# Patient Record
Sex: MALE | Race: BLACK OR AFRICAN AMERICAN | Employment: OTHER | URBAN - METROPOLITAN AREA
[De-identification: names, ages, dates, MRNs, and addresses within clinical notes are randomized per-mention and may not be internally consistent; named-entity substitution may affect disease eponyms.]

---

## 2017-08-14 ENCOUNTER — APPOINTMENT (OUTPATIENT)
Dept: GENERAL RADIOLOGY | Age: 72
DRG: 064 | End: 2017-08-14
Attending: EMERGENCY MEDICINE
Payer: OTHER GOVERNMENT

## 2017-08-14 ENCOUNTER — APPOINTMENT (OUTPATIENT)
Dept: CT IMAGING | Age: 72
DRG: 064 | End: 2017-08-14
Attending: EMERGENCY MEDICINE
Payer: OTHER GOVERNMENT

## 2017-08-14 ENCOUNTER — HOSPITAL ENCOUNTER (INPATIENT)
Age: 72
LOS: 7 days | Discharge: NURSING, REHAB OR LONG-TERM CARE | DRG: 064 | End: 2017-08-21
Attending: EMERGENCY MEDICINE | Admitting: FAMILY MEDICINE
Payer: OTHER GOVERNMENT

## 2017-08-14 DIAGNOSIS — I63.9 ACUTE CVA (CEREBROVASCULAR ACCIDENT) (HCC): Primary | ICD-10-CM

## 2017-08-14 DIAGNOSIS — R53.1 WEAKNESS: ICD-10-CM

## 2017-08-14 PROBLEM — Z95.0 PACEMAKER: Status: ACTIVE | Noted: 2017-08-14

## 2017-08-14 LAB
ALBUMIN SERPL BCP-MCNC: 3.2 G/DL (ref 3.4–5)
ALBUMIN/GLOB SERPL: 0.8 {RATIO} (ref 0.8–1.7)
ALP SERPL-CCNC: 67 U/L (ref 45–117)
ALT SERPL-CCNC: 32 U/L (ref 16–61)
ANION GAP BLD CALC-SCNC: 12 MMOL/L (ref 3–18)
APPEARANCE UR: CLEAR
AST SERPL W P-5'-P-CCNC: 33 U/L (ref 15–37)
BACTERIA URNS QL MICRO: ABNORMAL /HPF
BASOPHILS # BLD AUTO: 0 K/UL (ref 0–0.06)
BASOPHILS # BLD: 0 % (ref 0–2)
BILIRUB SERPL-MCNC: 0.7 MG/DL (ref 0.2–1)
BILIRUB UR QL: ABNORMAL
BUN SERPL-MCNC: 15 MG/DL (ref 7–18)
BUN/CREAT SERPL: 17 (ref 12–20)
CALCIUM SERPL-MCNC: 9.3 MG/DL (ref 8.5–10.1)
CHLORIDE SERPL-SCNC: 97 MMOL/L (ref 100–108)
CK MB CFR SERPL CALC: 0.7 % (ref 0–4)
CK MB SERPL-MCNC: 2.4 NG/ML (ref 5–25)
CK SERPL-CCNC: 352 U/L (ref 39–308)
CO2 SERPL-SCNC: 29 MMOL/L (ref 21–32)
COLOR UR: ABNORMAL
CREAT SERPL-MCNC: 0.86 MG/DL (ref 0.6–1.3)
DIFFERENTIAL METHOD BLD: ABNORMAL
EOSINOPHIL # BLD: 0 K/UL (ref 0–0.4)
EOSINOPHIL NFR BLD: 0 % (ref 0–5)
EPITH CASTS URNS QL MICRO: ABNORMAL /LPF (ref 0–5)
ERYTHROCYTE [DISTWIDTH] IN BLOOD BY AUTOMATED COUNT: 17.5 % (ref 11.6–14.5)
GLOBULIN SER CALC-MCNC: 4 G/DL (ref 2–4)
GLUCOSE BLD STRIP.AUTO-MCNC: 148 MG/DL (ref 70–110)
GLUCOSE SERPL-MCNC: 103 MG/DL (ref 74–99)
GLUCOSE UR STRIP.AUTO-MCNC: NEGATIVE MG/DL
HCT VFR BLD AUTO: 39.8 % (ref 36–48)
HGB BLD-MCNC: 13.2 G/DL (ref 13–16)
HGB UR QL STRIP: ABNORMAL
KETONES UR QL STRIP.AUTO: 80 MG/DL
LEUKOCYTE ESTERASE UR QL STRIP.AUTO: NEGATIVE
LYMPHOCYTES # BLD AUTO: 13 % (ref 21–52)
LYMPHOCYTES # BLD: 0.8 K/UL (ref 0.9–3.6)
MAGNESIUM SERPL-MCNC: 1.8 MG/DL (ref 1.6–2.6)
MCH RBC QN AUTO: 27.3 PG (ref 24–34)
MCHC RBC AUTO-ENTMCNC: 33.2 G/DL (ref 31–37)
MCV RBC AUTO: 82.4 FL (ref 74–97)
MONOCYTES # BLD: 0.8 K/UL (ref 0.05–1.2)
MONOCYTES NFR BLD AUTO: 13 % (ref 3–10)
MUCOUS THREADS URNS QL MICRO: ABNORMAL /LPF
NEUTS SEG # BLD: 4.7 K/UL (ref 1.8–8)
NEUTS SEG NFR BLD AUTO: 74 % (ref 40–73)
NITRITE UR QL STRIP.AUTO: NEGATIVE
PH UR STRIP: 5.5 [PH] (ref 5–8)
PLATELET # BLD AUTO: 175 K/UL (ref 135–420)
PMV BLD AUTO: 9.9 FL (ref 9.2–11.8)
POTASSIUM SERPL-SCNC: 2.8 MMOL/L (ref 3.5–5.5)
PROT SERPL-MCNC: 7.2 G/DL (ref 6.4–8.2)
PROT UR STRIP-MCNC: 300 MG/DL
RBC # BLD AUTO: 4.83 M/UL (ref 4.7–5.5)
SODIUM SERPL-SCNC: 138 MMOL/L (ref 136–145)
SP GR UR REFRACTOMETRY: 1.03 (ref 1–1.03)
TROPONIN I SERPL-MCNC: 0.07 NG/ML (ref 0–0.04)
TSH SERPL DL<=0.05 MIU/L-ACNC: 1.62 UIU/ML (ref 0.36–3.74)
UROBILINOGEN UR QL STRIP.AUTO: 1 EU/DL (ref 0.2–1)
WBC # BLD AUTO: 6.3 K/UL (ref 4.6–13.2)
WBC URNS QL MICRO: ABNORMAL /HPF (ref 0–4)

## 2017-08-14 PROCEDURE — 82550 ASSAY OF CK (CPK): CPT | Performed by: EMERGENCY MEDICINE

## 2017-08-14 PROCEDURE — 71010 XR CHEST PORT: CPT

## 2017-08-14 PROCEDURE — 83735 ASSAY OF MAGNESIUM: CPT | Performed by: EMERGENCY MEDICINE

## 2017-08-14 PROCEDURE — 81001 URINALYSIS AUTO W/SCOPE: CPT | Performed by: EMERGENCY MEDICINE

## 2017-08-14 PROCEDURE — 82962 GLUCOSE BLOOD TEST: CPT

## 2017-08-14 PROCEDURE — 74011000250 HC RX REV CODE- 250: Performed by: FAMILY MEDICINE

## 2017-08-14 PROCEDURE — 85025 COMPLETE CBC W/AUTO DIFF WBC: CPT | Performed by: EMERGENCY MEDICINE

## 2017-08-14 PROCEDURE — 93005 ELECTROCARDIOGRAM TRACING: CPT

## 2017-08-14 PROCEDURE — 74011000258 HC RX REV CODE- 258: Performed by: FAMILY MEDICINE

## 2017-08-14 PROCEDURE — 84443 ASSAY THYROID STIM HORMONE: CPT | Performed by: EMERGENCY MEDICINE

## 2017-08-14 PROCEDURE — 74011250637 HC RX REV CODE- 250/637: Performed by: EMERGENCY MEDICINE

## 2017-08-14 PROCEDURE — 74011250637 HC RX REV CODE- 250/637: Performed by: FAMILY MEDICINE

## 2017-08-14 PROCEDURE — 80053 COMPREHEN METABOLIC PANEL: CPT | Performed by: EMERGENCY MEDICINE

## 2017-08-14 PROCEDURE — 65660000000 HC RM CCU STEPDOWN

## 2017-08-14 PROCEDURE — 74011250637 HC RX REV CODE- 250/637: Performed by: PSYCHIATRY & NEUROLOGY

## 2017-08-14 PROCEDURE — 99285 EMERGENCY DEPT VISIT HI MDM: CPT

## 2017-08-14 PROCEDURE — 74011250636 HC RX REV CODE- 250/636: Performed by: FAMILY MEDICINE

## 2017-08-14 PROCEDURE — 70450 CT HEAD/BRAIN W/O DYE: CPT

## 2017-08-14 RX ORDER — SODIUM CHLORIDE 0.9 % (FLUSH) 0.9 %
5-10 SYRINGE (ML) INJECTION EVERY 8 HOURS
Status: DISCONTINUED | OUTPATIENT
Start: 2017-08-14 | End: 2017-08-21 | Stop reason: HOSPADM

## 2017-08-14 RX ORDER — CLOPIDOGREL BISULFATE 75 MG/1
75 TABLET ORAL DAILY
Status: DISCONTINUED | OUTPATIENT
Start: 2017-08-15 | End: 2017-08-21 | Stop reason: HOSPADM

## 2017-08-14 RX ORDER — ASPIRIN 325 MG
325 TABLET ORAL
Status: COMPLETED | OUTPATIENT
Start: 2017-08-14 | End: 2017-08-14

## 2017-08-14 RX ORDER — METOPROLOL TARTRATE 50 MG/1
TABLET ORAL DAILY
COMMUNITY
End: 2021-01-01

## 2017-08-14 RX ORDER — LANOLIN ALCOHOL/MO/W.PET/CERES
100 CREAM (GRAM) TOPICAL DAILY
Status: DISCONTINUED | OUTPATIENT
Start: 2017-08-14 | End: 2017-08-21 | Stop reason: HOSPADM

## 2017-08-14 RX ORDER — POTASSIUM CHLORIDE 20 MEQ/1
40 TABLET, EXTENDED RELEASE ORAL
Status: COMPLETED | OUTPATIENT
Start: 2017-08-14 | End: 2017-08-14

## 2017-08-14 RX ORDER — HYDROCODONE BITARTRATE AND ACETAMINOPHEN 5; 325 MG/1; MG/1
1 TABLET ORAL
Qty: 20 TAB | Refills: 0 | Status: SHIPPED | OUTPATIENT
Start: 2017-08-14 | End: 2017-08-14

## 2017-08-14 RX ORDER — SODIUM CHLORIDE 0.9 % (FLUSH) 0.9 %
5-10 SYRINGE (ML) INJECTION AS NEEDED
Status: DISCONTINUED | OUTPATIENT
Start: 2017-08-14 | End: 2017-08-21 | Stop reason: HOSPADM

## 2017-08-14 RX ORDER — NIFEDIPINE 60 MG/1
60 TABLET, EXTENDED RELEASE ORAL DAILY
Status: DISCONTINUED | OUTPATIENT
Start: 2017-08-15 | End: 2017-08-16

## 2017-08-14 RX ORDER — TRAZODONE HYDROCHLORIDE 50 MG/1
100 TABLET ORAL
Status: DISCONTINUED | OUTPATIENT
Start: 2017-08-14 | End: 2017-08-17

## 2017-08-14 RX ORDER — IBUPROFEN 200 MG
1 TABLET ORAL DAILY
Status: DISCONTINUED | OUTPATIENT
Start: 2017-08-15 | End: 2017-08-21 | Stop reason: HOSPADM

## 2017-08-14 RX ORDER — OXYCODONE AND ACETAMINOPHEN 5; 325 MG/1; MG/1
0.5 TABLET ORAL
Qty: 6 TAB | Refills: 0 | Status: SHIPPED | OUTPATIENT
Start: 2017-08-14 | End: 2017-08-21

## 2017-08-14 RX ORDER — CLOPIDOGREL BISULFATE 75 MG/1
75 TABLET ORAL DAILY
Status: DISCONTINUED | OUTPATIENT
Start: 2017-08-15 | End: 2017-08-14 | Stop reason: SDUPTHER

## 2017-08-14 RX ADMIN — Medication 100 MG: at 22:19

## 2017-08-14 RX ADMIN — POTASSIUM CHLORIDE: 2 INJECTION, SOLUTION, CONCENTRATE INTRAVENOUS at 21:00

## 2017-08-14 RX ADMIN — DEXTROSE MONOHYDRATE, SODIUM CHLORIDE, AND POTASSIUM CHLORIDE 75 ML/HR: 50; 4.5; .745 INJECTION, SOLUTION INTRAVENOUS at 22:19

## 2017-08-14 RX ADMIN — TRAZODONE HYDROCHLORIDE 100 MG: 50 TABLET ORAL at 22:19

## 2017-08-14 RX ADMIN — POTASSIUM CHLORIDE 40 MEQ: 20 TABLET, EXTENDED RELEASE ORAL at 10:49

## 2017-08-14 RX ADMIN — Medication 10 ML: at 22:19

## 2017-08-14 RX ADMIN — ASPIRIN 325 MG ORAL TABLET 325 MG: 325 PILL ORAL at 18:36

## 2017-08-14 RX ADMIN — POTASSIUM CHLORIDE: 2 INJECTION, SOLUTION, CONCENTRATE INTRAVENOUS at 23:30

## 2017-08-14 RX ADMIN — POTASSIUM CHLORIDE: 2 INJECTION, SOLUTION, CONCENTRATE INTRAVENOUS at 22:19

## 2017-08-14 NOTE — ED PROVIDER NOTES
HPI Comments: 8:33 AM Kartik Kohli is a 71 y.o. male w/ hx of HTN who presents to the ED, via EMS, c/o fatigue. Pt states that the sx have been present for 1-2 weeks. Pt also c/o diarrhea, and weakness. Pt states that the diarrhea started 1 dy ago. Pt is followed by the Thuan Hoffman. [facility] 1 day ago and discharged home. Pt denies fever, chills, cough, rash, abd pain, and voice changes. Pt has no other sx or complaints. Past Medical History:   Diagnosis Date    Hypertension     Other ill-defined conditions     PVD       No past surgical history on file. No family history on file. Social History     Social History    Marital status: SINGLE     Spouse name: N/A    Number of children: N/A    Years of education: N/A     Occupational History    Not on file. Social History Main Topics    Smoking status: Not on file    Smokeless tobacco: Not on file    Alcohol use Not on file    Drug use: Not on file    Sexual activity: Not on file     Other Topics Concern    Not on file     Social History Narrative    No narrative on file         ALLERGIES: Review of patient's allergies indicates no known allergies. Review of Systems   Constitutional: Positive for fatigue. Negative for chills and fever. Respiratory: Negative for cough. Gastrointestinal: Positive for diarrhea. Negative for abdominal pain, nausea and vomiting. Skin: Negative for rash. Neurological: Positive for weakness. All other systems reviewed and are negative. Vitals:    08/14/17 1045 08/14/17 1100 08/14/17 1115 08/14/17 1130   BP: (!) 156/91 147/71 145/80 163/77   Pulse: 88 88 79 91   Resp: 26 20 19 23   Temp:       SpO2: 100% 99% 98% 97%            Physical Exam   Constitutional: He is oriented to person, place, and time. He appears well-developed. HENT:   Head: Normocephalic and atraumatic. Eyes: EOM are normal. Pupils are equal, round, and reactive to light.    Neck: Normal range of motion. Neck supple. Cardiovascular: Normal rate, regular rhythm and normal heart sounds. Exam reveals no friction rub. No murmur heard. Pacemaker left anterior chest wall. Pulmonary/Chest: Effort normal and breath sounds normal. No respiratory distress. He has no wheezes. Abdominal: Soft. He exhibits no distension. There is no tenderness. There is no rebound and no guarding. Musculoskeletal: Normal range of motion. Neurological: He is alert and oriented to person, place, and time. strenght 4/5 right upper and lower   5/5 left   Skin: Skin is warm and dry. Psychiatric: He has a normal mood and affect. His behavior is normal. Thought content normal.        MDM  Number of Diagnoses or Management Options  Acute CVA (cerebrovascular accident) [facility]):   Weakness:   Diagnosis management comments:  EKG: Ventricular paced at 99 with a QRS of 198 and QTC of 436 negative for [place]. 1.  Weakness: 71year-old gentleman presents for about 1-2 weeks of weakness. Denies fever chills cough or infectious symptoms. No cardiac symptoms. Overall general weakness of unclear etiology, will send basic labs and thyroid. cxr napd   TSH wnl    CT noted. Discussed with family status as evidenced by lying down for about the past 2 weeks. He was seen over at the [address] however it was not an ER visit; complaint is geneeral weakness, cva noted on exma. Pt weak, covered in stool, [facility]; [facility]. 2:17 PM still penidng discussion    2:42 PM [facility] states [facility] not [facility]y    3:56 PM d/w Dr Sherlyn Grimm; requests admission to dr Vale Brand. 4:12 PM d/w dr Nico Benavidez; will admit ; Patient's covered in his own feces at home. Unable to walk well. Strength is subacute but he cannot get the outpatient workup [facility]    D/w Neuro.   Dr Zaheer Preciado - will consult     ED Course       Procedures                       Scribe Attestation:   Angela Chavez am scribing for and in the presence of Muna Barnhart MD on this day 08/14/17 at 8:32 AM   aaron Ordoñez    Provider Attestation:  I personally performed the services described in the documentation, reviewed the documentation, as recorded by the scribe in my presence, and it accurately and completely records my words and actions.   Muna Barnhart MD. 8:32 AM      Signed by: Aaron Ordoñez, 8:32 AM

## 2017-08-14 NOTE — H&P
History and Physical    Patient: Dorian Carmichael MRN: [id number]  SSN: [id number]    YOB: 1945  Age: 71 y.o. Sex: male      Subjective:      Dorian Carmichael is a 71 y.o. male who was brought to the ER because noted to have acute left arm and leg weakness. Patient mainly complaining of being hungry. Patient denies history of CVA. Will admit for acute left arm and leg weakness c/w CVA. Past Medical History:   Diagnosis Date    Hypertension     Other ill-defined conditions     PVD     No past surgical history on file. No family history on file. Social History   Substance Use Topics    Smoking status: Not on file    Smokeless tobacco: Not on file    Alcohol use Not on file      Prior to Admission medications    Medication Sig Start Date End Date Taking? Authorizing Provider   oxyCODONE-acetaminophen (PERCOCET) 5-325 mg per tablet Take 0.5 Tabs by mouth every four (4) hours as needed for Pain. Max Daily Amount: 3 Tabs. 8/14/17  Yes Lilliana Guzman MD   NIFEdipine CR (ADALAT CC) 60 mg CR tablet Take 60 mg by mouth daily. Historical Provider   cholecalciferol, vitamin D3, (VITAMIN D3) 2,000 unit Tab Take  by mouth. Historical Provider   ascorbic acid (VITAMIN C) 250 mg tablet Take  by mouth. Historical Provider   etodolac (LODINE) 400 mg tablet Take 400 mg by mouth two (2) times daily as needed. Historical Provider   sildenafil citrate (VIAGRA) 100 mg tablet Take 100 mg by mouth as needed. Historical Provider   traZODone (DESYREL) 100 mg tablet Take 100 mg by mouth nightly. Historical Provider   clopidogrel (PLAVIX) 75 mg tablet Take 1 Tab by mouth daily. 12/5/12   Rena Muniz MD        No Known Allergies    Review of Systems:  Review of systems not obtained due to patient factors.     Objective:     Vitals:    08/14/17 1115 08/14/17 1130 08/14/17 1600 08/14/17 1815   BP: 145/80 163/77 135/77 130/77   Pulse: 79 91 70 71   Resp: 19 23 11    Temp:   98.7 °F (37.1 °C) SpO2: 98% 97% 99% 98%        Physical Exam:  GENERAL: alert, no distress, slowed mentation, appears older than stated age  LUNG: clear to auscultation bilaterally  HEART: regular rate and rhythm  paced  ABDOMEN: soft, non-tender. Bowel sounds normal. No masses,  no organomegaly  EXTREMITIES:  extremities normal, atraumatic, no cyanosis or edema  NEUROLOGIC: positive findings: muscular weakness left arm and leg    Assessment:     Hospital Problems  Date Reviewed: 8/14/2017          Codes Class Noted POA    CVA (cerebral vascular accident) [facility]) ICD-10-CM: I63.9  ICD-9-CM: 434.91  8/14/2017 Unknown        Pacemaker ICD-10-CM: Z95.0  ICD-9-CM: V45.01  8/14/2017 Unknown              Plan:     Admit  CVA protocol. Cannot do MRI due to pacemaker. Potassium replacement.     Signed By: Pee Duenas MD     August 14, 2017

## 2017-08-14 NOTE — Clinical Note
Status[de-identified] Inpatient [101] Type of Bed: Neuro/Stroke [9] Inpatient Hospitalization Certified Necessary for the Following Reasons: 3. Patient receiving treatment that can only be provided in an inpatient setting (further clarification in H&P documentation) Admitting Diagnosis: CVA (cerebral vascular accident) [facility]) [[id number]] Admitting Physician: Khanh Johnson Attending Physician: Khanh Johnson Estimated Length of Stay: 2 Midnights Discharge Plan[de-identified] Home with Office Follow-up

## 2017-08-14 NOTE — ED NOTES
Pt received from [facility]. Pt confused with daughter at side. Pt waiting for disposition. Pt with left sided weakness since yesterday per Dr. Von Yu & family. Pt follows commands.

## 2017-08-14 NOTE — ED NOTES
Daughter at bedside, Pt provided with urinal to void, updated on plan of care. Pt stable at this time, provided with warm blankets.

## 2017-08-14 NOTE — ED TRIAGE NOTES
PT arrived via medic, c/o not feeling well for a few weeks, was taken to [facility] yesterday per medics patient's daughter stated [facility] made him leave because he doesn't have the right benefits\", patient covered in stool, medics report patient's home is covered in stool

## 2017-08-15 ENCOUNTER — APPOINTMENT (OUTPATIENT)
Dept: CT IMAGING | Age: 72
DRG: 064 | End: 2017-08-15
Attending: PSYCHIATRY & NEUROLOGY
Payer: OTHER GOVERNMENT

## 2017-08-15 LAB
ANION GAP BLD CALC-SCNC: 10 MMOL/L (ref 3–18)
ATRIAL RATE: 107 BPM
ATRIAL RATE: 115 BPM
BUN SERPL-MCNC: 11 MG/DL (ref 7–18)
BUN/CREAT SERPL: 17 (ref 12–20)
CALCIUM SERPL-MCNC: 8.5 MG/DL (ref 8.5–10.1)
CALCULATED R AXIS, ECG10: -69 DEGREES
CALCULATED R AXIS, ECG10: -77 DEGREES
CALCULATED T AXIS, ECG11: 100 DEGREES
CALCULATED T AXIS, ECG11: 99 DEGREES
CHLORIDE SERPL-SCNC: 102 MMOL/L (ref 100–108)
CHOLEST SERPL-MCNC: 185 MG/DL
CO2 SERPL-SCNC: 28 MMOL/L (ref 21–32)
CREAT SERPL-MCNC: 0.66 MG/DL (ref 0.6–1.3)
DIAGNOSIS, 93000: NORMAL
DIAGNOSIS, 93000: NORMAL
ERYTHROCYTE [SEDIMENTATION RATE] IN BLOOD: 23 MM/HR (ref 0–20)
EST. AVERAGE GLUCOSE BLD GHB EST-MCNC: 97 MG/DL
GLUCOSE BLD STRIP.AUTO-MCNC: 114 MG/DL (ref 70–110)
GLUCOSE BLD STRIP.AUTO-MCNC: 129 MG/DL (ref 70–110)
GLUCOSE BLD STRIP.AUTO-MCNC: 135 MG/DL (ref 70–110)
GLUCOSE BLD STRIP.AUTO-MCNC: 164 MG/DL (ref 70–110)
GLUCOSE SERPL-MCNC: 84 MG/DL (ref 74–99)
HBA1C MFR BLD: 5 % (ref 4.2–5.6)
HDLC SERPL-MCNC: 81 MG/DL (ref 40–60)
HDLC SERPL: 2.3 {RATIO} (ref 0–5)
LDLC SERPL CALC-MCNC: 84.2 MG/DL (ref 0–100)
LIPID PROFILE,FLP: ABNORMAL
POTASSIUM SERPL-SCNC: 3 MMOL/L (ref 3.5–5.5)
Q-T INTERVAL, ECG07: 418 MS
Q-T INTERVAL, ECG07: 476 MS
QRS DURATION, ECG06: 190 MS
QRS DURATION, ECG06: 198 MS
QTC CALCULATION (BEZET), ECG08: 531 MS
QTC CALCULATION (BEZET), ECG08: 536 MS
SODIUM SERPL-SCNC: 140 MMOL/L (ref 136–145)
TRIGL SERPL-MCNC: 99 MG/DL (ref ?–150)
VENTRICULAR RATE, ECG03: 75 BPM
VENTRICULAR RATE, ECG03: 99 BPM
VLDLC SERPL CALC-MCNC: 19.8 MG/DL

## 2017-08-15 PROCEDURE — 65660000000 HC RM CCU STEPDOWN

## 2017-08-15 PROCEDURE — 83036 HEMOGLOBIN GLYCOSYLATED A1C: CPT | Performed by: FAMILY MEDICINE

## 2017-08-15 PROCEDURE — 74011250636 HC RX REV CODE- 250/636: Performed by: PSYCHIATRY & NEUROLOGY

## 2017-08-15 PROCEDURE — 97161 PT EVAL LOW COMPLEX 20 MIN: CPT

## 2017-08-15 PROCEDURE — 74011250636 HC RX REV CODE- 250/636: Performed by: FAMILY MEDICINE

## 2017-08-15 PROCEDURE — 93306 TTE W/DOPPLER COMPLETE: CPT

## 2017-08-15 PROCEDURE — 74011250637 HC RX REV CODE- 250/637: Performed by: PSYCHIATRY & NEUROLOGY

## 2017-08-15 PROCEDURE — 92526 ORAL FUNCTION THERAPY: CPT

## 2017-08-15 PROCEDURE — 85652 RBC SED RATE AUTOMATED: CPT | Performed by: FAMILY MEDICINE

## 2017-08-15 PROCEDURE — 74011000258 HC RX REV CODE- 258: Performed by: FAMILY MEDICINE

## 2017-08-15 PROCEDURE — 80048 BASIC METABOLIC PNL TOTAL CA: CPT | Performed by: FAMILY MEDICINE

## 2017-08-15 PROCEDURE — 74011636320 HC RX REV CODE- 636/320: Performed by: FAMILY MEDICINE

## 2017-08-15 PROCEDURE — 74011250637 HC RX REV CODE- 250/637: Performed by: FAMILY MEDICINE

## 2017-08-15 PROCEDURE — 92610 EVALUATE SWALLOWING FUNCTION: CPT

## 2017-08-15 PROCEDURE — 82962 GLUCOSE BLOOD TEST: CPT

## 2017-08-15 PROCEDURE — 70496 CT ANGIOGRAPHY HEAD: CPT

## 2017-08-15 PROCEDURE — 74011000250 HC RX REV CODE- 250: Performed by: FAMILY MEDICINE

## 2017-08-15 PROCEDURE — 36415 COLL VENOUS BLD VENIPUNCTURE: CPT | Performed by: FAMILY MEDICINE

## 2017-08-15 PROCEDURE — 93005 ELECTROCARDIOGRAM TRACING: CPT

## 2017-08-15 PROCEDURE — 80061 LIPID PANEL: CPT | Performed by: FAMILY MEDICINE

## 2017-08-15 RX ORDER — DEXTROSE MONOHYDRATE AND SODIUM CHLORIDE 5; .45 G/100ML; G/100ML
50 INJECTION, SOLUTION INTRAVENOUS CONTINUOUS
Status: DISCONTINUED | OUTPATIENT
Start: 2017-08-15 | End: 2017-08-16

## 2017-08-15 RX ORDER — SODIUM CHLORIDE 9 MG/ML
10 INJECTION INTRAMUSCULAR; INTRAVENOUS; SUBCUTANEOUS
Status: COMPLETED | OUTPATIENT
Start: 2017-08-15 | End: 2017-08-15

## 2017-08-15 RX ORDER — LEVETIRACETAM 10 MG/ML
1000 INJECTION INTRAVASCULAR EVERY 12 HOURS
Status: DISCONTINUED | OUTPATIENT
Start: 2017-08-15 | End: 2017-08-21

## 2017-08-15 RX ADMIN — Medication 10 ML: at 13:00

## 2017-08-15 RX ADMIN — LEVETIRACETAM 1000 MG: 10 INJECTION INTRAVENOUS at 22:23

## 2017-08-15 RX ADMIN — CLOPIDOGREL BISULFATE 75 MG: 75 TABLET ORAL at 09:19

## 2017-08-15 RX ADMIN — LIDOCAINE HYDROCHLORIDE: 10 INJECTION, SOLUTION EPIDURAL; INFILTRATION; INTRACAUDAL; PERINEURAL at 17:24

## 2017-08-15 RX ADMIN — Medication 10 ML: at 23:10

## 2017-08-15 RX ADMIN — LEVETIRACETAM 1000 MG: 10 INJECTION INTRAVENOUS at 10:42

## 2017-08-15 RX ADMIN — NIFEDIPINE 60 MG: 60 TABLET, FILM COATED, EXTENDED RELEASE ORAL at 09:19

## 2017-08-15 RX ADMIN — IOPAMIDOL 100 ML: 755 INJECTION, SOLUTION INTRAVENOUS at 03:50

## 2017-08-15 RX ADMIN — Medication 10 ML: at 06:35

## 2017-08-15 RX ADMIN — SODIUM CHLORIDE 10 ML: 9 INJECTION INTRAMUSCULAR; INTRAVENOUS; SUBCUTANEOUS at 08:59

## 2017-08-15 RX ADMIN — DEXTROSE MONOHYDRATE AND SODIUM CHLORIDE 50 ML/HR: 5; .45 INJECTION, SOLUTION INTRAVENOUS at 15:28

## 2017-08-15 RX ADMIN — TRAZODONE HYDROCHLORIDE 100 MG: 50 TABLET ORAL at 22:23

## 2017-08-15 NOTE — PROGRESS NOTES
Patient was seen last night in emergency room. My phone dictated note is not available for review at this time. Patient has had a stroke. Reviewed patient's progress today. At 9:30 this morning the patient had a 15 second brief transient episode of focal seizure activity to the left. Blood glucose was 185. Oxygen saturation is adequate. Spontaneously resolved. Patient reports a little insight into the event. Temp: 97.7 °F (36.5 °C) (08/15/17 0940) Pulse (Heart Rate): 82 (08/15/17 0940) Resp Rate: 18 (08/15/17 0940) BP: 125/60 (08/15/17 0940) O2 Sat (%): 98 % (08/15/17 0940) Weight: 65 kg (143 lb 4.8 oz) (08/15/17 3334)     Exam shows a patient who's actually a little more awake and alert than he was yesterday. Dense recess with second to the left. He is not having seizure activity at this time. He is able to recognize who I am, remembered my name, and is able to register and recalled to some degree. No evidence of alcohol withdrawal.    Assessment: patient was stroke. Question if the patient has carotid disease. CT angiogram pending. Just focal seizure is most assuredly second to his stroke. Will initiate keppra  No need for EEG.     Monitor for alcohol withdrawal.

## 2017-08-15 NOTE — PROGRESS NOTES
Occupational Therapy Note:  Orders received, chart reviewed and this patient had a seizure this morning. OT evaluation attempted and this patient is unable to attend to tasks or conversation. Will f/u as appropriate for this patient. His daughter is present and is in agreement with this plan. She was instructed on role of OT.  Ching Abbott, SUSHMAR/L

## 2017-08-15 NOTE — PROGRESS NOTES
[facility] Stroke Education Booklet and Stroke Education provided to patient and relative(s) and the following topics were discussed    1. Patients personal risk factors for stroke are hypertension, smoking, family history and hyperlipidemia    2. Warning signs of Stroke:        * Sudden numbness or weakness of the face, arm or leg, especially on one side of          The body            * Sudden confusion, trouble speaking or understanding        * Sudden trouble seeing in one or both eyes        * Sudden trouble walking, dizziness, loss of balance or coordination        * Sudden severe headache with no known cause      3. Importance of activation Emergency Medical Services ( 9-1-1 ) immediately if  you experience any warning signs of stroke. 4. Be sure and schedule a follow-up appointment with your primary care doctor or any specialists as instructed. 5. You must take medicine every day to treat your risk factors for stroke. Be sure to take your medicines exactly as your doctor tells you: no more, no less. Know what your medicines are for , what they do. Anti-thrombotics /anticoagulants can help prevent strokes. You are taking the following medicine(s)  See MAR     6. Smoking and second-hand smoke greatly increase your risk of stroke, cardiovascular disease and death.  Smoking history cigarettes, 5-6 per day

## 2017-08-15 NOTE — PROGRESS NOTES
08/15/17 0940   Vitals   Temp 97.7 °F (36.5 °C)   Temp Source Axillary   Pulse (Heart Rate) 82   Heart Rate Source Monitor   Resp Rate 18   O2 Sat (%) 98 %   Level of Consciousness Alert   /60   MAP (Calculated) 82   BP 1 Location Left arm   BP 1 Method Automatic   BP Patient Position At rest   Per physical therapy, at 0930 patient was sitting on side of bed, left side began twitching and legs became rigid. Patient states left arm was tingling. Physical therapy placed patient on left side and states seizure activity lasted 30-45 seconds. Vital signs obtained, Dr. Leeanne Goldberg paged. Neurology Dr. Alejo Spencer at bedside and evaluated patient. New orders to follow. Patient resting comfortably in bed with call bell in reach, bed in low position, and bed alarm on. Daughter at bedside.

## 2017-08-15 NOTE — PROGRESS NOTES
NUTRITION    Nursing Referral: [facility]      RECOMMENDATIONS / PLAN:     - Add supplement: Ensure Enlive BID  - Continue RD inpatient monitoring and evaluation. NUTRITION INTERVENTIONS & DIAGNOSIS:     [x] Meals/Snacks: modified diet  [x] Medical food supplementation: add     Nutrition Diagnosis:  Unintentional weight loss related to inadequate energy intake as evidenced by wt loss 22 lb (13.3%) x 2 year PTA    ASSESSMENT:     Pt had good appetite and meal intake PTA per daughter report. Fair meal intake today. Had unplanned wt loss PTA. Discussed adding nutrition supplement; daughter agreed with plan    Average po intake adequate to meet patients estimated nutritional needs:   [] Yes     [] No   [x] Unable to determine at this time    Diet: DIET DYSPHAGIA MECH ALTERED (NDD2)      Food Allergies:  None known   Current Appetite:   [] Good     [x] Fair     [] Poor     [] Other:  Appetite/meal intake prior to admission:   [x] Good (per daughter)     [] Fair     [] Poor     [] Other:  Feeding Limitations:  [x] Swallowing difficulty: SLP following    [] Chewing difficulty    [] Other:  Current Meal Intake: No data found. BM:  8/14  Skin Integrity:  Previous wound lower back  Edema:  None   Pertinent Medications: Reviewed    Recent Labs      08/15/17   0308  08/14/17   0845   NA  140  138   K  3.0*  2.8*   CL  102  97*   CO2  28  29   GLU  84  103*   BUN  11  15   CREA  0.66  0.86   CA  8.5  9.3   MG   --   1.8   ALB   --   3.2*   SGOT   --   33   ALT   --   32       Intake/Output Summary (Last 24 hours) at 08/15/17 1447  Last data filed at 08/15/17 1042   Gross per 24 hour   Intake             1110 ml   Output                0 ml   Net             1110 ml       Anthropometrics:  Ht Readings from Last 1 Encounters:   08/15/17 6' (1.829 m)     Last 3 Recorded Weights in this Encounter    08/15/17 0625   Weight: 65 kg (143 lb 4.8 oz)     Body mass index is 19.43 kg/(m^2).           Weight History:  Pt had unplanned wt loss PTA per daughter report; 22 lb (13.3%) wt loss x 2 years PTA per chart    Weight Metrics 8/15/2017 9/11/2015 12/5/2012   Weight 143 lb 4.8 oz 165 lb 6.4 oz 173 lb   BMI 19.43 kg/m2 22.43 kg/m2 23.46 kg/m2        Admitting Diagnosis: CVA (cerebral vascular accident) ([facility] [address]  CVA (cerebral vascular accident) ([facility] [address]  Pertinent PMHx: HTN    Education Needs:        [x] None identified  [] Identified - Not appropriate at this time  []  Identified and addressed - refer to education log  Learning Limitations:   [] None identified  [x] Identified: appears confused at times during visit    Cultural, Islam & ethnic food preferences:  [x] None identified    [] Identified and addressed     ESTIMATED NUTRITION NEEDS:     Calories: 3019-6596 kcal (MSJx1.2-1.3) based on  [x] Actual BW: 81 kg      [] IBW   Protein: 65-81 gm (0.8-1 gm/kg) based on  [x] Actual BW      [] IBW   Fluid: 1 mL/kcal     MONITORING & EVALUATION:     Nutrition Goal(s):   1. Po intake of meals will meet >75% of patient estimated nutritional needs within the next 7 days.   Outcome:  [] Met/Ongoing    []  Not Met    [x] New/Initial Goal     Monitoring:   [x] Diet tolerance   [x] Meal intake   [x] Supplement intake   [] GI symptoms/ability to tolerate po diet   [] Respiratory status   [] Plan of care      Previous Recommendations (for follow-up assessments only):     []   Implemented       []   Not Implemented (RD to address)     [] No Recommendation Made     Discharge Planning: cardiac diet; consistency per SLP   [x] Participated in care planning, discharge planning, & interdisciplinary rounds as appropriate      Nahomy Young, [address] [place] [address]   Pager: [phone]

## 2017-08-15 NOTE — CDMP QUERY
Please clarify if this patient is being treated/managed for:    =>  left sided   hemiplegia  in setting of new CVA   with PT, OT ordered    =>Other Explanation of clinical findings  =>Unable to Determine (no explanation of clinical findings)    The medical record reflects the following:    Risk:  new stroke ; elderly     Clinical Indicators:  left sided  weakness noted on admit  to LUE  and LLE    Treatment:   PT. OT  ordered.   neuro consult     Thank you,    Buster Morrison RN   CCDS

## 2017-08-15 NOTE — PROGRESS NOTES
SLP evaluation completed; full report to follow. Recommend change diet to [facility] soft, thin liquids with meds as tolerated. Only feed when alert. D/w pt, dtr and RN.       Thank you for this referral,   Elizabeth Palmer M.S., [id number] [facility]  Speech-Language Pathologist

## 2017-08-15 NOTE — ROUTINE PROCESS
TRANSFER - OUT REPORT:    Verbal report given to Leigh NEGRO(name) on Cherelle Peres  being transferred to [facility](unit) for routine progression of care       Report consisted of patients Situation, Background, Assessment and   Recommendations(SBAR). Information from the following report(s) SBAR, ED Summary and MAR was reviewed with the receiving nurse. Lines:   Peripheral IV 08/14/17 Left Forearm (Active)   Site Assessment Clean, dry, & intact 8/14/2017  8:53 AM   Phlebitis Assessment 0 8/14/2017  8:53 AM   Infiltration Assessment 0 8/14/2017  8:53 AM   Dressing Status Clean, dry, & intact 8/14/2017  8:53 AM   Dressing Type Transparent 8/14/2017  8:53 AM        Opportunity for questions and clarification was provided.       Patient transported with:   Robert

## 2017-08-15 NOTE — PROGRESS NOTES
Problem: Dysphagia (Adult)  Goal: *Acute Goals and Plan of Care (Insert Text)  Patient will:  1. Tolerate PO trials with 0 s/s overt distress in 4/5 trials  2. Utilize compensatory swallow strategies/maneuvers (decrease bite/sip, size/rate, alt. liq/sol) with min cues in 4/5 trials  3. Perform oral-motor/laryngeal exercises to increase oropharyngeal swallow function with min cues  4. Complete an objective swallow study (i.e., MBSS) to assess swallow integrity, r/o aspiration, and determine of safest LRD, min A as indicated/ordered by MD     Recommend:   Mech soft, thin liquids  Meds as tolerated  ONLY FEED WHEN ALERT  Assist with all intake   Aspiration precautions  HOB >45 degrees during all intake and for at least 30 min after po   Small bites/sips, slow rate of intake, alternating bites/sips  Oral care post meals   SPEECH LANGUAGE PATHOLOGY BEDSIDE SWALLOW EVALUATION/TREATMENT     Patient: Becky Willis (38 y.o. male)  Date: 8/15/2017  Primary Diagnosis: CVA (cerebral vascular accident) [facility])  CVA (cerebral vascular accident) ([facility] [address]  Precautions: Aspiration         ASSESSMENT :  Based on the objective data described below, the patient presents with mild-mod oral dysphagia complicated by lethargy/post-ictal state. Pt drowsy, oriented to person only with decreased attention/concentration and poor safety awareness. Oral mech examination revealed R lingual droop with reduced labial seal resulting in anterior loss of saliva on R side. Voice breathy with low vocal intensity noted. Speech moderately dysarthric. Pt tolerating thin liquids + straw via single sips and successive swallows with timely swallow initiation and adequate laryngeal elevation to palpation. Pt demo incomplete bolus manipulation with regular solids with >50% of bolus remaining on anterior portion of tongue, not cleared with liquid wash and max cues. Pt demo improved tolerance of mech soft consistency trials.   No overt s/sx aspiration noted across po intake. Recommend mech soft, thin liquids with use of the above mentioned compensatory strategies listed above. Dtr present, educated following evaluation (see below). D/w RN. ST will continue to follow for further dysphagia management. Patient will benefit from skilled intervention to address the above impairments. Patients rehabilitation potential is considered to be Fair  Factors which may influence rehabilitation potential include:   [ ]            None noted  [X]            Mental ability/status  [X]            Medical condition  [ ]            Home/family situation and support systems  [X]            Safety awareness  [ ]            Pain tolerance/management  [ ]            Other:        PLAN :  Recommendations and Planned Interventions:  As above   May benefit from speech-lang evaluation as appropriate   Frequency/Duration: Patient will be followed by speech-language pathology 1-2 times per day/4-7 days per week to address goals. Discharge Recommendations: To Be Determined       SUBJECTIVE:   Patient stated I want my phone. OBJECTIVE:       Past Medical History:   Diagnosis Date    Hypertension      Other ill-defined conditions       PVD   History reviewed. No pertinent surgical history.   Prior Level of Function/Home Situation:  Home Situation  Home Environment: Apartment  # Steps to Enter: 0  One/Two Story Residence: One story  Living Alone: Yes  Support Systems: Child(keegan), /, Family member(s), Friends \ neighbors  Patient Expects to be Discharged to[de-identified] Apartment  Current DME Used/Available at Home: Shelley Chavez Wheelchair  Diet prior to admission: Family reports regular diet   Current Diet:  Soft solids, recommend mech soft, thin liquids    Cognitive and Communication Status:  Neurologic State: Drowsy, Confused  Orientation Level: Oriented to person  Cognition: Decreased command following, Decreased attention/concentration  Oral Assessment:  Oral Assessment  Labial: Right droop  Dentition: Natural  Oral Hygiene: Fair  Lingual: Decreased strength;Decreased rate; Incoordinated  Velum: Unable to visualize  Mandible: Restricted  P.O. Trials:  Patient Position: [facility] at [facility]  Vocal quality prior to P.O.: Breathy;Low volume  Consistency Presented: Thin liquid; Solid;Mechanical soft;Puree  How Presented: SLP-fed/presented;Spoon;Cup/sip;Straw;Successive swallows  Bolus Acceptance: No impairment  Bolus Formation/Control: Impaired  Type of Impairment: Mastication; Incomplete (With regular solids)  Propulsion: Discoordination;Delayed (# of seconds)  Oral Residue: Greater than 50% of bolus (With regular solids)  Initiation of Swallow: No impairment  Laryngeal Elevation: Functional  Aspiration Signs/Symptoms: None  Pharyngeal Phase Characteristics: No impairment, issues, or problems   Effective Modifications: Small sips and bites; Alternate liquids/solids  Cues for Modifications: Maximal  Oral Phase Severity: Mild-moderate  Pharyngeal Phase Severity : No impairment     GCODESwallowing:  Swallow Current Status CJ= 20-39%   Swallow Goal Status CI= 1-19%     The severity rating is based on the following outcomes:             Clinical Judgment     Pain:  No pain reported prior to or following evaluation     Treatment Performed Following evaluation:  Training and education completed following evaluation with dtr including aspiration s/sx, aspiration risk post CVA, diet recs, comp strategies, oral care and positioning. Pt's dtr able to verbalize understanding. Will continue to follow.        After treatment:   [ ]            Patient left in no apparent distress sitting up in chair  [X]            Patient left in no apparent distress in bed  [X]            Call bell left within reach  [X]            Nursing notified  [X]            Caregiver present  [ ]            Bed alarm activated      COMMUNICATION/EDUCATION:   [X]            SLP educated pt with regard to compensatory swallow strategies and                  aspiration/reflux precautions including: small bites/sips,                  alternate liquids/solids, decrease feeding rate, HOB > 45 with all po, and upright in bed at 30 degrees after po for at least 45 minutes. [X]            Patient/family have participated as able in goal setting and plan of care. [ ]            Patient/family agree to work toward stated goals and plan of care. [ ]            Patient understands intent and goals of therapy; neutral about participation. [ ]            Patient is unable to participate in goal setting and plan of care.      Thank you for this referral.  Umberto Brunner, M.S., [id number] [facility]  Speech-Language Pathologist

## 2017-08-15 NOTE — PROGRESS NOTES
RN called to to patients room by daughter. Daughter states patient having another seizure. Left facial and arm twitching, lasting 30 seconds. Dr. Alton Trejo at bedside. Patient started on IV keppra. Will continue to monitor.

## 2017-08-15 NOTE — CONSULTS
[address]    Name:  Mkie Lorenz  MR#:  [id number]  :  1945  Account #:  [de-identified]  Date of Adm:  2017  Date of Consultation:  2017      REASON FOR CONSULTATION: Evaluate for possible stroke. HISTORY OF PRESENT ILLNESS: The patient arrives for \"not feeling  well\". Patient found covered in stool. He has been  doing poorly for at least 1 week. Also having diarrhea for 1 day. The  patient was found to have paresis. This is on the left. A CT scan was  performed and I have received this film, which shows evidence of  multiple areas in the right parieto-occipital area, occipital temporal  area, and the basal ganglia, stroke. Has the appearance of a subacute  stroke, no hemorrhage. CURRENT MEDICATIONS:  1. Plavix. 2. Aspirin. 3. Nifedipine. 4. Trazodone. 5. I have initiated the patient on thiamine as he does have a history of  heavy alcohol use and he has gone in withdrawals in the past. Please  be aware of this. He does smoke tobacco. He requests a nicotine patch. He has a pacemaker. No contributing family history. Has a history of hypertension. The patient reports that he is dehydrated and hungry. Otherwise, a 10-  system review of systems is normal.    PHYSICAL EXAMINATION  VITAL SIGNS: Blood pressure currently 150/60. Saturating well. Pulse  is 82. It is irregular. Patient without fever. GENERAL: The patient is  awake and alert. NEUROLOGIC: Neglect to the left both visually as well as with cortical  stimulation loss. Arm greater than leg. Paresis on the left clear. Can lift  against gravity. Does not give good resistance in the arm; he has at  least 3, perhaps 4 power in the leg. He is hyperreflexive to the left with  upward going toe. Clear visual field loss to the left. A homonymous  hemianopsia is seen. HEART: Irregular. ASSESSMENT:  1. Patient with stroke. We need to evaluate vasculature.  Very well  could be artery to artery embolization stroke, given the appearance of  the CT. Cannot have MRI as has a pacemaker. A CT angiogram is  ordered. 2. Nicotine patch. 3. ETOH dependence. Initiate thiamine. Be aware that the patient  has gone  into withdrawal in the past by his report. 4. PT, OT, and speech would be warranted. 5. Noted that the patient is on aspirin and Plavix. I do not believe  the CHANCE trial is applicable here as the patient's NIH stroke scale is  clearly above 4. I will pick one or the other. I will not use dual  antiplatelet therapy in this the patient. Stroke coordinator consulted. We will follow. WILLIAM L. Lamonte Bumpers, MD Melburn Pica / Ricco Apodaca  D:  08/14/2017   20:24  T:  08/14/2017   21:39  Job #:  [id number]

## 2017-08-15 NOTE — PROGRESS NOTES
Problem: Mobility Impaired (Adult and Pediatric)  Goal: *Acute Goals and Plan of Care (Insert Text)  Physical Therapy Goals  Initiated 8/15/2017 and to be accomplished within 7 day(s)  1. Patient will move from supine to sit and sit to supine , scoot up and down and roll side to side in bed with minimal assistance. 2. Patient will transfer from bed to chair and chair to bed with contact guard assist using the least restrictive device. 3. Patient will perform sit to stand with minimal assistance. 4. Patient will ambulate with minimal assistance for >25 feet with the least restrictive device. 5. Patient will perform BLE HEP in order to improve strength for carryover into functional tasks. PHYSICAL THERAPY EVALUATION     Patient: Susan Sarabia (89 y.o. male)  Date: 8/15/2017  Primary Diagnosis: CVA (cerebral vascular accident) [facility])  CVA (cerebral vascular accident) [facility])        Precautions: Fall, Seizure       ASSESSMENT :  Pt presents today alert and agreeable to therapy and was supine in bed. Pt oriented to self, time, and place with Min VC's. Pt transferred sup to sit with ModA and additional time. Pt sat EOB with fair to poor balance, and demonstrated lean towards left; pt given VC's to self correct balance and required tactile cues at R shoulder to improve posture. Pt reports he can tell he is off balance. Pt also experienced 2 LOB posteriorly requiring righting by PT. Pt then performed objective assessment sitting up in bed. Pt then reported his L arm felt like it was tingling and apprx 3 seconds later, pt LUE began twitching, then progressed to full body with BLE rigidity; at this point pt actively seizing and was assited with Total Assist onto his side. RN notified and episode lasted apprx 30-45sec. Pt then stopped seizing and was able state his name and date of birth. Session terminated at this time and RN left in room with patient to continue appropriate care.  Pt demonstrates decreased strength, mobility, and endurance and will benefit from skilled intervention to address the above impairments. Patients rehabilitation potential is considered to be Fair  Factors which may influence rehabilitation potential include:   [ ]         None noted  [X]         Mental ability/status  [X]         Medical condition  [X]         Home/family situation and support systems  [X]         Safety awareness  [X]         Pain tolerance/management  [ ]         Other:        PLAN :  Recommendations and Planned Interventions:  [X]           Bed Mobility Training             [X]    Neuromuscular Re-Education  [X]           Transfer Training                   [ ]    Orthotic/Prosthetic Training  [X]           Gait Training                          [ ]    Modalities  [X]           Therapeutic Exercises          [ ]    Edema Management/Control  [X]           Therapeutic Activities            [X]    Patient and Family Training/Education  [ ]           Other (comment):     Frequency/Duration: Patient will be followed by physical therapy 1-2 times per day/4-7 days per week to address goals. Discharge Recommendations: TBD  Further Equipment Recommendations for Discharge: N/A       G-CODES      Mobility  Current  CJ= 20-39%   Goal  CJ= 20-39%. The severity rating is based on the Level of Assistance required for Functional Mobility and ADLs.            G-CODES      Eval Complexity: History: MEDIUM  Complexity : 1-2 comorbidities / personal factors will impact the outcome/ POC Exam:MEDIUM Complexity : 3 Standardized tests and measures addressing body structure, function, activity limitation and / or participation in recreation  Presentation: LOW Complexity : Stable, uncomplicated  Clinical Decision Making:Low Complexity   Overall Complexity:LOW       SUBJECTIVE:   Patient stated I'm tired.       OBJECTIVE DATA SUMMARY:       Past Medical History:   Diagnosis Date    Hypertension      Other ill-defined conditions       PVD History reviewed. No pertinent surgical history. Prior Level of Function/Home Situation: Pt poor historian and unable to report who he lives with. Pt reports he was able to walk with a walker and that he has a WC at home that his family/friends assist in pushing his WC. Home Situation  Home Environment: Apartment  # Steps to Enter: 0  One/Two Story Residence: One story  Living Alone: Yes  Support Systems: Child(keegan), /, Family member(s), Friends \ neighbors  Patient Expects to be Discharged to[de-identified] Apartment  Current DME Used/Available at Home: Ariella Handrajni, Wheelchair  Critical Behavior:  Neurologic State: Alert;Eyes open spontaneously  Orientation Level: Oriented to person;Oriented to place;Oriented to time;Disoriented to situation  Cognition: Follows commands;Decreased attention/concentration      Strength:    Strength: Generally decreased, functional (LLE diminished > RLE)   Tone & Sensation:    Sensation: Intact (BLE to LT)   Range Of Motion:  AROM: Generally decreased, functional (BLE)   Functional Mobility:  Bed Mobility:   Supine to Sit: Moderate assistance  Sit to Supine: Total assistance (as pt actively seizing); pt assisted to sidelying and not positioned supine due to seizure  Scooting: Minimum assistance  Balance:   Sitting: Impaired; With support  Sitting - Static: Fair (occasional); Poor (constant support)  Sitting - Dynamic: Poor (constant support)    Pain:  Pt reports 0/10 pain or discomfort prior to treatment. Pt reports 0/10 pain or discomfort post treatment. Activity Tolerance:   Pt demonstrated decreased tolerance to activity due to fatigue and poor balance. Session terminated due to pt seizing during assessment. Please refer to the flowsheet for vital signs taken during this treatment.   After treatment:   [ ]         Patient left in no apparent distress sitting up in chair  [X]         Patient left in no apparent distress in bed  [X]         Call bell left within reach  [X]         Nursing notified  [ ]         Caregiver present  [ ]         Bed alarm activated      COMMUNICATION/EDUCATION:   [X]         Fall prevention education was provided and the patient/caregiver indicated understanding. [X]         Patient/family have participated as able in goal setting and plan of care. [X]         Patient/family agree to work toward stated goals and plan of care. [ ]         Patient understands intent and goals of therapy, but is neutral about his/her participation. [ ]         Patient is unable to participate in goal setting and plan of care.      Thank you for this referral.  Verlan Rinne, PT   Time Calculation: 14 mins

## 2017-08-15 NOTE — PROGRESS NOTES
Bedside and Verbal shift change report given to [address] (oncoming nurse) by Travis Marks (offgoing nurse). Report included the following information SBAR, Kardex, Procedure Summary, Intake/Output, MAR, Recent Results and Cardiac Rhythm pacemaker with underlying afib. Manuel Diallo

## 2017-08-15 NOTE — PROGRESS NOTES
Problem: Falls - Risk of  Goal: *Absence of Falls  Document Karly Fall Risk and appropriate interventions in the flowsheet. Outcome: Progressing Towards Goal  Patient remained safe from harm and falls during this shift. Non skid socks in place, bed in low position, and bed alarm on. PT/OT following patient, daughter at bedside. Call bell and belongings within reach. Will continue to monitor.      Fall Risk Interventions:  Mobility Interventions: Assess mobility with egress test, Bed/chair exit alarm, OT consult for ADLs, Patient to call before getting OOB, PT Consult for mobility concerns, Strengthening exercises (ROM-active/passive)     Mentation Interventions: Adequate sleep, hydration, pain control, Bed/chair exit alarm, Door open when patient unattended, Family/sitter at bedside     Medication Interventions: Bed/chair exit alarm, Evaluate medications/consider consulting pharmacy, Patient to call before getting OOB     Elimination Interventions: Bed/chair exit alarm, Call light in reach, Patient to call for help with toileting needs     History of Falls Interventions: Bed/chair exit alarm, Consult care management for discharge planning, Door open when patient unattended

## 2017-08-15 NOTE — ROUTINE PROCESS
Primary Nurse Michele Miller RN and Abran Denney RN performed a dual skin assessment on this patient No impairment noted  Alton score is 16    Healing laceration to center of forehead from fall at home.

## 2017-08-15 NOTE — PROGRESS NOTES
ARU/IPR REFERRAL CONTACT NOTE  [address] [place] [address] for Physical Rehabilitation    RE: [facility]    Referral received to review this patient's case for admission to [address] [place] [address] for Physical Rehabilitation. Current status reviewed.  When appropriate, will need PT/OT evaluation/treatment on this patient to complete the pre-admission evaluation.  Will continue to follow. Thank you for this referral.  Should you have any questions please do not hesitate to call. Sincerely,  Milan Valdes.  [address] [place] [address] Physical Rehabilitation  [phone]

## 2017-08-15 NOTE — ROUTINE PROCESS
Bedside and Verbal shift change report given to [address] (oncoming nurse) by SAVANNAH Villar RN (offgoing nurse). Report given with SBAR, Kardex, MAR and Recent Results.

## 2017-08-15 NOTE — PROGRESS NOTES
Progress Note    Patient: Andrew Polo MRN: [id number]  SSN: [id number]    YOB: 1945  Age: 71 y.o. Sex: male      Admit Date: 8/14/2017    LOS: 1 day     Subjective:     Patient admitted with acute left hemiparesis with probable new CVA. Patient has hypokalemia unclear etiology. Does have ETOH history. Had brief seizure this am.    Objective:     Vitals:    08/15/17 0625 08/15/17 0800 08/15/17 0940 08/15/17 1158   BP:  153/75 125/60 134/78   Pulse:  68 82 63   Resp:  16 18 18   Temp:  98.2 °F (36.8 °C) 97.7 °F (36.5 °C) 98.2 °F (36.8 °C)   SpO2:  94% 98% 96%   Weight: 65 kg (143 lb 4.8 oz)      Height: 6' (1.829 m)           Intake and Output:  Current Shift: 08/15 0701 - 08/15 1900  In: 100 [I.V.:100]  Out: -   Last three shifts: 08/13 1901 - 08/15 0700  In: 1010 [P.O.:360; I.V.:650]  Out: 0     Physical Exam:   GENERAL: distracted, mild distress, slowed mentation, appears older than stated age  LUNG: clear to auscultation bilaterally  HEART: regular rate and rhythm  ABDOMEN: soft, non-tender. Bowel sounds normal. No masses,  no organomegaly    Lab/Data Review: All lab results for the last 24 hours reviewed. CTA occlusion right ICA. Potassium 3.0    Assessment:     Active Problems:    CVA (cerebral vascular accident) ([address] (8/14/2017)      Pacemaker (8/14/2017)        Plan:     Continue to replACE potassium  Doubt candidate for any revascularization.     Signed By: Abrahan Cavazos MD     August 15, 2017

## 2017-08-15 NOTE — CDMP QUERY
Please clarify if this patient is being treated/managed for:    =>  hypokalemia    =>Other Explanation of clinical findings  =>Unable to Determine (no explanation of clinical findings)    The medical record reflects the following:    Risk:  new stroke  , pt having weakness, diarrhea    Clinical Indicators:  K+  2.8 on admit  ;  next day   K+ up to 3.0; Treatment:    Doyal Passe   given in ER    If you DECLINE this query or would like to communicate with [employer], please utilize the \"[employer] message box\" at the TOP of the Progress Note on the right.       Thank you,   Veronika Liao   RN   [facility]

## 2017-08-15 NOTE — ROUTINE PROCESS
TRANSFER - IN REPORT:    Verbal report received from Negrito RN(name) on Junbrittani Mohair  being received from ED(unit) for routine progression of care      Report consisted of patients Situation, Background, Assessment and   Recommendations(SBAR). Information from the following report(s) SBAR, Kardex, ED Summary, Intake/Output, MAR and Recent Results was reviewed with the receiving nurse. Opportunity for questions and clarification was provided. Assessment completed upon patients arrival to unit and care assumed.

## 2017-08-16 LAB
ANION GAP BLD CALC-SCNC: 7 MMOL/L (ref 3–18)
BUN SERPL-MCNC: 8 MG/DL (ref 7–18)
BUN/CREAT SERPL: 12 (ref 12–20)
CALCIUM SERPL-MCNC: 8.9 MG/DL (ref 8.5–10.1)
CHLORIDE SERPL-SCNC: 103 MMOL/L (ref 100–108)
CO2 SERPL-SCNC: 29 MMOL/L (ref 21–32)
CREAT SERPL-MCNC: 0.65 MG/DL (ref 0.6–1.3)
GLUCOSE BLD STRIP.AUTO-MCNC: 107 MG/DL (ref 70–110)
GLUCOSE BLD STRIP.AUTO-MCNC: 123 MG/DL (ref 70–110)
GLUCOSE SERPL-MCNC: 107 MG/DL (ref 74–99)
POTASSIUM SERPL-SCNC: 3.2 MMOL/L (ref 3.5–5.5)
SODIUM SERPL-SCNC: 139 MMOL/L (ref 136–145)

## 2017-08-16 PROCEDURE — 74011000250 HC RX REV CODE- 250: Performed by: FAMILY MEDICINE

## 2017-08-16 PROCEDURE — 65660000000 HC RM CCU STEPDOWN

## 2017-08-16 PROCEDURE — 36415 COLL VENOUS BLD VENIPUNCTURE: CPT | Performed by: FAMILY MEDICINE

## 2017-08-16 PROCEDURE — 82962 GLUCOSE BLOOD TEST: CPT

## 2017-08-16 PROCEDURE — 74011250637 HC RX REV CODE- 250/637: Performed by: FAMILY MEDICINE

## 2017-08-16 PROCEDURE — 97530 THERAPEUTIC ACTIVITIES: CPT

## 2017-08-16 PROCEDURE — 74011250637 HC RX REV CODE- 250/637: Performed by: PSYCHIATRY & NEUROLOGY

## 2017-08-16 PROCEDURE — 74011000258 HC RX REV CODE- 258: Performed by: FAMILY MEDICINE

## 2017-08-16 PROCEDURE — 74011250636 HC RX REV CODE- 250/636: Performed by: FAMILY MEDICINE

## 2017-08-16 PROCEDURE — 80048 BASIC METABOLIC PNL TOTAL CA: CPT | Performed by: FAMILY MEDICINE

## 2017-08-16 PROCEDURE — 97165 OT EVAL LOW COMPLEX 30 MIN: CPT

## 2017-08-16 PROCEDURE — 74011250636 HC RX REV CODE- 250/636: Performed by: PSYCHIATRY & NEUROLOGY

## 2017-08-16 RX ORDER — GUAIFENESIN 100 MG/5ML
81 LIQUID (ML) ORAL DAILY
Status: DISCONTINUED | OUTPATIENT
Start: 2017-08-16 | End: 2017-08-16

## 2017-08-16 RX ORDER — NIFEDIPINE 30 MG/1
30 TABLET, EXTENDED RELEASE ORAL DAILY
Status: DISCONTINUED | OUTPATIENT
Start: 2017-08-17 | End: 2017-08-16

## 2017-08-16 RX ORDER — NIFEDIPINE 30 MG/1
30 TABLET, EXTENDED RELEASE ORAL DAILY
Status: DISCONTINUED | OUTPATIENT
Start: 2017-08-16 | End: 2017-08-21 | Stop reason: HOSPADM

## 2017-08-16 RX ADMIN — NIFEDIPINE 30 MG: 30 TABLET, FILM COATED, EXTENDED RELEASE ORAL at 10:28

## 2017-08-16 RX ADMIN — Medication 10 ML: at 06:26

## 2017-08-16 RX ADMIN — ASPIRIN 81 MG 81 MG: 81 TABLET ORAL at 13:47

## 2017-08-16 RX ADMIN — POTASSIUM CHLORIDE: 2 INJECTION, SOLUTION, CONCENTRATE INTRAVENOUS at 17:00

## 2017-08-16 RX ADMIN — CLOPIDOGREL BISULFATE 75 MG: 75 TABLET ORAL at 09:56

## 2017-08-16 RX ADMIN — Medication 100 MG: at 09:55

## 2017-08-16 RX ADMIN — TRAZODONE HYDROCHLORIDE 100 MG: 50 TABLET ORAL at 22:04

## 2017-08-16 RX ADMIN — LEVETIRACETAM 1000 MG: 10 INJECTION INTRAVENOUS at 22:04

## 2017-08-16 RX ADMIN — POTASSIUM CHLORIDE: 2 INJECTION, SOLUTION, CONCENTRATE INTRAVENOUS at 14:54

## 2017-08-16 RX ADMIN — POTASSIUM CHLORIDE: 2 INJECTION, SOLUTION, CONCENTRATE INTRAVENOUS at 16:04

## 2017-08-16 RX ADMIN — LEVETIRACETAM 1000 MG: 10 INJECTION INTRAVENOUS at 10:28

## 2017-08-16 RX ADMIN — Medication 10 ML: at 14:59

## 2017-08-16 RX ADMIN — Medication 10 ML: at 22:05

## 2017-08-16 NOTE — PROGRESS NOTES
Bedside and Verbal shift change report given to [address] (oncoming nurse) by Nain Casillas (offgoing nurse). Report included the following information SBAR, Procedure Summary, Intake/Output, MAR, Recent Results and Cardiac Rhythm afib and pacemaker. Annamary Ripa

## 2017-08-16 NOTE — PROGRESS NOTES
08/16/17 0755   Vitals   /82   MAP (Calculated) 99   BP 1 Location Left arm   BP 1 Method Automatic   BP Patient Position At rest   Dr. Jorje Beverly made aware of blood pressure and admission for stroke. Verbal order with read back to modify nifedepine to 30mg daily. Will continue to monitor.

## 2017-08-16 NOTE — PROGRESS NOTES
Complete occlusion carotid  No procedure available to open available  No interval seizures  Pt reports history stent in L leg. .. Given this Plavix better choice  Will switch to Plavix. .D/C ASA

## 2017-08-16 NOTE — PROGRESS NOTES
Problem: Mobility Impaired (Adult and Pediatric)  Goal: *Acute Goals and Plan of Care (Insert Text)  Physical Therapy Goals  Initiated 8/15/2017 and to be accomplished within 7 day(s)  1. Patient will move from supine to sit and sit to supine , scoot up and down and roll side to side in bed with minimal assistance. 2. Patient will transfer from bed to chair and chair to bed with contact guard assist using the least restrictive device. 3. Patient will perform sit to stand with minimal assistance. 4. Patient will ambulate with minimal assistance for >25 feet with the least restrictive device. 5. Patient will perform BLE HEP in order to improve strength for carryover into functional tasks. PHYSICAL THERAPY TREATMENT     Patient: Logan Proctor (80 y.o. male)  Date: 8/16/2017  Diagnosis: CVA (cerebral vascular accident) [facility])  [facility] <principal problem not specified>       Precautions: fall,impulsive  Chart, physical therapy assessment, plan of care and goals were reviewed. ASSESSMENT:  Fair progress noted with increased ability to progress to standing functional transfers and increased time tolerated in sitting. Pt req's slightly decreased assist to maintain sitting for increased time and tolerates intermittent periods of unsupported sitting. Pt cont's to require substantial neuromuscular re-ed/postural re-ed and midline orientation training as indicated by posterolateral L shift in sitting and inability to reach across midline to facilitate functional movements in the bed. Pt will benefit from an intensive and multidisciplinary approach,however, it is unclear at this point if this pt is currently able to tolerate 3 hrs daily regimen found in ARU. This being said the pt is progressing in terms of activity tolerance as indicated in this encounter and would significantly benefit.   Progression toward goals:  [X]      Improving appropriately and progressing toward goals  [X] Improving slowly and progressing toward goals  [ ]      Not making progress toward goals and plan of care will be adjusted       PLAN:  Patient continues to benefit from skilled intervention to address the above impairments. Continue treatment per established plan of care. Discharge Recommendations:  Rehab  Further Equipment Recommendations for Discharge:  N/A       SUBJECTIVE:   Patient stated I need that bottle. Can you get me that bottle?   Pt asks for the urinal several times throughout session despite several times the urinal having already been positioned for use. OBJECTIVE DATA SUMMARY:   Critical Behavior:  Neurologic State: Drowsy, Eyes open spontaneously  Orientation Level: Oriented to person, Oriented to time, Oriented to situation, Disoriented to place  Cognition: Follows commands, Decreased attention/concentration     Functional Mobility Training:  Bed Mobility:  Rolling: Moderate assistance  Supine to Sit: Maximum assistance  Sit to Supine: Moderate assistance  Scooting: Moderate assistance (mod lateral and max A of 2 to scoot to [facility])   Difficulty following commands for cross midline reaching with roll to L secondary to noted L neglect/inattention. Transfers:  Sit to Stand: Moderate assistance  Stand to Sit: Maximum assistance  Bed to Chair: Maximum assistance  Other: stand step with L LE management swing/stance   Stand step utilized to L and to R for transfer to bedside commode. To R pt demo's increased R LE impulsive stepping w/o coordination of movement. To the L (second trial) pt demo's decreased impulsivity with cont'd need for full L LE management throughout trial.    Balance:  Sitting: Impaired; With support  Sitting - Static: Fair (occasional) (L posterolateral lean with ability to correct/verbal cues)  Sitting - Dynamic: Poor (constant support)  Standing: Impaired; With support  Standing - Static: Poor  Standing - Dynamic : Poor              Seated static/dynamic trials: x's 15 min's EOB with intermittent moderate assist for balance. Maximal tactile/manual cues for erect posture (thoracic extension,scapular adduction). Pt is able to right balance to within a few degrees from midline with verbal cues,however, cannot maintain position for >10 seconds at a time. Pt presents with some mild R to L pushing/posting,however, is receptive to restrictive training. Standing: pt demo's 0 L LE quad engagement to maintain static stand,however, does demo some quad engagement with lift off. L knee support is req'd at all times in standing and mod/max A for balance. Pain:  Pt reports 0/10 pain or discomfort prior to treatment. Pt reports 0/10 pain or discomfort post treatment. Activity Tolerance:   Fair-/poor+  Please refer to the flowsheet for vital signs taken during this treatment.   After treatment:   [ ] Patient left in no apparent distress sitting up in chair  [X] Patient left in no apparent distress in bed  [X] Call bell left within reach  [ ] Nursing notified  [ ] Caregiver present  [ ] Bed alarm activated      Saskia Villareal PTA   Time Calculation: 32 mins

## 2017-08-16 NOTE — PROGRESS NOTES
ARU/IPR REFERRAL CONTACT NOTE  [address] [place] [address] Physical Rehabilitation    RE: Eneida Holt    Referral received to review this patient's case for admission to [address] [place] [address] Physical [facility]. Current status reviewed.  When appropriate, will need OT evaluation/treatment on this patient to complete the pre-admission evaluation.  Will continue to follow. Thank you for this referral.  Should you have any questions please do not hesitate to call. Sincerely,  Malcom Hoyt.  [address] [place] [address] Physical Rehabilitation  [phone]

## 2017-08-16 NOTE — PROGRESS NOTES
Patient resting in bed with daughter at bedside. NIH assessment completed. NIH score have changed from 8 to 16 and patient is neglecting the left side more. When I approached the patient from the left side he could not see me until I got in front of his face or approached him from the right side. Patient also noted to have left facial droop and to be drooling on the left side of his mouth. His speech is also noted to be a little slurred with some words incomprehensible. He's also unable to lift his left arm and left leg off the bed. When this writer raised the patients left arm, it immediately fell to the bed as did his left leg when lifted off the bed. Patient deny any complaints of pain and no distress noted. Daughter at bedside stated that she attempted to help patient to the bathroom earlier today and he couldn't take any steps. Daughter stated patient started to lean to the left side so she put him back in bed. During my shift last night, 08/14/17, patient was able to ambulate to the bathroom with assistance. Will page neurologist on call regarding changes. [id number] No return call from neurology. Spoke with Dr. Jenni Stacy on phone and made of patients changes in NIH score.

## 2017-08-16 NOTE — CDMP QUERY
Please clarify if this patient is being treated/managed for:    =>  mild protein calorie malnutrition    =>Other Explanation of clinical findings  =>Unable to Determine (no explanation of clinical findings)    The medical record reflects the following:    Risk:   elderly; new stroke ; swallowing problems. diarrhea, weakness    Clinical Indicators:   Nutrition Diagnosis:  Unintentional weight loss related to inadequate energy intake as evidenced by wt loss 22 lb (13.3%) x 2 year PTA    Treatment:    Ensure Enlive BID  - Continue RD inpatient monitoring and evaluation      If you DECLINE this query or would like to communicate with [facility], please utilize the \"Snapsheet message box\" at the TOP of the Progress Note on the right.       Thank you,    Dane Dick RN   CCDS

## 2017-08-16 NOTE — PROGRESS NOTES
Problem: Falls - Risk of  Goal: *Absence of Falls  Document Karly Fall Risk and appropriate interventions in the flowsheet.    Outcome: Progressing Towards Goal  Fall Risk Interventions:  Mobility Interventions: Assess mobility with egress test, Bed/chair exit alarm, Communicate number of staff needed for ambulation/transfer, OT consult for ADLs, Patient to call before getting OOB, PT Consult for mobility concerns, PT Consult for assist device competence, Strengthening exercises (ROM-active/passive), Utilize walker, cane, or other assitive device, Utilize gait belt for transfers/ambulation     Mentation Interventions: Adequate sleep, hydration, pain control, Bed/chair exit alarm, Door open when patient unattended, Evaluate medications/consider consulting pharmacy, Eyeglasses and hearing aids, Familiar objects from home, Family/sitter at bedside, Increase mobility, More frequent rounding, Reorient patient, Toileting rounds, Update white board     Medication Interventions: Bed/chair exit alarm, Patient to call before getting OOB, Teach patient to arise slowly     Elimination Interventions: Bed/chair exit alarm, Call light in reach, Patient to call for help with toileting needs, Toilet paper/wipes in reach, Toileting schedule/hourly rounds, Urinal in reach     History of Falls Interventions: Bed/chair exit alarm, Consult care management for discharge planning, Door open when patient unattended, Investigate reason for fall, Room close to nurse's station, Utilize gait belt for transfer/ambulation

## 2017-08-16 NOTE — PROGRESS NOTES
Problem: Self Care Deficits Care Plan (Adult)  Goal: *Acute Goals and Plan of Care (Insert Text)  Occupational Therapy Goals  Initiated 8/16/2017 within 7 day(s). 1. Patient will perform self-feeding with supervision/set-up   2. Patient will perform upper body dressing with supervision/set-up. 3. Patient will perform grooming with modified independence while sitting on the edge of the bed  4. Patient and his family will demonstrate independence with LUE sensory stim/functional activity cycle in preparation for efficient functional use of LUE during his self care routine  5. Patient and his family will demonstrate independence with his occulo motor program in preparation for his efficient gaze shifting during his self care routine  OCCUPATIONAL THERAPY EVALUATION     Patient: Kartik Kohli (13 y.o. male)  Date: 8/16/2017  Primary Diagnosis: CVA (cerebral vascular accident) [facility])  [facility]   Precautions: fall         ASSESSMENT :  Based on the objective data described below, the patient presents with decreased sensation/strength and functional use of his LUE, decreased balance (sense of middle), decreased visual attention to the left and decreased functional mobility which limits his independence. Patient will benefit from skilled intervention to address the above impairments.   Patients rehabilitation potential is considered to be Good  Factors which may influence rehabilitation potential include:   [X]             None noted  [ ]             Mental ability/status  [ ]             Medical condition  [ ]             Home/family situation and support systems  [ ]             Safety awareness  [ ]             Pain tolerance/management  [ ]             Other:        PLAN :  Recommendations and Planned Interventions:  [X]               Self Care Training                  [X]        Therapeutic Activities  [ ]               Functional Mobility Training    [ ]        Cognitive Retraining  [X]               Therapeutic Exercises           [X]        Endurance Activities  [X]               Balance Training                   [X]        Neuromuscular Re-Education  [X]               Visual/Perceptual Training     [ ]   Home Safety Training  [X]               Patient Education                 [ ]        Family Training/Education  [ ]               Other (comment):     Frequency/Duration: Patient will be followed by occupational therapy 1-2 times per day/4-7 days per week to address goals. Discharge Recommendations: Rehab  Further Equipment Recommendations for Discharge: N/A       PATIENT COMPLEXITY      Eval Complexity: History: LOW Complexity : Brief history review ; Examination: LOW Complexity : 1-3 performance deficits relating to physical, cognitive , or psychosocial skils that result in activity limitations and / or participation restrictions ; Decision Making:LOW Complexity : No comorbidities that affect functional and no verbal or physical assistance needed to complete eval tasks  Assessment: LOW Complexity       G-CODES:      Self Care  Current  CN= 100%   Goal  CK= 40-59%. The severity rating is based on the Level of Assistance required for Functional Mobility and ADLs. SUBJECTIVE:   Patient's daughter stated I fed him because I wanted him to eat and he was having difficulty finding his mouth.  she reports an understanding of instructions regarding his sensory loss resulting in decreased concept of his middle and is independent with training in backward chaining and sensory stimulation to facilitate his participation in his self feeding program.      OBJECTIVE DATA SUMMARY:       Past Medical History:   Diagnosis Date    Hypertension      Other ill-defined conditions       PVD   History reviewed. No pertinent surgical history.   Prior Level of Function/Home Situation:   Home Situation  Home Environment: Apartment  # Steps to Enter: 0  One/Two Story Residence: [facility] story  Living Alone: Yes  Support Systems: Child(keegan), /, Family member(s), Friends \ neighbors  Patient Expects to be Discharged to[de-identified] Apartment  Current DME Used/Available at Home: Aleksandra Neff, Wheelchair  [X]  Right hand dominant          [ ]  Left hand dominant  Cognitive/Behavioral Status:   he is being alert with frequent sensory stim (auditory and tactile) on his right side   Skin: no skin integrity issues noted during OT evaluation  Edema: no extremity edema noted  Vision/Perceptual:      tracking from the left to middle only; no response to visual threat from middle of left eye laterally; he will attend to first 20% of left of anterior body with tactile stim.     Coordination:   RUE: WFL  LUE: non functional   Balance:   min assist to mod assist unsupported sitting on the edge of the bed  Strength:  RUE: 4-/5  LUE: shoulder and elbow 1/5; hand 2/5 digit flexion and extension   Tone & Sensation:  RUE: WFL  LUE: mod impaired sensation and hypotonic   Range of Motion:  RUE: AROM is WFL   LUE: PROM is WFL; trace AROM in shoulder and elbow; 25% digit flexion and extension   Functional Mobility and Transfers for ADLs:  Bed Mobility:   supine to sit and return to supine requires mod assist   ADL Assessment:   self feeding: total assist  Grooming: total assist  UB bathing/dressing: max assist  LB bathing/dressing: total assist   ADL Intervention:   backward chaining training done for self feeding and he was facilitated to drink from a cup following set up; he daughter reports an understanding of backward chaining program and that she will follow through  Therapeutic Exercise:  Sensory stim/functional activity cycle completed (modified to account for his current level of functioning) and trained to this patient and his daughter for follow through; they presented with independent return demonstration and report they will follow through  Pain:  0/10 pain prior to OT session  0/10 pain following OT session  Activity Tolerance:   No SOB noted and no c/o fatigue noted  Please refer to the flowsheet for vital signs taken during this treatment. After treatment:   [ ] Patient left in no apparent distress sitting up in chair  [X] Patient left in no apparent distress in bed  [ ] Call bell left within reach  [ ] Nursing notified  [ ] Caregiver present  [ ] Bed alarm activated      COMMUNICATION/EDUCATION:   [ ] Home safety education was provided and the patient/caregiver indicated understanding. [X] Patient/family have participated as able in goal setting and plan of care. This patient is highly motivated  [ ] Patient/family agree to work toward stated goals and plan of care. [ ] Patient understands intent and goals of therapy, but is neutral about his/her participation. [ ] Patient is unable to participate in goal setting and plan of care.      Thank you for this referral.  Samira Duncan, OTR/L  Time Calculation: 27 mins

## 2017-08-16 NOTE — PROGRESS NOTES
Progress Note    Patient: Burton Elizabeth MRN: [id number]  SSN: [id number]    YOB: 1945  Age: 71 y.o. Sex: male      Admit Date: 8/14/2017    LOS: 2 days     Subjective:     Patient admitted with acute CVA and hypokalemia. Patient with continued slurred speech but more alert. No further seizures. Objective:     Vitals:    08/16/17 0009 08/16/17 0415 08/16/17 0755 08/16/17 1202   BP: 114/73 120/82 132/82 136/75   Pulse: 74 80  75   Resp: 16 16 16 16   Temp: 98.4 °F (36.9 °C) 97.9 °F (36.6 °C) 97.9 °F (36.6 °C) 97.6 °F (36.4 °C)   SpO2: 98% 97% 94% 98%   Weight:       Height:            Intake and Output:  Current Shift: 08/16 0701 - 08/16 1900  In: 100 [I.V.:100]  Out: -   Last three shifts: 08/14 1901 - 08/16 0700  In: 2982.1 [P.O.:720; I.V.:2262.1]  Out: 0     Physical Exam:   GENERAL: alert, cooperative, no distress, mild distress, appears stated age  LUNG: clear to auscultation bilaterally  HEART: regular rate and rhythm  paced  NEUROLOGIC: positive findings: muscular weakness left arm and leg    Lab/Data Review: All lab results for the last 24 hours reviewed. Potassium 3.2 ECHO pending    Assessment:     Active Problems:    CVA (cerebral vascular accident) ([address] (8/14/2017)      Pacemaker (8/14/2017)        Plan:     Nifedipine dose decreased. Replace potassium. Hopefully can be transferred to ARU.     Signed By: Bobby García MD     August 16, 2017

## 2017-08-17 ENCOUNTER — APPOINTMENT (OUTPATIENT)
Dept: CT IMAGING | Age: 72
DRG: 064 | End: 2017-08-17
Attending: PSYCHIATRY & NEUROLOGY
Payer: OTHER GOVERNMENT

## 2017-08-17 LAB — GLUCOSE BLD STRIP.AUTO-MCNC: 117 MG/DL (ref 70–110)

## 2017-08-17 PROCEDURE — 74011250637 HC RX REV CODE- 250/637: Performed by: PSYCHIATRY & NEUROLOGY

## 2017-08-17 PROCEDURE — 97112 NEUROMUSCULAR REEDUCATION: CPT

## 2017-08-17 PROCEDURE — 97530 THERAPEUTIC ACTIVITIES: CPT

## 2017-08-17 PROCEDURE — 74011250637 HC RX REV CODE- 250/637: Performed by: FAMILY MEDICINE

## 2017-08-17 PROCEDURE — 70450 CT HEAD/BRAIN W/O DYE: CPT

## 2017-08-17 PROCEDURE — 65660000000 HC RM CCU STEPDOWN

## 2017-08-17 PROCEDURE — 74011000258 HC RX REV CODE- 258: Performed by: FAMILY MEDICINE

## 2017-08-17 PROCEDURE — 92526 ORAL FUNCTION THERAPY: CPT

## 2017-08-17 PROCEDURE — 74011250636 HC RX REV CODE- 250/636: Performed by: PSYCHIATRY & NEUROLOGY

## 2017-08-17 PROCEDURE — 82962 GLUCOSE BLOOD TEST: CPT

## 2017-08-17 RX ORDER — DEXTROSE MONOHYDRATE AND SODIUM CHLORIDE 5; .45 G/100ML; G/100ML
100 INJECTION, SOLUTION INTRAVENOUS CONTINUOUS
Status: DISCONTINUED | OUTPATIENT
Start: 2017-08-17 | End: 2017-08-21 | Stop reason: HOSPADM

## 2017-08-17 RX ADMIN — Medication 10 ML: at 17:12

## 2017-08-17 RX ADMIN — Medication 100 MG: at 10:13

## 2017-08-17 RX ADMIN — CLOPIDOGREL BISULFATE 75 MG: 75 TABLET ORAL at 10:14

## 2017-08-17 RX ADMIN — Medication 10 ML: at 05:54

## 2017-08-17 RX ADMIN — DEXTROSE MONOHYDRATE AND SODIUM CHLORIDE 100 ML/HR: 5; .45 INJECTION, SOLUTION INTRAVENOUS at 17:12

## 2017-08-17 RX ADMIN — LEVETIRACETAM 1000 MG: 10 INJECTION INTRAVENOUS at 22:35

## 2017-08-17 RX ADMIN — NIFEDIPINE 30 MG: 30 TABLET, FILM COATED, EXTENDED RELEASE ORAL at 10:14

## 2017-08-17 RX ADMIN — LEVETIRACETAM 1000 MG: 10 INJECTION INTRAVENOUS at 12:13

## 2017-08-17 RX ADMIN — Medication 10 ML: at 22:36

## 2017-08-17 NOTE — ROUTINE PROCESS
Patient: Esa Casey Age: 71 y.o. Sex: male     Bedside and Verbal shift change report given to [place], RN (oncoming nurse) by Cassandra Fotre RN (offgoing nurse). Report included the following information SBAR, Kardex, MAR and Recent Results. PATIENT GOALS FOR TODAY PATIENT PRIORITIES FOR TODAY   Goals are: cva protocoo, pt, ot, speech, and safety, monitor labs  Updated on Whiteboard in Patients Room: yes   Patient states his/her priorities are: get better  Updated on Whiteboard in Patients Room: yes    SITUATION:   Code Status: Full Code Reason for Admission: CVA (cerebral vascular accident) ([facility]  CVA (cerebral vascular accident) [facility])   Hospital day: 3 Problem List: Active Problems:    CVA (cerebral vascular accident) ([facility] (8/14/2017)      Pacemaker (8/14/2017)       Attending Provider:   Liliya Kaufman MD Allergies: No Known Allergies   BACKGROUND:   Past Medical History:   Past Medical History:   Diagnosis Date    Hypertension     Other ill-defined conditions     PVD     ASSESSMENT:   Neuro:  Neurologic State: Alert, Orientation Level: Oriented to place, Oriented to person, Disoriented to time, Disoriented to situation CV:  Patient on Telemetry: Cardiac/Telemetry Monitor On: Yes    Box Number: 70   Cardiac Rhythm: Normal sinus rhythm  Patient has a pace maker:   Endocrine   Recent Glucose Results:   Lab Results   Component Value Date/Time    GLUCPOC 117 (H) 08/17/2017 06:26 AM    GLUCPOC 107 08/16/2017 10:54 PM        Respiratory:  O2 Device: Room air       Supplemental O2         Incentive Spirometery          GI  Current diet: DIET NUTRITIONAL SUPPLEMENTS Breakfast, Dinner; ENSURE ENLIVE  DIET DYSPHAGIA PUREED (NDD1)                                        Reasons if patient has a eugene: no   Patient Safety  Restraints:    Family notified:    Other Alternatives:     Fall Risk   Total Score: 3   Safety Measures: Bed/Chair alarm on, Bed/Chair-Wheels locked, Bed in low position, Call light within reach, Fall prevention (comment), Family at bedside, Seizure precaution, Side rails X 3 VTE Prophylaxis     Sequential Compression Device: Bilateral     Patient Refused VTE Prophylaxis: Yes    WOUND (if present)  Wound Type:  no  Dressing present Dressing Present : No  Wound Concerns/Notes: no IV ACCESS     Reasons if patient has a central line: no     PAIN  Pain Assessment  Pain Intensity 1: 0 (08/17/17 1532)        Patient Stated Pain Goal: 0  Time of last intervention: 1700   Reassessment Completed: no Last Vitals:  Vitals:    08/17/17 0343 08/17/17 0800 08/17/17 1200 08/17/17 1600   BP: 145/79 146/88 136/76 149/78   Pulse: 70 70 63 83   Resp: 18 17 18 18   Temp: 98.1 °F (36.7 °C) 98.2 °F (36.8 °C) 98 °F (36.7 °C) 98.1 °F (36.7 °C)   SpO2: 95% 98% 100% 95%   Weight: 70.4 kg (155 lb 4.8 oz)      Height:          Last 3 Weights:  Last 3 Recorded Weights in this Encounter    08/15/17 0625 08/17/17 0343   Weight: 65 kg (143 lb 4.8 oz) 70.4 kg (155 lb 4.8 oz)     Weight change:   LAB RESULTS  No results for input(s): WBC, HGB, HCT, PLT, HGBEXT, HCTEXT, PLTEXT in the last 72 hours. Recent Labs      08/16/17   0318  08/15/17   0308   NA  139  140   K  3.2*  3.0*   GLU  107*  84   BUN  8  11   CREA  0.65  0.66   CA  8.9  8.5      RECOMMENDATIONS AND DISCHARGE PLANNING   1. Discharge plan for patient // Needs or barriers to disharge: [possible rehab/snf . 2. Estimated Discharge Date: tbd Posted on Whiteboard in Patients Room: yes   \"HEALS\" SAFETY CHECK   A safety check occurred in the patient's room between off going nurse and oncoming nurse listed above. The safety check included the below items  Area Items   H  High Alert Meds  - Verify all high alert medication drips (heparin, PCA, etc.)   E  Equipment - Suction is set up for ALL patients (with yanker)  - Red plugs utilized for all equipment (IV pumps, etc.)  - WOWs wiped down at end of shift.  - Room stocked with oxygen, suction, and other unit-specific supplies   A  Alarms - Bed alarm is set for fall risk patients  - Ensure chair alarm is in place and activated if patient is up in a chair   L  Lines - Check IV for any infiltration  - Zhang bag is empty if patient has a Zhang   - Tubing and IV bags are labeled   S  Safety   - Room is clean, patient is clean, and equipment is clean. - Ensure room is clear and free of clutter  - Hallways are clear from equipment besides carts.    - Fall bracelet on for fall risk patients  - Suction is set up for ALL patients (with jose francisco)  - Isolation precautions followed, supplies available outside room, sign posted   Jay Chaudhry RN

## 2017-08-17 NOTE — PROGRESS NOTES
Progress Note    Patient: Josephine Joya MRN: [id number]  SSN: [id number]    YOB: 1945  Age: 71 y.o. Sex: male      Admit Date: 8/14/2017    LOS: 3 days     Subjective:     Patient admitted with new CVA. Patient is more lethargic today. Objective:     Vitals:    08/16/17 2000 08/17/17 0000 08/17/17 0343 08/17/17 0800   BP: 135/86 152/81 145/79 146/88   Pulse: 73 84 70 70   Resp: 16 18 18 17   Temp: 97.6 °F (36.4 °C) 98.1 °F (36.7 °C) 98.1 °F (36.7 °C) 98.2 °F (36.8 °C)   SpO2: 100% 96% 95% 98%   Weight:   70.4 kg (155 lb 4.8 oz)    Height:            Intake and Output:  Current Shift: 08/17 0701 - 08/17 1900  In: 100 [I.V.:100]  Out: -   Last three shifts: 08/15 1901 - 08/17 0700  In: 1436.7 [P.O.:120; I.V.:1316.7]  Out: -     Physical Exam:   GENERAL: alert, cooperative, no distress, mild distress, slowed mentation, appears older than stated age  LUNG: clear to auscultation bilaterally  HEART: regular rate and rhythm  paced    Lab/Data Review: All lab results for the last 24 hours reviewed. Urinalysis and repeat head CT pending    Assessment:     Active Problems:    CVA (cerebral vascular accident) ([address] (8/14/2017)      Pacemaker (8/14/2017)        Plan:     Agree with holding trazodone. Will start maintenance IV fluid as patient may not be alert to eat.     Signed By: Rodrigo Quispe MD     August 17, 2017

## 2017-08-17 NOTE — PROGRESS NOTES
Progress Note    Patient: Kartik Kohli MRN: [id number]  SSN: [id number]    YOB: 1945  Age: 71 y.o. Sex: male      Admit Date: 8/14/2017    LOS: 3 days     Subjective:     Patient admitted with new CVA with left hemiparesis. More lethargic today. Patient has mild protein calorie malnutrition. Objective:     Vitals:    08/16/17 2000 08/17/17 0000 08/17/17 0343 08/17/17 0800   BP: 135/86 152/81 145/79 146/88   Pulse: 73 84 70 70   Resp: 16 18 18 17   Temp: 97.6 °F (36.4 °C) 98.1 °F (36.7 °C) 98.1 °F (36.7 °C) 98.2 °F (36.8 °C)   SpO2: 100% 96% 95% 98%   Weight:   70.4 kg (155 lb 4.8 oz)    Height:            Intake and Output:  Current Shift: 08/17 0701 - 08/17 1900  In: 100 [I.V.:100]  Out: -   Last three shifts: 08/15 1901 - 08/17 0700  In: 1436.7 [P.O.:120; I.V.:1316.7]  Out: -     Physical Exam:   GENERAL: mild distress, slowed mentation, appears older than stated age  LUNG: clear to auscultation bilaterally  HEART: regular rate and rhythm  paced    Lab/Data Review: All lab results for the last 24 hours reviewed. Urinalysis and head CT pending    Assessment:     Active Problems:    CVA (cerebral vascular accident) ([facility] [address] (8/14/2017)      Pacemaker (8/14/2017)        Plan:      Will give IV fluid for hydration    Signed By: Kyleigh Brand MD     August 17, 2017

## 2017-08-17 NOTE — PROGRESS NOTES
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 If stroke has extended  ? UTI  ?  Side effect trazodone    Repeat CT non contrast.  Cannot have mri due to pacemaker  UA  D/C trazodone

## 2017-08-17 NOTE — PROGRESS NOTES
Care Management Interventions  PCP Verified by CM: Yes (Seen at [place].)  Mode of Transport at Discharge: BLS  Transition of Care Consult (CM Consult): SNF, Discharge Planning (Patient will be transfering to SNF for rehab. Possible LTC.)  Physical Therapy Consult: Yes  Occupational Therapy Consult: Yes  Speech Therapy Consult: Yes  Current Support Network: Lives Alone, Family Lives Nearby (Pt was living alone prior to discharge. His sister and brother in law are local.  Daughter lives in [place], at bedside.)  Plan discussed with Pt/Family/Caregiver: Yes  Freedom of Choice Offered: Yes  Discharge Location  Discharge Placement: Skilled nursing facility    Patient is a 72 yo male admitted for CVA. Daughter, Vineet Quiñones [phone], at bedside. She lives in [place], [place]. She stated she was able to speak with her dad briefly and he is in agreement with rehab and possible transfer to LTC. She confirmed he has assets, therefore will not qualify for Medicaid. The patient is unable to participate in the interview. Patient's daughter would like for him to transfer to SNF for rehab. PT/OT recommends rehab, however with so many questions regarding the patient's rehab potential, she is unable to determine a solid d/c plan. VML with Amy Alberto to determine if the patient can attend ARU with transfer to [facility]. If not, patient will most likely go directly to [facility] for rehab. Daughter will be researching half-way and NF near her home in [place]. Patient would be private pay. Also, unclear if the patient has any VA benefit for LTC. Will call the patient's sister and brother in law, Piyush Cartagena [phone], to determine patient's PCM and associated SW.  Per Alfredo Leroy, she stated the patient mentioned he was 30% service connected. VML.

## 2017-08-17 NOTE — PROGRESS NOTES
SLP Note:     Attempted dysphagia tx this am; however, pt asleep and not aroused with verbal/tactile stim. Pt's dtr at b/s reporting pt was unable to clear green beans from lunch tray previous date. Recommend diet downgrade to puree with SLP follow up later this date to continue further dysphagia management. D/w pt's dtr who verbalized understanding/agreement.       Sandra Adan, M.S., [address] [facility]  Speech-Language Pathologist

## 2017-08-17 NOTE — PROGRESS NOTES
Patient has been accepted to [address] [place] [address] Will transfer when medically appropriate.

## 2017-08-17 NOTE — PROGRESS NOTES
Problem: Mobility Impaired (Adult and Pediatric)  Goal: *Acute Goals and Plan of Care (Insert Text)  Physical Therapy Goals  Initiated 8/15/2017 and to be accomplished within 7 day(s)  1. Patient will move from supine to sit and sit to supine , scoot up and down and roll side to side in bed with minimal assistance. 2. Patient will transfer from bed to chair and chair to bed with contact guard assist using the least restrictive device. 3. Patient will perform sit to stand with minimal assistance. 4. Patient will ambulate with minimal assistance for >25 feet with the least restrictive device. 5. Patient will perform BLE HEP in order to improve strength for carryover into functional tasks. PHYSICAL THERAPY TREATMENT     Patient: Tiffany Crawley (89 y.o. male)  Date: 8/17/2017  Diagnosis: CVA (cerebral vascular accident) [facility])  CVA (cerebral vascular accident) ([facility] [address] <principal problem not specified>       Precautions: Fall  Chart, physical therapy assessment, plan of care and goals were reviewed. ASSESSMENT:  Pt demo's increased alertness and sitting balance this afternoon with the ability to maintain sitting at EOB x's 15 min's and with decreased assist for a portion post neuro re-ed activities. Prior to thalamic stimulation activity described in Neuro Re-ed pt req's intermittent Mod A to achieve and maintain midline. Post activity pt maintains midline x's ~2 min's with SBA and slight verbal cues for self correction of L drift. Pt is fatigued significantly by this session and req's return to supine upon conclusion. Progression toward goals:  [ ]      Improving appropriately and progressing toward goals  [X]      Improving slowly and progressing toward goals  [ ]      Not making progress toward goals and plan of care will be adjusted       PLAN:  Patient continues to benefit from skilled intervention to address the above impairments. Continue treatment per established plan of care.   Discharge Recommendations:  Rehab  Further Equipment Recommendations for Discharge:  N/A       SUBJECTIVE:   Patient stated i'm tired.       OBJECTIVE DATA SUMMARY:   Critical Behavior:  Neurologic State: Alert  Orientation Level: Oriented to place, Oriented to person, Disoriented to time, Disoriented to situation  Cognition: Impaired decision making  Functional Mobility Training:  Bed Mobility:  Rolling: Minimum assistance; Moderate assistance (to L)  Supine to Sit: Maximum assistance (from L side 2/2 R sided weakness)  Sit to Supine: Moderate assistance;Maximum assistance  Transfers:  Sit to Stand: Moderate assistance  Stand to Sit: Moderate assistance;Maximum assistance (increased control with descent noted)  Bed to Chair:  (NT 2/2 decreased tolerance and impaired sitting balance.)  Balance:  Sitting: Impaired; With support  Sitting - Static: Fair (occasional) (x's 15 min's EOB postural re-ed/visual targeting for midline)  Sitting - Dynamic: Poor (constant support)  Standing: Impaired; With support  Standing - Static: Poor  Standing - Dynamic : Poor   Standing static: x's 2'30\" for hygiene post urinary incontinence. Pt req's L knee management to prevent buckling,however, with verbal cues pt is able to intermittently engage L quads for some [address] Pt is unable to control in final degrees of extension and experiences recurvatum as a result. Neuro Re-Education:  Postural re-ed with manual cues for increased scapular adduction and thoracic extension. Visual targeting/cues utilized for increased midline awareness post use of midline reintegration activity. Pt fully shifted to WB on R elbow and cued for 10 anterior weight shifts consecutively. Pt returned passively to midline and is able to maintain with decreased assist with minimal drift. Pain:  Pt reports 0/10 pain or discomfort prior to treatment. Pt reports 0/10 pain or discomfort post treatment.    Activity Tolerance:   fair  Please refer to the flowsheet for vital signs taken during this treatment.   After treatment:   [ ] Patient left in no apparent distress sitting up in chair  [X] Patient left in no apparent distress in bed  [X] Call bell left within reach  [ ] Nursing notified  [ ] Caregiver present  [ ] Bed alarm activated      Aiyana Baumann PTA   Time Calculation: 25 mins

## 2017-08-17 NOTE — PROGRESS NOTES
Occupational Therapy Note:  OT session attempted. This patient is unable to be aroused with verbal or tactile stim. Will f/u as appropriate for this patient. His daughter is present and is in agreement with this plan.  Evan Abbott, OTR/L

## 2017-08-17 NOTE — ROUTINE PROCESS
Bedside shift change report given to Antonette Sethi RN (oncoming nurse) by MARKY Jarquin (offgoing nurse). Report included the following information SBAR, ED Summary, MAR and Recent Results.

## 2017-08-17 NOTE — PROGRESS NOTES
Problem: Falls - Risk of  Goal: *Absence of Falls  Document Karly Fall Risk and appropriate interventions in the flowsheet.    Fall Risk Interventions:  Mobility Interventions: Assess mobility with egress test, Bed/chair exit alarm, Communicate number of staff needed for ambulation/transfer, Patient to call before getting OOB, PT Consult for mobility concerns, Strengthening exercises (ROM-active/passive)     Mentation Interventions: Bed/chair exit alarm, Adequate sleep, hydration, pain control, Door open when patient unattended, Family/sitter at bedside, Reorient patient, Toileting rounds     Medication Interventions: Bed/chair exit alarm, Patient to call before getting OOB     Elimination Interventions: Bed/chair exit alarm, Call light in reach, Patient to call for help with toileting needs, Toileting schedule/hourly rounds     History of Falls Interventions: Bed/chair exit alarm, Consult care management for discharge planning, Door open when patient unattended

## 2017-08-17 NOTE — PROGRESS NOTES
conducted an initial consultation and Spiritual Assessment for Lorraine King, who is a 71 y.o.,male. Patients Primary Language is: [place]. According to the patients EMR Sabianism Affiliation is: No Religious. The reason the Patient came to the hospital is:   Patient Active Problem List    Diagnosis Date Noted    [facility] 08/14/2017    Pacemaker 08/14/2017        The  provided the following Interventions:  Initiated a relationship of care and support. Listened empathically. Provided chaplaincy education. Provided information about Spiritual Care Services. Offered prayer and assurance of continued prayers on patient's behalf. Chart reviewed. Plan:  Chaplains will continue to follow and will provide pastoral care on an as needed/requested basis.  recommends bedside caregivers page  on duty if patient shows signs of acute spiritual or emotional distress.       [address] [facility]   [phone]

## 2017-08-17 NOTE — PROGRESS NOTES
Problem: Dysphagia (Adult)  Goal: *Acute Goals and Plan of Care (Insert Text)  Patient will:  1. Tolerate PO trials with 0 s/s overt distress in 4/5 trials  2. Utilize compensatory swallow strategies/maneuvers (decrease bite/sip, size/rate, alt. liq/sol) with min cues in 4/5 trials  3. Perform oral-motor/laryngeal exercises to increase oropharyngeal swallow function with min cues  4. Complete an objective swallow study (i.e., MBSS) to assess swallow integrity, r/o aspiration, and determine of safest LRD, min A as indicated/ordered by MD     Recommend:   Puree, thin liquids  Meds as tolerated  ONLY FEED WHEN ALERT  Assist with all intake   Aspiration precautions  HOB >45 degrees during all intake and for at least 30 min after po   Small bites/sips, slow rate of intake, alternating bites/sips  Oral care post meals    Outcome: Not Progressing Towards Goal  SPEECH LANGUAGE PATHOLOGY DYSPHAGIA TREATMENT     Patient: Tiffany Older (85 y.o. male)  Date: 8/17/2017  Diagnosis: CVA (cerebral vascular accident) [facility]  [facility] <principal problem not specified>  Precautions: Aspiration        ASSESSMENT:  Pt seen multiple times today with dtr present at b/s. Attempted to arouse pt several times throughout the day; however, pt answering briefly and going back to sleep x4 attempts. Training and education completed with pt's dtr with pt present at b/s. Dtr informed SLP in am that pt was unable to chew green beans from lunch tray previous date; therefore, diet was changed to puree. Pt's dtr present during puree lunch meal reporting pt alerting enough to consume \"some\" of puree diet with no difficulties. Pt's dtr educated with regard to diet recs, s/sx aspiration, diet recs, compensatory strategies, oral care and positioning. Further educated to alert MD/RN if aspiration s/sx occur. Pt's dtr able to verbalize understanding. Will continue to follow for further dysphagia management.     Progression toward goals:  [ ]         Improving appropriately and progressing toward goals  [ ]         Improving slowly and progressing toward goals  [X]         Not making progress toward goals and plan of care will be adjusted       PLAN:  Recommendations and Planned Interventions:  Patient continues to benefit from skilled intervention to address the above impairments. Continue treatment per established plan of care. Discharge Recommendations: To Be Determined       SUBJECTIVE:   Patient stated \"No. OBJECTIVE:   Cognitive and Communication Status:  Neurologic State: Alert  Orientation Level: Oriented to place, Oriented to person, Disoriented to time, Disoriented to situation  Cognition: Impaired decision making  Dysphagia Treatment:  See above      PAIN:  Pt unable to report pain level     After treatment:   [ ]              Patient left in no apparent distress sitting up in chair  [X]              Patient left in no apparent distress in bed  [X]              Call bell left within reach  [X]              Nursing notified  [X]              Caregiver present  [ ]              Bed alarm activated         COMMUNICATION/EDUCATION:   [X]              SLP educated pt's dtr with regard to compensatory swallow strategies and                   aspiration/reflux precautions including: small bites/sips,                   alternate liquids/solids, decrease feeding rate, HOB > 45 with all po, and                   upright in bed at 30 degrees after po for at least 45                   Minutes. Further educated with regard to s/sx aspiration and to alert MD/RN if symptoms arise. Pt's dtr able to verbalize                   understanding.        Akhil Aparicio M.S., [address] [facility]  Speech-Language Pathologist

## 2017-08-18 PROBLEM — I25.5 ISCHEMIC CARDIOMYOPATHY: Chronic | Status: ACTIVE | Noted: 2017-08-15

## 2017-08-18 PROBLEM — Z78.9 IMPAIRED MOBILITY AND ADLS: Status: ACTIVE | Noted: 2017-08-14

## 2017-08-18 PROBLEM — E87.6 HYPOKALEMIA: Status: ACTIVE | Noted: 2017-08-14

## 2017-08-18 PROBLEM — E78.5 HYPERLIPIDEMIA WITH TARGET LDL LESS THAN 70: Chronic | Status: ACTIVE | Noted: 2017-08-15

## 2017-08-18 PROBLEM — I65.21 OCCLUSION OF RIGHT INTERNAL CAROTID ARTERY: Status: ACTIVE | Noted: 2017-08-15

## 2017-08-18 PROBLEM — I69.391 DYSPHAGIA AS LATE EFFECT OF CEREBROVASCULAR ACCIDENT (CVA): Status: ACTIVE | Noted: 2017-08-14

## 2017-08-18 PROBLEM — I69.398 SEIZURE, LATE EFFECT OF STROKE (HCC): Status: ACTIVE | Noted: 2017-08-15

## 2017-08-18 PROBLEM — Z74.09 IMPAIRED MOBILITY AND ADLS: Status: ACTIVE | Noted: 2017-08-14

## 2017-08-18 PROBLEM — R41.841 COGNITIVE COMMUNICATION DEFICIT: Status: ACTIVE | Noted: 2017-08-14

## 2017-08-18 PROBLEM — I69.354 HEMIPARESIS AFFECTING LEFT SIDE AS LATE EFFECT OF CEREBROVASCULAR ACCIDENT (CVA) (HCC): Status: ACTIVE | Noted: 2017-08-14

## 2017-08-18 PROBLEM — R56.9 SEIZURE, LATE EFFECT OF STROKE (HCC): Status: ACTIVE | Noted: 2017-08-15

## 2017-08-18 PROBLEM — I69.322 DYSARTHRIA AS LATE EFFECT OF CEREBROVASCULAR ACCIDENT (CVA): Status: ACTIVE | Noted: 2017-08-14

## 2017-08-18 PROBLEM — I63.231 CEREBROVASCULAR ACCIDENT (CVA) DUE TO OCCLUSION OF RIGHT CAROTID ARTERY (HCC): Status: ACTIVE | Noted: 2017-08-14

## 2017-08-18 PROCEDURE — 74011000258 HC RX REV CODE- 258: Performed by: FAMILY MEDICINE

## 2017-08-18 PROCEDURE — 97112 NEUROMUSCULAR REEDUCATION: CPT

## 2017-08-18 PROCEDURE — 74011250636 HC RX REV CODE- 250/636: Performed by: PSYCHIATRY & NEUROLOGY

## 2017-08-18 PROCEDURE — 74011250637 HC RX REV CODE- 250/637: Performed by: PSYCHIATRY & NEUROLOGY

## 2017-08-18 PROCEDURE — 74011250637 HC RX REV CODE- 250/637: Performed by: FAMILY MEDICINE

## 2017-08-18 PROCEDURE — 65660000000 HC RM CCU STEPDOWN

## 2017-08-18 PROCEDURE — 97530 THERAPEUTIC ACTIVITIES: CPT

## 2017-08-18 PROCEDURE — 97535 SELF CARE MNGMENT TRAINING: CPT

## 2017-08-18 RX ADMIN — Medication 10 ML: at 22:53

## 2017-08-18 RX ADMIN — Medication 100 MG: at 08:44

## 2017-08-18 RX ADMIN — DEXTROSE MONOHYDRATE AND SODIUM CHLORIDE 100 ML/HR: 5; .45 INJECTION, SOLUTION INTRAVENOUS at 03:18

## 2017-08-18 RX ADMIN — Medication 10 ML: at 05:41

## 2017-08-18 RX ADMIN — CLOPIDOGREL BISULFATE 75 MG: 75 TABLET ORAL at 08:43

## 2017-08-18 RX ADMIN — LEVETIRACETAM 1000 MG: 10 INJECTION INTRAVENOUS at 22:49

## 2017-08-18 RX ADMIN — LEVETIRACETAM 1000 MG: 10 INJECTION INTRAVENOUS at 12:09

## 2017-08-18 RX ADMIN — NIFEDIPINE 30 MG: 30 TABLET, FILM COATED, EXTENDED RELEASE ORAL at 08:43

## 2017-08-18 RX ADMIN — DEXTROSE MONOHYDRATE AND SODIUM CHLORIDE 100 ML/HR: 5; .45 INJECTION, SOLUTION INTRAVENOUS at 13:27

## 2017-08-18 NOTE — ROUTINE PROCESS
Bedside shift change report given to Samantha Feldman RN (oncoming nurse) by Elisa Coello RN (offgoing nurse). Report included the following information SBAR, ED Summary, MAR and Recent Results.

## 2017-08-18 NOTE — PALLIATIVE CARE
ARU/IPR REFERRAL CONTACT NOTE  [address] [place] [address] for Physical Rehabilitation    RE: Dorian Carmichael   Thank you for the opportunity to review this patient's case for admission to [address] [place] [address] for Physical Rehabilitation. Based on our pre-admission screening:     [x ] Our Team/Medical Director is following this case. Comments: Spoke with the nurse Dulce Lee. She stated that the patient becomes extremily drowsy after his administration of IV Keppra q12 hours. He would need to be alert enough to be able to participate in 3 hours of therapy a day. We will continue to monitor patients medical status,PT/OT/ST progress and advice following review with the team.    Again, Thank you for this referral. Should you have any questions please do not hesitate to call. Sincerely,  Marizol Quigley. Gisell Hope, [address]  Gisell Hope, RN  Admissions [facility] for Physical Rehabilitation  [phone]

## 2017-08-18 NOTE — CDMP QUERY
Please clarify if this patient is being treated/managed for:    =>Acute Toxic/Metabolic Encephalopathy s/p CVA with noted increasing size, more letharigc in a patient recieving Trazodone at bedtime  =>Other Explanation of clinical findings  =>Unable to Determine (no explanation of clinical findings)    The medical record reflects the following:    Risk:s/p Evolving right parieto-occipital and occipitotemporal infarction. Increasing size of the now large evolving infarction of the right basal ganglia    Clinical Indicators: more lethargic and hard to arouse, speech more garbled with drooling. At one point aroused to agressive stimulation. Receiving Trazodone at bedtime    Treatment: CT, UA, started IV fluids, discontinued Trazodone    Please clarify and document your clinical opinion in the progress notes and discharge summary including the definitive and/or presumptive diagnosis, (suspected or probable), related to the above clinical findings. Please include clinical findings supporting your diagnosis. If you DECLINE this query or would like to communicate with [facility], please utilize the \"[facility] message box\" at the TOP of the Progress Note on the right.       Thank you,    Haylie Hernandez RN, CCDS

## 2017-08-18 NOTE — PROGRESS NOTES
ALMA received a voicemail from the patient's daughter, Cody Redman [phone], who stated she would like him to transfer to SNF with [facility]. ALMA contacted Anatoly Bernal with [facility] with new plan. ALMA placed him to [facility] via CC link. ALMA spoke with the patient's daughter. She is very overwhelmed at this time. She is in agreement with patient transferring to rehab. She is open to both ARU or SNF. Ideally ARU to SNF. Patient potentially needs LTC. Patient decided he wants to stay in [place].   ALMA contacted [facility] with referral.

## 2017-08-18 NOTE — PROGRESS NOTES
Problem: Self Care Deficits Care Plan (Adult)  Goal: *Acute Goals and Plan of Care (Insert Text)  Occupational Therapy Goals  Initiated 8/16/2017 within 7 day(s). 1. Patient will perform self-feeding with supervision/set-up   2. Patient will perform upper body dressing with supervision/set-up. 3. Patient will perform grooming with modified independence while sitting on the edge of the bed  4. Patient and his family will demonstrate independence with LUE sensory stim/functional activity cycle in preparation for efficient functional use of LUE during his self care routine  5. Patient and his family will demonstrate independence with his occulo motor program in preparation for his efficient gaze shifting during his self care routine   Outcome: Progressing Towards Goal  OCCUPATIONAL THERAPY TREATMENT     Patient: Jens Herrmnan (89 y.o. male)  Date: 8/18/2017  Diagnosis: CVA (cerebral vascular accident) [facility])  CVA (cerebral vascular accident) [facility]) Cerebrovascular accident (CVA) due to occlusion of right carotid artery ([address]       Precautions:    Chart, occupational therapy assessment, plan of care, and goals were reviewed. ASSESSMENT:  Pt Is asleep upon entry, however easily awakens. Pt c/o neck pain, however noted to be positioned w/lateral flexion of the neck to R. Position was adjusted w/pt's agreement, pt reports feeling more comfortable w/adjusting head slightly more towards midline w/o c/o pain. Pt follows commands well this session, supportive family is present at bedside. Pt and pt's family educated on and  participated in sensory stim/functional activity cycle for pt's LUE x3, following which pt noted to have ~25% of supination in L forearm, and ~25% of L digit flexion. Pt's lunch present in room.  Pt initially required Mod A for self-feeding, however w/backwards chaining and min/mod tactile cues for midline pt was able to self-feed w/spoon (holding utensil independently) w/Min A. Pt's family was educated on tracking techniques to midline/to right to facilitate gaze shift and improve pt's ability to efficiently participate in ADLs at midline. Pt is very motivated to participate in OT and improve independence w/ADLs. Progression toward goals:  [ ]          Improving appropriately and progressing toward goals  [X]          Improving slowly and progressing toward goals  [ ]          Not making progress toward goals and plan of care will be adjusted       PLAN:  Patient continues to benefit from skilled intervention to address the above impairments. Continue treatment per established plan of care. Discharge Recommendations:  Rehab  Further Equipment Recommendations for Discharge:  N/A      G-CODES:      Self Care  Current  CL= 60-79%. The severity rating is based on the Level of Assistance required for Functional Mobility and ADLs. SUBJECTIVE:   Patient stated I am not very hungry, but I'll try to feed myself.       OBJECTIVE DATA SUMMARY:   Cognitive/Behavioral Status:  Neurologic State: Alert  Orientation Level: Oriented to person  Cognition: Decreased attention/concentration, Follows commands   ADL Intervention:   Feeding  Utensil Management: Contact guard assistance  Food to Mouth: Minimum assistance; Moderate assistance  Drink to Mouth: Minimum assistance; Moderate assistance     Grooming  Grooming Assistance: Minimum assistance  Washing Face: Contact guard assistance  Neuro Re-Education:   Sensory stim/functional activity cycle x3 as noted above     Therapeutic Exercises:   Functional ROM in preparation for ADLs  Pain:  Pt reports 5/10 pain or discomfort prior to treatment. Neck  Pt reports 5/10 pain or discomfort post treatment. Activity Tolerance:    Fair     Please refer to the flowsheet for vital signs taken during this treatment.   After treatment:   [ ]  Patient left in no apparent distress sitting up in chair  [X]  Patient left in no apparent distress in bed  [X]  Call bell left within reach  [X]  Nursing notified  [X]  Caregiver present  [X]  Bed alarm activated     CHIOMA Palomo  Time Calculation: 39 mins

## 2017-08-18 NOTE — ROUTINE PROCESS
NURSING Routine Process Date of Service: 08/11817 [id number]         []Hide copied text  []Hover for attribution information      Patient: Tahira Hudson Age: 71 y.o. Sex: male      Bedside and Verbal shift change report given to [employer] (oncoming nurse) by Malena Corrales RN (offgoing nurse). Report included the following information SBAR, Kardex, MAR and Recent Results.   PATIENT GOALS FOR TODAY PATIENT PRIORITIES FOR TODAY   Goals are: cva protocoo, pt, ot, speech, and safety, monitor labs  Updated on Whiteboard in Patients Room: yes   Patient states his/her priorities are: get better  Updated on Whiteboard in Patients Room: yes    SITUATION:   Code Status: Full Code Reason for Admission: CVA (cerebral vascular accident) ([facility] [address]  CVA (cerebral vascular accident) [facility])   Hospital day: 3 Problem List: Active Problems:    CVA (cerebral vascular accident) ([facility] [address] (8/14/2017)       Pacemaker (8/14/2017)         Attending Provider:   Oseas Junior MD Allergies: No Known Allergies   BACKGROUND:   Past Medical History:        Past Medical History:   Diagnosis Date    Hypertension      Other ill-defined conditions       PVD           ASSESSMENT:   Neuro:  Neurologic State: Alert, Orientation Level: Oriented to place, Oriented to person, Disoriented to time, Disoriented to situation CV:  Patient on Telemetry: Cardiac/Telemetry Monitor On: Yes    Box Number: 70   Cardiac Rhythm: Normal sinus rhythm  Patient has a pace maker:   Endocrine   Recent Glucose Results:           Lab Results   Component Value Date/Time     GLUCPOC 117 (H) 08/17/2017 06:26 AM     GLUCPOC 107 08/16/2017 10:54 PM       Respiratory:  O2 Device: Room air       Supplemental O2         Incentive Spirometery          GI  Current diet: DIET NUTRITIONAL SUPPLEMENTS Breakfast, Dinner; ENSURE ENLIVE  DIET DYSPHAGIA PUREED (NDD1)                                                                                          Reasons if patient has a eugene: no Patient Safety  Restraints:    Family notified:    Other Alternatives: Fall Risk   Total Score: 3   Safety Measures: Bed/Chair alarm on, Bed/Chair-Wheels locked, Bed in low position, Call light within reach, Fall prevention (comment), Family at bedside, Seizure precaution, Side rails X 3 VTE Prophylaxis     Sequential Compression Device: Bilateral     Patient Refused VTE Prophylaxis: Yes     WOUND (if present)  Wound Type:  no  Dressing present Dressing Present : No  Wound Concerns/Notes: no IV ACCESS     Reasons if patient has a central line: no   PAIN  Pain Assessment  Pain Intensity 1: 0 (08/17/17 1532)        Patient Stated Pain Goal: 0  Time of last intervention: 1700   Reassessment Completed: no Last Vitals:         Vitals:     08/17/17 0343 08/17/17 0800 08/17/17 1200 08/17/17 1600   BP: 145/79 146/88 136/76 149/78   Pulse: 70 70 63 83   Resp: 18 17 18 18   Temp: 98.1 °F (36.7 °C) 98.2 °F (36.8 °C) 98 °F (36.7 °C) 98.1 °F (36.7 °C)   SpO2: 95% 98% 100% 95%   Weight: 70.4 kg (155 lb 4.8 oz)         Height:               Last 3 Weights:       Last 3 Recorded Weights in this Encounter     08/15/17 0625 08/17/17 0343   Weight: 65 kg (143 lb 4.8 oz) 70.4 kg (155 lb 4.8 oz)      Weight change:   LAB RESULTS  No results for input(s): WBC, HGB, HCT, PLT, HGBEXT, HCTEXT, PLTEXT in the last 72 hours. Recent Labs       08/16/17   0318  08/15/17   0308   NA  139  140   K  3.2*  3.0*   GLU  107*  84   BUN  8  11   CREA  0.65  0.66   CA  8.9  8.5       RECOMMENDATIONS AND DISCHARGE PLANNING   1. Discharge plan for patient // Needs or barriers to disharge: [possible rehab/snf . 2. Estimated Discharge Date: tbd Posted on Whiteboard in Patients Room: yes   \"HEALS\" SAFETY CHECK   A safety check occurred in the patient's room between off going nurse and oncoming nurse listed above. The safety check included the below items  Area Items   H  High Alert Meds  § Verify all high alert medication drips (heparin, PCA, etc.)   E  Equipment § Suction is set up for ALL patients (with jose francisco)  § Red plugs utilized for all equipment (IV pumps, etc.)  § WOWs wiped down at end of shift. § Room stocked with oxygen, suction, and other unit-specific supplies   A  Alarms § Bed alarm is set for fall risk patients  § Ensure chair alarm is in place and activated if patient is up in a chair   L  Lines § Check IV for any infiltration  § Zhang bag is empty if patient has a Zhang   § Tubing and IV bags are labeled   S  Safety § Room is clean, patient is clean, and equipment is clean. § Ensure room is clear and free of clutter  § Hallways are clear from equipment besides carts.    § Fall bracelet on for fall risk patients  § Suction is set up for ALL patients (with jose francisco)  § Isolation precautions followed, supplies available outside room

## 2017-08-18 NOTE — PROGRESS NOTES
CM called, spoke to Shin Hercules about this patient for possible post acute care inj ARU. She said, they will discuss this pt's case on their huddle, however, there are too many steps to be addressed in order for them to consider this patient for admission.

## 2017-08-18 NOTE — PROGRESS NOTES
Progress Note    Patient: Robin Winn MRN: [id number]  SSN: [id number]    YOB: 1945  Age: 71 y.o. Sex: male      Admit Date: 8/14/2017    LOS: 4 days     Subjective:     Patient aqdmitted with new CVA with left hemiparesis. Patient does have encephalopathy secondary to CVA. Not very alert. Objective:     Vitals:    08/18/17 0000 08/18/17 0400 08/18/17 0800 08/18/17 1200   BP: 125/77 141/83 153/87 128/78   Pulse: 70 81 70 70   Resp: 16 16 17 18   Temp: 99.5 °F (37.5 °C) 99.8 °F (37.7 °C) 98.4 °F (36.9 °C) 98 °F (36.7 °C)   SpO2: 97% 98% 100% 97%   Weight:  64.7 kg (142 lb 9.6 oz)     Height:            Intake and Output:  Current Shift:    Last three shifts: 08/16 1901 - 08/18 0700  In: 536.7 [I.V.:536.7]  Out: -     Physical Exam:   GENERAL: alert, cooperative, no distress, slowed mentation, appears older than stated age  LUNG: clear to auscultation bilaterally  HEART: regular rate and  Rhythm  paced  ABDOMEN: soft, non-tender. Bowel sounds normal. No masses,  no organomegaly    Lab/Data Review: All lab results for the last 24 hours reviewed.      Glucose 117    Assessment:     Principal Problem:    Cerebrovascular accident (CVA) due to occlusion of right carotid artery ([facility] [address] (8/14/2017)      Overview: Acute Ischemic Stroke (acute infarctions involving right parieto-occipital       area and occipitotemporal area and anterior and midportion of right corona       radiata) with residual left hemiparesis, dysphagia, dysarthria and       cognitive-communication deficits    Active Problems:    Cardiac pacemaker in situ ()      Impaired mobility and ADLs (8/14/2017)      Hemiparesis affecting left side as late effect of cerebrovascular accident (CVA) ([address] (8/14/2017)      Dysphagia as late effect of cerebrovascular accident (CVA) (8/14/2017)      Cognitive communication deficit (8/14/2017)      Ischemic cardiomyopathy (8/15/2017)      Overview: EF 40%      Hyperlipidemia with target LDL less than 70 (8/15/2017)      Overview: Lipid profile (8/15/2017) showed TG 99, , HDL 81, LDL 84      Hypokalemia (8/14/2017)      Overview: K (8/14/2017, on admission) = 2.8      On chronic clopidogrel therapy ()      Alcohol use disorder (HCC) ()      Tobacco use disorder ()      Benign hypertensive heart disease with systolic congestive heart failure, NYHA class 2 (HCC) ()      Seizure, late effect of stroke ([address] (8/15/2017)      Overview: On Levetiracetam      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) (8/14/2017)      Occlusion of right internal carotid artery (8/15/2017)        Plan:     Calorie count        Signed By: Angela Couch MD     August 18, 2017

## 2017-08-18 NOTE — PROGRESS NOTES
More awake and alert  CT head shows an evolution of stroke. No new stroke or hemorrhage. UA nitrate/LE (-)  Minimal WBC  Pt reports neck discomfort    Temp: 98.4 °F (36.9 °C) (08/18/17 0800) Pulse (Heart Rate): 70 (08/18/17 0800) Resp Rate: 17 (08/18/17 0800) BP: 153/87 (08/18/17 0800) O2 Sat (%): 100 % (08/18/17 0800) Weight: 64.7 kg (142 lb 9.6 oz) (08/18/17 0400)     Awake alert dysarthric  Lying in bed once again now with eugene  Dense weakness with neglect and visual field loss as before  Assessment:  Stroke. Artery to artery  Non dominant hemisphere  Complete occlusion carotid No procedure available to open available  No interval seizures Pt reports history stent in L leg. .. Given this Plavix better choice than asa  Improvement in lethargy  Will need NH placement  Need to advance patient.   Needs to get out of bed  D/C eugene if possible  Will follow

## 2017-08-18 NOTE — PROGRESS NOTES
NUTRITION    Nursing Referral: [facility]   Nutrition Consult: Calorie Count     RECOMMENDATIONS / PLAN:     - Start calorie count on 8/19 for next 3 days; RD to collect and calculate when completed  - Continue RD inpatient monitoring and evaluation. NUTRITION INTERVENTIONS & DIAGNOSIS:     [x] Meals/Snacks: modified diet  [x] Medical food supplementation: Ensure Enlive BID  [x] Coordination of care: calorie count discussed with nursing    Nutrition Diagnosis:  Unintentional weight loss related to inadequate energy intake as evidenced by wt loss 22 lb (13.3%) x 2 year PTA    ASSESSMENT:     8/18: Pt with fair meal intake per nursing. Eats slow. Unsure if consuming supplements; had one unopened Ensure in room. Pt lethargic at time of visit. Plan to start calorie count; discussed with nursing; form posted in pt's room    8/15: Pt had good appetite and meal intake PTA per daughter report. Fair meal intake today. Had unplanned wt loss PTA.  Discussed adding nutrition supplement; daughter agreed with plan    Average po intake adequate to meet patients estimated nutritional needs:   [] Yes     [x] No   [] Unable to determine at this time    Diet: DIET NUTRITIONAL SUPPLEMENTS Breakfast, Dinner; ENSURE ENLIVE  DIET DYSPHAGIA PUREED (NDD1)      Food Allergies:  None known   Current Appetite:   [] Good     [x] Fair     [] Poor     [] Other:  Appetite/meal intake prior to admission:   [x] Good (per daughter)     [] Payam Kaplan     [] Poor     [] Other:  Feeding Limitations:  [x] Swallowing difficulty: SLP following    [] Chewing difficulty    [] Other:  Current Meal Intake:   Patient Vitals for the past 100 hrs:   % Diet Eaten   08/15/17 1857 50 %       BM:  8/14  Skin Integrity:  Previous wound lower back  Edema:  None   Pertinent Medications: Reviewed    Recent Labs      08/16/17   0318   NA  139   K  3.2*   CL  103   CO2  29   GLU  107*   BUN  8   CREA  0.65   CA  8.9       Intake/Output Summary (Last 24 hours) at 08/18/17 1825  Last data filed at 08/17/17 2034   Gross per 24 hour   Intake           436.67 ml   Output                0 ml   Net           436.67 ml       Anthropometrics:  Ht Readings from Last 1 Encounters:   08/15/17 6' (1.829 m)     Last 3 Recorded Weights in this Encounter    08/15/17 0625 08/17/17 0343 08/18/17 0400   Weight: 65 kg (143 lb 4.8 oz) 70.4 kg (155 lb 4.8 oz) 64.7 kg (142 lb 9.6 oz)     Body mass index is 19.34 kg/(m^2). Weight History:  Pt had unplanned wt loss PTA per daughter report; 22 lb (13.3%) wt loss x 2 years PTA per chart    Weight Metrics 8/18/2017 9/11/2015 12/5/2012   Weight 142 lb 9.6 oz 165 lb 6.4 oz 173 lb   BMI 19.34 kg/m2 22.43 kg/m2 23.46 kg/m2        Admitting Diagnosis: CVA (cerebral vascular accident) ([facility] [address]  CVA (cerebral vascular accident) ([facility] [address]  Pertinent PMHx: HTN    Education Needs:        [x] None identified  [] Identified - Not appropriate at this time  []  Identified and addressed - refer to education log  Learning Limitations:   [] None identified  [x] Identified: appears confused at times during visit    Cultural, Hoahaoism & ethnic food preferences:  [x] None identified    [] Identified and addressed     ESTIMATED NUTRITION NEEDS:     Calories: 0023-0349 kcal (MSJx1.2-1.3) based on  [x] Actual BW: 81 kg      [] IBW   Protein: 65-81 gm (0.8-1 gm/kg) based on  [x] Actual BW      [] IBW   Fluid: 1 mL/kcal     MONITORING & EVALUATION:     Nutrition Goal(s):   1. Po intake of meals will meet >75% of patient estimated nutritional needs within the next 7 days.   Outcome:  [] Met/Ongoing    [x]  Not Met/Progressing    [] New/Initial Goal     Monitoring:   [x] Diet tolerance   [x] Meal intake   [x] Supplement intake   [] GI symptoms/ability to tolerate po diet   [] Respiratory status   [] Plan of care      Previous Recommendations (for follow-up assessments only):     [x]   Implemented       []   Not Implemented (RD to address)     [] No Recommendation Made     Discharge Planning: cardiac diet; consistency per SLP   [x] Participated in care planning, discharge planning, & interdisciplinary rounds as appropriate      Leonid Goins, [address] [place] [address]   Pager: [phone]

## 2017-08-19 LAB
ANION GAP SERPL CALC-SCNC: 9 MMOL/L (ref 3–18)
BUN SERPL-MCNC: 8 MG/DL (ref 7–18)
BUN/CREAT SERPL: 13 (ref 12–20)
CALCIUM SERPL-MCNC: 8.3 MG/DL (ref 8.5–10.1)
CHLORIDE SERPL-SCNC: 97 MMOL/L (ref 100–108)
CO2 SERPL-SCNC: 27 MMOL/L (ref 21–32)
CREAT SERPL-MCNC: 0.63 MG/DL (ref 0.6–1.3)
GLUCOSE BLD STRIP.AUTO-MCNC: 107 MG/DL (ref 70–110)
GLUCOSE SERPL-MCNC: 125 MG/DL (ref 74–99)
POTASSIUM SERPL-SCNC: 2.8 MMOL/L (ref 3.5–5.5)
SODIUM SERPL-SCNC: 133 MMOL/L (ref 136–145)

## 2017-08-19 PROCEDURE — 80048 BASIC METABOLIC PNL TOTAL CA: CPT | Performed by: FAMILY MEDICINE

## 2017-08-19 PROCEDURE — 74011250637 HC RX REV CODE- 250/637: Performed by: FAMILY MEDICINE

## 2017-08-19 PROCEDURE — 82962 GLUCOSE BLOOD TEST: CPT

## 2017-08-19 PROCEDURE — 74011000258 HC RX REV CODE- 258: Performed by: FAMILY MEDICINE

## 2017-08-19 PROCEDURE — 74011250637 HC RX REV CODE- 250/637: Performed by: PSYCHIATRY & NEUROLOGY

## 2017-08-19 PROCEDURE — 36415 COLL VENOUS BLD VENIPUNCTURE: CPT | Performed by: FAMILY MEDICINE

## 2017-08-19 PROCEDURE — 65660000000 HC RM CCU STEPDOWN

## 2017-08-19 PROCEDURE — 97535 SELF CARE MNGMENT TRAINING: CPT

## 2017-08-19 PROCEDURE — 74011250636 HC RX REV CODE- 250/636: Performed by: PSYCHIATRY & NEUROLOGY

## 2017-08-19 PROCEDURE — 74011250636 HC RX REV CODE- 250/636: Performed by: FAMILY MEDICINE

## 2017-08-19 RX ORDER — POTASSIUM CHLORIDE 7.45 MG/ML
10 INJECTION INTRAVENOUS
Status: COMPLETED | OUTPATIENT
Start: 2017-08-19 | End: 2017-08-19

## 2017-08-19 RX ORDER — POTASSIUM CHLORIDE 1.5 G/1.77G
20 POWDER, FOR SOLUTION ORAL 2 TIMES DAILY WITH MEALS
Status: DISCONTINUED | OUTPATIENT
Start: 2017-08-19 | End: 2017-08-21 | Stop reason: HOSPADM

## 2017-08-19 RX ADMIN — POTASSIUM CHLORIDE 20 MEQ: 1.5 POWDER, FOR SOLUTION ORAL at 16:41

## 2017-08-19 RX ADMIN — LEVETIRACETAM 1000 MG: 10 INJECTION INTRAVENOUS at 12:59

## 2017-08-19 RX ADMIN — CLOPIDOGREL BISULFATE 75 MG: 75 TABLET ORAL at 09:23

## 2017-08-19 RX ADMIN — DEXTROSE MONOHYDRATE AND SODIUM CHLORIDE 100 ML/HR: 5; .45 INJECTION, SOLUTION INTRAVENOUS at 00:32

## 2017-08-19 RX ADMIN — DEXTROSE MONOHYDRATE AND SODIUM CHLORIDE 100 ML/HR: 5; .45 INJECTION, SOLUTION INTRAVENOUS at 14:00

## 2017-08-19 RX ADMIN — NIFEDIPINE 30 MG: 30 TABLET, FILM COATED, EXTENDED RELEASE ORAL at 09:23

## 2017-08-19 RX ADMIN — POTASSIUM CHLORIDE 10 MEQ: 7.46 INJECTION, SOLUTION INTRAVENOUS at 10:26

## 2017-08-19 RX ADMIN — Medication 10 ML: at 06:01

## 2017-08-19 RX ADMIN — Medication 100 MG: at 09:23

## 2017-08-19 RX ADMIN — POTASSIUM CHLORIDE 10 MEQ: 7.46 INJECTION, SOLUTION INTRAVENOUS at 11:52

## 2017-08-19 RX ADMIN — POTASSIUM CHLORIDE 10 MEQ: 7.46 INJECTION, SOLUTION INTRAVENOUS at 09:21

## 2017-08-19 NOTE — ROUTINE PROCESS
Bedside, Verbal and Written shift change report given to ZEYNEP Baez RN (oncoming nurse) by [employer]). Report included the following information SBAR, Kardex, and MAR. Hourly rounding and  chart checks completed.

## 2017-08-19 NOTE — PROGRESS NOTES
Problem: Self Care Deficits Care Plan (Adult)  Goal: *Acute Goals and Plan of Care (Insert Text)  Occupational Therapy Goals  Initiated 8/16/2017 within 7 day(s). 1. Patient will perform self-feeding with supervision/set-up   2. Patient will perform upper body dressing with supervision/set-up. 3. Patient will perform grooming with modified independence while sitting on the edge of the bed  4. Patient and his family will demonstrate independence with LUE sensory stim/functional activity cycle in preparation for efficient functional use of LUE during his self care routine  5. Patient and his family will demonstrate independence with his occulo motor program in preparation for his efficient gaze shifting during his self care routine   Outcome: Progressing Towards Goal  OCCUPATIONAL THERAPY TREATMENT     Patient: Alethea Mitchell (9 y.o. male)  Date: 8/19/2017  Diagnosis: CVA (cerebral vascular accident) [facility])  CVA (cerebral vascular accident) [facility]) Cerebrovascular accident (CVA) due to occlusion of right carotid artery [facility])       Precautions:  Fall  Chart, occupational therapy assessment, plan of care, and goals were reviewed. ASSESSMENT:  At start of session, patient lethargic and with right facing gaze. Patient able to self feed with minimal assistance after backward chaining and hand over hand assist provided. Constant verbal and tactile cues given to bring head to midline and visual cues used to look at lunch tray when attempting to eat. He was able to reach his drink on the tray table to bring it to his mouth but assist given to position straw for drinking. Extra time required for self feeding but increased performance noted over time. At end of session, patient with midline gaze and more alert state.    Progression toward goals:  [ ]          Improving appropriately and progressing toward goals  [X]          Improving slowly and progressing toward goals  [ ]          Not making progress toward goals and plan of care will be adjusted       PLAN:  Patient continues to benefit from skilled intervention to address the above impairments. Continue treatment per established plan of care. Discharge Recommendations:  Rehab  Further Equipment Recommendations for Discharge:  TBD at next level of care      G-CODES:      NA      SUBJECTIVE:   Patient stated I need transportation.       OBJECTIVE DATA SUMMARY:   Cognitive/Behavioral Status:  Neurologic State: Alert  Orientation Level: Oriented to person  Cognition: Decreased attention/concentration, Decreased command following  Safety/Judgement: Fall prevention     ADL Intervention:  Feeding  Feeding Assistance: Minimum assistance  Food to Mouth: Minimum assistance  Drink to Mouth: Supervision/set-up     Cognitive Retraining  Safety/Judgement: Fall prevention     Pain:  Pt reports 0/10 pain or discomfort prior to treatment. Pt reports 0/10 pain or discomfort post treatment. Activity Tolerance:    Good     Please refer to the flowsheet for vital signs taken during this treatment. After treatment:   [ ]  Patient left in no apparent distress sitting up in chair  [X]  Patient left in no apparent distress in bed  [X]  Call bell left within reach  [X]  Nursing notified  [ ]  Caregiver present  [ ]  Bed alarm activated      COMMUNICATION/EDUCATION:   [X] Home safety education was provided and the patient/caregiver indicated understanding. [ ] Patient/family have participated as able in goal setting and plan of care. [ ] Patient/family agree to work toward stated goals and plan of care. [ ] Patient understands intent and goals of therapy, but is neutral about his/her participation. [ ] Patient is unable to participate in goal setting and plan of care.         Everlena Klinefelter, MS OTR/L  Time Calculation: 25 mins

## 2017-08-19 NOTE — PROGRESS NOTES
Progress Note    Patient: Elizabeth Cutler MRN: [id number]  SSN: [id number]    YOB: 1945  Age: 71 y.o. Sex: male      Admit Date: 8/14/2017    LOS: 5 days     Subjective:     Patient with moderate protein calorie malnutrition with new CVA and hypokalemia. Slightly more awake today. Objective:     Vitals:    08/18/17 2349 08/19/17 0406 08/19/17 0636 08/19/17 0750   BP: 150/80 155/81  155/89   Pulse: 73 66  68   Resp: 20 18 18   Temp: 98.1 °F (36.7 °C) 98.1 °F (36.7 °C)  98.5 °F (36.9 °C)   SpO2: 99% 99%  98%   Weight:   64.7 kg (142 lb 10.2 oz)    Height:            Intake and Output:  Current Shift:    Last three shifts: 08/17 1901 - 08/19 0700  In: 1736.7 [I.V.:1736.7]  Out: 500 [Urine:500]    Physical Exam:   GENERAL: alert, slowed mentation, appears older than stated age  LUNG: clear to auscultation bilaterally  paced  HEART: regular rate and rhythm    Lab/Data Review: All lab results for the last 24 hours reviewed.      Potassium 2.8    Assessment:     Principal Problem:    Cerebrovascular accident (CVA) due to occlusion of right carotid artery ([facility] [address] (8/14/2017)      Overview: Acute Ischemic Stroke (acute infarctions involving right parieto-occipital       area and occipitotemporal area and anterior and midportion of right corona       radiata) with residual left hemiparesis, dysphagia, dysarthria and       cognitive-communication deficits    Active Problems:    Cardiac pacemaker in situ ()      Impaired mobility and ADLs (8/14/2017)      Hemiparesis affecting left side as late effect of cerebrovascular accident (CVA) ([address] (8/14/2017)      Dysphagia as late effect of cerebrovascular accident (CVA) (8/14/2017)      Cognitive communication deficit (8/14/2017)      Ischemic cardiomyopathy (8/15/2017)      Overview: EF 40%      Hyperlipidemia with target LDL less than 70 (8/15/2017)      Overview: Lipid profile (8/15/2017) showed TG 99, , HDL 81, LDL 84      Hypokalemia (8/14/2017) Overview: K (8/14/2017, on admission) = 2.8      On chronic clopidogrel therapy ()      Alcohol use disorder (HCC) ()      Tobacco use disorder ()      Benign hypertensive heart disease with systolic congestive heart failure, NYHA class 2 (HCC) ()      Seizure, late effect of stroke ([facility] [address] (8/15/2017)      Overview: On Levetiracetam      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) (8/14/2017)      Occlusion of right internal carotid artery (8/15/2017)        Plan:     potassium replacement.     Signed By: Abrahan Cavazos MD     August 19, 2017

## 2017-08-19 NOTE — PROGRESS NOTES
Pt not seen for skilled OT due to:  []  Nausea/vomiting  []  Eating  []  Pain  [x]  Pt sleeping and requesting therapy to come back later. []  Off Unit  Will f/u later as schedule allows. Thank you.   David Barnett MS OTR/L

## 2017-08-19 NOTE — PROGRESS NOTES
14:25 PT treatment attempt X 1: Patient occupied with CNA, asked to return later. 14:50 PT treatment attempt X 2: Patient sleeping soundly, unable to be roused with verbal or tactile stimuli. 15:30 PT treatment attempt X 3: Patient continues sleeping soundly, unable to be roused with verbal or tactile stimuli. Will f/u as patient's schedule permits. Thank you, Bessie Godfrey DPT.

## 2017-08-20 LAB
ANION GAP SERPL CALC-SCNC: 6 MMOL/L (ref 3–18)
BUN SERPL-MCNC: 8 MG/DL (ref 7–18)
BUN/CREAT SERPL: 14 (ref 12–20)
CALCIUM SERPL-MCNC: 8.6 MG/DL (ref 8.5–10.1)
CHLORIDE SERPL-SCNC: 102 MMOL/L (ref 100–108)
CO2 SERPL-SCNC: 29 MMOL/L (ref 21–32)
CREAT SERPL-MCNC: 0.58 MG/DL (ref 0.6–1.3)
GLUCOSE SERPL-MCNC: 93 MG/DL (ref 74–99)
POTASSIUM SERPL-SCNC: 3.5 MMOL/L (ref 3.5–5.5)
SODIUM SERPL-SCNC: 137 MMOL/L (ref 136–145)

## 2017-08-20 PROCEDURE — 97112 NEUROMUSCULAR REEDUCATION: CPT

## 2017-08-20 PROCEDURE — 80048 BASIC METABOLIC PNL TOTAL CA: CPT | Performed by: FAMILY MEDICINE

## 2017-08-20 PROCEDURE — 65660000000 HC RM CCU STEPDOWN

## 2017-08-20 PROCEDURE — 74011250637 HC RX REV CODE- 250/637: Performed by: PSYCHIATRY & NEUROLOGY

## 2017-08-20 PROCEDURE — 36415 COLL VENOUS BLD VENIPUNCTURE: CPT | Performed by: FAMILY MEDICINE

## 2017-08-20 PROCEDURE — 74011250636 HC RX REV CODE- 250/636: Performed by: PSYCHIATRY & NEUROLOGY

## 2017-08-20 PROCEDURE — 97530 THERAPEUTIC ACTIVITIES: CPT

## 2017-08-20 PROCEDURE — 74011250637 HC RX REV CODE- 250/637: Performed by: FAMILY MEDICINE

## 2017-08-20 PROCEDURE — 74011000258 HC RX REV CODE- 258: Performed by: FAMILY MEDICINE

## 2017-08-20 RX ADMIN — LEVETIRACETAM 1000 MG: 10 INJECTION INTRAVENOUS at 01:06

## 2017-08-20 RX ADMIN — LEVETIRACETAM 1000 MG: 10 INJECTION INTRAVENOUS at 12:19

## 2017-08-20 RX ADMIN — Medication 10 ML: at 01:52

## 2017-08-20 RX ADMIN — POTASSIUM CHLORIDE 20 MEQ: 1.5 POWDER, FOR SOLUTION ORAL at 16:51

## 2017-08-20 RX ADMIN — LEVETIRACETAM 1000 MG: 10 INJECTION INTRAVENOUS at 22:45

## 2017-08-20 RX ADMIN — Medication 100 MG: at 10:07

## 2017-08-20 RX ADMIN — CLOPIDOGREL BISULFATE 75 MG: 75 TABLET ORAL at 10:07

## 2017-08-20 RX ADMIN — DEXTROSE MONOHYDRATE AND SODIUM CHLORIDE 100 ML/HR: 5; .45 INJECTION, SOLUTION INTRAVENOUS at 12:17

## 2017-08-20 RX ADMIN — DEXTROSE MONOHYDRATE AND SODIUM CHLORIDE 100 ML/HR: 5; .45 INJECTION, SOLUTION INTRAVENOUS at 22:43

## 2017-08-20 RX ADMIN — NIFEDIPINE 30 MG: 30 TABLET, FILM COATED, EXTENDED RELEASE ORAL at 10:07

## 2017-08-20 RX ADMIN — POTASSIUM CHLORIDE 20 MEQ: 1.5 POWDER, FOR SOLUTION ORAL at 10:07

## 2017-08-20 RX ADMIN — DEXTROSE MONOHYDRATE AND SODIUM CHLORIDE 100 ML/HR: 5; .45 INJECTION, SOLUTION INTRAVENOUS at 01:48

## 2017-08-20 NOTE — ROUTINE PROCESS
Bedside and Verbal shift change report given to CONNOR Samuel RNby ZYENEP Baez RN. Report included the following information SBAR, Kardex, MAR and Recent Results.

## 2017-08-20 NOTE — PROGRESS NOTES
Problem: Mobility Impaired (Adult and Pediatric)  Goal: *Acute Goals and Plan of Care (Insert Text)  Physical Therapy Goals  Initiated 8/15/2017 and to be accomplished within 7 day(s)  1. Patient will move from supine to sit and sit to supine , scoot up and down and roll side to side in bed with minimal assistance. 2. Patient will transfer from bed to chair and chair to bed with contact guard assist using the least restrictive device. 3. Patient will perform sit to stand with minimal assistance. 4. Patient will ambulate with minimal assistance for >25 feet with the least restrictive device. 5. Patient will perform BLE HEP in order to improve strength for carryover into functional tasks. PHYSICAL THERAPY TREATMENT     Patient: Leda Rosales (1 y.o. male)  Date: 8/20/2017  Diagnosis: CVA (cerebral vascular accident) [facility])  CVA (cerebral vascular accident) [facility]) Cerebrovascular accident (CVA) due to occlusion of right carotid artery ([facility] [address]       Precautions:    Chart, physical therapy assessment, plan of care and goals were reviewed. ASSESSMENT:  Pt agreeable to treatment session upon entering room. Pt has difficulty verbalizing responses/questions. Mod to max (A) for bed mobility and transfers. Max (A) for sit<>stand, pt able to maintain standing for ~30 seconds. Neuro re-ed per objective below. Pt requests to use bathroom toward end of session, and shortly after has BM @ EOB. Assisted CNA w/ clean-up. Pt returned to supine, caregiver present, nursing notified, call bell within reach. Progression toward goals:  [ ]      Improving appropriately and progressing toward goals  [X]      Improving slowly and progressing toward goals  [ ]      Not making progress toward goals and plan of care will be adjusted       PLAN:  Patient continues to benefit from skilled intervention to address the above impairments. Continue treatment per established plan of care.   Discharge Recommendations:  Skilled Nursing Facility  Further Equipment Recommendations for Discharge:  rolling walker and N/A       SUBJECTIVE:   Patient stated I need to go to the bathroom.   Mobility [id number] Current  CL= 60-79%   Goal  CJ= 20-39%. The severity rating is based on the Other level of assistance required for functional mobility and ADLs. OBJECTIVE DATA SUMMARY:   Critical Behavior:  Neurologic State: Drowsy, Lethargic  Orientation Level: Oriented X4  Cognition: Decreased command following  Safety/Judgement: Fall prevention  Functional Mobility Training:  Bed Mobility:  Rolling: Moderate assistance  Supine to Sit: Maximum assistance  Sit to Supine: Maximum assistance                       Transfers:  Sit to Stand: Maximum assistance  Stand to Sit: Maximum assistance           Balance:  Sitting: Impaired; With support  Sitting - Static: Poor (constant support)  Sitting - Dynamic: Poor (constant support)  Standing: Impaired; With support  Standing - Static: Constant support  Neuro Re-Education:  Static sitting to improve posture, decrease drift to (L). Pt positioned on (R) elbow w/ WS to (R) followed by sitting EOB @ midline. Pt unable to maintain midline for > 2 min. Pain:  Pt pain was reported as  Na  pre-treatment. Pt pain was reported as na  post-treatment     Activity Tolerance:   Fair  Please refer to the flowsheet for vital signs taken during this treatment.   After treatment:   [ ] Patient left in no apparent distress sitting up in chair  [X] Patient left in no apparent distress in bed  [X] Call bell left within reach  [X] Nursing notified  [X] Caregiver present  [ ] Bed alarm activated      Tanya Villagran, PTA   Time Calculation: 34 mins

## 2017-08-20 NOTE — PROGRESS NOTES
Progress Note    Patient: Robin Winn MRN: [id number]  SSN: [id number]    YOB: 1945  Age: 71 y.o. Sex: male      Admit Date: 8/14/2017    LOS: 6 days     Subjective:     Patient admitted with new CVA with hypokalemia. Taking some po. Objective:     Vitals:    08/20/17 0000 08/20/17 0400 08/20/17 0618 08/20/17 0800   BP: (!) 165/95 (!) 164/95  (!) 148/92   Pulse: 69 70  70   Resp: 19 19 18   Temp: 97.7 °F (36.5 °C) 97.9 °F (36.6 °C)  97.5 °F (36.4 °C)   SpO2: 99% 99%  98%   Weight:   66.5 kg (146 lb 9.6 oz)    Height:   6' (1.829 m)         Intake and Output:  Current Shift: 08/20 0701 - 08/20 1900  In: 60 [P.O.:60]  Out: -   Last three shifts: 08/18 1901 - 08/20 0700  In: 1400 [P.O.:100; I.V.:1300]  Out: 1650 [Urine:1650]    Physical Exam:   GENERAL: alert, cooperative, no distress, appears older than stated age  LUNG: clear to auscultation bilaterally  HEART: regular rate and rhythm, S1, S2 normal, no murmur, click, rub or gallop    Lab/Data Review: All lab results for the last 24 hours reviewed.      Potassium 3.5    Assessment:     Principal Problem:    Cerebrovascular accident (CVA) due to occlusion of right carotid artery ([address] (8/14/2017)      Overview: Acute Ischemic Stroke (acute infarctions involving right parieto-occipital       area and occipitotemporal area and anterior and midportion of right corona       radiata) with residual left hemiparesis, dysphagia, dysarthria and       cognitive-communication deficits    Active Problems:    Cardiac pacemaker in situ ()      Impaired mobility and ADLs (8/14/2017)      Hemiparesis affecting left side as late effect of cerebrovascular accident (CVA) ([address] (8/14/2017)      Dysphagia as late effect of cerebrovascular accident (CVA) (8/14/2017)      Cognitive communication deficit (8/14/2017)      Ischemic cardiomyopathy (8/15/2017)      Overview: EF 40%      Hyperlipidemia with target LDL less than 70 (8/15/2017)      Overview: Lipid profile (8/15/2017) showed TG 99, , HDL 81, LDL 84      Hypokalemia (8/14/2017)      Overview: K (8/14/2017, on admission) = 2.8      On chronic clopidogrel therapy ()      Alcohol use disorder (HCC) ()      Tobacco use disorder ()      Benign hypertensive heart disease with systolic congestive heart failure, NYHA class 2 (HCC) ()      Seizure, late effect of stroke ([facility] [address] (8/15/2017)      Overview: On Levetiracetam      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) (8/14/2017)      Occlusion of right internal carotid artery (8/15/2017)        Plan:     Awaiting SNF placement.     Signed By: Beto Nunez MD     August 20, 2017

## 2017-08-21 VITALS
BODY MASS INDEX: 20.05 KG/M2 | HEIGHT: 72 IN | RESPIRATION RATE: 19 BRPM | HEART RATE: 70 BPM | TEMPERATURE: 97.2 F | SYSTOLIC BLOOD PRESSURE: 166 MMHG | DIASTOLIC BLOOD PRESSURE: 91 MMHG | WEIGHT: 148 LBS | OXYGEN SATURATION: 99 %

## 2017-08-21 PROCEDURE — 97112 NEUROMUSCULAR REEDUCATION: CPT

## 2017-08-21 PROCEDURE — 74011250637 HC RX REV CODE- 250/637: Performed by: FAMILY MEDICINE

## 2017-08-21 PROCEDURE — 74011250637 HC RX REV CODE- 250/637: Performed by: PSYCHIATRY & NEUROLOGY

## 2017-08-21 PROCEDURE — 97164 PT RE-EVAL EST PLAN CARE: CPT

## 2017-08-21 PROCEDURE — 97535 SELF CARE MNGMENT TRAINING: CPT

## 2017-08-21 PROCEDURE — 74011000258 HC RX REV CODE- 258: Performed by: FAMILY MEDICINE

## 2017-08-21 PROCEDURE — 92526 ORAL FUNCTION THERAPY: CPT

## 2017-08-21 PROCEDURE — 92522 EVALUATE SPEECH PRODUCTION: CPT

## 2017-08-21 RX ORDER — ACETAMINOPHEN 325 MG/1
650 TABLET ORAL
Status: DISCONTINUED | OUTPATIENT
Start: 2017-08-21 | End: 2017-08-21 | Stop reason: HOSPADM

## 2017-08-21 RX ORDER — POTASSIUM CHLORIDE 1.5 G/1.77G
20 POWDER, FOR SOLUTION ORAL DAILY
Qty: 30 PACKET | Refills: 0 | Status: SHIPPED
Start: 2017-08-21 | End: 2017-09-23

## 2017-08-21 RX ORDER — LEVETIRACETAM 500 MG/1
1000 TABLET ORAL EVERY 12 HOURS
Status: DISCONTINUED | OUTPATIENT
Start: 2017-08-21 | End: 2017-08-21 | Stop reason: HOSPADM

## 2017-08-21 RX ORDER — LANOLIN ALCOHOL/MO/W.PET/CERES
100 CREAM (GRAM) TOPICAL DAILY
Qty: 30 TAB | Refills: 0 | Status: SHIPPED
Start: 2017-08-21 | End: 2021-01-01

## 2017-08-21 RX ORDER — LEVETIRACETAM 500 MG/1
1000 TABLET ORAL 2 TIMES DAILY
Status: DISCONTINUED | OUTPATIENT
Start: 2017-08-21 | End: 2017-08-21

## 2017-08-21 RX ORDER — IBUPROFEN 200 MG
1 TABLET ORAL DAILY
Qty: 7 PATCH | Refills: 0 | Status: SHIPPED
Start: 2017-08-21 | End: 2017-09-23

## 2017-08-21 RX ORDER — LEVETIRACETAM 1000 MG/1
1000 TABLET ORAL EVERY 12 HOURS
Qty: 60 TAB | Refills: 0 | Status: SHIPPED
Start: 2017-08-21 | End: 2021-01-01

## 2017-08-21 RX ADMIN — CLOPIDOGREL BISULFATE 75 MG: 75 TABLET ORAL at 09:36

## 2017-08-21 RX ADMIN — Medication 10 ML: at 01:27

## 2017-08-21 RX ADMIN — ACETAMINOPHEN 650 MG: 325 TABLET ORAL at 03:01

## 2017-08-21 RX ADMIN — POTASSIUM CHLORIDE 20 MEQ: 1.5 POWDER, FOR SOLUTION ORAL at 17:56

## 2017-08-21 RX ADMIN — NIFEDIPINE 30 MG: 30 TABLET, FILM COATED, EXTENDED RELEASE ORAL at 09:35

## 2017-08-21 RX ADMIN — LEVETIRACETAM 1000 MG: 500 TABLET ORAL at 09:36

## 2017-08-21 RX ADMIN — DEXTROSE MONOHYDRATE AND SODIUM CHLORIDE 100 ML/HR: 5; .45 INJECTION, SOLUTION INTRAVENOUS at 09:33

## 2017-08-21 RX ADMIN — POTASSIUM CHLORIDE 20 MEQ: 1.5 POWDER, FOR SOLUTION ORAL at 09:35

## 2017-08-21 RX ADMIN — Medication 100 MG: at 09:35

## 2017-08-21 NOTE — PROGRESS NOTES
ARU/IPR REFERRAL CONTACT NOTE  [address] [place] [address] for Physical Rehabilitation    RE: Tay Mccartney     Thank you for the opportunity to review this patient's case for admission to [address] [place] [address] for Physical Rehabilitation. Based on our pre-admission screening:     [x ] This patient does not meet criteria for admission to [facility] for Physical  Rehabilitation due to:    [x ] Too low level, per documentation, patient has not demonstrated tolerance for acute rehabilitation level of intensity. [x ] We recommend the following:  [ ] Re-evaluation of this patient's status when appropriate  [ ] Home with Home Care Services  [ ] Outpatient Therapy Services  [x ] Skilled Nursing Facility/Sub Acute Services with extended stay option  [ ] Assisted Living/ Adult Home    Again, Thank you for this referral. Should you have any questions please do not hesitate to call. Sincerely,  Minor Hunger. Tawny Coker, [address]   Tawny Coker, RN  Admissions [facility] for Physical Rehabilitation  [phone]

## 2017-08-21 NOTE — DISCHARGE SUMMARY
Discharge Summary     Patient: Ben Leventhal MRN: [id number]  SSN: [id number]    YOB: 1945  Age: 71 y.o.   Sex: male       Admit Date: 8/14/2017    Discharge Date: 8/21/2017      Admission Diagnoses: CVA (cerebral vascular accident) [facility])  CVA (cerebral vascular accident) [facility])    Discharge Diagnoses:   Problem List as of 8/21/2017  Date Reviewed: 8/14/2017          Codes Class Noted - Resolved    Peripheral artery disease ([facility] (Chronic) ICD-10-CM: I73.9  ICD-9-CM: 443.9  Unknown - Present        On chronic clopidogrel therapy (Chronic) ICD-10-CM: Z79.01  ICD-9-CM: V58.61  Unknown - Present        Alcohol use disorder ([facility] (Chronic) ICD-10-CM: F10.99  ICD-9-CM: 305.00  Unknown - Present        Tobacco use disorder (Chronic) ICD-10-CM: F17.200  ICD-9-CM: 305.1  Unknown - Present        Benign hypertensive heart disease with systolic congestive heart failure, NYHA class 2 (HCC) (Chronic) ICD-10-CM: I11.0, I50.20  ICD-9-CM: 402.11, 428.20, 428.0  Unknown - Present        Erectile dysfunction (Chronic) ICD-10-CM: N52.9  ICD-9-CM: 607.84  Unknown - Present    Overview Signed 8/18/2017  1:38 PM by Lázaro Lebron MD     On Sildenafil citrate             Depression (Chronic) ICD-10-CM: F32.9  ICD-9-CM: 311  Unknown - Present    Overview Signed 8/18/2017  1:37 PM by Lázaro Lebron MD     On Trazodone             Osteoarthritis (Chronic) ICD-10-CM: M19.90  ICD-9-CM: 715.90  Unknown - Present    Overview Signed 8/18/2017  1:37 PM by Lázaro Lebron MD     On Etodolac and Oxycodone-Acetaminophen             Ischemic cardiomyopathy (Chronic) ICD-10-CM: I25.5  ICD-9-CM: 414.8  8/15/2017 - Present    Overview Signed 8/18/2017  1:31 PM by Lázaro Lebron MD     EF 40%             Hyperlipidemia with target LDL less than 70 (Chronic) ICD-10-CM: E78.5  ICD-9-CM: 272.4  8/15/2017 - Present    Overview Signed 8/18/2017  1:32 PM by Lázaro Lebron MD     Lipid profile (8/15/2017) showed TG 99, , HDL 81, LDL 84 Seizure, late effect of stroke [facility]) ICD-10-CM: E28.780, R56.9  ICD-9-CM: 438.89, 780.39  8/15/2017 - Present    Overview Signed 8/18/2017  1:39 PM by Masood Leavitt MD     On Levetiracetam             Occlusion of right internal carotid artery ICD-10-CM: I65.21  ICD-9-CM: 433.10  8/15/2017 - Present        * (Principal)Cerebrovascular accident (CVA) due to occlusion of right carotid artery ([facility] ICD-10-CM: A92.535  ICD-9-CM: 433.11  8/14/2017 - Present    Overview Addendum 8/18/2017  1:40 PM by Masood Leavitt MD     Acute Ischemic Stroke (acute infarctions involving right parieto-occipital area and occipitotemporal area and anterior and midportion of right corona radiata) with residual left hemiparesis, dysphagia, dysarthria and cognitive-communication deficits             Cardiac pacemaker in situ (Chronic) ICD-10-CM: Z95.0  ICD-9-CM: V45.01  Unknown - Present        Impaired mobility and ADLs ICD-10-CM: Z74.09  ICD-9-CM: 799.89  8/14/2017 - Present        Hemiparesis affecting left side as late effect of cerebrovascular accident (CVA) ([facility] ICD-10-CM: G06.023  ICD-9-CM: 438.20  8/14/2017 - Present        Dysphagia as late effect of cerebrovascular accident (CVA) ICD-10-CM: I69.391  ICD-9-CM: 438.82  8/14/2017 - Present        Cognitive communication deficit ICD-10-CM: R41.841  ICD-9-CM: 799.52  8/14/2017 - Present        Hypokalemia ICD-10-CM: E87.6  ICD-9-CM: 276.8  8/14/2017 - Present    Overview Signed 8/18/2017  1:33 PM by Masood Leavitt MD     K (8/14/2017, on admission) = 2.8             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) ICD-10-CM: Y52.921  ICD-9-CM: 438.13  8/14/2017 - Present               Discharge Condition: Stable    Hospital Course: Patient admitted with new onset CVA with initial slurred speech and left hemiparesis. Patient initially with hypokalemia but this has been corrected. Patient found to have occluded right ICA.  PWill transfer to SNF for therapy. Consults: Neurology    Significant Diagnostic Studies: radiology: MRI: right sided CVA and CT scan: CTA  Occluded right ICA    Disposition: SNF    Discharge Medications:   Current Discharge Medication List      START taking these medications    Details   levETIRAcetam 1,000 mg tablet Take 1 Tab by mouth every twelve (12) hours. Qty: 60 Tab, Refills: 0      nicotine (NICODERM CQ) 21 mg/24 hr 1 Patch by TransDERmal route daily for 30 days. Qty: 7 Patch, Refills: 0      potassium chloride (KLOR-CON) 20 mEq packet Take 1 Packet by mouth daily. Qty: 30 Packet, Refills: 0      thiamine (B-1) 100 mg tablet Take 1 Tab by mouth daily. Qty: 30 Tab, Refills: 0         CONTINUE these medications which have NOT CHANGED    Details   metoprolol tartrate (LOPRESSOR) 50 mg tablet Take  by mouth daily. cholecalciferol, vitamin D3, (VITAMIN D3) 2,000 unit Tab Take  by mouth. ascorbic acid (VITAMIN C) 250 mg tablet Take  by mouth.        etodolac (LODINE) 400 mg tablet Take 400 mg by mouth two (2) times daily as needed. clopidogrel (PLAVIX) 75 mg tablet Take 1 Tab by mouth daily. Qty: 30 Tab, Refills: 3         STOP taking these medications       NIFEdipine CR (ADALAT CC) 60 mg CR tablet Comments:   Reason for Stopping:         sildenafil citrate (VIAGRA) 100 mg tablet Comments:   Reason for Stopping:         traZODone (DESYREL) 100 mg tablet Comments:   Reason for Stopping:               Activity: Activity as tolerated  Diet: Cardiac Diet and Dysphagia diet-pureed  Wound Care: None needed    Follow-up Appointments   Procedures    FOLLOW UP VISIT Appointment in: Other ([address]) I will follow as attending     I will follow as attending     Standing Status:   Standing     Number of Occurrences:   1     Order Specific Question:   Appointment in     Answer:    Other (Specify)       Signed By: Juan Manuel Muro MD     August 21, 2017

## 2017-08-21 NOTE — PROGRESS NOTES
Problem: Self Care Deficits Care Plan (Adult)  Goal: *Acute Goals and Plan of Care (Insert Text)  Occupational Therapy Goals  Initiated 8/16/2017 within 7 day(s). 1. Patient will perform self-feeding with supervision/set-up   2. Patient will perform upper body dressing with supervision/set-up. 3. Patient will perform grooming with modified independence while sitting on the edge of the bed  4. Patient and his family will demonstrate independence with LUE sensory stim/functional activity cycle in preparation for efficient functional use of LUE during his self care routine  5. Patient and his family will demonstrate independence with his occulo motor program in preparation for his efficient gaze shifting during his self care routine   Outcome: Progressing Towards Goal  OCCUPATIONAL THERAPY TREATMENT     Patient: Leigh Ann Chatman (65 y.o. male)  Date: 8/21/2017  Diagnosis: CVA (cerebral vascular accident) [facility])  CVA (cerebral vascular accident) [facility]) Cerebrovascular accident (CVA) due to occlusion of right carotid artery ([address]       Precautions:    Chart, occupational therapy assessment, plan of care, and goals were reviewed. ASSESSMENT: Pt presented sitting up supported in bed with daughter, Fermin Trejo, at bedside. Provided Pt and daughter education and demonstration on the benefits of sensory stimulation techniques and neuromuscular rehabilitation to maximize independence and safety during ADL tasks and transfers. Fermin Trejo demonstrated understanding through return verbalization, and Pt through return participation. Following sensory stimulation Pt exhibiting slow improving progress with supine to EOB transitions with Moderate Verbal/Visual cues. Upon sitting EOB, Pt required Max A to maintain Fair- Midline balance  but improved to Min A  following orientation to midline and close supervision during UB dressing and grooming tasks.   Pt and daughter participated in brief standing tasks in preparation for ADL transfer training. Pt required Mod A x 2 for sit<>stand xfr, and Max Ax 2 to maintain F standing balance for 10 seconds with Poor static standing balance. Please see below for levels of assistance/independence. Progression toward goals:  [ ]          Improving appropriately and progressing toward goals  [X]          Improving slowly and progressing toward goals  [ ]          Not making progress toward goals and plan of care will be adjusted       PLAN:  Patient continues to benefit from skilled intervention to address the above impairments. Continue treatment per established plan of care. Discharge Recommendations:  Pt will benefit from intensive skilled therapy to maximize activity tolerance for optimal safety and independence at d/c location (home). Further Equipment Recommendations for Discharge:  tbd       SUBJECTIVE:   Patient stated I would love a western omelet with toast.      G-CODES:      Self Care  Current  CL= 60-79%. The severity rating is based on the Level of Assistance required for Functional Mobility and ADLs. OBJECTIVE DATA SUMMARY:   Cognitive/Behavioral Status:  Neurologic State: Alert, Drowsy, Confused  Orientation Level: Oriented to person  Cognition: Follows commands  Safety/Judgement: Fall prevention  Functional Mobility and Transfers for ADLs:              Bed Mobility:  Rolling: Moderate assistance  Supine to Sit: Maximum assistance  Sit to Supine: Maximum assistance                 Transfers:  Sit to Stand: Maximum assistance;Assist x2                                                                                                     Balance:  Sitting: Impaired; With support  Sitting - Static: Poor (constant support)  Sitting - Dynamic: Poor (constant support)  Standing: With support; Impaired  Standing - Static: Constant support;Poor  ADL Intervention:  Grooming  Washing Face: Minimum assistance  Washing Hands: Maximum assistance  Kishan Whyte [address]: Maximum assistance      Neuromuscular Rehabilitation  Pt participated in neuromuscular reeducation of movement, balance, coordination,  posture, and/or proprioception for sitting and/or standing activities for improved functional performance during ADLs and ADL transfers. Pain:  Pt reports 0/10 pain or discomfort prior to treatment. Pt reports 0/10 pain or discomfort post treatment. Activity Tolerance:    Fair  Please refer to the flowsheet for vital signs taken during this treatment.   After treatment:   [ ]  Patient left in no apparent distress sitting up in chair  [X]  Patient left in no apparent distress in bed  [X]  Call bell left within reach  [ ]  Nursing notified  [X]  Caregiver present  [ ]  Bed alarm activated     Wilfrid Lombard, COTA  Time Calculation: 45 mins

## 2017-08-21 NOTE — PROGRESS NOTES
CM called [facility], L for Ana Drain, informed about possible dc today, asking assistance with placement. DRAGAN also spoke to [facility] admissions coordinator, she stated patient is accepted to their facility and can come in when ready. CM received call from Ana Pichardo stating she can accept the patient, but ONLY for short term rehab.   3:50 PM [facility] at [facility], or 6 PM.    DRAGAN called pt's Jenna Mosley [phone] to inform about pt's discharge, [facility].     5:09 PM Called, spoke to pt's Jenna Mosley [phone], informed about pt's dc.

## 2017-08-21 NOTE — PROGRESS NOTES
Problem: Motor Speech Impaired (Adult)  Goal: Interventions  1. Utilize compensatory strategies (decrease rate, overarticulate, increase intensity) to increase intelligibility to >90% at level with conversational A visual/verbal cues in 4/5 trials. 2. Perform oral motor exercises (with and without resistance) in therapy and at home to increase oral motor strength/range-of-motion for articulation tasks with min A with visual/verbal cues in 4/5 trials. 3. Complete articulatory agility tasks (reading-conversation) with min A with visual/verbal cues in 4/5 trials. 4. Demonstrate functional increase in articulation skills for effective participation in communication ADLs with min A.   SPEECH LANGUAGE PATHOLOGY EVALUATION     Patient: Khadijah Cardenas (89 y.o. male)  Date: 8/21/2017  Primary Diagnosis: CVA (cerebral vascular accident) [facility])  CVA [facility]cere[facility] vascular accident) ([facility]  Precautions: Aspiration, Falls          ASSESSMENT :  Based on the objective data described below, the patient presents with mod dysarthria impacting functional communication and functional independence. Pt with decreased lingual/labial strength with L facial droop with decreased labial seal on R side resulting in anterior loss of saliva. Motor speech c/b blended word boundaries, imprecise articulation, decreased intensity and poor respiratory coordination impacting intelligibility. Intelligibility ~80% in conversation, improved to ~95% at the word level. Pt able to improve speech with mod cues for use of comp strategies (decreased rate, exaggerated articulation, increased volume). Recommend SLP services to address above mentioned deficits. D/w pt who verbalized understanding. Patient will benefit from skilled intervention to address the above impairments.   Patients rehabilitation potential is considered to be Good  Factors which may influence rehabilitation potential include:   [ ]              None noted  [ ]              Mental ability/status  [X]              Medical condition  [ ]              Home/family situation and support systems  [ ]              Safety awareness  [ ]              Pain tolerance/management  [ ]              Other:        PLAN :  Recommendations and Planned Interventions:  As above   Frequency/Duration: Patient will be followed by speech-language pathology 1-2 times per day/4-7 days per week to address goals. Discharge Recommendations: Inpatient Rehab or Skilled Nursing Facility       SUBJECTIVE:   Patient stated I was in the [employer].       OBJECTIVE:       Past Medical History:   Diagnosis Date    Alcohol use disorder ([address]      Benign hypertensive heart disease with systolic congestive heart failure, NYHA class 2 ([address]      Cardiac pacemaker in situ      Cerebrovascular accident (CVA) due to occlusion of right carotid artery ([address] 8/14/2017     Acute Ischemic Stroke (acute infarctions involving right parieto-occipital area and occipitotemporal area and anterior and midportion of right corona radiata) with residual left hemiparesis, dysphagia and cognitive-communication deficits    Cognitive communication deficit 8/14/2017    Depression       On Trazodone    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) 8/14/2017    Dysphagia as late effect of cerebrovascular accident (CVA) 8/14/2017    Erectile dysfunction       On Sildenafil citrate    Hemiparesis affecting left side as late effect of cerebrovascular accident (CVA) ([address] 8/14/2017    Hyperlipidemia with target LDL less than 70 8/15/2017     Lipid profile (8/15/2017) showed TG 99, , HDL 81, LDL 80    Hypertension      Ischemic cardiomyopathy 8/15/2017     EF 40%    Occlusion of right internal carotid artery 8/15/2017    On chronic clopidogrel therapy      Osteoarthritis       On Etodolac and Oxycodone-Acetaminophen    Other ill-defined conditions       PVD    Peripheral artery disease ([address]      Seizure, late effect of stroke ([facility] 8/15/2017     On Levetiracetam    Tobacco use disorder       Past Surgical History:   Procedure Laterality Date    HX ANGIOPLASTY   12/05/2012     S/P Balloon angioplasty and stent of left common iliac artery (12/5/2012 - Dr. Michele Hobbs)     Prior Level of Function/Home Situation:  Home Situation  Home Environment: Apartment  # Steps to Enter: 0  One/Two Story Residence: One story  Living Alone: Yes  Support Systems: Child(keegan), /, Family member(s), Friends \ neighbors  Patient Expects to be Discharged to[de-identified] Apartment  Current DME Used/Available at Home: [employer], Wheelchair  Mental Status:  Neurologic State: Alert, Drowsy, Confused  Orientation Level: Oriented to person  Cognition: Follows commands  Perception: Cues to attend left visual field, Cues to attend to left side of body, Tactile, Verbal, Visual  Perseveration: Perseverates during conversation  Safety/Judgement: Fall prevention  Motor Speech:  Oral-Motor Structure/Motor Speech  Dysarthric Characteristics: Blended word boundaries; Decreased breath support; Imprecise; Increased rate  Intelligibility: Impaired  Word Intelligibility (%): 95 %  Phrase Intelligibility (%): 90 %  Sentence Intelligibility (%): 85 %  Conversation Intelligibility (%): 80 %  Overall Impairment Severity: Moderate     SPEECH GCODE  Motor:  Current  CK= 40-59%   Goal  CJ= 20-39%  The severity rating is based on the following outcomes     PAIN:  Pt reports 0/10 pain or discomfort prior to evaluation. Pt reports 0/10 pain or discomfort post evaluation.          After treatment:   [ ]              Patient left in no apparent distress sitting up in chair  [X]              Patient left in no apparent distress in bed  [X]              Call bell left within reach  [X]              Nursing notified  [ ]              Caregiver present  [ ]              Bed alarm activated      COMMUNICATION/EDUCATION:   [X] Patient educated regarding role of SLP, comp strategies   [X] Patient/family have participated as able in goal setting and plan of care. [ ]  Patient/family agree to work toward stated goals and plan of care. [ ]  Patient understands intent and goals of therapy, but is neutral about his/her participation. [ ]  Patient is unable to participate in goal setting and plan of care.      Thank you for this referral.  Ariadna Bah M.S., [id number] [facility]  Speech-Language Pathologist

## 2017-08-21 NOTE — PROGRESS NOTES
Problem: Mobility Impaired (Adult and Pediatric)  Goal: *Acute Goals and Plan of Care (Insert Text)  Physical Therapy Goals  Initiated 8/21/2017 and to be accomplished within 7 day(s)  1. Patient will move from supine to sit and sit to supine, scoot up and down and roll side to side in bed with minimal assistance. 2. Patient will transfer from bed to chair and chair to bed with minimum assistance using the least restrictive device. 3. Patient will perform sit to stand with minimal assistance. 4. Patient will perform BLE HEP in order to improve strength for carryover into functional tasks. PHYSICAL THERAPY RE-EVALUATION AND TREATMENT     Patient: Aliyah Funes (48 y.o. male)  Date: 8/21/2017  Diagnosis: CVA (cerebral vascular accident) [facility])  CVA (cerebral vascular accident) [facility]) Cerebrovascular accident (CVA) due to occlusion of right carotid artery ([address]       Precautions:        ASSESSMENT:  Pt presents today alert and agreeable to therapy and was supine in bed upon arrival. Pt transferred sup to sit with MaxA and VC's for sequencing. Pt demonstrated poor sitting balance requiring constant support form therapist. Pt sat EOB several minutes and reports he can tell when he is not centered and when he is listing towards L or posteriorly. Pt was assisted back to supine with MaxA and left resting with call bell by his side. HOB elevated beyond 30 degrees as pt demonstrates difficulty with managing secretions. Patient's progression toward goals since last assessment: Progressing appropriately, goals adjusted at this time       PLAN:  Goals have been updated based on progression since last assessment. Patient continues to benefit from skilled intervention to address the above impairments. Continue to follow the patient 1-2 times per day/4-7 days per week to address goals.   Planned Interventions:  [X]     Bed Mobility Training          [X]     Neuromuscular Re-Education  [X]     Transfer Training                [ ]    Orthotic/Prosthetic Training  [X]     Gait Training                       [ ]     Modalities  [X]     Therapeutic Exercises       [ ]     Edema Management/Control  [X]     Therapeutic Activities         [X]     Patient and Family Training/Education  [ ]     Other (comment):  Discharge Recommendations: Rehab  Further Equipment Recommendations for Discharge: N/A       G-CODES:      Mobility [id number] Current  CL= 60-79%   Goal  CJ= 20-39%. The severity rating is based on the Level of Assistance required for Functional Mobility and ADLs. SUBJECTIVE:   Patient stated I'm used to working. I retired twice in my life.       OBJECTIVE DATA SUMMARY:   Critical Behavior:  Neurologic State: Drowsy  Orientation Level: Disoriented to place, Disoriented to situation, Disoriented to time, Oriented to person  Cognition: Decreased command following  Safety/Judgement: Fall prevention  Functional Mobility Training:  Bed Mobility:  Rolling: Moderate assistance  Supine to Sit: Maximum assistance  Sit to Supine: Maximum assistance   Balance:  Sitting: Impaired; With support  Sitting - Static: Poor (constant support)  Sitting - Dynamic: Poor (constant support)   Performed seated weight shifting x5 to both L/R and Ant/Post  Pain:    Pt did not rate  Activity Tolerance:   Pt tolerated activity without complaints of chest pain, dizziness, or SOB. Please refer to the flowsheet for vital signs taken during this treatment.   After treatment:   [ ]  Patient left in no apparent distress sitting up in chair  [X]  Patient left in no apparent distress in bed  [X]  Call bell left within reach  [X]  Nursing notified  [ ]  Caregiver present  [ ]  Bed alarm activated     Alvin Roldan PT   Time Calculation: 17 mins

## 2017-08-21 NOTE — PROGRESS NOTES
Problem: Dysphagia (Adult)  Goal: *Acute Goals and Plan of Care (Insert Text)  Patient will:  1. Tolerate PO trials with 0 s/s overt distress in 4/5 trials  2. Utilize compensatory swallow strategies/maneuvers (decrease bite/sip, size/rate, alt. liq/sol) with min cues in 4/5 trials  3. Perform oral-motor/laryngeal exercises to increase oropharyngeal swallow function with min cues  4. Complete an objective swallow study (i.e., MBSS) to assess swallow integrity, r/o aspiration, and determine of safest LRD, min A as indicated/ordered by MD     Recommend:   Mech soft, thin liquids  Meds in puree   No mixed consistencies   Assist with all intake   Aspiration precautions  HOB >45 degrees during all intake and for at least 30 min after po   Small bites/sips, slow rate of intake, alternating bites/sips  Oral care post meals    Outcome: Progressing Towards Goal    SPEECH LANGUAGE PATHOLOGY DYSPHAGIA TREATMENT     Patient: Cleo Wilson (80 y.o. male)  Date: 8/21/2017  Diagnosis: CVA (cerebral vascular accident) [facility])  CVA (cerebral vascular accident) ([facility] Cerebrovascular accident (CVA) due to occlusion of right carotid artery ([facility]  Precautions: Aspiration        ASSESSMENT:  Pt seen for follow up dysphagia tx, demo improved alertness. Pt able to feed self with set up. Pt with anterior loss of saliva on R side with L sided lingual/labial weakness. Pt observed with cereal (mixed consistency), demo x1 sneeze and runny nose. Pt able to tolerate thin liquids +/- straw with timely swallow initiation and no overt s/sx aspiration across multiple trials. Pt tolerating puree and mech soft with positive mastication/clearance and no overt s/sx aspiration. Recommend continue mech soft, thin liquid diet with NO MIXED CONSISTENCIES (including pills; recommend pills in puree). Further recommend motor speech evaluation as pt with increased alertness this date. D/w pt who verbalized understanding. Will continue to follow. Progression toward goals:  [ ]         Improving appropriately and progressing toward goals  [X]         Improving slowly and progressing toward goals  [ ]         Not making progress toward goals and plan of care will be adjusted       PLAN:  Recommendations and Planned Interventions:  Patient continues to benefit from skilled intervention to address the above impairments. Continue treatment per established plan of care. Discharge Recommendations:  Inpatient Rehab or Skilled Nursing Facility       SUBJECTIVE:   Patient stated I like to eat sausage, eggs and rice for breakfast. OBJECTIVE:   Cognitive and Communication Status:  Neurologic State: Alert, Drowsy, Confused  Orientation Level: Oriented to person  Cognition: Follows commands  Perception: Cues to attend left visual field, Cues to attend to left side of body, Tactile, Verbal, Visual  Perseveration: Perseverates during conversation  Safety/Judgement: Fall prevention  Dysphagia Treatment:  Oral Assessment:  Oral Assessment  Labial: Right droop  Dentition: Natural  Oral Hygiene: Fair  Lingual: Decreased strength, Decreased rate, Incoordinated  Velum: Unable to visualize  Mandible: Restricted  P.O. Trials:              Patient Position: [facility] at [facility]              Vocal quality prior to P.O.: Breathy, Low volume              Consistency Presented:  Thin liquid, Solid, Mechanical soft, Puree              How Presented: SLP-fed/presented, Spoon, Cup/sip, Straw, Successive swallows              Bolus Acceptance: No impairment              Bolus Formation/Control: Impaired              Type of Impairment: Mastication, Incomplete (With regular solids)              Propulsion: Discoordination, Delayed (# of seconds)              Oral Residue: Greater than 50% of bolus (With regular solids)              Initiation of Swallow: No impairment              Laryngeal Elevation: Functional              Aspiration S/sx:  Sneeze, runny nose with mixed consistency Pharyngeal Phase Characteristics: No impairment, issues, or problems               Effective Modifications: Small sips and bites, Alternate liquids/solids              Cues for Modifications: Maximal              Oral Phase Severity: Mild-moderate              Pharyngeal Phase Severity : No impairment                PAIN:  Pt reports 0/10 pain or discomfort prior to tx. Pt reports 0/10 pain or discomfort post tx. After treatment:   [ ]              Patient left in no apparent distress sitting up in chair  [X]              Patient left in no apparent distress in bed  [X]              Call bell left within reach  [X]              Nursing notified  [ ]              Caregiver present  [ ]              Bed alarm activated         COMMUNICATION/EDUCATION:   [X]              SLP educated pt with regard to compensatory swallow strategies and                   aspiration/reflux precautions including: small bites/sips,                   alternate liquids/solids, decrease feeding rate, HOB > 45 with all po, and                   upright in bed at 30 degrees after po for at least 45                   minutes.       Maggie Salgado M.S., [address] [facility]  Speech-Language Pathologist

## 2017-08-24 ENCOUNTER — HOSPITAL ENCOUNTER (OUTPATIENT)
Dept: LAB | Age: 72
Discharge: HOME | End: 2017-08-24

## 2017-08-24 LAB
APPEARANCE UR: CLEAR
BACTERIA URNS QL MICRO: ABNORMAL /HPF
BILIRUB UR QL: NEGATIVE
COLOR UR: ABNORMAL
EPITH CASTS URNS QL MICRO: ABNORMAL /LPF (ref 0–5)
GLUCOSE UR STRIP.AUTO-MCNC: NEGATIVE MG/DL
HGB UR QL STRIP: NEGATIVE
KETONES UR QL STRIP.AUTO: 15 MG/DL
LEUKOCYTE ESTERASE UR QL STRIP.AUTO: NEGATIVE
MUCOUS THREADS URNS QL MICRO: ABNORMAL /LPF
NITRITE UR QL STRIP.AUTO: NEGATIVE
PH UR STRIP: 6.5 [PH] (ref 5–8)
PROT UR STRIP-MCNC: 300 MG/DL
RBC #/AREA URNS HPF: ABNORMAL /HPF (ref 0–5)
SP GR UR REFRACTOMETRY: 1.02 (ref 1–1.03)
UROBILINOGEN UR QL STRIP.AUTO: 1 EU/DL (ref 0.2–1)
WBC URNS QL MICRO: ABNORMAL /HPF (ref 0–4)

## 2017-08-24 PROCEDURE — 87086 URINE CULTURE/COLONY COUNT: CPT | Performed by: FAMILY MEDICINE

## 2017-08-24 PROCEDURE — 81001 URINALYSIS AUTO W/SCOPE: CPT | Performed by: FAMILY MEDICINE

## 2017-08-25 LAB
BACTERIA SPEC CULT: NORMAL
SERVICE CMNT-IMP: NORMAL

## 2017-08-29 ENCOUNTER — HOSPITAL ENCOUNTER (OUTPATIENT)
Dept: LAB | Age: 72
Discharge: HOME | End: 2017-08-29

## 2017-08-29 LAB
ALBUMIN SERPL-MCNC: 3.2 G/DL (ref 3.4–5)
ALBUMIN/GLOB SERPL: 0.8 {RATIO} (ref 0.8–1.7)
ALP SERPL-CCNC: 94 U/L (ref 45–117)
ALT SERPL-CCNC: 34 U/L (ref 16–61)
ANION GAP SERPL CALC-SCNC: 11 MMOL/L (ref 3–18)
AST SERPL-CCNC: 41 U/L (ref 15–37)
BILIRUB SERPL-MCNC: 0.5 MG/DL (ref 0.2–1)
BUN SERPL-MCNC: 25 MG/DL (ref 7–18)
BUN/CREAT SERPL: 30 (ref 12–20)
CALCIUM SERPL-MCNC: 9.8 MG/DL (ref 8.5–10.1)
CHLORIDE SERPL-SCNC: 97 MMOL/L (ref 100–108)
CO2 SERPL-SCNC: 25 MMOL/L (ref 21–32)
CREAT SERPL-MCNC: 0.83 MG/DL (ref 0.6–1.3)
GLOBULIN SER CALC-MCNC: 4.2 G/DL (ref 2–4)
GLUCOSE SERPL-MCNC: 84 MG/DL (ref 74–99)
POTASSIUM SERPL-SCNC: 5.2 MMOL/L (ref 3.5–5.5)
PROT SERPL-MCNC: 7.4 G/DL (ref 6.4–8.2)
SODIUM SERPL-SCNC: 133 MMOL/L (ref 136–145)

## 2017-08-29 PROCEDURE — 80053 COMPREHEN METABOLIC PANEL: CPT | Performed by: FAMILY MEDICINE

## 2017-08-31 ENCOUNTER — HOME HEALTH ADMISSION (OUTPATIENT)
Dept: HOME HEALTH SERVICES | Facility: HOME HEALTH | Age: 72
End: 2017-08-31
Payer: MEDICARE

## 2017-09-05 ENCOUNTER — HOSPITAL ENCOUNTER (OUTPATIENT)
Dept: LAB | Age: 72
Discharge: HOME | End: 2017-09-05

## 2017-09-05 LAB
ALBUMIN SERPL-MCNC: 3.6 G/DL (ref 3.4–5)
ALBUMIN/GLOB SERPL: 0.9 {RATIO} (ref 0.8–1.7)
ALP SERPL-CCNC: 96 U/L (ref 45–117)
ALT SERPL-CCNC: 38 U/L (ref 16–61)
ANION GAP SERPL CALC-SCNC: 11 MMOL/L (ref 3–18)
AST SERPL-CCNC: 28 U/L (ref 15–37)
BILIRUB SERPL-MCNC: 0.5 MG/DL (ref 0.2–1)
BUN SERPL-MCNC: 40 MG/DL (ref 7–18)
BUN/CREAT SERPL: 36 (ref 12–20)
CALCIUM SERPL-MCNC: 10 MG/DL (ref 8.5–10.1)
CHLORIDE SERPL-SCNC: 98 MMOL/L (ref 100–108)
CO2 SERPL-SCNC: 28 MMOL/L (ref 21–32)
CREAT SERPL-MCNC: 1.11 MG/DL (ref 0.6–1.3)
GLOBULIN SER CALC-MCNC: 4.2 G/DL (ref 2–4)
GLUCOSE SERPL-MCNC: 106 MG/DL (ref 74–99)
POTASSIUM SERPL-SCNC: 4.8 MMOL/L (ref 3.5–5.5)
PROT SERPL-MCNC: 7.8 G/DL (ref 6.4–8.2)
SODIUM SERPL-SCNC: 137 MMOL/L (ref 136–145)

## 2017-09-05 PROCEDURE — 80053 COMPREHEN METABOLIC PANEL: CPT | Performed by: FAMILY MEDICINE

## 2017-09-10 ENCOUNTER — HOME CARE VISIT (OUTPATIENT)
Dept: SCHEDULING | Facility: HOME HEALTH | Age: 72
End: 2017-09-10
Payer: MEDICARE

## 2017-09-10 PROCEDURE — 400013 HH SOC

## 2017-09-10 PROCEDURE — 3331090001 HH PPS REVENUE CREDIT

## 2017-09-10 PROCEDURE — 3331090002 HH PPS REVENUE DEBIT

## 2017-09-10 PROCEDURE — G0299 HHS/HOSPICE OF RN EA 15 MIN: HCPCS

## 2017-09-11 ENCOUNTER — HOME CARE VISIT (OUTPATIENT)
Dept: HOME HEALTH SERVICES | Facility: HOME HEALTH | Age: 72
End: 2017-09-11
Payer: MEDICARE

## 2017-09-11 PROCEDURE — 3331090002 HH PPS REVENUE DEBIT

## 2017-09-11 PROCEDURE — 3331090001 HH PPS REVENUE CREDIT

## 2017-09-12 ENCOUNTER — HOME CARE VISIT (OUTPATIENT)
Dept: SCHEDULING | Facility: HOME HEALTH | Age: 72
End: 2017-09-12
Payer: MEDICARE

## 2017-09-12 VITALS
HEART RATE: 69 BPM | OXYGEN SATURATION: 97 % | SYSTOLIC BLOOD PRESSURE: 120 MMHG | RESPIRATION RATE: 16 BRPM | DIASTOLIC BLOOD PRESSURE: 70 MMHG | TEMPERATURE: 97 F

## 2017-09-12 PROCEDURE — G0151 HHCP-SERV OF PT,EA 15 MIN: HCPCS

## 2017-09-12 PROCEDURE — 3331090001 HH PPS REVENUE CREDIT

## 2017-09-12 PROCEDURE — 3331090002 HH PPS REVENUE DEBIT

## 2017-09-12 PROCEDURE — G0153 HHCP-SVS OF S/L PATH,EA 15MN: HCPCS

## 2017-09-13 PROCEDURE — 3331090001 HH PPS REVENUE CREDIT

## 2017-09-13 PROCEDURE — 3331090002 HH PPS REVENUE DEBIT

## 2017-09-14 ENCOUNTER — HOME CARE VISIT (OUTPATIENT)
Dept: SCHEDULING | Facility: HOME HEALTH | Age: 72
End: 2017-09-14
Payer: MEDICARE

## 2017-09-14 VITALS — SYSTOLIC BLOOD PRESSURE: 120 MMHG | TEMPERATURE: 99.2 F | HEART RATE: 69 BPM | DIASTOLIC BLOOD PRESSURE: 70 MMHG

## 2017-09-14 PROCEDURE — 3331090002 HH PPS REVENUE DEBIT

## 2017-09-14 PROCEDURE — 3331090001 HH PPS REVENUE CREDIT

## 2017-09-14 PROCEDURE — G0157 HHC PT ASSISTANT EA 15: HCPCS

## 2017-09-15 ENCOUNTER — HOME CARE VISIT (OUTPATIENT)
Dept: SCHEDULING | Facility: HOME HEALTH | Age: 72
End: 2017-09-15
Payer: MEDICARE

## 2017-09-15 PROCEDURE — 3331090001 HH PPS REVENUE CREDIT

## 2017-09-15 PROCEDURE — G0153 HHCP-SVS OF S/L PATH,EA 15MN: HCPCS

## 2017-09-15 PROCEDURE — 3331090002 HH PPS REVENUE DEBIT

## 2017-09-15 PROCEDURE — G0299 HHS/HOSPICE OF RN EA 15 MIN: HCPCS

## 2017-09-16 PROCEDURE — 3331090002 HH PPS REVENUE DEBIT

## 2017-09-16 PROCEDURE — 3331090001 HH PPS REVENUE CREDIT

## 2017-09-17 PROCEDURE — 3331090001 HH PPS REVENUE CREDIT

## 2017-09-17 PROCEDURE — 3331090002 HH PPS REVENUE DEBIT

## 2017-09-18 ENCOUNTER — HOME CARE VISIT (OUTPATIENT)
Dept: SCHEDULING | Facility: HOME HEALTH | Age: 72
End: 2017-09-18
Payer: MEDICARE

## 2017-09-18 VITALS — HEART RATE: 68 BPM | SYSTOLIC BLOOD PRESSURE: 110 MMHG | DIASTOLIC BLOOD PRESSURE: 62 MMHG | OXYGEN SATURATION: 97 %

## 2017-09-18 PROCEDURE — 3331090001 HH PPS REVENUE CREDIT

## 2017-09-18 PROCEDURE — G0157 HHC PT ASSISTANT EA 15: HCPCS

## 2017-09-18 PROCEDURE — G0152 HHCP-SERV OF OT,EA 15 MIN: HCPCS

## 2017-09-18 PROCEDURE — G0155 HHCP-SVS OF CSW,EA 15 MIN: HCPCS

## 2017-09-18 PROCEDURE — 3331090002 HH PPS REVENUE DEBIT

## 2017-09-19 ENCOUNTER — HOME CARE VISIT (OUTPATIENT)
Dept: SCHEDULING | Facility: HOME HEALTH | Age: 72
End: 2017-09-19
Payer: MEDICARE

## 2017-09-19 ENCOUNTER — HOME CARE VISIT (OUTPATIENT)
Dept: HOME HEALTH SERVICES | Facility: HOME HEALTH | Age: 72
End: 2017-09-19
Payer: MEDICARE

## 2017-09-19 VITALS — SYSTOLIC BLOOD PRESSURE: 120 MMHG | OXYGEN SATURATION: 98 % | DIASTOLIC BLOOD PRESSURE: 70 MMHG | HEART RATE: 70 BPM

## 2017-09-19 PROCEDURE — G0299 HHS/HOSPICE OF RN EA 15 MIN: HCPCS

## 2017-09-19 PROCEDURE — 3331090002 HH PPS REVENUE DEBIT

## 2017-09-19 PROCEDURE — G0156 HHCP-SVS OF AIDE,EA 15 MIN: HCPCS

## 2017-09-19 PROCEDURE — 3331090001 HH PPS REVENUE CREDIT

## 2017-09-19 PROCEDURE — G0153 HHCP-SVS OF S/L PATH,EA 15MN: HCPCS

## 2017-09-20 ENCOUNTER — HOME CARE VISIT (OUTPATIENT)
Dept: SCHEDULING | Facility: HOME HEALTH | Age: 72
End: 2017-09-20
Payer: MEDICARE

## 2017-09-20 VITALS
TEMPERATURE: 97.6 F | SYSTOLIC BLOOD PRESSURE: 130 MMHG | HEART RATE: 70 BPM | OXYGEN SATURATION: 97 % | RESPIRATION RATE: 20 BRPM | DIASTOLIC BLOOD PRESSURE: 90 MMHG

## 2017-09-20 PROCEDURE — G0157 HHC PT ASSISTANT EA 15: HCPCS

## 2017-09-20 PROCEDURE — 3331090001 HH PPS REVENUE CREDIT

## 2017-09-20 PROCEDURE — 3331090002 HH PPS REVENUE DEBIT

## 2017-09-21 ENCOUNTER — HOME CARE VISIT (OUTPATIENT)
Dept: SCHEDULING | Facility: HOME HEALTH | Age: 72
End: 2017-09-21
Payer: MEDICARE

## 2017-09-21 ENCOUNTER — HOSPITAL ENCOUNTER (OUTPATIENT)
Age: 72
Setting detail: OBSERVATION
Discharge: HOME HEALTH | End: 2017-09-23
Attending: EMERGENCY MEDICINE | Admitting: FAMILY MEDICINE
Payer: SELF-PAY

## 2017-09-21 ENCOUNTER — HOME CARE VISIT (OUTPATIENT)
Dept: HOME HEALTH SERVICES | Facility: HOME HEALTH | Age: 72
End: 2017-09-21
Payer: MEDICARE

## 2017-09-21 DIAGNOSIS — K92.2 ACUTE LOWER GI BLEEDING: Primary | ICD-10-CM

## 2017-09-21 PROBLEM — Z86.73 HISTORY OF CVA (CEREBROVASCULAR ACCIDENT): Status: ACTIVE | Noted: 2017-09-21

## 2017-09-21 LAB
ALBUMIN SERPL-MCNC: 3 G/DL (ref 3.4–5)
ALBUMIN/GLOB SERPL: 0.6 {RATIO} (ref 0.8–1.7)
ALP SERPL-CCNC: 94 U/L (ref 45–117)
ALT SERPL-CCNC: 23 U/L (ref 16–61)
ANION GAP SERPL CALC-SCNC: 5 MMOL/L (ref 3–18)
AST SERPL-CCNC: 28 U/L (ref 15–37)
BASOPHILS # BLD: 0 K/UL (ref 0–0.06)
BASOPHILS NFR BLD: 0 % (ref 0–2)
BILIRUB SERPL-MCNC: 0.6 MG/DL (ref 0.2–1)
BUN SERPL-MCNC: 26 MG/DL (ref 7–18)
BUN/CREAT SERPL: 24 (ref 12–20)
CALCIUM SERPL-MCNC: 10.2 MG/DL (ref 8.5–10.1)
CHLORIDE SERPL-SCNC: 99 MMOL/L (ref 100–108)
CO2 SERPL-SCNC: 33 MMOL/L (ref 21–32)
CREAT SERPL-MCNC: 1.07 MG/DL (ref 0.6–1.3)
DIFFERENTIAL METHOD BLD: ABNORMAL
EOSINOPHIL # BLD: 0.1 K/UL (ref 0–0.4)
EOSINOPHIL NFR BLD: 2 % (ref 0–5)
ERYTHROCYTE [DISTWIDTH] IN BLOOD BY AUTOMATED COUNT: 14.6 % (ref 11.6–14.5)
GLOBULIN SER CALC-MCNC: 5 G/DL (ref 2–4)
GLUCOSE SERPL-MCNC: 102 MG/DL (ref 74–99)
HCT VFR BLD AUTO: 40.6 % (ref 36–48)
HEMOCCULT STL QL: POSITIVE
HGB BLD-MCNC: 13.3 G/DL (ref 13–16)
INR PPP: 1.1 (ref 0.8–1.2)
LYMPHOCYTES # BLD: 1.6 K/UL (ref 0.9–3.6)
LYMPHOCYTES NFR BLD: 28 % (ref 21–52)
MCH RBC QN AUTO: 26 PG (ref 24–34)
MCHC RBC AUTO-ENTMCNC: 32.8 G/DL (ref 31–37)
MCV RBC AUTO: 79.3 FL (ref 74–97)
MONOCYTES # BLD: 0.5 K/UL (ref 0.05–1.2)
MONOCYTES NFR BLD: 9 % (ref 3–10)
NEUTS SEG # BLD: 3.5 K/UL (ref 1.8–8)
NEUTS SEG NFR BLD: 61 % (ref 40–73)
PLATELET # BLD AUTO: 202 K/UL (ref 135–420)
PMV BLD AUTO: 10.4 FL (ref 9.2–11.8)
POTASSIUM SERPL-SCNC: 4.2 MMOL/L (ref 3.5–5.5)
PROT SERPL-MCNC: 8 G/DL (ref 6.4–8.2)
PROTHROMBIN TIME: 13.2 SEC (ref 11.5–15.2)
RBC # BLD AUTO: 5.12 M/UL (ref 4.7–5.5)
SODIUM SERPL-SCNC: 137 MMOL/L (ref 136–145)
WBC # BLD AUTO: 5.7 K/UL (ref 4.6–13.2)

## 2017-09-21 PROCEDURE — 65390000012 HC CONDITION CODE 44 OBSERVATION

## 2017-09-21 PROCEDURE — 74011250637 HC RX REV CODE- 250/637: Performed by: FAMILY MEDICINE

## 2017-09-21 PROCEDURE — 80053 COMPREHEN METABOLIC PANEL: CPT | Performed by: EMERGENCY MEDICINE

## 2017-09-21 PROCEDURE — 77030011256 HC DRSG MEPILEX <16IN NO BORD MOLN -A

## 2017-09-21 PROCEDURE — 82270 OCCULT BLOOD FECES: CPT

## 2017-09-21 PROCEDURE — 65270000029 HC RM PRIVATE

## 2017-09-21 PROCEDURE — 3331090002 HH PPS REVENUE DEBIT

## 2017-09-21 PROCEDURE — 85025 COMPLETE CBC W/AUTO DIFF WBC: CPT | Performed by: EMERGENCY MEDICINE

## 2017-09-21 PROCEDURE — 85610 PROTHROMBIN TIME: CPT | Performed by: EMERGENCY MEDICINE

## 2017-09-21 PROCEDURE — 3331090001 HH PPS REVENUE CREDIT

## 2017-09-21 PROCEDURE — 99285 EMERGENCY DEPT VISIT HI MDM: CPT

## 2017-09-21 RX ORDER — METOPROLOL TARTRATE 50 MG/1
50 TABLET ORAL DAILY
Status: DISCONTINUED | OUTPATIENT
Start: 2017-09-22 | End: 2017-09-23 | Stop reason: HOSPADM

## 2017-09-21 RX ORDER — IBUPROFEN 200 MG
1 TABLET ORAL DAILY
Status: DISCONTINUED | OUTPATIENT
Start: 2017-09-22 | End: 2017-09-23 | Stop reason: HOSPADM

## 2017-09-21 RX ORDER — LEVETIRACETAM 500 MG/1
1000 TABLET ORAL EVERY 12 HOURS
Status: DISCONTINUED | OUTPATIENT
Start: 2017-09-21 | End: 2017-09-23 | Stop reason: HOSPADM

## 2017-09-21 RX ORDER — LANOLIN ALCOHOL/MO/W.PET/CERES
100 CREAM (GRAM) TOPICAL DAILY
Status: DISCONTINUED | OUTPATIENT
Start: 2017-09-22 | End: 2017-09-23 | Stop reason: HOSPADM

## 2017-09-21 RX ADMIN — LEVETIRACETAM 1000 MG: 500 TABLET ORAL at 20:59

## 2017-09-21 NOTE — H&P
History and Physical    Patient: Prasanna Joseph MRN: [id number]  SSN: [id number]    YOB: 1945  Age: 71 y.o. Sex: male      Subjective:      Prasanna Joseph is a 71 y.o. male who has had recent CVA affecting left side and has been placed on plavix. Patient had been in SNF for therapy but now with home health. Patient reported by family to have bleeding per rectum starting after midnight with at least 3 bloody stools. Will admit for lower GI bleeding in patient on plavix.     Past Medical History:   Diagnosis Date    Alcohol use disorder ([address]     Benign hypertensive heart disease with systolic congestive heart failure, NYHA class 2 ([address]     Cardiac pacemaker in situ     Cerebrovascular accident (CVA) due to occlusion of right carotid artery ([address] 8/14/2017    Acute Ischemic Stroke (acute infarctions involving right parieto-occipital area and occipitotemporal area and anterior and midportion of right corona radiata) with residual left hemiparesis, dysphagia and cognitive-communication deficits    Cognitive communication deficit 8/14/2017    Depression     On Trazodone    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) 8/14/2017    Dysphagia as late effect of cerebrovascular accident (CVA) 8/14/2017    Erectile dysfunction     On Sildenafil citrate    Hemiparesis affecting left side as late effect of cerebrovascular accident (CVA) ([address] 8/14/2017    Hyperlipidemia with target LDL less than 70 8/15/2017    Lipid profile (8/15/2017) showed TG 99, , HDL 81, LDL 80    Hypertension     Ischemic cardiomyopathy 8/15/2017    EF 40%    Occlusion of right internal carotid artery 8/15/2017    On chronic clopidogrel therapy     Osteoarthritis     On Etodolac and Oxycodone-Acetaminophen    Other ill-defined conditions     PVD    Peripheral artery disease ([address]     Seizure, late effect of stroke ([address] 8/15/2017    On Levetiracetam    Tobacco use disorder      Past Surgical History: Procedure Laterality Date    HX ANGIOPLASTY  12/05/2012    S/P Balloon angioplasty and stent of left common iliac artery (12/5/2012 - Dr. Eron Parekh)      History reviewed. No pertinent family history. Social History   Substance Use Topics    Smoking status: Not on file    Smokeless tobacco: Not on file    Alcohol use Not on file      Prior to Admission medications    Medication Sig Start Date End Date Taking? Authorizing Provider   NIFEDIPINE PO Take  by mouth. Yes Chasidy Kern MD   potassium chloride (KLOR-CON) 20 mEq packet Take 1 Packet by mouth daily. 8/21/17  Yes Dennie Kluver, MD   thiamine (B-1) 100 mg tablet Take 1 Tab by mouth daily. 8/21/17  Yes Dennie Kluver, MD   metoprolol tartrate (LOPRESSOR) 50 mg tablet Take  by mouth daily. Yes Historical Provider   cholecalciferol, vitamin D3, (VITAMIN D3) 2,000 unit Tab Take  by mouth. Yes Historical Provider   etodolac (LODINE) 400 mg tablet Take 400 mg by mouth two (2) times daily as needed. Yes Historical Provider   clopidogrel (PLAVIX) 75 mg tablet Take 1 Tab by mouth daily. 12/5/12  Yes Eron Parekh MD   levETIRAcetam 1,000 mg tablet Take 1 Tab by mouth every twelve (12) hours. 8/21/17   Dennie Kluver, MD   nicotine (NICODERM CQ) 21 mg/24 hr 1 Patch by TransDERmal route daily for 30 days. 8/21/17 9/20/17  Dennie Kluver, MD   ascorbic acid (VITAMIN C) 250 mg tablet Take  by mouth. Historical Provider        No Known Allergies    Review of Systems:  Review of systems not obtained due to patient factors. Objective:     Vitals:    09/21/17 1230 09/21/17 1300 09/21/17 1330 09/21/17 1400   BP: 159/79 161/75 151/90 (!) 158/95   Pulse: 89 86 87 81   Resp: 20 24 19 24   Temp:       SpO2: 98% 99% 99% 98%   Weight:       Height:            Physical Exam:  GENERAL: alert, mild distress, appears older than stated age  LUNG: clear to auscultation bilaterally  HEART: regular rate and rhythm  ABDOMEN: soft, non-tender.  Bowel sounds normal. No masses,  no organomegaly    Assessment:     Hospital Problems  Date Reviewed: 9/21/2017          Codes Class Noted POA    Lower GI bleed ICD-10-CM: K92.2  ICD-9-CM: 578.9  9/21/2017 Unknown        History of CVA (cerebrovascular accident) ICD-10-CM: Z86.73  ICD-9-CM: V12.54  9/21/2017 Unknown              Plan:     Admit  Hold plavix GI consulted serial hct.     Signed By: Melissa Graves MD     September 21, 2017

## 2017-09-21 NOTE — IP AVS SNAPSHOT
Seamus Bang 
 
 
 [address] [facility] [address] [place] [address] Patient: Leda Rosales MRN: [id number] KXN:[id number] You are allergic to the following No active allergies Recent Documentation Height Weight BMI Smoking Status 1.829 m 59.9 kg 17.9 kg/m2 Never Assessed Emergency Contacts Name Discharge Info Relation Home Work Mobile Carlene Baez DISCHARGE CAREGIVER [3] Other Relative [6] [phone] [facility] DISCHARGE CAREGIVER [3] Daughter [21]   [phone] About your hospitalization You were admitted on:  September 21, 2017 You last received care in the:  [facility] [address] [facility] You were discharged on:  September 23, 2017 Unit phone number:  [phone] Why you were hospitalized Your primary diagnosis was:  Not on File Your diagnoses also included:  Lower Gi Bleed, History Of Cva (Cerebrovascular Accident), Acute Lower Gi Bleeding Providers Seen During Your Hospitalizations Provider Role Specialty Primary office phone Malcolm Burkett MD Attending Provider Emergency Medicine [phone] Vishnu Neumann MD Attending Provider [facility] [phone] Your Primary Care Physician (PCP) Primary Care Physician Office Phone Office Fax Andres Dennison [phone] Follow-up Information Follow up With Details Comments Contact Info Vishnu Neumann MD On 9/25/2017   [address] [address] 
[phone] Current Discharge Medication List  
  
START taking these medications Dose & Instructions Dispensing Information Comments Morning Noon Evening Bedtime  
 aspirin delayed-release 81 mg tablet Your next dose is:  9/23/2017 Dose:  81 mg Take 1 Tab by mouth daily. Quantity:  30 Tab Refills:  0 CONTINUE these medications which have NOT CHANGED Dose & Instructions Dispensing Information Comments Morning Noon Evening Bedtime  
 levETIRAcetam 1,000 mg tablet Your next dose is:  9/23/2017 Dose:  1000 mg Take 1 Tab by mouth every twelve (12) hours. Quantity:  60 Tab Refills:  0  
     
   
   
   
  
  
 metoprolol tartrate 50 mg tablet Commonly known as:  LOPRESSOR Your next dose is:  9/24/2017 Take  by mouth daily. Refills:  0  
     
  
   
   
   
  
 thiamine 100 mg tablet Commonly known as:  B-1 Your next dose is:  9/23/2017 Dose:  100 mg Take 1 Tab by mouth daily. Quantity:  30 Tab Refills:  0  
     
  
   
   
   
  
 VITAMIN C 250 mg tablet Generic drug:  ascorbic acid (vitamin C) Take  by mouth. Refills:  0  
     
   
   
   
  
 VITAMIN D3 2,000 unit Tab Generic drug:  cholecalciferol (vitamin D3) Take  by mouth. Refills:  0 STOP taking these medications   
 clopidogrel 75 mg Tab Commonly known as:  PLAVIX  
   
  
 etodolac 400 mg tablet Commonly known as:  LODINE  
   
  
 nicotine 21 mg/24 hr  
Commonly known as:  NICODERM CQ  
   
  
 NIFEDIPINE PO  
   
  
 potassium chloride 20 mEq packet Commonly known as:  KLOR-CON Where to Get Your Medications Information on where to get these meds will be given to you by the nurse or doctor. ! Ask your nurse or doctor about these medications  
  aspirin delayed-release 81 mg tablet Discharge Instructions Follow Up instructions by Physician: F/u cbc, cmp, mag on with Dr César Stephens Monday. Stop Plavix, and use asa 81 mg daily. Set up colonoscopy as outpatient. Dr Jay Florez office will arrange APPT. If pt has new sx recurrent bleeding to return to ER.  to resume home health with [facility]. DISCHARGE SUMMARY from Nurse The following personal items are in your possession at time of discharge: 
 
  
Visual Aid: None Clothing: None PATIENT INSTRUCTIONS: 
 
After general anesthesia or intravenous sedation, for 24 hours or while taking prescription Narcotics: · Limit your activities · Do not drive and operate hazardous machinery · Do not make important personal or business decisions · Do  not drink alcoholic beverages · If you have not urinated within 8 hours after discharge, please contact your surgeon on call. What to do at Home: 
Recommended activity: Activity as tolerated, or as recommended by your PCP If you experience any of the following symptoms listed under GI Bleed, please follow up with your PCP. *  Please give a list of your current medications to your Primary Care Provider. *  Please update this list whenever your medications are discontinued, doses are 
    changed, or new medications (including over-the-counter products) are added. *  Please carry medication information at all times in case of emergency situations. These are general instructions for a healthy lifestyle: No smoking/ No tobacco products/ Avoid exposure to second hand smoke Surgeon General's Warning:  Quitting smoking now greatly reduces serious risk to your health. Obesity, smoking, and sedentary lifestyle greatly increases your risk for illness A healthy diet, regular physical exercise & weight monitoring are important for maintaining a healthy lifestyle You may be retaining fluid if you have a history of heart failure or if you experience any of the following symptoms:  Weight gain of 3 pounds or more overnight or 5 pounds in a week, increased swelling in our hands or feet or shortness of breath while lying flat in bed. Please call your doctor as soon as you notice any of these symptoms; do not wait until your next office visit.  
 
Recognize signs and symptoms of STROKE: 
 
 F-face looks uneven A-arms unable to move or move unevenly S-speech slurred or non-existent T-time-call 911 as soon as signs and symptoms begin-DO NOT go Back to bed or wait to see if you get better-TIME IS BRAIN. Warning Signs of HEART ATTACK Call 911 if you have these symptoms: 
? Chest discomfort. Most heart attacks involve discomfort in the center of the chest that lasts more than a few minutes, or that goes away and comes back. It can feel like uncomfortable pressure, squeezing, fullness, or pain. ? Discomfort in other areas of the upper body. Symptoms can include pain or discomfort in one or both arms, the back, neck, jaw, or stomach. ? Shortness of breath with or without chest discomfort. ? Other signs may include breaking out in a cold sweat, nausea, or lightheadedness. Don't wait more than five minutes to call [address]! Fast action can save your life. Calling 911 is almost always the fastest way to get lifesaving treatment. Emergency Medical Services staff can begin treatment when they arrive  up to an hour sooner than if someone gets to the hospital by car. The discharge information has been reviewed with the patient. The patient verbalized understanding. Discharge medications reviewed with the patient and appropriate educational materials and side effects teaching were provided. Gastrointestinal Bleeding: Care Instructions Your Care Instructions The digestive or gastrointestinal tract goes from the mouth to the anus. It is often called the GI tract. Bleeding can happen anywhere in the GI tract. It may be caused by an ulcer, an infection, or cancer. It may also be caused by medicines such as aspirin or ibuprofen. Light bleeding may not cause any symptoms at first. But if you continue to bleed for a while, you may feel very weak or tired. Sudden, heavy bleeding means you need to see a doctor right away.  This kind of bleeding can be very dangerous. But it can usually be cured or controlled. The doctor may do some tests to find the cause of your bleeding. Follow-up care is a key part of your treatment and safety. Be sure to make and go to all appointments, and call your doctor if you are having problems. It's also a good idea to know your test results and keep a list of the medicines you take. How can you care for yourself at home? · Be safe with medicines. Take your medicines exactly as prescribed. Call your doctor if you think you are having a problem with your medicine. You will get more details on the specific medicines your doctor prescribes. · Do not take aspirin or other anti-inflammatory medicines, such as naproxen (Aleve) or ibuprofen (Advil, Motrin), without talking to your doctor first. Ask your doctor if it is okay to use acetaminophen (Tylenol). · Do not drink alcohol. · The bleeding may make you lose iron. So it's important to eat foods that have a lot of iron. These include red meat, shellfish, poultry, and eggs. They also include beans, raisins, whole-grain breads, and leafy green vegetables. If you want help planning meals, you can make an appointment with a dietitian. When should you call for help? Call 911 anytime you think you may need emergency care. For example, call if: 
· You have sudden, severe belly pain. · You vomit blood or what looks like coffee grounds. · You passed out (lost consciousness). · Your stools are maroon or very bloody. Call your doctor now or seek immediate medical care if: 
· You are dizzy or lightheaded, or you feel like you may faint. · Your stools are black and look like tar, or they have streaks of blood. · You have belly pain. · You vomit or have nausea. · You have trouble swallowing, or it hurts when you swallow. Watch closely for changes in your health, and be sure to contact your doctor if: 
· You do not get better as expected. Where can you learn more? Go to http://gordo-ghassan.info/. Enter Z695 in the search box to learn more about \"Gastrointestinal Bleeding: Care Instructions. \" Current as of: March 20, 2017 Content Version: 11.3 © 3626-8502 [employer]. Care instructions adapted under license by [employer]EVIZZ (which disclaims liability or warranty for this information). If you have questions about a medical condition or this instruction, always ask your healthcare professional. [place] [address] any warranty or liability for your use of this information. Colonoscopy: Before Your Procedure What is a colonoscopy? A colonoscopy is a test that lets a doctor look inside your colon. The doctor uses a thin, lighted tube called a colonoscope to look for problems. These include small growths called polyps, cancer, or bleeding. During the test, the doctor can take samples of tissue that can be checked for cancer or other problems. This is called a biopsy. The doctor can also take out polyps. Before the test, you will need to stop eating solid foods. You also will drink a liquid or take a tablet that cleans out your colon. This helps your doctor be able to see inside your colon during the test. 
Follow-up care is a key part of your treatment and safety. Be sure to make and go to all appointments, and call your doctor if you are having problems. It's also a good idea to know your test results and keep a list of the medicines you take. What happens before the procedure? Procedures can be stressful. This information will help you understand what you can expect. And it will help you safely prepare for your procedure. Preparing for the procedure · Understand exactly what procedure is planned, along with the risks, benefits, and other options.  
· Tell your doctors ALL the medicines, vitamins, supplements, and herbal remedies you take. Some of these can increase the risk of bleeding or interact with anesthesia. · If you take blood thinners, such as warfarin (Coumadin), clopidogrel (Plavix), or aspirin, be sure to talk to your doctor. He or she will tell you if you should stop taking these medicines before your procedure. Make sure that you understand exactly what your doctor wants you to do. · Your doctor will tell you which medicines to take or stop before your procedure. You may need to stop taking certain medicines a week or more before the procedure. So talk to your doctor as soon as you can. · If you have an advance directive, let your doctor know. It may include a living will and a durable power of  for health care. Bring a copy to the hospital. If you don't have one, you may want to prepare one. It lets your doctor and loved ones know your health care wishes. Doctors advise that everyone prepare these papers before any type of surgery or procedure. Before the procedure · Follow your doctor's directions about when to stop eating solid foods and drink only clear liquids. You can drink water, clear juices, clear broths, flavored ice pops, and gelatin (such as Jell-O). Do not eat or drink anything red or purple. This includes grape juice and grape-flavored ice pops. It also includes fruit punch and cherry gelatin. · Drink the \"colon prep\" liquid as your doctor tells you. You will want to stay home, because the liquid will make you go to the bathroom a lot. Your stools will be loose and watery. It is very important to drink all of the liquid. If you have problems drinking it, call your doctor. Some doctors may have you take a tablet rather than drink a liquid. · Do not eat any solid foods after you drink the colon prep. · Stop drinking clear liquids 6 to 8 hours before the test. 
What happens on the day of the procedure? · Follow the instructions exactly about when to stop eating and drinking. If you don't, your procedure may be canceled. If your doctor told you to take your medicines on the day of the procedure, take them with only a sip of water. · Take a bath or shower before you come in for your procedure. Do not apply lotions, perfumes, deodorants, or nail polish. · Take off all jewelry and piercings. And take out contact lenses, if you wear them. At the [address] [place] [address] or hospital 
· Bring a picture ID. · You will be kept comfortable and safe by your anesthesia provider. The anesthesia may make you sleep. · You will lie on your back or your side with your knees drawn up toward your belly. The doctor will gently put a gloved finger into your anus. Then the doctor puts the scope in and moves it into your colon. The scope goes in easily because it is lubricated. · The doctor may also use small tools to take tissue samples for a biopsy or to remove polyps. This does not hurt. · The test usually takes 30 to 45 minutes. But it may take longer. It depends on what is found and what is done. Going home · Be sure you have someone to drive you home. Anesthesia and pain medicine make it unsafe for you to drive. · You will be given more specific instructions about recovering from your procedure. When should you call your doctor? · You have questions or concerns. · You don't understand how to prepare for your procedure. · You are having trouble with the bowel prep. · You become ill before the procedure (such as fever, flu, or a cold). · You need to reschedule or have changed your mind about having the procedure. Where can you learn more? Go to http://gordo-ghassan.info/. Enter C315 in the search box to learn more about \"Colonoscopy: Before Your Procedure. \" Current as of: August 9, 2016 Content Version: 11.3 © 8663-4258 [employer]U, [employer].  Care instructions adapted under license by BI[employer] (which disclaims liability or warranty for this information). If you have questions about a medical condition or this instruction, always ask your healthcare professional. [address] any warranty or liability for your use of this information. Patient armband removed and shredded Discharge Orders None WhatsOpen Announcement We are excited to announce that we are making your provider's discharge notes available to you in WhatsOpen. You will see these notes when they are completed and signed by the physician that discharged you from your recent hospital stay. If you have any questions or concerns about any information you see in WhatsOpen, please call the Health Information Department where you were seen or reach out to your Primary Care Provider for more information about your plan of care. Introducing [facility]! [facility] introduces WhatsOpen patient portal. Now you can access parts of your medical record, email your doctor's office, and request medication refills online. 1. In your internet browser, go to [email or web address] 2. Click on the First Time User? Click Here link in the Sign In box. You will see the New Member Sign Up page. 3. Enter your WhatsOpen Access Code exactly as it appears below. You will not need to use this code after youve completed the sign-up process. If you do not sign up before the expiration date, you must request a new code. · WhatsOpen Access Code: [email or web address] Expires: 11/13/2017  9:55 AM 
 
4. Enter the last four digits of your Social Security Number (xxxx) and Date of Birth (mm/dd/yyyy) as indicated and click Submit. You will be taken to the next sign-up page. 5. Create a WhatsOpen ID. This will be your WhatsOpen login ID and cannot be changed, so think of one that is secure and easy to remember. 6. Create a WhatsOpen password. You can change your password at any time. 7. Enter your Password Reset Question and Answer. This can be used at a later time if you forget your password. 8. Enter your e-mail address. You will receive e-mail notification when new information is available in [address] 9. Click Sign Up. You can now view and download portions of your medical record. 10. Click the Download Summary menu link to download a portable copy of your medical information. If you have questions, please visit the Frequently Asked Questions section of the Dizkon website. Remember, Dizkon is NOT to be used for urgent needs. For medical emergencies, dial 911. Now available from your iPhone and Android! General Information Please provide this summary of care documentation to your next provider. Patient Signature:  ____________________________________________________________ Date:  ____________________________________________________________  
  
[facility] Provider Signature:  ____________________________________________________________ Date:  ____________________________________________________________

## 2017-09-21 NOTE — ED NOTES
Pt laying with eyes closed and appears to be sleeping. Easily rouses to verbal/tactile stimulus. NAD noted. Will continue to monitor.

## 2017-09-21 NOTE — ED TRIAGE NOTES
Pt daughter states she noted blood in patient stool on 3 occasions through the night. Pt is on Plavix and is s/p cva x 1 month.

## 2017-09-21 NOTE — ED NOTES
Incontinence check. Pt cleaned and changed of grossly bloody stool. Sacral wound stage 2 noted. New brief applied and pt repositioned for comfort. Denies any needs at this time. Daughter remains at bedside.

## 2017-09-21 NOTE — ED PROVIDER NOTES
HPI Comments: 10:24 AM Susan Sarabia is a 71 y.o. male with h/o HTN, CVA, PAD, and Seizures who presents to ED complaining of rectal bleeding onset today after 12AM. Patients daughter states that he hasn't had this before and that it was a lot of blood. Patient has left sided neglect due to hx of his stroke. Patient takes Plavix. Denies any pain. No other concerns or symptoms at this time. PCP: Sue Meredith MD      The history is provided by the patient.         Past Medical History:   Diagnosis Date    Alcohol use disorder ([address]     Benign hypertensive heart disease with systolic congestive heart failure, NYHA class 2 ([address]     Cardiac pacemaker in situ     Cerebrovascular accident (CVA) due to occlusion of right carotid artery ([address] 8/14/2017    Acute Ischemic Stroke (acute infarctions involving right parieto-occipital area and occipitotemporal area and anterior and midportion of right corona radiata) with residual left hemiparesis, dysphagia and cognitive-communication deficits    Cognitive communication deficit 8/14/2017    Depression     On Trazodone    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) 8/14/2017    Dysphagia as late effect of cerebrovascular accident (CVA) 8/14/2017    Erectile dysfunction     On Sildenafil citrate    Hemiparesis affecting left side as late effect of cerebrovascular accident (CVA) ([address] 8/14/2017    Hyperlipidemia with target LDL less than 70 8/15/2017    Lipid profile (8/15/2017) showed TG 99, , HDL 81, LDL 80    Hypertension     Ischemic cardiomyopathy 8/15/2017    EF 40%    Occlusion of right internal carotid artery 8/15/2017    On chronic clopidogrel therapy     Osteoarthritis     On Etodolac and Oxycodone-Acetaminophen    Other ill-defined conditions     PVD    Peripheral artery disease ([address]     Seizure, late effect of stroke ([address] 8/15/2017    On Levetiracetam    Tobacco use disorder        Past Surgical History:   Procedure Laterality Date    HX ANGIOPLASTY  12/05/2012    S/P Balloon angioplasty and stent of left common iliac artery (12/5/2012 - Dr. Lindy Carmona)         No family history on file. Social History     Social History    Marital status: SINGLE     Spouse name: N/A    Number of children: N/A    Years of education: N/A     Occupational History    Not on file. Social History Main Topics    Smoking status: Not on file    Smokeless tobacco: Not on file    Alcohol use Not on file    Drug use: Not on file    Sexual activity: Not on file     Other Topics Concern    Not on file     Social History Narrative         ALLERGIES: Review of patient's allergies indicates no known allergies. Review of Systems   Gastrointestinal: Positive for anal bleeding. Negative for abdominal pain. All other systems reviewed and are negative. Vitals:    09/21/17 1021   BP: (!) 184/96   Pulse: 94   Resp: 16   Temp: 97.8 °F (36.6 °C)   SpO2: 99%   Weight: 64.4 kg (142 lb)   Height: 6' (1.829 m)            Physical Exam   Constitutional: He is oriented to person, place, and time. He appears well-developed. HENT:   Head: Normocephalic and atraumatic. Eyes: EOM are normal. Pupils are equal, round, and reactive to light. Neck: Normal range of motion. Neck supple. Cardiovascular: Normal rate, regular rhythm and normal heart sounds. Exam reveals no friction rub. No murmur heard. Pulmonary/Chest: Effort normal and breath sounds normal. No respiratory distress. He has no wheezes. Abdominal: Soft. He exhibits no distension. There is no tenderness. There is no rebound and no guarding. Musculoskeletal: Normal range of motion. Neurological: He is alert and oriented to person, place, and time. resdiual left upper and lower weakness     Skin: Skin is warm and dry. Psychiatric: He has a normal mood and affect.  His behavior is normal. Thought content normal.        MDM  Number of Diagnoses or Management Options  Diagnosis management comments: : Lower GI bleed-  Patient with lower GI bleeding since a.m. Hemoglobin 13. Given on plavix will likely admit to obs. 12:25 PM pending discussion with dr Tigre Santos.     5:21 PM pt does not see dr Vale Brand; is [facility] pt. Would like to go to [place], d/w Dr Giles [place]: will check to see if GI is available. D[facility]w [facility] Dr Terri Da Silva does not have capability to handle this and we should admit here. D/w Dr Tigre Santos; will admit. ED Course       Procedures                       Scribe Attestation      Donita Matthews acting as a scribe for and in the presence of Tabitha Carrington MD      September 21, 2017 at 10:23 AM       Provider Attestation:      I personally performed the services described in the documentation, reviewed the documentation, as recorded by the scribe in my presence, and it accurately and completely records my words and actions.  September 21, 2017 at 10:23 AM - Tabitha Carrington MD

## 2017-09-21 NOTE — IP AVS SNAPSHOT
[address] Patient: Moon Smith MRN: [id number]:[id number] Current Discharge Medication List  
  
START taking these medications Dose & Instructions Dispensing Information Comments Morning Noon Evening Bedtime  
 aspirin delayed-release 81 mg tablet Your next dose is:  9/23/2017 Dose:  81 mg Take 1 Tab by mouth daily. Quantity:  30 Tab Refills:  0 CONTINUE these medications which have NOT CHANGED Dose & Instructions Dispensing Information Comments Morning Noon Evening Bedtime  
 levETIRAcetam 1,000 mg tablet Your next dose is:  9/23/2017 Dose:  1000 mg Take 1 Tab by mouth every twelve (12) hours. Quantity:  60 Tab Refills:  0  
     
   
   
   
  
  
 metoprolol tartrate 50 mg tablet Commonly known as:  LOPRESSOR Your next dose is:  9/24/2017 Take  by mouth daily. Refills:  0  
     
  
   
   
   
  
 thiamine 100 mg tablet Commonly known as:  B-1 Your next dose is:  9/23/2017 Dose:  100 mg Take 1 Tab by mouth daily. Quantity:  30 Tab Refills:  0  
     
  
   
   
   
  
 VITAMIN C 250 mg tablet Generic drug:  ascorbic acid (vitamin C) Take  by mouth. Refills:  0  
     
   
   
   
  
 VITAMIN D3 2,000 unit Tab Generic drug:  cholecalciferol (vitamin D3) Take  by mouth. Refills:  0 STOP taking these medications   
 clopidogrel 75 mg Tab Commonly known as:  PLAVIX  
   
  
 etodolac 400 mg tablet Commonly known as:  LODINE  
   
  
 nicotine 21 mg/24 hr  
Commonly known as:  NICODERM CQ  
   
  
 NIFEDIPINE PO  
   
  
 potassium chloride 20 mEq packet Commonly known as:  KLOR-CON Where to Get Your Medications Information on where to get these meds will be given to you by the nurse or doctor. ! Ask your nurse or doctor about these medications  
  aspirin delayed-release 81 mg tablet

## 2017-09-21 NOTE — ED NOTES
TRANSFER - OUT REPORT:    Verbal report given to 4N nurse(name) on Moon Smith  being transferred to 4N(unit) for routine progression of care       Report consisted of patients Situation, Background, Assessment and   Recommendations(SBAR). Information from the following report(s) SBAR, ED Summary, [facility] and Recent Results was reviewed with the receiving nurse. Lines:   Peripheral IV 09/21/17 Right Forearm (Active)   Site Assessment Clean, dry, & intact 9/21/2017 10:20 AM   Phlebitis Assessment 0 9/21/2017 10:20 AM   Infiltration Assessment 0 9/21/2017 10:20 AM   Dressing Status Clean, dry, & intact; Occlusive 9/21/2017 10:20 AM   Dressing Type Transparent 9/21/2017 10:20 AM   Hub Color/Line Status Pink;Flushed;Patent 9/21/2017 10:20 AM   Action Taken Blood drawn 9/21/2017 10:20 AM        Opportunity for questions and clarification was provided.       Patient transported with:   [employer]

## 2017-09-21 NOTE — ED NOTES
Rounds made. Pt repositioned for comfort. NAD noted. Denies any needs at this time. Pt and family informed of status.

## 2017-09-22 ENCOUNTER — HOME CARE VISIT (OUTPATIENT)
Dept: HOME HEALTH SERVICES | Facility: HOME HEALTH | Age: 72
End: 2017-09-22
Payer: MEDICARE

## 2017-09-22 VITALS — SYSTOLIC BLOOD PRESSURE: 122 MMHG | OXYGEN SATURATION: 97 % | HEART RATE: 70 BPM | DIASTOLIC BLOOD PRESSURE: 68 MMHG

## 2017-09-22 PROBLEM — K92.2 ACUTE LOWER GI BLEEDING: Status: ACTIVE | Noted: 2017-09-22

## 2017-09-22 LAB
FERRITIN SERPL-MCNC: 945 NG/ML (ref 8–388)
FOLATE SERPL-MCNC: 7.4 NG/ML (ref 3.1–17.5)
HCT VFR BLD AUTO: 38.6 % (ref 36–48)
HGB BLD-MCNC: 12.2 G/DL (ref 13–16)
IRON SATN MFR SERPL: 30 %
IRON SERPL-MCNC: 54 UG/DL (ref 50–175)
TIBC SERPL-MCNC: 181 UG/DL (ref 250–450)
VIT B12 SERPL-MCNC: 988 PG/ML (ref 211–911)

## 2017-09-22 PROCEDURE — 36415 COLL VENOUS BLD VENIPUNCTURE: CPT | Performed by: FAMILY MEDICINE

## 2017-09-22 PROCEDURE — 82607 VITAMIN B-12: CPT | Performed by: NURSE PRACTITIONER

## 2017-09-22 PROCEDURE — 65390000012 HC CONDITION CODE 44 OBSERVATION

## 2017-09-22 PROCEDURE — 85018 HEMOGLOBIN: CPT | Performed by: FAMILY MEDICINE

## 2017-09-22 PROCEDURE — 74011250637 HC RX REV CODE- 250/637: Performed by: FAMILY MEDICINE

## 2017-09-22 PROCEDURE — 74011250637 HC RX REV CODE- 250/637: Performed by: INTERNAL MEDICINE

## 2017-09-22 PROCEDURE — 3331090001 HH PPS REVENUE CREDIT

## 2017-09-22 PROCEDURE — 74011250636 HC RX REV CODE- 250/636: Performed by: INTERNAL MEDICINE

## 2017-09-22 PROCEDURE — 82728 ASSAY OF FERRITIN: CPT | Performed by: NURSE PRACTITIONER

## 2017-09-22 PROCEDURE — 96361 HYDRATE IV INFUSION ADD-ON: CPT

## 2017-09-22 PROCEDURE — 83540 ASSAY OF IRON: CPT | Performed by: NURSE PRACTITIONER

## 2017-09-22 PROCEDURE — 96360 HYDRATION IV INFUSION INIT: CPT

## 2017-09-22 PROCEDURE — 99218 HC RM OBSERVATION: CPT

## 2017-09-22 PROCEDURE — 3331090002 HH PPS REVENUE DEBIT

## 2017-09-22 RX ORDER — SODIUM CHLORIDE, SODIUM LACTATE, POTASSIUM CHLORIDE, CALCIUM CHLORIDE 600; 310; 30; 20 MG/100ML; MG/100ML; MG/100ML; MG/100ML
100 INJECTION, SOLUTION INTRAVENOUS CONTINUOUS
Status: DISCONTINUED | OUTPATIENT
Start: 2017-09-22 | End: 2017-09-23 | Stop reason: HOSPADM

## 2017-09-22 RX ORDER — MEGESTROL ACETATE 40 MG/ML
200 SUSPENSION ORAL 2 TIMES DAILY
Status: DISCONTINUED | OUTPATIENT
Start: 2017-09-22 | End: 2017-09-23 | Stop reason: HOSPADM

## 2017-09-22 RX ADMIN — Medication 100 MG: at 08:11

## 2017-09-22 RX ADMIN — LEVETIRACETAM 1000 MG: 500 TABLET ORAL at 08:11

## 2017-09-22 RX ADMIN — LEVETIRACETAM 1000 MG: 500 TABLET ORAL at 21:39

## 2017-09-22 RX ADMIN — MEGESTROL ACETATE 200 MG: 40 SUSPENSION ORAL at 10:49

## 2017-09-22 RX ADMIN — SODIUM CHLORIDE, SODIUM LACTATE, POTASSIUM CHLORIDE, AND CALCIUM CHLORIDE 100 ML/HR: 600; 310; 30; 20 INJECTION, SOLUTION INTRAVENOUS at 21:38

## 2017-09-22 RX ADMIN — METOPROLOL TARTRATE 50 MG: 50 TABLET ORAL at 08:11

## 2017-09-22 RX ADMIN — MEGESTROL ACETATE 200 MG: 40 SUSPENSION ORAL at 17:05

## 2017-09-22 RX ADMIN — LACTULOSE 10 G: 20 SOLUTION ORAL at 12:41

## 2017-09-22 RX ADMIN — SODIUM CHLORIDE, SODIUM LACTATE, POTASSIUM CHLORIDE, AND CALCIUM CHLORIDE 100 ML/HR: 600; 310; 30; 20 INJECTION, SOLUTION INTRAVENOUS at 10:47

## 2017-09-22 NOTE — PROGRESS NOTES
Problem: Falls - Risk of  Goal: *Absence of Falls  Document Karly Fall Risk and appropriate interventions in the flowsheet.    Outcome: Progressing Towards Goal  Fall Risk Interventions:  Mobility Interventions: PT Consult for mobility concerns, PT Consult for assist device competence     Mentation Interventions: Adequate sleep, hydration, pain control, Bed/chair exit alarm     Medication Interventions: Patient to call before getting OOB     Elimination Interventions: Call light in reach, Bed/chair exit alarm

## 2017-09-22 NOTE — MED STUDENT NOTES
*ATTENTION:  This note has been created by a medical student for educational purposes only. Please do not refer to the content of this note for clinical decision-making, billing, or other purposes. Please see attending physicians note to obtain clinical information on this patient. *         [email or web address]  [phone]    Impression:   1. GI bleeding  -was on plavix due to recent R sided CVA  -patient stable, H/H today 12.2/38.6 down from baseline of 13.3/40.6  -per patient, has never had a colonoscopy      2. 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 Continue to hold plavix, will order iron profile, B12, folate  2. Monitor CBC, monitor for further instances of BRBPR  3. May need outpatient colonoscopy, due to no overt GI hemorrhage       Chief Complaint: Bloody stools       HPI:  Lorraine King is a 71 y.o. male who is being seen on consult for GI bleeding. H/o left-sided CVA, on plavix, lives at home with home health. Per patient's family, he had 3 episodes of grossly bloody stool, which prompted them to come to the ED. No prior history of GI bleeding. In the ED Plavix was held. During our encounter this morning, patient reports no further episodes of bleeding.      ROS: denies nausea, vomiting, abdominal pain, diarrhea, constipation, melena     PMH:   Past Medical History:   Diagnosis Date    Alcohol use disorder ([address]     Benign hypertensive heart disease with systolic congestive heart failure, NYHA class 2 ([address]     Cardiac pacemaker in situ     Cerebrovascular accident (CVA) due to occlusion of right carotid artery ([address] 8/14/2017    Acute Ischemic Stroke (acute infarctions involving right parieto-occipital area and occipitotemporal area and anterior and midportion of right corona radiata) with residual left hemiparesis, dysphagia and cognitive-communication deficits    Cognitive communication deficit 8/14/2017    Depression     On Trazodone    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) 8/14/2017    Dysphagia as late effect of cerebrovascular accident (CVA) 8/14/2017    Erectile dysfunction     On Sildenafil citrate    Hemiparesis affecting left side as late effect of cerebrovascular accident (CVA) ([facility] 8/14/2017    Hyperlipidemia with target LDL less than 70 8/15/2017    Lipid profile (8/15/2017) showed TG 99, , HDL 81, LDL 80    Hypertension     Ischemic cardiomyopathy 8/15/2017    EF 40%    Occlusion of right internal carotid artery 8/15/2017    On chronic clopidogrel therapy     Osteoarthritis     On Etodolac and Oxycodone-Acetaminophen    Other ill-defined conditions     PVD    Peripheral artery disease (HCC)     Seizure, late effect of stroke ([facility] 8/15/2017    On Levetiracetam    Tobacco use disorder        PSH:   Past Surgical History:   Procedure Laterality Date    HX ANGIOPLASTY  12/05/2012    S/P Balloon angioplasty and stent of left common iliac artery (12/5/2012 - Dr. Martir Munoz)       Social HX:   Social History     Social History    Marital status: SINGLE     Spouse name: N/A    Number of children: N/A    Years of education: N/A     Occupational History    Not on file. Social History Main Topics    Smoking status: Not on file    Smokeless tobacco: Not on file    Alcohol use Not on file    Drug use: Not on file    Sexual activity: Not on file     Other Topics Concern    Not on file     Social History Narrative       FHX:   History reviewed. No pertinent family history. Allergy:   No Known Allergies    Home Medications:     Prescriptions Prior to Admission   Medication Sig    NIFEDIPINE PO Take  by mouth.  potassium chloride (KLOR-CON) 20 mEq packet Take 1 Packet by mouth daily.  thiamine (B-1) 100 mg tablet Take 1 Tab by mouth daily.  metoprolol tartrate (LOPRESSOR) 50 mg tablet Take  by mouth daily.  cholecalciferol, vitamin D3, (VITAMIN D3) 2,000 unit Tab Take  by mouth.       etodolac (LODINE) 400 mg tablet Take 400 mg by mouth two (2) times daily as needed.  clopidogrel (PLAVIX) 75 mg tablet Take 1 Tab by mouth daily.  levETIRAcetam 1,000 mg tablet Take 1 Tab by mouth every twelve (12) hours.  [] nicotine (NICODERM CQ) 21 mg/24 hr 1 Patch by TransDERmal route daily for 30 days.  ascorbic acid (VITAMIN C) 250 mg tablet Take  by mouth. Review of Systems:     A fourteen point review of systems was obtained, and was negative except per HPI. Visit Vitals    /65 (BP 1 Location: Left arm, BP Patient Position: At rest)    Pulse (!) 56    Temp 96.7 °F (35.9 °C)    Resp 18    Ht 6' (1.829 m)    Wt 64.4 kg (142 lb)    SpO2 98%    BMI 19.26 kg/m2       Physical Assessment:     constitutional: appearance: well developed, well nourished, in no acute distress. skin: no rashes, jaundice  eyes: inspection: normal conjunctivae and lids; no scleral icterus pupils: equal, round, reactive to light; extraocular movements intact  ENMT: mouth: mucous membranes moist, no thrush  neck:  no masses or tenderness; normal range of motion  respiratory: breath sounds clear, no wheeze/rales/rhonchi  cardiovascular: normal rate, regular rhythm without murmur  abdominal: soft, bowel sounds present, non-tender to palpation without rebound or guarding; no appreciable mass; no appreciable hepatosplenomegaly; no appreciable fluid wave  extremities: no clubbing, cyanosis, edema. Left-sided weakness of upper extremity  psychiatric:  oriented to time, space and person.         Basic Metabolic Profile   Recent Labs      17   1020   NA  137   K  4.2   CL  99*   CO2  33*   BUN  26*   GLU  102*   CA  10.2*         CBC w/Diff    Recent Labs      17   0330  17   1020   WBC   --   5.7   RBC   --   5.12   HGB  12.2*  13.3   HCT  38.6  40.6   MCV   --   79.3   MCH   --   26.0   MCHC   --   32.8   RDW   --   14.6*   PLT   --   202    Recent Labs      17   1020   GRANS 61   LYMPH  28   EOS  2        Hepatic Function   Recent Labs      09/21/17   1020   ALB  3.0*   TP  8.0   TBILI  0.6   SGOT  28   AP  94          Amatmoy-Marry Calloway, MS4 EVMS    Supervised: Santiago Rod MD  [facility]  [email or web address]

## 2017-09-22 NOTE — HOME CARE
Noted patient is active with [facility] for SN/PT/OT/SLP/HA/MSW - current referral has been updated with resumption orders and liaison nurses will continue to follow for home care needs thru discharge - MELISA Valenzuela LPN

## 2017-09-22 NOTE — PROGRESS NOTES
Bedside shift change report given to Darrion Ramirez (oncoming nurse) by Severo Hopkins (offgoing nurse). Report included the following information SBAR, Kardex, ED Summary, Recent Results and Med Rec Status. Patient alert and oriented x's 4. Vitals stable, afebrile, on RA, HR regular. Denying pain. Eating with encouragement. IVMF infusing. No BM today. Possible discharge tomorrow, now observation patient. Continuing to monitor.

## 2017-09-22 NOTE — CONSULTS
[email or web address]  [phone]    Impression:   1. GI bleeding. No overt GI hemorrhage  -was on plavix due to recent R sided CVA  -patient stable, H/H today 12.2/38.6 down from baseline of 13.3/40.6  -per patient, has never had a colonoscopy       2. 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 Continue to hold plavix, will order iron profile, B12, folate  2. Monitor CBC transfuse as indicated. monitor for further episodes of BRBPR  3. May need outpatient colonoscopy, due to no overt GI hemorrhage at this time      Chief Complaint: GI bleed      HPI:  Aliyah Funes is a 71 y.o. male who is being seen on consult  for GI bleeding. H/o left-sided CVA, on plavix, lives at home with home health. Per patient's family, he had 3 episodes of grossly bloody stool, which prompted them to come to the ED. No prior history of GI bleeding.  In the ED Plavix was held.      During our encounter this morning, patient reports no further episodes of bleeding.        PMH:   Past Medical History:   Diagnosis Date    Alcohol use disorder ([address]     Benign hypertensive heart disease with systolic congestive heart failure, NYHA class 2 ([address]     Cardiac pacemaker in situ     Cerebrovascular accident (CVA) due to occlusion of right carotid artery ([address] 8/14/2017    Acute Ischemic Stroke (acute infarctions involving right parieto-occipital area and occipitotemporal area and anterior and midportion of right corona radiata) with residual left hemiparesis, dysphagia and cognitive-communication deficits    Cognitive communication deficit 8/14/2017    Depression     On Trazodone    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) 8/14/2017    Dysphagia as late effect of cerebrovascular accident (CVA) 8/14/2017    Erectile dysfunction     On Sildenafil citrate    Hemiparesis affecting left side as late effect of cerebrovascular accident (CVA) ([address] 8/14/2017    Hyperlipidemia with target LDL less than 70 8/15/2017    Lipid profile (8/15/2017) showed TG 99, , HDL 81, LDL 84    Hypertension     Ischemic cardiomyopathy 8/15/2017    EF 40%    Occlusion of right internal carotid artery 8/15/2017    On chronic clopidogrel therapy     Osteoarthritis     On Etodolac and Oxycodone-Acetaminophen    Other ill-defined conditions     PVD    Peripheral artery disease (HCC)     Seizure, late effect of stroke ([facility] 8/15/2017    On Levetiracetam    Tobacco use disorder        PSH:   Past Surgical History:   Procedure Laterality Date    HX ANGIOPLASTY  2012    S/P Balloon angioplasty and stent of left common iliac artery (2012 - Dr. Michele Hobbs)       Social HX:   Social History     Social History    Marital status: SINGLE     Spouse name: N/A    Number of children: N/A    Years of education: N/A     Occupational History    Not on file. Social History Main Topics    Smoking status: Not on file    Smokeless tobacco: Not on file    Alcohol use Not on file    Drug use: Not on file    Sexual activity: Not on file     Other Topics Concern    Not on file     Social History Narrative       FHX:   History reviewed. No pertinent family history. Allergy:   No Known Allergies    Home Medications:     Prescriptions Prior to Admission   Medication Sig    NIFEDIPINE PO Take  by mouth.  potassium chloride (KLOR-CON) 20 mEq packet Take 1 Packet by mouth daily.  thiamine (B-1) 100 mg tablet Take 1 Tab by mouth daily.  metoprolol tartrate (LOPRESSOR) 50 mg tablet Take  by mouth daily.  cholecalciferol, vitamin D3, (VITAMIN D3) 2,000 unit Tab Take  by mouth.  etodolac (LODINE) 400 mg tablet Take 400 mg by mouth two (2) times daily as needed.  clopidogrel (PLAVIX) 75 mg tablet Take 1 Tab by mouth daily.  levETIRAcetam 1,000 mg tablet Take 1 Tab by mouth every twelve (12) hours.     [] nicotine (NICODERM CQ) 21 mg/24 hr 1 Patch by TransDERmal route daily for 30 days.  ascorbic acid (VITAMIN C) 250 mg tablet Take  by mouth. Review of Systems:     A fourteen point review of systems was obtained, and was negative except per HPI. Visit Vitals    /81 (BP 1 Location: Left arm, BP Patient Position: At rest)    Pulse 70    Temp 96.7 °F (35.9 °C)    Resp 18    Ht 6' (1.829 m)    Wt 64.4 kg (142 lb)    SpO2 96%    BMI 19.26 kg/m2       Physical Assessment:     constitutional: appearance: well developed, well nourished, in no acute distress. skin: no rashes, jaundice  eyes: inspection: normal conjunctivae and lids; no scleral icterus pupils: equal, round, reactive to light; extraocular movements intact  ENMT: mouth: mucous membranes moist, no thrush  neck:  no masses or tenderness; normal range of motion  respiratory: breath sounds clear, no wheeze/rales/rhonchi  cardiovascular: normal rate, regular rhythm without murmur  abdominal: soft, bowel sounds present, non-tender to palpation without rebound or guarding; no appreciable mass; no appreciable hepatosplenomegaly; no appreciable fluid wave  extremities: no clubbing, cyanosis, edema  neurologic:  Cranial nerves II-XII grossly intact; no asterixis   psychiatric:  oriented to time, space and person. Basic Metabolic Profile   Recent Labs      09/21/17   1020   NA  137   K  4.2   CL  99*   CO2  33*   BUN  26*   GLU  102*   CA  10.2*         CBC w/Diff    Recent Labs      09/22/17   0330  09/21/17   1020   WBC   --   5.7   RBC   --   5.12   HGB  12.2*  13.3   HCT  38.6  40.6   MCV   --   79.3   MCH   --   26.0   MCHC   --   32.8   RDW   --   14.6*   PLT   --   202    Recent Labs      09/21/17   1020   GRANS  61   LYMPH  28   EOS  2        Hepatic Function   Recent Labs      09/21/17   1020   ALB  3.0*   TP  8.0   TBILI  0.6   SGOT  28   AP  94          Michelle Sears NP  [facility], [facility]  [email or web address]

## 2017-09-22 NOTE — PROGRESS NOTES
conducted an initial consultation and Spiritual Assessment for Kartik Kohli, who is a 71 y.o.,male. Patients Primary Language is: [place]. According to the patients EMR Hindu Affiliation is: Jim Jenkins.     The reason the Patient came to the hospital is:   Patient Active Problem List    Diagnosis Date Noted    Lower GI bleed 09/21/2017    History of CVA (cerebrovascular accident) 09/21/2017    Peripheral artery disease ([address]     On chronic clopidogrel therapy     Alcohol use disorder ([address]     Tobacco use disorder     Benign hypertensive heart disease with systolic congestive heart failure, NYHA class 2 ([address]     Erectile dysfunction     Depression     Osteoarthritis     Ischemic cardiomyopathy 08/15/2017    Hyperlipidemia with target LDL less than 70 08/15/2017    Seizure, late effect of stroke ([address] 08/15/2017    Occlusion of right internal carotid artery 08/15/2017    Cerebrovascular accident (CVA) due to occlusion of right carotid artery ([address] 08/14/2017    Impaired mobility and ADLs 08/14/2017    Hemiparesis affecting left side as late effect of cerebrovascular accident (CVA) ([address] 08/14/2017    Dysphagia as late effect of cerebrovascular accident (CVA) 08/14/2017    Cognitive communication deficit 08/14/2017    Hypokalemia 08/14/2017    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 Explored issues of milan, belief, spirituality and Tenriism/ritual needs while hospitalized. Listened empathically. Provided chaplaincy education. Provided information about Spiritual Care Services. Offered prayer and assurance of continued prayers on patient's behalf. Chart reviewed.     The following outcomes where achieved:  Patient shared limited information about both their medical narrative and spiritual journey/beliefs.  confirmed Patient's Gnosticist Affiliation. Patient processed feeling about current hospitalization. Patient expressed gratitude for 's visit. Assessment:  Patient does not have any Lutheran/cultural needs that will affect patients preferences in health care. There are no spiritual or Lutheran issues which require intervention at this time. Plan:  Chaplains will continue to follow and will provide pastoral care on an as needed/requested basis.  recommends bedside caregivers page  on duty if patient shows signs of acute spiritual or emotional distress.     [address] [facility] Center[facility], CPE Resident   Pager: [phone]  Phone: [phone]

## 2017-09-22 NOTE — ROUTINE PROCESS
Bedside and Verbal shift change report given to Kathryn Bullock RN (oncoming nurse) by [employer] RN (offgoing nurse). Report included the following information SBAR and Kardex.

## 2017-09-22 NOTE — PROGRESS NOTES
Progress Note    Patient: Dorian Carmichael MRN: [id number]  SSN: [id number]    YOB: 1945  Age: 71 y.o. Sex: male      Admit Date: 9/21/2017    LOS: 1 day     Subjective:     Patient with recent CVA admitted due to acute lower GI bleeding. Stool still heme positive but no inessa bleeding at this time. Objective:     Vitals:    09/22/17 0044 09/22/17 0417 09/22/17 0750 09/22/17 1122   BP: 148/82 161/69 155/65 123/81   Pulse: (!) 57 (!) 52 (!) 56 70   Resp: 18 18 18 18   Temp: 97.8 °F (36.6 °C) 98.1 °F (36.7 °C) 96.7 °F (35.9 °C) 96.7 °F (35.9 °C)   SpO2: 98% 99% 98% 96%   Weight:       Height:            Intake and Output:  Current Shift: 09/22 0701 - 09/22 1900  In: 748.3 [P.O.:735; I.V.:13.3]  Out: -   Last three shifts:      Physical Exam:   GENERAL: alert, cooperative, no distress, appears stated age  LUNG: clear to auscultation bilaterally    Lab/Data Review: All lab results for the last 24 hours reviewed. hct 38    Assessment:     Active Problems:    Lower GI bleed (9/21/2017)      History of CVA (cerebrovascular accident) (9/21/2017)        Plan:     Watch hct. No plan for inpatient colonoscopy at this time.     Signed By: Buffy Ugalde MD     September 22, 2017

## 2017-09-22 NOTE — PROGRESS NOTES
NUTRITION    Nursing Referral:  Pressure Ulcer      RECOMMENDATIONS / PLAN:     - Add supplement: Morgan BID, Ensure Enlive once daily  - Downgrade diet consistency to mechanical soft  - Recommend SLP consult for swallow evaluation  - Continue RD inpatient monitoring and evaluation. NUTRITION INTERVENTIONS & DIAGNOSIS:     [x] Meals/Snacks: modified diet  [x] Medical food supplementation: add  [x] Coordination of care: attempted to contact MD regarding SLP consult; awaiting reply    Nutrition Diagnosis:  Increased protein needs related to promotion of wound healing as evidenced by pressure ulcer wound  Swallowing difficulty related to decreased oral function as evidenced by pt with hx of dysphagia, needing mechanical soft diet    ASSESSMENT:     Pt difficult to understand. Poor meal intake per nursing. Was on clear liquids; advanced to dental soft diet. Per chart hx, pt was on mechanical soft during previous admission; SLP was following. Pt with skin breakdown    Average po intake adequate to meet patients estimated nutritional needs:   [] Yes     [x] No   [] Unable to determine at this time    Diet: DIET DENTAL SOFT (SOFT SOLID)      Food Allergies: none known   Current Appetite:   [] Good     [x] Fair     [] Poor     [] Other:    Appetite/meal intake prior to admission:   [] Good     [x] Fair     [] Poor     [] Other:  Feeding Limitations:  [x] Swallowing difficulty: per chart hx, pt was on mechanical soft consistency diet and thin liquids; SLP following in August 2017    [] Chewing difficulty    [] Other:  Current Meal Intake: No data found.       BM:  9/21  Skin Integrity:  Stage II sacral pressure ulcer  Edema:  None   Pertinent Medications: Reviewed: megace    Recent Labs      09/21/17   1020   NA  137   K  4.2   CL  99*   CO2  33*   GLU  102*   BUN  26*   CREA  1.07   CA  10.2*   ALB  3.0*   SGOT  28   ALT  23       Intake/Output Summary (Last 24 hours) at 09/22/17 1243  Last data filed at 09/22/17 2277 Gross per 24 hour   Intake           628.33 ml   Output                0 ml   Net           628.33 ml       Anthropometrics:  Ht Readings from Last 1 Encounters:   09/21/17 6' (1.829 m)     Last 3 Recorded Weights in this Encounter    09/21/17 1021   Weight: 64.4 kg (142 lb)     Body mass index is 19.26 kg/(m^2). Weight History:   Noted wt loss during past several years PTA per chart hx. 6 lb (4%) wt loss x 1 month    Weight Metrics 9/21/2017 8/21/2017 9/11/2015 12/5/2012   Weight 142 lb 148 lb 165 lb 6.4 oz 173 lb   BMI 19.26 kg/m2 20.07 kg/m2 22.43 kg/m2 23.46 kg/m2        Admitting Diagnosis: Lower GI bleed  Pertinent PMHx:  Alcohol use disorder, CVA, dysphagia, HLD, HTN, PAD    Education Needs:        [x] None identified  [] Identified - Not appropriate at this time  []  Identified and addressed - refer to education log  Learning Limitations:   [] None identified  [x] Identified: cognitive communication deficit    Cultural, Anabaptist & ethnic food preferences:  [x] None identified    [] Identified and addressed     ESTIMATED NUTRITION NEEDS:     Calories: 7064-8373 kcal (30-35 kcal/kg) based on  [x] Actual BW: 64 kg      [] IBW   Protein: 77-90 gm (1.2-1.4 gm/kg) based on  [x] Actual BW      [] IBW   Fluid: 1 mL/kcal     MONITORING & EVALUATION:     Nutrition Goal(s):   1. Po intake of meals will meet >75% of patient estimated nutritional needs within the next 7 days.   Outcome:  [] Met/Ongoing    []  Not Met    [x] New/Initial Goal     Monitoring:   [x] Diet tolerance   [x] Meal intake   [x] Supplement intake   [] GI symptoms/ability to tolerate po diet   [] Respiratory status   [] Plan of care      Previous Recommendations (for follow-up assessments only):     []   Implemented       []   Not Implemented (RD to address)     [] No Recommendation Made     Discharge Planning:  Cardiac diet; mechanical soft consistency  [x] Participated in care planning, discharge planning, & interdisciplinary rounds as appropriate      Cochisecrystal Espinoza, [address] [place] [address]   Pager: [phone]

## 2017-09-23 VITALS
DIASTOLIC BLOOD PRESSURE: 62 MMHG | TEMPERATURE: 68 F | SYSTOLIC BLOOD PRESSURE: 118 MMHG | OXYGEN SATURATION: 98 % | HEART RATE: 70 BPM | RESPIRATION RATE: 20 BRPM

## 2017-09-23 VITALS
OXYGEN SATURATION: 97 % | SYSTOLIC BLOOD PRESSURE: 134 MMHG | HEIGHT: 72 IN | BODY MASS INDEX: 17.88 KG/M2 | DIASTOLIC BLOOD PRESSURE: 80 MMHG | HEART RATE: 71 BPM | TEMPERATURE: 97.7 F | WEIGHT: 132 LBS | RESPIRATION RATE: 18 BRPM

## 2017-09-23 LAB
HCT VFR BLD AUTO: 36.5 % (ref 36–48)
HGB BLD-MCNC: 11.7 G/DL (ref 13–16)

## 2017-09-23 PROCEDURE — 85018 HEMOGLOBIN: CPT | Performed by: FAMILY MEDICINE

## 2017-09-23 PROCEDURE — 96361 HYDRATE IV INFUSION ADD-ON: CPT

## 2017-09-23 PROCEDURE — 74011250637 HC RX REV CODE- 250/637: Performed by: INTERNAL MEDICINE

## 2017-09-23 PROCEDURE — 77030010545

## 2017-09-23 PROCEDURE — 74011250637 HC RX REV CODE- 250/637: Performed by: FAMILY MEDICINE

## 2017-09-23 PROCEDURE — 3331090002 HH PPS REVENUE DEBIT

## 2017-09-23 PROCEDURE — 99218 HC RM OBSERVATION: CPT

## 2017-09-23 PROCEDURE — 36415 COLL VENOUS BLD VENIPUNCTURE: CPT | Performed by: FAMILY MEDICINE

## 2017-09-23 PROCEDURE — 74011250636 HC RX REV CODE- 250/636: Performed by: INTERNAL MEDICINE

## 2017-09-23 PROCEDURE — 3331090001 HH PPS REVENUE CREDIT

## 2017-09-23 RX ORDER — ASPIRIN 81 MG/1
81 TABLET ORAL DAILY
Qty: 30 TAB | Refills: 0 | Status: SHIPPED | OUTPATIENT
Start: 2017-09-23

## 2017-09-23 RX ADMIN — METOPROLOL TARTRATE 50 MG: 50 TABLET ORAL at 09:27

## 2017-09-23 RX ADMIN — MEGESTROL ACETATE 200 MG: 40 SUSPENSION ORAL at 09:31

## 2017-09-23 RX ADMIN — SODIUM CHLORIDE, SODIUM LACTATE, POTASSIUM CHLORIDE, AND CALCIUM CHLORIDE 100 ML/HR: 600; 310; 30; 20 INJECTION, SOLUTION INTRAVENOUS at 06:18

## 2017-09-23 RX ADMIN — Medication 100 MG: at 09:27

## 2017-09-23 RX ADMIN — LEVETIRACETAM 1000 MG: 500 TABLET ORAL at 09:27

## 2017-09-23 RX ADMIN — LACTULOSE 10 G: 20 SOLUTION ORAL at 09:31

## 2017-09-23 NOTE — PROGRESS NOTES
[facility], [address] [place] [address]  [email or web address]         Impression/Plan:  1. Scant LGI bleeding- source could be perirectal or more proximal. No prior colonoscopy  2. Acute , Mild GI blood loss due to above. 3. CVA on Plavix , which has been held. Plan:  1. Cont to monitor bleeding. 2.Transfuse to keep Hgb >7.5  3. Will consider Colonoscopy once off Plavix for 5 days. Chief Complaint: Rectal bleeding      Subjective:  Pt has no specific complaints this am.  Denies any bloody stool overnight.         Eyes: conjunctiva normal, EOM normal   Neck: ROM normal, supple and trachea normal   Cardiovascular: heart normal, intact distal pulses, normal rate and regular rhythm   Pulmonary/Chest Wall: breath sounds normal and effort normal   Abdominal: appearance normal, bowel sounds normal and soft, non-acute, non-tender                Visit Vitals    /87 (BP 1 Location: Left arm, BP Patient Position: At rest)    Pulse 70    Temp 97.3 °F (36.3 °C)    Resp 18    Ht 6' (1.829 m)    Wt 59.9 kg (132 lb)    SpO2 99%    BMI 17.9 kg/m2           Intake/Output Summary (Last 24 hours) at 09/23/17 0737  Last data filed at 09/22/17 1431   Gross per 24 hour   Intake           748.33 ml   Output                0 ml   Net           748.33 ml       CBC w/Diff    Lab Results   Component Value Date/Time    WBC 5.7 09/21/2017 10:20 AM    RBC 5.12 09/21/2017 10:20 AM    HGB 11.7 (L) 09/23/2017 05:20 AM    HCT 36.5 09/23/2017 05:20 AM    MCV 79.3 09/21/2017 10:20 AM    MCH 26.0 09/21/2017 10:20 AM    MCHC 32.8 09/21/2017 10:20 AM    RDW 14.6 (H) 09/21/2017 10:20 AM     09/21/2017 10:20 AM    Lab Results   Component Value Date/Time    GRANS 61 09/21/2017 10:20 AM    LYMPH 28 09/21/2017 10:20 AM    EOS 2 09/21/2017 10:20 AM    BASOS 0 09/21/2017 10:20 AM      Basic Metabolic Profile   Recent Labs      09/21/17   1020   NA  137   K  4.2   CL  99*   CO2  33*   BUN  26* CA  10.2*        Hepatic Function    Lab Results   Component Value Date/Time    ALB 3.0 (L) 09/21/2017 10:20 AM    TP 8.0 09/21/2017 10:20 AM    AP 94 09/21/2017 10:20 AM    Lab Results   Component Value Date/Time    SGOT 28 09/21/2017 10:20 AM        @LABRCNT(CULT:3)                 )Arturo Morales MD, MD  [facility] 1946, [address] [facility]  Pager [phone]  [email or web address]

## 2017-09-23 NOTE — ROUTINE PROCESS
Bedside and Verbal shift change report given to Paulie Morales RN (oncoming nurse) by Antoni Merino RN (offgoing nurse). Report included the following information SBAR, Kardex, MAR and Recent Results.     SITUATION:  Code Status: Full Code  Reason for Admission: Lower GI bleed  Acute lower GI bleeding  Hospital day: 1  Problem List:       Hospital Problems  Date Reviewed: 9/21/2017          Codes Class Noted POA    Acute lower GI bleeding ICD-10-CM: K92.2  ICD-9-CM: 578.9  9/22/2017 Unknown        Lower GI bleed ICD-10-CM: K92.2  ICD-9-CM: 578.9  9/21/2017 Unknown        History of CVA (cerebrovascular accident) ICD-10-CM: Z86.73  ICD-9-CM: V12.54  9/21/2017 Unknown              BACKGROUND:   Past Medical History:   Past Medical History:   Diagnosis Date    Alcohol use disorder ([facility]     Benign hypertensive heart disease with systolic congestive heart failure, NYHA class 2 ([facility]     Cardiac pacemaker in situ     Cerebrovascular accident (CVA) due to occlusion of right carotid artery ([facility] 8/14/2017    Acute Ischemic Stroke (acute infarctions involving right parieto-occipital area and occipitotemporal area and anterior and midportion of right corona radiata) with residual left hemiparesis, dysphagia and cognitive-communication deficits    Cognitive communication deficit 8/14/2017    Depression     On Trazodone    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) 8/14/2017    Dysphagia as late effect of cerebrovascular accident (CVA) 8/14/2017    Erectile dysfunction     On Sildenafil citrate    Hemiparesis affecting left side as late effect of cerebrovascular accident (CVA) ([facility] 8/14/2017    Hyperlipidemia with target LDL less than 70 8/15/2017    Lipid profile (8/15/2017) showed TG 99, , HDL 81, LDL 80    Hypertension     Ischemic cardiomyopathy 8/15/2017    EF 40%    Occlusion of right internal carotid artery 8/15/2017    On chronic clopidogrel therapy     Osteoarthritis     On Etodolac and Oxycodone-Acetaminophen    Other ill-defined conditions     PVD    Peripheral artery disease (HCC)     Seizure, late effect of stroke ([facility] 8/15/2017    On Levetiracetam    Tobacco use disorder       Patient taking anticoagulants no    Patient has a defibrillator: yes    History of shots YES for example, flu, pneumonia, tetanus   Isolation History NO for example, MRSA, CDiff    ASSESSMENT:  Changes in Assessment Throughout Shift: None  Significant Changes in 24 hours (for example, RR/code, fall)  Patient has Central Line: no Reasons if yes: N/A  Patient has Zhang Cath: no Reasons if yes: N/A   Mobility Issues  PT  IV Patency  OR Checklist  Pending Tests    Last Vitals:  Vitals w/ MEWS Score (last day)     Date/Time MEWS Score Pulse Resp Temp BP Level of Consciousness SpO2    09/23/17 0439 1 70 18 97.3 °F (36.3 °C) 133/87 Alert 99 %    09/23/17 0010 1 70 18 97.8 °F (36.6 °C) 164/87 Alert 100 %    09/22/17 2015 1 70 18 97.7 °F (36.5 °C) 146/89 Alert 99 %    09/22/17 1550 1 70 19 97.4 °F (36.3 °C) 132/85 Alert 100 %    09/22/17 1122 1 70 18 96.7 °F (35.9 °C) 123/81 Alert 96 %    09/22/17 0750 1 (!)  56 18 96.7 °F (35.9 °C) 155/65 Alert 98 %    09/22/17 0417 1 (!)  52 18 98.1 °F (36.7 °C) 161/69 Alert 99 %    09/22/17 0044 1 (!)  57 18 97.8 °F (36.6 °C) 148/82 Alert 98 %            PAIN    Pain Assessment    Pain Intensity 1: 0 (09/23/17 0439)              Patient Stated Pain Goal: 0  Intervention effective: N/A  Time of last intervention: N/A Reassessment Completed: N/A  Other actions taken for pain: N/A    Last 3 Weights:  Last 3 Recorded Weights in this Encounter    09/21/17 1021 09/22/17 2015   Weight: 64.4 kg (142 lb) 59.9 kg (132 lb)   Weight change: -4.536 kg (-10 lb)    INTAKE/OUPUT    Current Shift:      Last three shifts: 09/21 0701 - 09/22 1900  In: 748.3 [P.O.:735;  I.V.:13.3]  Out: -     RECOMMENDATIONS AND DISCHARGE PLANNING  Patient needs and requests: None    Pending tests/procedures: None     Discharge plan for patient: [facility]    Discharge planning Needs or Barriers: TBD    Estimated Discharge Date: TBD Posted on Whiteboard in Patients Room: no       \"HEALS\" SAFETY CHECK  A safety check occurred in the patient's room between off going nurse and oncoming nurse listed above. The safety check included the below items:    H  High Alert Medications Verify all high alert medication drips (heparin, PCA, etc.)  E  Equipment Suction is set up for ALL patients (with jose francisco)  Red plugs utilized for all equipment (IV pumps, etc.)  WOWs wiped down at end of shift. Room stocked with oxygen, suction, and other unit-specific supplies  A  Alarms Bed alarm is set for fall risk patients  Ensure chair alarm is in place and activated if patient is up in a chair  L  Lines Check IV for any infiltration  Zhang bag is empty if patient has a Zhang   Tubing and IV bags are labeled  S  Safety  Room is clean, patient is clean, and equipment is clean. Hallways are clear from equipment besides carts. Fall bracelet on for fall risk patients  Ensure room is clear and free of clutter  Suction is set up for ALL patients (with jose francisco)  Hallways are clear from equipment besides carts.    Isolation precautions followed, supplies available outside room, sign posted    Pedro Montgomery RN

## 2017-09-23 NOTE — PROGRESS NOTES
Assumed care; Pt resting in bed; no distress noted; Pt denies pain; bed in low position; Side rails up x 3; Call light and personal belonging within reach. Will continue to monitor. 1642: IV and external catheter removed per protocol; Vitals stable; No distress noted. Discharge instruction reviewed with daughter; No question or concerns at this time. 1722: Pt discharged.

## 2017-09-23 NOTE — ROUTINE PROCESS
Bedside and Verbal shift change report given to Anand Baires RN (oncoming nurse) by Carlitos Hernandez RN (offgoing nurse). Report included the following information SBAR, Kardex, MAR and Recent Results.     SITUATION:  Code Status: Full Code  Reason for Admission: Lower GI bleed  Acute lower GI bleeding  Hospital day: 1  Problem List:       Hospital Problems  Date Reviewed: 9/21/2017          Codes Class Noted POA    Acute lower GI bleeding ICD-10-CM: K92.2  ICD-9-CM: 578.9  9/22/2017 Unknown        Lower GI bleed ICD-10-CM: K92.2  ICD-9-CM: 578.9  9/21/2017 Unknown        History of CVA (cerebrovascular accident) ICD-10-CM: Z86.73  ICD-9-CM: V12.54  9/21/2017 Unknown              BACKGROUND:   Past Medical History:   Past Medical History:   Diagnosis Date    Alcohol use disorder ([facility]     Benign hypertensive heart disease with systolic congestive heart failure, NYHA class 2 ([facility]     Cardiac pacemaker in situ     Cerebrovascular accident (CVA) due to occlusion of right carotid artery ([facility] 8/14/2017    Acute Ischemic Stroke (acute infarctions involving right parieto-occipital area and occipitotemporal area and anterior and midportion of right corona radiata) with residual left hemiparesis, dysphagia and cognitive-communication deficits    Cognitive communication deficit 8/14/2017    Depression     On Trazodone    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) 8/14/2017    Dysphagia as late effect of cerebrovascular accident (CVA) 8/14/2017    Erectile dysfunction     On Sildenafil citrate    Hemiparesis affecting left side as late effect of cerebrovascular accident (CVA) ([facility] 8/14/2017    Hyperlipidemia with target LDL less than 70 8/15/2017    Lipid profile (8/15/2017) showed TG 99, , HDL 81, LDL 80    Hypertension     Ischemic cardiomyopathy 8/15/2017    EF 40%    Occlusion of right internal carotid artery 8/15/2017    On chronic clopidogrel therapy     Osteoarthritis     On Etodolac and Oxycodone-Acetaminophen    Other ill-defined conditions     PVD    Peripheral artery disease (HCC)     Seizure, late effect of stroke ([facility] 8/15/2017    On Levetiracetam    Tobacco use disorder       Patient taking anticoagulants no    Patient has a defibrillator: yes    History of shots YES for example, flu, pneumonia, tetanus   Isolation History NO for example, MRSA, CDiff    ASSESSMENT:  Changes in Assessment Throughout Shift: None  Significant Changes in 24 hours (for example, RR/code, fall)  Patient has Central Line: no Reasons if yes: N/A  Patient has Zhang Cath: no Reasons if yes: N/A   Mobility Issues  PT  IV Patency  OR Checklist  Pending Tests    Last Vitals:  Vitals w/ MEWS Score (last day)     Date/Time MEWS Score Pulse Resp Temp BP Level of Consciousness SpO2    09/23/17 0439 1 70 18 97.3 °F (36.3 °C) 133/87 Alert 99 %    09/23/17 0010 1 70 18 97.8 °F (36.6 °C) 164/87 Alert 100 %    09/22/17 2015 1 70 18 97.7 °F (36.5 °C) 146/89 Alert 99 %    09/22/17 1550 1 70 19 97.4 °F (36.3 °C) 132/85 Alert 100 %    09/22/17 1122 1 70 18 96.7 °F (35.9 °C) 123/81 Alert 96 %    09/22/17 0750 1 (!)  56 18 96.7 °F (35.9 °C) 155/65 Alert 98 %    09/22/17 0417 1 (!)  52 18 98.1 °F (36.7 °C) 161/69 Alert 99 %    09/22/17 0044 1 (!)  57 18 97.8 °F (36.6 °C) 148/82 Alert 98 %            PAIN    Pain Assessment    Pain Intensity 1: 0 (09/23/17 0439)              Patient Stated Pain Goal: 0  Intervention effective: N/A  Time of last intervention: N/A Reassessment Completed: N/A  Other actions taken for pain: N/A    Last 3 Weights:  Last 3 Recorded Weights in this Encounter    09/21/17 1021 09/22/17 2015   Weight: 64.4 kg (142 lb) 59.9 kg (132 lb)   Weight change: -4.536 kg (-10 lb)    INTAKE/OUPUT    Current Shift:      Last three shifts: 09/21 0701 - 09/22 1900  In: 748.3 [P.O.:735;  I.V.:13.3]  Out: -     RECOMMENDATIONS AND DISCHARGE PLANNING  Patient needs and requests: None    Pending tests/procedures: None     Discharge plan for patient: [facility]    Discharge planning Needs or Barriers: TBD    Estimated Discharge Date: TBD Posted on Whiteboard in Patients Room: no       \"HEALS\" SAFETY CHECK  A safety check occurred in the patient's room between off going nurse and oncoming nurse listed above. The safety check included the below items:    H  High Alert Medications Verify all high alert medication drips (heparin, PCA, etc.)  E  Equipment Suction is set up for ALL patients (with jose francisco)  Red plugs utilized for all equipment (IV pumps, etc.)  WOWs wiped down at end of shift. Room stocked with oxygen, suction, and other unit-specific supplies  A  Alarms Bed alarm is set for fall risk patients  Ensure chair alarm is in place and activated if patient is up in a chair  L  Lines Check IV for any infiltration  Zhang bag is empty if patient has a Zhang   Tubing and IV bags are labeled  S  Safety  Room is clean, patient is clean, and equipment is clean. Hallways are clear from equipment besides carts. Fall bracelet on for fall risk patients  Ensure room is clear and free of clutter  Suction is set up for ALL patients (with jose francisco)  Hallways are clear from equipment besides carts.    Isolation precautions followed, supplies available outside room, sign posted    Logan Morgan RN

## 2017-09-23 NOTE — DISCHARGE SUMMARY
Discharge Summary     Patient: Clarisa Mendez MRN: [id number]  SSN: [id number]    YOB: 1945  Age: 71 y.o.   Sex: male       Admit Date: 9/21/2017    Discharge Date: 9/23/2017      Admission Diagnoses: Lower GI bleed  Acute lower GI bleeding    Discharge Diagnoses:   Problem List as of 9/23/2017  Date Reviewed: 9/21/2017          Codes Class Noted - Resolved    Acute lower GI bleeding ICD-10-CM: K92.2  ICD-9-CM: 578.9  9/22/2017 - Present        Lower GI bleed ICD-10-CM: K92.2  ICD-9-CM: 578.9  9/21/2017 - Present        History of CVA (cerebrovascular accident) ICD-10-CM: Z86.73  ICD-9-CM: V12.54  9/21/2017 - Present        Peripheral artery disease (HCC) (Chronic) ICD-10-CM: I73.9  ICD-9-CM: 443.9  Unknown - Present        On chronic clopidogrel therapy (Chronic) ICD-10-CM: Z79.01  ICD-9-CM: V58.61  Unknown - Present        Alcohol use disorder (HCC) (Chronic) ICD-10-CM: F10.99  ICD-9-CM: 305.00  Unknown - Present        Tobacco use disorder (Chronic) ICD-10-CM: F17.200  ICD-9-CM: 305.1  Unknown - Present        Benign hypertensive heart disease with systolic congestive heart failure, NYHA class 2 (HCC) (Chronic) ICD-10-CM: I11.0, I50.20  ICD-9-CM: 402.11, 428.20, 428.0  Unknown - Present        Erectile dysfunction (Chronic) ICD-10-CM: N52.9  ICD-9-CM: 607.84  Unknown - Present    Overview Signed 8/18/2017  1:38 PM by Amita Bobo MD     On Sildenafil citrate             Depression (Chronic) ICD-10-CM: F32.9  ICD-9-CM: 311  Unknown - Present    Overview Signed 8/18/2017  1:37 PM by Amita Bobo MD     On Trazodone             Osteoarthritis (Chronic) ICD-10-CM: M19.90  ICD-9-CM: 715.90  Unknown - Present    Overview Signed 8/18/2017  1:37 PM by Amita Bobo MD     On Etodolac and Oxycodone-Acetaminophen             Ischemic cardiomyopathy (Chronic) ICD-10-CM: I25.5  ICD-9-CM: 414.8  8/15/2017 - Present    Overview Signed 8/18/2017  1:31 PM by Amita Bobo MD     EF 40% Hyperlipidemia with target LDL less than 70 (Chronic) ICD-10-CM: E78.5  ICD-9-CM: 272.4  8/15/2017 - Present    Overview Signed 8/18/2017  1:32 PM by Jame Burkitt, MD     Lipid profile (8/15/2017) showed TG 99, , HDL 81, LDL 84             Seizure, late effect of stroke (HCC) ICD-10-CM: I69.398, R56.9  ICD-9-CM: 438.89, 780.39  8/15/2017 - Present    Overview Signed 8/18/2017  1:39 PM by Jame Burkitt, MD     On Levetiracetam             Occlusion of right internal carotid artery ICD-10-CM: I65.21  ICD-9-CM: 433.10  8/15/2017 - Present        Cerebrovascular accident (CVA) due to occlusion of right [facility] ([facility] ICD-10-CM: V88.143  ICD-9-CM: 433.11  8/14/2017 - Present    Overview Addendum 8/18/2017  1:40 PM by Jame Burkitt, MD     Acute Ischemic Stroke (acute infarctions involving right parieto-occipital area and occipitotemporal area and anterior and midportion of right corona radiata) with residual left hemiparesis, dysphagia, dysarthria and cognitive-communication deficits             Cardiac pacemaker in situ (Chronic) ICD-10-CM: Z95.0  ICD-9-CM: V45.01  Unknown - Present        Impaired mobility and ADLs ICD-10-CM: Z74.09  ICD-9-CM: 799.89  8/14/2017 - Present        Hemiparesis affecting left side as late effect of cerebrovascular accident (CVA) ([facility] ICD-10-CM: O21.125  ICD-9-CM: 438.20  8/14/2017 - Present        Dysphagia as late effect of cerebrovascular accident (CVA) ICD-10-CM: I69.391  ICD-9-CM: 438.82  8/14/2017 - Present        Cognitive communication deficit ICD-10-CM: R41.841  ICD-9-CM: 799.52  8/14/2017 - Present        Hypokalemia ICD-10-CM: E87.6  ICD-9-CM: 276.8  8/14/2017 - Present    Overview Signed 8/18/2017  1:33 PM by Jame Burkitt, MD     K (8/14/2017, on admission) = 2.8             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) ICD-10-CM: P75.849  ICD-9-CM: 438.13  8/14/2017 - Present               Discharge Condition: [place] Course: Aliyah Funes is a 71 y.o. male who has had recent CVA affecting left side and has been placed on plavix. Patient had been in SNF for therapy but now with home health. Patient reported by family to have bleeding per rectum starting after midnight on 09/20/17 with at least 3 bloody stools. Will admit for lower GI bleeding in patient on plavix. Patient has never had a colonoscopy. GI, Dr. Jaison Willis, consulted. Rectal exam showed brown red stool but no inessa bleeding. Recommended to hold Plavix and plan for colonoscopy OP once he been off of this for 5-7 days. H/H stable at discharge with Hgb 11.7. Discussed with patients daughter that he needs to be off Plavix and instructed her to f/u with Dr. Juwan Velez on Monday for repeat CMP, CBC, and mag. Per Dr. Christian, pt can take ASA 81 mg daily while off plavix; his office will arrange appointment for colonoscopy.        Consults: Gastroenterology    Significant Diagnostic Studies:     Physical Examination:   GENERAL ASSESSMENT: Alert and oriented and in no apparent distress  SKIN: normal color, no lesions  HEAD: normocephalic, atraumatic  EYES: normal eyes, normal lids and pupils equal, round, reactive to light  EARS: normal  NOSE: normal external appearance and nares patent  MOUTH: Mucus membranes moist  NECK: No masses or tenderness, normal ROM  CHEST: normal air exchange, no rales, no rhonchi, no wheezes, respiratory effort normal with no retractions  HEART: Regular rate and rhythm, normal S1/S2, no murmurs  ABDOMEN: Soft, non-distended, no masses, no hepatosplenomegaly, bowel sounds present  EXTREMITY: No clubbing, cyanosis, edema. Left-sided weakness of upper extremity; pulses 2+ bilaterally in UE and LE  PSYCHIATRIC: No depression, anxiety, mood disorder    Disposition: home    Discharge Medications:   Current Discharge Medication List      START taking these medications    Details   aspirin delayed-release 81 mg tablet Take 1 Tab by mouth daily.   Qty: 30 Tab, Refills: 0 CONTINUE these medications which have NOT CHANGED    Details   thiamine (B-1) 100 mg tablet Take 1 Tab by mouth daily. Qty: 30 Tab, Refills: 0      metoprolol tartrate (LOPRESSOR) 50 mg tablet Take  by mouth daily. cholecalciferol, vitamin D3, (VITAMIN D3) 2,000 unit Tab Take  by mouth.        levETIRAcetam 1,000 mg tablet Take 1 Tab by mouth every twelve (12) hours. Qty: 60 Tab, Refills: 0      ascorbic acid (VITAMIN C) 250 mg tablet Take  by mouth. STOP taking these medications       NIFEDIPINE PO Comments:   Reason for Stopping:         potassium chloride (KLOR-CON) 20 mEq packet Comments:   Reason for Stopping:         etodolac (LODINE) 400 mg tablet Comments:   Reason for Stopping:         clopidogrel (PLAVIX) 75 mg tablet Comments:   Reason for Stopping:         nicotine (NICODERM CQ) 21 mg/24 hr Comments:   Reason for Stopping:               Activity: Activity as tolerated  Diet: Dysphagia diet-pureed  Wound Care: As directed    Follow-up Appointments   Procedures    FOLLOW UP VISIT Appointment in: 3 - 5 Days F/u cbc cmp, mag Monday discussed with Daughter that he needs to follow up with Dr Dilcia Hickman Monday discussed with Dr Maxi Nair pt can go home stop plavix use asa 81 mg daily and set up colonoscopy as outpa. .. F/u cbc cmp mag Monday discussed with Daughter that he needs to follow up with Dr Dilcia Hickman Monday discussed with Dr Maxi Nair pt can go home stop plavix use asa 81 mg daily and set up colonoscopy as outpatient his office will arrange APPT discussed with family and daughter if pt has new sx recurrent bleeding to return to ER,.  Discussed with  he can resume home health he has [facility] will order before discharge     Standing Status:   Standing     Number of Occurrences:   1     Order Specific Question:   Appointment in     Answer:   3 - 5 Days       Signed By: Steven Vaca     September 23, 2017

## 2017-09-23 NOTE — DISCHARGE SUMMARY
Discharge Summary     Patient: Josephine Joya MRN: [id number]  SSN: [id number]    YOB: 1945  Age: 71 y.o.   Sex: male       Admit Date: 9/21/2017    Discharge Date: 9/23/2017      Admission Diagnoses: Lower GI bleed  Acute lower GI bleeding    Discharge Diagnoses:   Problem List as of 9/23/2017  Date Reviewed: 9/21/2017          Codes Class Noted - Resolved    Acute lower GI bleeding ICD-10-CM: K92.2  ICD-9-CM: 578.9  9/22/2017 - Present        Lower GI bleed ICD-10-CM: K92.2  ICD-9-CM: 578.9  9/21/2017 - Present        History of CVA (cerebrovascular accident) ICD-10-CM: Z86.73  ICD-9-CM: V12.54  9/21/2017 - Present        Peripheral artery disease (HCC) (Chronic) ICD-10-CM: I73.9  ICD-9-CM: 443.9  Unknown - Present        On chronic clopidogrel therapy (Chronic) ICD-10-CM: Z79.01  ICD-9-CM: V58.61  Unknown - Present        Alcohol use disorder (HCC) (Chronic) ICD-10-CM: F10.99  ICD-9-CM: 305.00  Unknown - Present        Tobacco use disorder (Chronic) ICD-10-CM: F17.200  ICD-9-CM: 305.1  Unknown - Present        Benign hypertensive heart disease with systolic congestive heart failure, NYHA class 2 (HCC) (Chronic) ICD-10-CM: I11.0, I50.20  ICD-9-CM: 402.11, 428.20, 428.0  Unknown - Present        Erectile dysfunction (Chronic) ICD-10-CM: N52.9  ICD-9-CM: 607.84  Unknown - Present    Overview Signed 8/18/2017  1:38 PM by Ashanti Heart MD     On Sildenafil citrate             Depression (Chronic) ICD-10-CM: F32.9  ICD-9-CM: 311  Unknown - Present    Overview Signed 8/18/2017  1:37 PM by Ashanti Heart MD     On Trazodone             Osteoarthritis (Chronic) ICD-10-CM: M19.90  ICD-9-CM: 715.90  Unknown - Present    Overview Signed 8/18/2017  1:37 PM by Ashanti Heart MD     On Etodolac and Oxycodone-Acetaminophen             Ischemic cardiomyopathy (Chronic) ICD-10-CM: I25.5  ICD-9-CM: 414.8  8/15/2017 - Present    Overview Signed 8/18/2017  1:31 PM by Ashanti Heart MD     EF 40% Hyperlipidemia with target LDL less than 70 (Chronic) ICD-10-CM: E78.5  ICD-9-CM: 272.4  8/15/2017 - Present    Overview Signed 8/18/2017  1:32 PM by Ouida Barthel, MD     Lipid profile (8/15/2017) showed TG 99, , HDL 81, LDL 84             Seizure, late effect of stroke (HCC) ICD-10-CM: I69.398, R56.9  ICD-9-CM: 438.89, 780.39  8/15/2017 - Present    Overview Signed 8/18/2017  1:39 PM by Ouida Barthel, MD     On Levetiracetam             Occlusion of right internal carotid artery ICD-10-CM: I65.21  ICD-9-CM: 433.10  8/15/2017 - Present        Cerebrovascular accident (CVA) due to occlusion of right carotid artery [facility] ICD-10-CM: K31.690  ICD-9-CM: 433.11  8/14/2017 - Present    Overview Addendum 8/18/2017  1:40 PM by Ouida Barthel, MD     Acute Ischemic Stroke (acute infarctions involving right parieto-occipital area and occipitotemporal area and anterior and midportion of right corona radiata) with residual left hemiparesis, dysphagia, dysarthria and cognitive-communication deficits             Cardiac pacemaker in situ (Chronic) ICD-10-CM: Z95.0  ICD-9-CM: V45.01  Unknown - Present        Impaired mobility and ADLs ICD-10-CM: Z74.09  ICD-9-CM: 799.89  8/14/2017 - Present        Hemiparesis affecting left side as late effect of cerebrovascular accident (CVA) ([facility] ICD-10-CM: P48.690  ICD-9-CM: 438.20  8/14/2017 - Present        Dysphagia as late effect of cerebrovascular accident (CVA) ICD-10-CM: I69.391  ICD-9-CM: 438.82  8/14/2017 - Present        Cognitive communication deficit ICD-10-CM: R41.841  ICD-9-CM: 799.52  8/14/2017 - Present        Hypokalemia ICD-10-CM: E87.6  ICD-9-CM: 276.8  8/14/2017 - Present    Overview Signed 8/18/2017  1:33 PM by Ouida Barthel, MD     K (8/14/2017, on admission) = 2.8             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) ICD-10-CM: P33.741  ICD-9-CM: 438.13  8/14/2017 - Present               Discharge Condition: [place] Course: Esa Casey is a 71 y.o. male who has had recent CVA with  left side  Weakness and has been placed on plavix. Patient had been in SNF for therapy but now with home health. Patient reported by family to have bleeding per rectum starting after midnight on 09/20/17 with at least 3 bloody stools. admitted for lower GI bleeding in patient on plavix. Patient has never had a colonoscopy. GI, Dr. Brendon Hadley, consulted. Rectal exam showed brown red stool but no inessa bleeding. Recommended to hold Plavix and plan for colonoscopy OP once he been off of this for 5-7 days. H/H stable at discharge with Hgb 11.7  Discussed with Dr Letha Chang before discharge . His office will arrange colonoscopy as outpatient on monday    Discussed with patients  Family sister  and daughter that he needs to be off Plavix for 5-7 days and instructed her to f/u with Dr. Artis Valle on Monday for repeat CMP, CBC, and mag. Per Dr. Letha Chang, pt can take ASA 81 mg daily while off plavix; his office will arrange appointment for colonoscopy. Discussed with daughter if recurrent bleeding new sx to come back to ER       Consults: Gastroenterology    Significant Diagnostic Studies:     Physical Examination:   GENERAL ASSESSMENT: Alert and oriented and in no apparent distress  SKIN: normal color, no lesions  HEAD: normocephalic, atraumatic  EYES: normal eyes, normal lids and pupils equal, round, reactive to light  EARS: normal  NOSE: normal external appearance and nares patent  MOUTH: Mucus membranes moist  NECK: No masses or tenderness, normal ROM  CHEST: normal air exchange, no rales, no rhonchi, no wheezes, respiratory effort normal with no retractions  HEART: Regular rate and rhythm, normal S1/S2, no murmurs  ABDOMEN: Soft, non-distended, no masses, no hepatosplenomegaly, bowel sounds present  EXTREMITY: No clubbing, cyanosis, edema.  Left-sided weakness of upper extremity; pulses 2+ bilaterally in UE and LE  PSYCHIATRIC: No depression, anxiety, mood disorder    Disposition: home    Discharge Medications:   Current Discharge Medication List      START taking these medications    Details   aspirin delayed-release 81 mg tablet Take 1 Tab by mouth daily. Qty: 30 Tab, Refills: 0         CONTINUE these medications which have NOT CHANGED    Details   thiamine (B-1) 100 mg tablet Take 1 Tab by mouth daily. Qty: 30 Tab, Refills: 0      metoprolol tartrate (LOPRESSOR) 50 mg tablet Take  by mouth daily. cholecalciferol, vitamin D3, (VITAMIN D3) 2,000 unit Tab Take  by mouth.        levETIRAcetam 1,000 mg tablet Take 1 Tab by mouth every twelve (12) hours. Qty: 60 Tab, Refills: 0      ascorbic acid (VITAMIN C) 250 mg tablet Take  by mouth. STOP taking these medications       NIFEDIPINE PO Comments:   Reason for Stopping:         potassium chloride (KLOR-CON) 20 mEq packet Comments:   Reason for Stopping:         etodolac (LODINE) 400 mg tablet Comments:   Reason for Stopping:         clopidogrel (PLAVIX) 75 mg tablet Comments:   Reason for Stopping:         nicotine (NICODERM CQ) 21 mg/24 hr Comments:   Reason for Stopping:               Activity: Activity as tolerated  Diet: Dysphagia diet-pureed crushed medication  Wound Care: As directed    Follow-up Appointments   Procedures    FOLLOW UP VISIT Appointment in: 3 - 5 Days F/u cbc, cmp, mag Monday discussed with Daughter that he needs to follow up with Dr Kyrie Corcoran Monday discussed with Dr Christian pt can go home stop plavix use asa 81 mg daily and set up colonoscopy as outpa. .. F/u cbc, cmp, mag Monday discussed with Daughter that he needs to follow up with Dr Kyrie Corcoran Monday discussed with Dr Christian pt can go home stop plavix use asa 81 mg daily and set up colonoscopy as outpatient his office will arrange APPT discussed with family and daughter if pt has new sx recurrent bleeding to return to ER,.  Discussed with  he can resume home health he has home health with [facility] will order before discharge     Standing Status:   Standing     Number of Occurrences:   1     Order Specific Question:   Appointment in     Answer:   3 - 5 Days       Signed By: Annetta Patterson MD     September 23, 2017

## 2017-09-23 NOTE — DISCHARGE INSTRUCTIONS
Follow Up instructions by Physician:  F/u cbc, cmp, mag on with Dr Tiny Waters Monday. Stop Plavix, and use asa 81 mg daily. Set up colonoscopy as outpatient. Dr Gilles Mckeon office will arrange APPT. If pt has new sx recurrent bleeding to return to ER.  to resume home health with [facility]. DISCHARGE SUMMARY from Nurse    The following personal items are in your possession at time of discharge:       Visual Aid: None           Clothing: None                PATIENT INSTRUCTIONS:    After general anesthesia or intravenous sedation, for 24 hours or while taking prescription Narcotics:  · Limit your activities  · Do not drive and operate hazardous machinery  · Do not make important personal or business decisions  · Do  not drink alcoholic beverages  · If you have not urinated within 8 hours after discharge, please contact your surgeon on call. What to do at Home:  Recommended activity: Activity as tolerated, or as recommended by your PCP    If you experience any of the following symptoms listed under GI Bleed, please follow up with your PCP. *  Please give a list of your current medications to your Primary Care Provider. *  Please update this list whenever your medications are discontinued, doses are      changed, or new medications (including over-the-counter products) are added. *  Please carry medication information at all times in case of emergency situations. These are general instructions for a healthy lifestyle:    No smoking/ No tobacco products/ Avoid exposure to second hand smoke    Surgeon General's Warning:  Quitting smoking now greatly reduces serious risk to your health.     Obesity, smoking, and sedentary lifestyle greatly increases your risk for illness    A healthy diet, regular physical exercise & weight monitoring are important for maintaining a healthy lifestyle    You may be retaining fluid if you have a history of heart failure or if you experience any of the following symptoms:  Weight gain of 3 pounds or more overnight or 5 pounds in a week, increased swelling in our hands or feet or shortness of breath while lying flat in bed. Please call your doctor as soon as you notice any of these symptoms; do not wait until your next office visit. Recognize signs and symptoms of STROKE:    F-face looks uneven    A-arms unable to move or move unevenly    S-speech slurred or non-existent    T-time-call 911 as soon as signs and symptoms begin-DO NOT go       Back to bed or wait to see if you get better-TIME IS BRAIN. Warning Signs of HEART ATTACK     Call 911 if you have these symptoms:   Chest discomfort. Most heart attacks involve discomfort in the center of the chest that lasts more than a few minutes, or that goes away and comes back. It can feel like uncomfortable pressure, squeezing, fullness, or pain.  Discomfort in other areas of the upper body. Symptoms can include pain or discomfort in one or both arms, the back, neck, jaw, or stomach.  Shortness of breath with or without chest discomfort.  Other signs may include breaking out in a cold sweat, nausea, or lightheadedness. Don't wait more than five minutes to call 911 - MINUTES MATTER! Fast action can save your life. Calling 911 is almost always the fastest way to get lifesaving treatment. Emergency Medical Services staff can begin treatment when they arrive -- up to an hour sooner than if someone gets to the hospital by car. The discharge information has been reviewed with the patient. The patient verbalized understanding. Discharge medications reviewed with the patient and appropriate educational materials and side effects teaching were provided. Gastrointestinal Bleeding: Care Instructions  Your Care Instructions    The digestive or gastrointestinal tract goes from the mouth to the anus. It is often called the GI tract. Bleeding can happen anywhere in the GI tract.  It may be caused by an ulcer, an infection, or cancer. It may also be caused by medicines such as aspirin or ibuprofen. Light bleeding may not cause any symptoms at first. But if you continue to bleed for a while, you may feel very weak or tired. Sudden, heavy bleeding means you need to see a doctor right away. This kind of bleeding can be very dangerous. But it can usually be cured or controlled. The doctor may do some tests to find the cause of your bleeding. Follow-up care is a key part of your treatment and safety. Be sure to make and go to all appointments, and call your doctor if you are having problems. It's also a good idea to know your test results and keep a list of the medicines you take. How can you care for yourself at home? · Be safe with medicines. Take your medicines exactly as prescribed. Call your doctor if you think you are having a problem with your medicine. You will get more details on the specific medicines your doctor prescribes. · Do not take aspirin or other anti-inflammatory medicines, such as naproxen (Aleve) or ibuprofen (Advil, Motrin), without talking to your doctor first. Ask your doctor if it is okay to use acetaminophen (Tylenol). · Do not drink alcohol. · The bleeding may make you lose iron. So it's important to eat foods that have a lot of iron. These include red meat, shellfish, poultry, and eggs. They also include beans, raisins, whole-grain breads, and leafy green vegetables. If you want help planning meals, you can make an appointment with a dietitian. When should you call for help? Call 911 anytime you think you may need emergency care. For example, call if:  · You have sudden, severe belly pain. · You vomit blood or what looks like coffee grounds. · You passed out (lost consciousness). · Your stools are maroon or very bloody. Call your doctor now or seek immediate medical care if:  · You are dizzy or lightheaded, or you feel like you may faint.   · Your stools are black and look like tar, or they have streaks of blood. · You have belly pain. · You vomit or have nausea. · You have trouble swallowing, or it hurts when you swallow. Watch closely for changes in your health, and be sure to contact your doctor if:  · You do not get better as expected. Where can you learn more? Go to http://gordo-ghassan.info/. Enter G234 in the search box to learn more about \"Gastrointestinal Bleeding: Care Instructions. \"  Current as of: March 20, 2017  Content Version: 11.3  © 3136-4888 [employer]. Care instructions adapted under license by [employer] (which disclaims liability or warranty for this information). If you have questions about a medical condition or this instruction, always ask your healthcare professional. [address] any warranty or liability for your use of this information. Colonoscopy: Before Your Procedure  What is a colonoscopy? A colonoscopy is a test that lets a doctor look inside your colon. The doctor uses a thin, lighted tube called a colonoscope to look for problems. These include small growths called polyps, cancer, or bleeding. During the test, the doctor can take samples of tissue that can be checked for cancer or other problems. This is called a biopsy. The doctor can also take out polyps. Before the test, you will need to stop eating solid foods. You also will drink a liquid or take a tablet that cleans out your colon. This helps your doctor be able to see inside your colon during the test.  Follow-up care is a key part of your treatment and safety. Be sure to make and go to all appointments, and call your doctor if you are having problems. It's also a good idea to know your test results and keep a list of the medicines you take. What happens before the procedure? Procedures can be stressful. This information will help you understand what you can expect.  And it will help you safely prepare for your procedure. Preparing for the procedure  · Understand exactly what procedure is planned, along with the risks, benefits, and other options. · Tell your doctors ALL the medicines, vitamins, supplements, and herbal remedies you take. Some of these can increase the risk of bleeding or interact with anesthesia. · If you take blood thinners, such as warfarin (Coumadin), clopidogrel (Plavix), or aspirin, be sure to talk to your doctor. He or she will tell you if you should stop taking these medicines before your procedure. Make sure that you understand exactly what your doctor wants you to do. · Your doctor will tell you which medicines to take or stop before your procedure. You may need to stop taking certain medicines a week or more before the procedure. So talk to your doctor as soon as you can. · If you have an advance directive, let your doctor know. It may include a living will and a durable power of  for health care. Bring a copy to the hospital. If you don't have one, you may want to prepare one. It lets your doctor and loved ones know your health care wishes. Doctors advise that everyone prepare these papers before any type of surgery or procedure. Before the procedure  · Follow your doctor's directions about when to stop eating solid foods and drink only clear liquids. You can drink water, clear juices, clear broths, flavored ice pops, and gelatin (such as Jell-O). Do not eat or drink anything red or purple. This includes grape juice and grape-flavored ice pops. It also includes fruit punch and cherry gelatin. · Drink the \"colon prep\" liquid as your doctor tells you. You will want to stay home, because the liquid will make you go to the bathroom a lot. Your stools will be loose and watery. It is very important to drink all of the liquid. If you have problems drinking it, call your doctor. Some doctors may have you take a tablet rather than drink a liquid.   · Do not eat any solid foods after you drink the colon prep. · Stop drinking clear liquids 6 to 8 hours before the test.  What happens on the day of the procedure? · Follow the instructions exactly about when to stop eating and drinking. If you don't, your procedure may be canceled. If your doctor told you to take your medicines on the day of the procedure, take them with only a sip of water. · Take a bath or shower before you come in for your procedure. Do not apply lotions, perfumes, deodorants, or nail polish. · Take off all jewelry and piercings. And take out contact lenses, if you wear them. At the [address] [place] [address] or hospital  · Bring a picture ID. · You will be kept comfortable and safe by your anesthesia provider. The anesthesia may make you sleep. · You will lie on your back or your side with your knees drawn up toward your belly. The doctor will gently put a gloved finger into your anus. Then the doctor puts the scope in and moves it into your colon. The scope goes in easily because it is lubricated. · The doctor may also use small tools to take tissue samples for a biopsy or to remove polyps. This does not hurt. · The test usually takes 30 to 45 minutes. But it may take longer. It depends on what is found and what is done. Going home  · Be sure you have someone to drive you home. Anesthesia and pain medicine make it unsafe for you to drive. · You will be given more specific instructions about recovering from your procedure. When should you call your doctor? · You have questions or concerns. · You don't understand how to prepare for your procedure. · You are having trouble with the bowel prep. · You become ill before the procedure (such as fever, flu, or a cold). · You need to reschedule or have changed your mind about having the procedure. Where can you learn more? Go to http://gordo-ghassan.info/.   Enter C315 in the search box to learn more about \"Colonoscopy: Before Your Procedure. \"  Current as of: August 9, 2016  Content Version: 11.3  © 9136-9539 [employer], [facility]. Care instructions adapted under license by [employer] (which disclaims liability or warranty for this information). If you have questions about a medical condition or this instruction, always ask your healthcare professional. [place] [address] any warranty or liability for your use of this information.     Patient armband removed and shredded

## 2017-09-23 NOTE — PROGRESS NOTES
Transport set-up for this pt to transported home. Contacted Home Health([facility]) spoke with Kelly and informed her of this pt being discharged to home. Transportation set-up for 5:00 p.m,  Pt's daughter agreed to be at this pt's home when he arrives, with transportation.

## 2017-09-24 PROCEDURE — 3331090001 HH PPS REVENUE CREDIT

## 2017-09-24 PROCEDURE — 3331090002 HH PPS REVENUE DEBIT

## 2017-09-25 ENCOUNTER — HOME CARE VISIT (OUTPATIENT)
Dept: SCHEDULING | Facility: HOME HEALTH | Age: 72
End: 2017-09-25
Payer: MEDICARE

## 2017-09-25 VITALS — DIASTOLIC BLOOD PRESSURE: 64 MMHG | OXYGEN SATURATION: 96 % | SYSTOLIC BLOOD PRESSURE: 118 MMHG | HEART RATE: 69 BPM

## 2017-09-25 PROCEDURE — G0153 HHCP-SVS OF S/L PATH,EA 15MN: HCPCS

## 2017-09-25 PROCEDURE — 3331090002 HH PPS REVENUE DEBIT

## 2017-09-25 PROCEDURE — 3331090001 HH PPS REVENUE CREDIT

## 2017-09-25 NOTE — HOME CARE
Discharge noted over the weekend, [facility] will follow Active pt , referral processed over the weekend by [facility] central intake. JOLLY OCHOA.

## 2017-09-26 ENCOUNTER — HOME CARE VISIT (OUTPATIENT)
Dept: HOME HEALTH SERVICES | Facility: HOME HEALTH | Age: 72
End: 2017-09-26
Payer: MEDICARE

## 2017-09-26 ENCOUNTER — HOME CARE VISIT (OUTPATIENT)
Dept: SCHEDULING | Facility: HOME HEALTH | Age: 72
End: 2017-09-26
Payer: MEDICARE

## 2017-09-26 VITALS — SYSTOLIC BLOOD PRESSURE: 128 MMHG | HEART RATE: 86 BPM | DIASTOLIC BLOOD PRESSURE: 88 MMHG | OXYGEN SATURATION: 97 %

## 2017-09-26 VITALS — RESPIRATION RATE: 18 BRPM | OXYGEN SATURATION: 98 % | TEMPERATURE: 97.8 F | HEART RATE: 78 BPM

## 2017-09-26 VITALS
DIASTOLIC BLOOD PRESSURE: 70 MMHG | SYSTOLIC BLOOD PRESSURE: 110 MMHG | HEART RATE: 71 BPM | OXYGEN SATURATION: 98 % | RESPIRATION RATE: 16 BRPM | TEMPERATURE: 98.5 F

## 2017-09-26 PROCEDURE — A4927 NON-STERILE GLOVES: HCPCS

## 2017-09-26 PROCEDURE — G0151 HHCP-SERV OF PT,EA 15 MIN: HCPCS

## 2017-09-26 PROCEDURE — A6250 SKIN SEAL PROTECT MOISTURIZR: HCPCS

## 2017-09-26 PROCEDURE — 3331090001 HH PPS REVENUE CREDIT

## 2017-09-26 PROCEDURE — G0299 HHS/HOSPICE OF RN EA 15 MIN: HCPCS

## 2017-09-26 PROCEDURE — 3331090002 HH PPS REVENUE DEBIT

## 2017-09-26 PROCEDURE — A6260 WOUND CLEANSER ANY TYPE/SIZE: HCPCS

## 2017-09-26 PROCEDURE — A6216 NON-STERILE GAUZE<=16 SQ IN: HCPCS

## 2017-09-26 PROCEDURE — G0152 HHCP-SERV OF OT,EA 15 MIN: HCPCS

## 2017-09-26 PROCEDURE — A6212 FOAM DRG <=16 SQ IN W/BORDER: HCPCS

## 2017-09-27 ENCOUNTER — HOME CARE VISIT (OUTPATIENT)
Dept: SCHEDULING | Facility: HOME HEALTH | Age: 72
End: 2017-09-27
Payer: MEDICARE

## 2017-09-27 VITALS — OXYGEN SATURATION: 97 % | SYSTOLIC BLOOD PRESSURE: 110 MMHG | DIASTOLIC BLOOD PRESSURE: 60 MMHG | HEART RATE: 78 BPM

## 2017-09-27 PROCEDURE — G0153 HHCP-SVS OF S/L PATH,EA 15MN: HCPCS

## 2017-09-27 PROCEDURE — 3331090002 HH PPS REVENUE DEBIT

## 2017-09-27 PROCEDURE — 3331090001 HH PPS REVENUE CREDIT

## 2017-09-28 ENCOUNTER — HOSPITAL ENCOUNTER (EMERGENCY)
Age: 72
Discharge: HOME | End: 2017-09-28
Attending: EMERGENCY MEDICINE
Payer: OTHER GOVERNMENT

## 2017-09-28 ENCOUNTER — HOME CARE VISIT (OUTPATIENT)
Dept: SCHEDULING | Facility: HOME HEALTH | Age: 72
End: 2017-09-28
Payer: MEDICARE

## 2017-09-28 ENCOUNTER — APPOINTMENT (OUTPATIENT)
Dept: CT IMAGING | Age: 72
End: 2017-09-28
Attending: PHYSICIAN ASSISTANT
Payer: OTHER GOVERNMENT

## 2017-09-28 VITALS
DIASTOLIC BLOOD PRESSURE: 79 MMHG | SYSTOLIC BLOOD PRESSURE: 120 MMHG | TEMPERATURE: 98.1 F | RESPIRATION RATE: 16 BRPM | OXYGEN SATURATION: 100 % | HEART RATE: 70 BPM

## 2017-09-28 DIAGNOSIS — S09.90XA HEAD INJURY, INITIAL ENCOUNTER: ICD-10-CM
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
## 2017-09-28 DIAGNOSIS — S01.81XA LACERATION OF FACE, INITIAL ENCOUNTER: Primary | ICD-10-CM

## 2017-09-28 PROCEDURE — 3331090002 HH PPS REVENUE DEBIT

## 2017-09-28 PROCEDURE — 75810000293 HC SIMP/SUPERF WND  RPR

## 2017-09-28 PROCEDURE — G0157 HHC PT ASSISTANT EA 15: HCPCS

## 2017-09-28 PROCEDURE — 74011250636 HC RX REV CODE- 250/636: Performed by: PHYSICIAN ASSISTANT

## 2017-09-28 PROCEDURE — 70450 CT HEAD/BRAIN W/O DYE: CPT

## 2017-09-28 PROCEDURE — 90471 IMMUNIZATION ADMIN: CPT

## 2017-09-28 PROCEDURE — 90715 TDAP VACCINE 7 YRS/> IM: CPT | Performed by: PHYSICIAN ASSISTANT

## 2017-09-28 PROCEDURE — 99282 EMERGENCY DEPT VISIT SF MDM: CPT

## 2017-09-28 PROCEDURE — 3331090001 HH PPS REVENUE CREDIT

## 2017-09-28 PROCEDURE — 77030002986 HC SUT PROL J&J -A

## 2017-09-28 PROCEDURE — 77030031132 HC SUT NYL COVD -A

## 2017-09-28 RX ADMIN — TETANUS TOXOID, REDUCED DIPHTHERIA TOXOID AND ACELLULAR PERTUSSIS VACCINE, ADSORBED 0.5 ML: 5; 2.5; 8; 8; 2.5 SUSPENSION INTRAMUSCULAR at 18:55

## 2017-09-28 NOTE — ED PROVIDER NOTES
HPI Comments: Leigh Ann Chatman is a 71 y.o. Male with PMHx of HTN and L sided hemiparesis post CVA who presents to the ED with c/o a laceration to L forehead/eyebrow after falling out of his wheelchair while attempting to reach for his phone, 30 mins PTA. Rates his pain 7/10 on pain scale. Notes hitting head, denies LOC or dizziness. Denies neck or back pain. Unsure when his last tetanus shot was. No other symptoms or concerns were expessed. The history is provided by the patient.         Past Medical History:   Diagnosis Date    Alcohol use disorder ([address]     Benign hypertensive heart disease with systolic congestive heart failure, NYHA class 2 ([address]     Cardiac pacemaker in situ     Cerebrovascular accident (CVA) due to occlusion of right carotid artery ([address] 8/14/2017    Acute Ischemic Stroke (acute infarctions involving right parieto-occipital area and occipitotemporal area and anterior and midportion of right corona radiata) with residual left hemiparesis, dysphagia and cognitive-communication deficits    Cognitive communication deficit 8/14/2017    Depression     On Trazodone    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) 8/14/2017    Dysphagia as late effect of cerebrovascular accident (CVA) 8/14/2017    Erectile dysfunction     On Sildenafil citrate    Hemiparesis affecting left side as late effect of cerebrovascular accident (CVA) ([address] 8/14/2017    Hyperlipidemia with target LDL less than 70 8/15/2017    Lipid profile (8/15/2017) showed TG 99, , HDL 81, LDL 80    Hypertension     Ischemic cardiomyopathy 8/15/2017    EF 40%    Occlusion of right internal carotid artery 8/15/2017    On chronic clopidogrel therapy     Osteoarthritis     On Etodolac and Oxycodone-Acetaminophen    Other ill-defined conditions     PVD    Peripheral artery disease ([address]     Seizure, late effect of stroke ([address] 8/15/2017    On Levetiracetam    Tobacco use disorder        Past Surgical History:   Procedure Laterality Date    HX ANGIOPLASTY  12/05/2012    S/P Balloon angioplasty and stent of left common iliac artery (12/5/2012 - Dr. Umair Barragan)         No family history on file. Social History     Social History    Marital status: SINGLE     Spouse name: N/A    Number of children: N/A    Years of education: N/A     Occupational History    Not on file. Social History Main Topics    Smoking status: Not on file    Smokeless tobacco: Not on file    Alcohol use Not on file    Drug use: Not on file    Sexual activity: Not on file     Other Topics Concern    Not on file     Social History Narrative         ALLERGIES: Review of patient's allergies indicates no known allergies. Review of Systems   Musculoskeletal: Negative for neck pain. Skin: Positive for wound. L eyebrow laceration   Neurological: Negative for dizziness, seizures, syncope and headaches. All other systems reviewed and are negative. Vitals:    09/28/17 1701   BP: 120/79   Pulse: 70   Resp: 16   Temp: 98.1 °F (36.7 °C)   SpO2: 100%            Physical Exam   Constitutional: He is oriented to person, place, and time. He appears well-developed and well-nourished. No distress. PT in wheelchair, atrophy to BLE. HENT:   Head: Normocephalic and atraumatic. Eyes: Conjunctivae and EOM are normal. Pupils are equal, round, and reactive to light. Right eye exhibits no discharge. Left eye exhibits no discharge. Neck: Normal range of motion. Neck supple. Cardiovascular: Normal rate, regular rhythm and normal heart sounds. Exam reveals no gallop and no friction rub. No murmur heard. Pulmonary/Chest: Effort normal and breath sounds normal. No respiratory distress. He has no wheezes. He has no rales. Musculoskeletal: He exhibits no edema, tenderness or deformity. No TTP to c or t spine, without step off or deformity. Neurological: He is alert and oriented to person, place, and time.  He exhibits normal muscle tone. Skin: Skin is warm and dry. No rash noted. He is not diaphoretic. No erythema. No pallor. Left medial eyebrow extending superior laterally with 4cm open wound present. Psychiatric: He has a normal mood and affect. His behavior is normal. Judgment and thought content normal.   Nursing note and vitals reviewed. MDM  Number of Diagnoses or Management Options  Fall, initial encounter:   Head injury, initial encounter:   Laceration of face, initial encounter:   Diagnosis management comments: CT HEAD IMPRESSION: . No acute intracranial process. Laceration repaired with sutures. Tetanus updated. Pt to f/u with PCP and return for suture removal or any worsening. ED Course       Wound Repair  Date/Time: 9/28/2017 6:35 PM  Performed by: PAPreparation: sterile field established  Location details: left eyebrow  Wound length:2.6 - 7.5 cm (4 cm)  Anesthesia: local infiltration    Anesthesia:  Local Anesthetic: lidocaine 1% without epinephrine  Skin closure: 6-0 nylon and Prolene  Number of sutures: 8. Technique: simple and interrupted  Patient tolerance: Patient tolerated the procedure well with no immediate complications  My total time at bedside, performing this procedure was 1-15 minutes. Scribe Attestation      Mal N[place] acting as a scribe for and in the presence of [facility] Davey [facility]      September 28, 2017 at 5:06 PM       Provider Attestation:      I personally performed the services described in the documentation, reviewed the documentation, as recorded by the scribe in my presence, and it accurately and completely records my words and actions. September 28, 2017 at 5:06 PM - VERONICA Arias      Diagnosis:   1. Laceration of face, initial encounter    2. Head injury, initial encounter    3. Fall, initial encounter        Disposition: Discharge.     Follow-up Information     Follow up With Details Comments MD Eleno In 2 days  [id number] [place]      [facility] EMERGENCY DEPT In 1 week For suture removal or sooner for any worsening. Miguel Kay Rosa  [phone]          Patient's Medications   Start Taking    No medications on file   Continue Taking    ACETAMINOPHEN (TYLENOL) 325 MG TABLET    Take 325 mg by mouth three (3) times daily as needed for Pain. ASCORBIC ACID (VITAMIN C) 250 MG TABLET    Take  by mouth. ASPIRIN DELAYED-RELEASE 81 MG TABLET    Take 1 Tab by mouth daily. CHOLECALCIFEROL, VITAMIN D3, (VITAMIN D3) 2,000 UNIT TAB    Take  by mouth. ETODOLAC (LODINE) 400 MG TABLET    Take 400 mg by mouth two (2) times daily as needed (pain). ETODOLAC (LODINE) 400 MG TABLET    Take 400 mg by mouth two (2) times a day. LEVETIRACETAM 1,000 MG TABLET    Take 1 Tab by mouth every twelve (12) hours. METOPROLOL TARTRATE (LOPRESSOR) 50 MG TABLET    Take  by mouth daily. THIAMINE (B-1) 100 MG TABLET    Take 1 Tab by mouth daily.    These Medications have changed    No medications on file   Stop Taking    No medications on file     VERONICA Arteaga

## 2017-09-28 NOTE — DISCHARGE INSTRUCTIONS
Cuts Closed With Stitches: Care Instructions  Your Care Instructions  A cut can happen anywhere on your body. The doctor used stitches to close the cut. Using stitches also helps the cut heal and reduces scarring. Sometimes pieces of tape called Steri-Strips are put over the stitches. If the cut went deep and through the skin, the doctor may have put in two layers of stitches. The deeper layer brings the deep part of the cut together. These stitches will dissolve and don't need to be removed. The stitches in the upper layer are the ones you see on the cut. You will probably have a bandage over the stitches. You will need to have the stitches removed, usually in 7 to 14 days. The doctor has checked you carefully, but problems can develop later. If you notice any problems or new symptoms, get medical treatment right away. Follow-up care is a key part of your treatment and safety. Be sure to make and go to all appointments, and call your doctor if you are having problems. It's also a good idea to know your test results and keep a list of the medicines you take. How can you care for yourself at home? · Keep the cut dry for the first 24 to 48 hours. After this, you can shower if your doctor okays it. Pat the cut dry. · Don't soak the cut, such as in a bathtub. Your doctor will tell you when it's safe to get the cut wet. · If your doctor told you how to care for your cut, follow your doctor's instructions. If you did not get instructions, follow this general advice:  ¨ After the first 24 to 48 hours, wash around the cut with clean water 2 times a day. Don't use hydrogen peroxide or alcohol, which can slow healing. ¨ You may cover the cut with a thin layer of petroleum jelly, such as Vaseline, and a nonstick bandage. ¨ Apply more petroleum jelly and replace the bandage as needed. · Prop up the sore area on a pillow anytime you sit or lie down during the next 3 days.  Try to keep it above the level of your heart. This will help reduce swelling. · Avoid any activity that could cause your cut to reopen. · Do not remove the stitches on your own. Your doctor will tell you when to come back to have the stitches removed. · Leave Steri-Strips on until they fall off. · Be safe with medicines. Read and follow all instructions on the label. ¨ If the doctor gave you a prescription medicine for pain, take it as prescribed. ¨ If you are not taking a prescription pain medicine, ask your doctor if you can take an over-the-counter medicine. When should you call for help? Call your doctor now or seek immediate medical care if:  · You have new pain, or your pain gets worse. · The skin near the cut is cold or pale or changes color. · You have tingling, weakness, or numbness near the cut. · The cut starts to bleed, and blood soaks through the bandage. Oozing small amounts of blood is normal.  · You have trouble moving the area near the cut. · You have symptoms of infection, such as:  ¨ Increased pain, swelling, warmth, or redness around the cut. ¨ Red streaks leading from the cut. ¨ Pus draining from the cut. ¨ A fever. Watch closely for changes in your health, and be sure to contact your doctor if:  · The cut reopens. · You do not get better as expected. Where can you learn more? Go to http://gordo-ghassan.info/. Enter R217 in the search box to learn more about \"Cuts Closed With Stitches: Care Instructions. \"  Current as of: March 20, 2017  Content Version: 11.3  © 2013-9034 [employer]. Care instructions adapted under license by [employer] (which disclaims liability or warranty for this information). If you have questions about a medical condition or this instruction, always ask your healthcare professional. [address] any warranty or liability for your use of this information.

## 2017-09-29 ENCOUNTER — HOME CARE VISIT (OUTPATIENT)
Dept: SCHEDULING | Facility: HOME HEALTH | Age: 72
End: 2017-09-29
Payer: MEDICARE

## 2017-09-29 PROCEDURE — G0153 HHCP-SVS OF S/L PATH,EA 15MN: HCPCS

## 2017-09-29 PROCEDURE — 3331090001 HH PPS REVENUE CREDIT

## 2017-09-29 PROCEDURE — G0299 HHS/HOSPICE OF RN EA 15 MIN: HCPCS

## 2017-09-29 PROCEDURE — G0158 HHC OT ASSISTANT EA 15: HCPCS

## 2017-09-29 PROCEDURE — 3331090002 HH PPS REVENUE DEBIT

## 2017-09-30 PROCEDURE — 3331090001 HH PPS REVENUE CREDIT

## 2017-09-30 PROCEDURE — 3331090002 HH PPS REVENUE DEBIT

## 2017-10-01 VITALS
RESPIRATION RATE: 20 BRPM | TEMPERATURE: 98 F | SYSTOLIC BLOOD PRESSURE: 140 MMHG | HEART RATE: 68 BPM | DIASTOLIC BLOOD PRESSURE: 88 MMHG | OXYGEN SATURATION: 97 %

## 2017-10-01 PROCEDURE — 3331090002 HH PPS REVENUE DEBIT

## 2017-10-01 PROCEDURE — 3331090001 HH PPS REVENUE CREDIT

## 2017-10-02 ENCOUNTER — HOME CARE VISIT (OUTPATIENT)
Dept: HOME HEALTH SERVICES | Facility: HOME HEALTH | Age: 72
End: 2017-10-02
Payer: MEDICARE

## 2017-10-02 ENCOUNTER — HOME CARE VISIT (OUTPATIENT)
Dept: SCHEDULING | Facility: HOME HEALTH | Age: 72
End: 2017-10-02
Payer: MEDICARE

## 2017-10-02 VITALS — DIASTOLIC BLOOD PRESSURE: 70 MMHG | OXYGEN SATURATION: 96 % | HEART RATE: 83 BPM | SYSTOLIC BLOOD PRESSURE: 120 MMHG

## 2017-10-02 VITALS — OXYGEN SATURATION: 97 % | SYSTOLIC BLOOD PRESSURE: 110 MMHG | HEART RATE: 75 BPM | DIASTOLIC BLOOD PRESSURE: 58 MMHG

## 2017-10-02 PROCEDURE — 3331090002 HH PPS REVENUE DEBIT

## 2017-10-02 PROCEDURE — G0151 HHCP-SERV OF PT,EA 15 MIN: HCPCS

## 2017-10-02 PROCEDURE — 3331090001 HH PPS REVENUE CREDIT

## 2017-10-03 ENCOUNTER — HOME CARE VISIT (OUTPATIENT)
Dept: HOME HEALTH SERVICES | Facility: HOME HEALTH | Age: 72
End: 2017-10-03
Payer: MEDICARE

## 2017-10-03 PROCEDURE — 3331090002 HH PPS REVENUE DEBIT

## 2017-10-03 PROCEDURE — 3331090001 HH PPS REVENUE CREDIT

## 2017-10-04 ENCOUNTER — HOME CARE VISIT (OUTPATIENT)
Dept: SCHEDULING | Facility: HOME HEALTH | Age: 72
End: 2017-10-04
Payer: MEDICARE

## 2017-10-04 PROCEDURE — 3331090001 HH PPS REVENUE CREDIT

## 2017-10-04 PROCEDURE — G0158 HHC OT ASSISTANT EA 15: HCPCS

## 2017-10-04 PROCEDURE — 3331090002 HH PPS REVENUE DEBIT

## 2017-10-05 ENCOUNTER — HOME CARE VISIT (OUTPATIENT)
Dept: SCHEDULING | Facility: HOME HEALTH | Age: 72
End: 2017-10-05
Payer: MEDICARE

## 2017-10-05 ENCOUNTER — HOSPITAL ENCOUNTER (EMERGENCY)
Age: 72
Discharge: HOME | End: 2017-10-05
Attending: EMERGENCY MEDICINE
Payer: MEDICARE

## 2017-10-05 ENCOUNTER — HOME CARE VISIT (OUTPATIENT)
Dept: HOME HEALTH SERVICES | Facility: HOME HEALTH | Age: 72
End: 2017-10-05
Payer: MEDICARE

## 2017-10-05 VITALS
RESPIRATION RATE: 20 BRPM | OXYGEN SATURATION: 100 % | HEART RATE: 70 BPM | TEMPERATURE: 97.2 F | DIASTOLIC BLOOD PRESSURE: 89 MMHG | SYSTOLIC BLOOD PRESSURE: 135 MMHG

## 2017-10-05 DIAGNOSIS — Z48.02 VISIT FOR SUTURE REMOVAL: Primary | ICD-10-CM

## 2017-10-05 PROCEDURE — 3331090002 HH PPS REVENUE DEBIT

## 2017-10-05 PROCEDURE — 75810000275 HC EMERGENCY DEPT VISIT NO LEVEL OF CARE

## 2017-10-05 PROCEDURE — 3331090001 HH PPS REVENUE CREDIT

## 2017-10-05 PROCEDURE — G0158 HHC OT ASSISTANT EA 15: HCPCS

## 2017-10-05 NOTE — ED NOTES
I have reviewed discharge instructions with the patient and daughter. The patient and daughter verbalized understanding. The daughter helped the pt to the lobby via wheelchair.  Pts safety intact

## 2017-10-05 NOTE — DISCHARGE INSTRUCTIONS
Learning About Stitches and Staples Removal  When are stitches and staples removed? Your doctor will tell you when to have your stitches or staples removed, usually in 7 to 14 days. How long you'll be told to wait will depend on things like where the wound is located, how big and how deep the wound is, and what your general health is like. Do not remove the stitches on your own. Stitches on the face are usually removed within a week. But stitches and staples on other areas of the body, such as on the back or belly or over a joint, may need to stay in place longer, often a week or two. Be sure to follow your doctor's instructions. How are stitches and staples removed? It usually doesn't hurt when the doctor removes the stitches or staples. You may feel a tug as each stitch or staple is removed. · You will either be seated or lying down. · To remove stitches, the doctor will use scissors to cut each of the knots and then pull the threads out. · To remove staples, the doctor will use a tool to take out the staples one at a time. · The area may still feel tender after the stitches or staples are gone. But it should feel better within a few minutes or up to a few hours. What can you expect after stitches and staples are removed? Depending on the type and location of the cut, you will have a scar. Scars usually fade over time. Keep the area clean, but you won't need a bandage. When should you call for help? Call your doctor now or seek immediate medical care if:  · You have new pain, or your pain gets worse. · You have trouble moving the area near the scar. · You have symptoms of infection, such as:  ¨ Increased pain, swelling, warmth, or redness around the scar. ¨ Red streaks leading from the scar. ¨ Pus draining from the scar. ¨ A fever. Watch closely for changes in your health, and be sure to contact your doctor if:  · The scar opens. · You do not get better as expected.   Follow-up care is a key part of your treatment and safety. Be sure to make and go to all appointments, and call your doctor if you do not get better as expected. It's also a good idea to keep a list of the medicines you take. Where can you learn more? Go to http://gordo-ghassan.info/. Enter F978 in the search box to learn more about \"Learning About Stitches and Staples Removal.\"  Current as of: 2017  Content Version: 11.3  © 4430-8229 [employer]. Care instructions adapted under license by [employer] (which disclaims liability or warranty for this information). If you have questions about a medical condition or this instruction, always ask your healthcare professional. [address] any warranty or liability for your use of this information. Ge.tt Activation    Thank you for requesting access to Ge.[email or web address]. Please follow the instructions below to securely access and download your online medical record. Ge.tt allows you to send messages to your doctor, view your test results, renew your prescriptions, schedule appointments, and more. How Do I Sign Up? 1. In your internet browser, go to [email or web address]  2. Click on the First Time User? Click Here link in the Sign In box. You will be redirect to the New Member Sign Up page. 3. Enter your Ge.[email or web address] Access Code exactly as it appears below. You will not need to use this code after youve completed the sign-up process. If you do not sign up before the expiration date, you must request a new code. Ge.[email or web address] Access Code: [email or web address]  Expires: 2017  9:55 AM (This is the date your Ge.[email or web address] access code will )    4. Enter the last four digits of your Social Security Number (xxxx) and Date of Birth (mm/dd/yyyy) as indicated and click Submit. You will be taken to the next sign-up page. 5. Create a Ge.[email or web address] ID.  This will be your Ge.[email or web address] login ID and cannot be changed, so think of one that is secure and easy to remember. 6. Create a [email or web address] password. You can change your password at any time. 7. Enter your Password Reset Question and Answer. This can be used at a later time if you forget your password. 8. Enter your e-mail address. You will receive e-mail notification when new information is available in [address] 9. Click Sign Up. You can now view and download portions of your medical record. 10. Click the Download Summary menu link to download a portable copy of your medical information. Additional Information    If you have questions, please visit the Frequently Asked Questions section of the [email or web address] website at [email or web address] Remember, [email or web address] is NOT to be used for urgent needs. For medical emergencies, dial 911.

## 2017-10-05 NOTE — ED PROVIDER NOTES
HPI Comments: 71 yr old male, hx htn, CVA with L hemiparesis, cognitive communication deficit, depression, and OA presents to the ED for removal of sutures placed in the head on 9/28/17. Pt states the wound has healed well and he has no new complaints. Past Medical History:   Diagnosis Date    Alcohol use disorder ([address]     Benign hypertensive heart disease with systolic congestive heart failure, NYHA class 2 ([address]     Cardiac pacemaker in situ     Cerebrovascular accident (CVA) due to occlusion of right carotid artery ([address] 8/14/2017    Acute Ischemic Stroke (acute infarctions involving right parieto-occipital area and occipitotemporal area and anterior and midportion of right corona radiata) with residual left hemiparesis, dysphagia and cognitive-communication deficits    Cognitive communication deficit 8/14/2017    Depression     On Trazodone    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) 8/14/2017    Dysphagia as late effect of cerebrovascular accident (CVA) 8/14/2017    Erectile dysfunction     On Sildenafil citrate    Hemiparesis affecting left side as late effect of cerebrovascular accident (CVA) ([address] 8/14/2017    Hyperlipidemia with target LDL less than 70 8/15/2017    Lipid profile (8/15/2017) showed TG 99, , HDL 81, LDL 80    Hypertension     Ischemic cardiomyopathy 8/15/2017    EF 40%    Occlusion of right internal carotid artery 8/15/2017    On chronic clopidogrel therapy     Osteoarthritis     On Etodolac and Oxycodone-Acetaminophen    Other ill-defined conditions     PVD    Peripheral artery disease (HCC)     Seizure, late effect of stroke ([address] 8/15/2017    On Levetiracetam    Tobacco use disorder        Past Surgical History:   Procedure Laterality Date    HX ANGIOPLASTY  12/05/2012    S/P Balloon angioplasty and stent of left common iliac artery (12/5/2012 - Dr. Emiliano Grimm)         No family history on file.     Social History     Social History  Marital status: SINGLE     Spouse name: N/A    Number of children: N/A    Years of education: N/A     Occupational History    Not on file. Social History Main Topics    Smoking status: Not on file    Smokeless tobacco: Not on file    Alcohol use Not on file    Drug use: Not on file    Sexual activity: Not on file     Other Topics Concern    Not on file     Social History Narrative         ALLERGIES: Review of patient's allergies indicates no known allergies. Review of Systems   Constitutional: Negative. Negative for chills, diaphoresis, fatigue and fever. HENT: Negative. Negative for congestion, ear pain, rhinorrhea and sore throat. Eyes: Negative. Negative for pain and redness. Respiratory: Negative. Negative for cough, shortness of breath, wheezing and stridor. Cardiovascular: Negative. Negative for chest pain, palpitations and leg swelling. Gastrointestinal: Negative. Negative for abdominal pain, constipation, diarrhea, nausea and vomiting. Endocrine: Negative. Genitourinary: Negative. Negative for dysuria, flank pain, frequency and hematuria. Musculoskeletal: Negative. Negative for back pain and neck pain. Skin: Positive for wound. Negative for rash. Allergic/Immunologic: Negative. Neurological: Negative. Negative for dizziness, seizures, syncope and headaches. Hematological: Negative. Psychiatric/Behavioral: Negative. All other systems reviewed and are negative. There were no vitals filed for this visit. Physical Exam   Constitutional: He is oriented to person, place, and time. He appears well-developed and well-nourished. No distress. HENT:   Head: Normocephalic. Neck: Normal range of motion. Neck supple. Cardiovascular: Normal rate, regular rhythm and normal heart sounds. Exam reveals no gallop and no friction rub. No murmur heard. Pulmonary/Chest: Effort normal and breath sounds normal. No stridor. No respiratory distress. He has no wheezes. He has no rales. Musculoskeletal: Normal range of motion. Neurological: He is alert and oriented to person, place, and time. Skin: Skin is warm and dry. He is not diaphoretic. No erythema. Laceration noted across the L eyebrow, wound has healed well, no bleeding, erythema, purulence, or dehiscence noted. 7 sutures noted in place. Psychiatric: He has a normal mood and affect. His behavior is normal. Thought content normal.   Nursing note and vitals reviewed. MDM  Number of Diagnoses or Management Options  Diagnosis management comments: Impression:  Suture removal    Sutures removed, review of previous note states 8 sutures were placed, however only 7 sutures were noted on today's visit. All 7 sutures were removed. Patient is stable for discharge at this time. No new rx given. Rest and follow-up with PCP this week as needed. Return to the ED immediately for any new or worsening sx. Gisela Foster PA-C 10:09 AM     Risk of Complications, Morbidity, and/or Mortality  Presenting problems: minimal  Diagnostic procedures: minimal  Management options: minimal    Patient Progress  Patient progress: stable    ED Course       Suture/Staple Removal  Date/Time: 10/5/2017 10:08 AM  Performed by: Hazel Badillo  Authorized by: Hazel Badillo     Consent:     Consent obtained:  Verbal    Consent given by:  Patient    Risks discussed:  Pain and bleeding    Alternatives discussed:  Delayed treatment  Location:     Location: L eyebrow. Procedure details:     Wound appearance:  Good wound healing    Number of sutures removed:  7  Post-procedure details:     Patient tolerance of procedure:   Tolerated well, no immediate complications

## 2017-10-06 ENCOUNTER — HOME CARE VISIT (OUTPATIENT)
Dept: SCHEDULING | Facility: HOME HEALTH | Age: 72
End: 2017-10-06
Payer: MEDICARE

## 2017-10-06 PROCEDURE — 3331090003 HH PPS REVENUE ADJ

## 2017-10-06 PROCEDURE — G0299 HHS/HOSPICE OF RN EA 15 MIN: HCPCS

## 2017-10-06 PROCEDURE — 3331090001 HH PPS REVENUE CREDIT

## 2017-10-06 PROCEDURE — 3331090002 HH PPS REVENUE DEBIT

## 2017-10-08 VITALS
RESPIRATION RATE: 20 BRPM | TEMPERATURE: 98 F | HEART RATE: 68 BPM | DIASTOLIC BLOOD PRESSURE: 68 MMHG | SYSTOLIC BLOOD PRESSURE: 118 MMHG | OXYGEN SATURATION: 98 %

## 2017-10-09 ENCOUNTER — HOME CARE VISIT (OUTPATIENT)
Dept: HOME HEALTH SERVICES | Facility: HOME HEALTH | Age: 72
End: 2017-10-09
Payer: MEDICARE

## 2017-10-10 ENCOUNTER — HOME CARE VISIT (OUTPATIENT)
Dept: HOME HEALTH SERVICES | Facility: HOME HEALTH | Age: 72
End: 2017-10-10
Payer: MEDICARE

## 2017-10-10 ENCOUNTER — HOME CARE VISIT (OUTPATIENT)
Dept: SCHEDULING | Facility: HOME HEALTH | Age: 72
End: 2017-10-10
Payer: MEDICARE

## 2017-10-11 ENCOUNTER — HOME CARE VISIT (OUTPATIENT)
Dept: HOME HEALTH SERVICES | Facility: HOME HEALTH | Age: 72
End: 2017-10-11
Payer: MEDICARE

## 2017-10-12 ENCOUNTER — HOME CARE VISIT (OUTPATIENT)
Dept: HOME HEALTH SERVICES | Facility: HOME HEALTH | Age: 72
End: 2017-10-12
Payer: MEDICARE

## 2017-10-13 ENCOUNTER — HOME CARE VISIT (OUTPATIENT)
Dept: HOME HEALTH SERVICES | Facility: HOME HEALTH | Age: 72
End: 2017-10-13
Payer: MEDICARE

## 2017-10-18 ENCOUNTER — HOME CARE VISIT (OUTPATIENT)
Dept: HOME HEALTH SERVICES | Facility: HOME HEALTH | Age: 72
End: 2017-10-18
Payer: MEDICARE

## 2017-10-21 ENCOUNTER — HOME CARE VISIT (OUTPATIENT)
Dept: HOME HEALTH SERVICES | Facility: HOME HEALTH | Age: 72
End: 2017-10-21
Payer: MEDICARE

## 2017-10-24 ENCOUNTER — HOME CARE VISIT (OUTPATIENT)
Dept: HOME HEALTH SERVICES | Facility: HOME HEALTH | Age: 72
End: 2017-10-24
Payer: MEDICARE

## 2019-01-15 ENCOUNTER — HOSPITAL ENCOUNTER (EMERGENCY)
Age: 74
Discharge: HOME | End: 2019-01-15
Attending: EMERGENCY MEDICINE
Payer: MEDICARE

## 2019-01-15 VITALS
TEMPERATURE: 97.5 F | SYSTOLIC BLOOD PRESSURE: 140 MMHG | HEART RATE: 82 BPM | RESPIRATION RATE: 14 BRPM | BODY MASS INDEX: 19.64 KG/M2 | HEIGHT: 72 IN | DIASTOLIC BLOOD PRESSURE: 75 MMHG | OXYGEN SATURATION: 95 % | WEIGHT: 145 LBS

## 2019-01-15 DIAGNOSIS — Z00.00 WELL ADULT HEALTH CHECK: Primary | ICD-10-CM

## 2019-01-15 LAB
ABO + RH BLD: NORMAL
ALBUMIN SERPL-MCNC: 4.1 G/DL (ref 3.4–5)
ALBUMIN/GLOB SERPL: 0.9 {RATIO} (ref 0.8–1.7)
ALP SERPL-CCNC: 66 U/L (ref 45–117)
ALT SERPL-CCNC: 18 U/L (ref 16–61)
ANION GAP SERPL CALC-SCNC: 5 MMOL/L (ref 3–18)
AST SERPL-CCNC: 17 U/L (ref 15–37)
BASOPHILS # BLD: 0 K/UL (ref 0–0.1)
BASOPHILS NFR BLD: 1 % (ref 0–2)
BILIRUB SERPL-MCNC: 0.2 MG/DL (ref 0.2–1)
BLOOD GROUP ANTIBODIES SERPL: NORMAL
BUN SERPL-MCNC: 17 MG/DL (ref 7–18)
BUN/CREAT SERPL: 16 (ref 12–20)
CALCIUM SERPL-MCNC: 9.2 MG/DL (ref 8.5–10.1)
CHLORIDE SERPL-SCNC: 109 MMOL/L (ref 100–108)
CO2 SERPL-SCNC: 26 MMOL/L (ref 21–32)
CREAT SERPL-MCNC: 1.07 MG/DL (ref 0.6–1.3)
DIFFERENTIAL METHOD BLD: ABNORMAL
EOSINOPHIL # BLD: 0.1 K/UL (ref 0–0.4)
EOSINOPHIL NFR BLD: 2 % (ref 0–5)
ERYTHROCYTE [DISTWIDTH] IN BLOOD BY AUTOMATED COUNT: 16.5 % (ref 11.6–14.5)
GLOBULIN SER CALC-MCNC: 4.4 G/DL (ref 2–4)
GLUCOSE SERPL-MCNC: 86 MG/DL (ref 74–99)
HCT VFR BLD AUTO: 38.5 % (ref 36–48)
HEMOCCULT STL QL: NEGATIVE
HGB BLD-MCNC: 12.3 G/DL (ref 13–16)
INR PPP: 1 (ref 0.8–1.2)
LYMPHOCYTES # BLD: 1.8 K/UL (ref 0.9–3.6)
LYMPHOCYTES NFR BLD: 35 % (ref 21–52)
MCH RBC QN AUTO: 24 PG (ref 24–34)
MCHC RBC AUTO-ENTMCNC: 31.9 G/DL (ref 31–37)
MCV RBC AUTO: 75.2 FL (ref 74–97)
MONOCYTES # BLD: 0.5 K/UL (ref 0.05–1.2)
MONOCYTES NFR BLD: 9 % (ref 3–10)
NEUTS SEG # BLD: 2.8 K/UL (ref 1.8–8)
NEUTS SEG NFR BLD: 53 % (ref 40–73)
PLATELET # BLD AUTO: 264 K/UL (ref 135–420)
PMV BLD AUTO: 10.4 FL (ref 9.2–11.8)
POTASSIUM SERPL-SCNC: 4.4 MMOL/L (ref 3.5–5.5)
PROT SERPL-MCNC: 8.5 G/DL (ref 6.4–8.2)
PROTHROMBIN TIME: 12.8 SEC (ref 11.5–15.2)
RBC # BLD AUTO: 5.12 M/UL (ref 4.7–5.5)
SODIUM SERPL-SCNC: 140 MMOL/L (ref 136–145)
SPECIMEN EXP DATE BLD: NORMAL
WBC # BLD AUTO: 5.2 K/UL (ref 4.6–13.2)

## 2019-01-15 PROCEDURE — 85025 COMPLETE CBC W/AUTO DIFF WBC: CPT

## 2019-01-15 PROCEDURE — 82270 OCCULT BLOOD FECES: CPT

## 2019-01-15 PROCEDURE — 86900 BLOOD TYPING SEROLOGIC ABO: CPT

## 2019-01-15 PROCEDURE — 80053 COMPREHEN METABOLIC PANEL: CPT

## 2019-01-15 PROCEDURE — 85610 PROTHROMBIN TIME: CPT

## 2019-01-15 PROCEDURE — 99283 EMERGENCY DEPT VISIT LOW MDM: CPT

## 2019-01-15 NOTE — DISCHARGE INSTRUCTIONS
Patient Education        Well Visit, Over 72: Care Instructions  Your Care Instructions    Physical exams can help you stay healthy. Your doctor has checked your overall health and may have suggested ways to take good care of yourself. He or she also may have recommended tests. At home, you can help prevent illness with healthy eating, regular exercise, and other steps. Follow-up care is a key part of your treatment and safety. Be sure to make and go to all appointments, and call your doctor if you are having problems. It's also a good idea to know your test results and keep a list of the medicines you take. How can you care for yourself at home? · Reach and stay at a healthy weight. This will lower your risk for many problems, such as obesity, diabetes, heart disease, and high blood pressure. · Get at least 30 minutes of exercise on most days of the week. Walking is a good choice. You also may want to do other activities, such as running, swimming, cycling, or playing tennis or team sports. · Do not smoke. Smoking can make health problems worse. If you need help quitting, talk to your doctor about stop-smoking programs and medicines. These can increase your chances of quitting for good. · Protect your skin from too much sun. When you're outdoors from 10 a.m. to 4 p.m., stay in the shade or cover up with clothing and a hat with a wide brim. Wear sunglasses that block UV rays. Even when it's cloudy, put broad-spectrum sunscreen (SPF 30 or higher) on any exposed skin. · See a dentist one or two times a year for checkups and to have your teeth cleaned. · Wear a seat belt in the car. · Limit alcohol to 2 drinks a day for men and 1 drink a day for women. Too much alcohol can cause health problems. Follow your doctor's advice about when to have certain tests. These tests can spot problems early. For men and women  · Cholesterol.  Your doctor will tell you how often to have this done based on your overall health and other things that can increase your risk for heart attack and stroke. · Blood pressure. Have your blood pressure checked during a routine doctor visit. Your doctor will tell you how often to check your blood pressure based on your age, your blood pressure results, and other factors. · Diabetes. Ask your doctor whether you should have tests for diabetes. · Vision. Experts recommend that you have yearly exams for glaucoma and other age-related eye problems. · Hearing. Tell your doctor if you notice any change in your hearing. You can have tests to find out how well you hear. · Colon cancer tests. Keep having colon cancer tests as your doctor recommends. You can have one of several types of tests. · Heart attack and stroke risk. At least every 4 to 6 years, you should have your risk for heart attack and stroke assessed. Your doctor uses factors such as your age, blood pressure, cholesterol, and whether you smoke or have diabetes to show what your risk for a heart attack or stroke is over the next 10 years. · Osteoporosis. Talk to your doctor about whether you should have a bone density test to find out whether you have thinning bones. Also ask your doctor about whether you should take calcium and vitamin D supplements. For women  · Pap test and pelvic exam. You may no longer need a Pap test. Talk with your doctor about whether to stop or continue to have Pap tests. · Breast exam and mammogram. Ask how often you should have a mammogram, which is an X-ray of your breasts. A mammogram can spot breast cancer before it can be felt and when it is easiest to treat. · Thyroid disease. Talk to your doctor about whether to have your thyroid checked as part of a regular physical exam. Women have an increased chance of a thyroid problem. For men  · Prostate exam. Talk to your doctor about whether you should have a blood test (called a PSA test) for prostate cancer.  Experts disagree on whether men should have this test. Some experts recommend that you discuss the benefits and risks of the test with your doctor. · Abdominal aortic aneurysm. Ask your doctor whether you should have a test to check for an aneurysm. You may need a test if you ever smoked or if your parent, brother, sister, or child has had an aneurysm. When should you call for help? Watch closely for changes in your health, and be sure to contact your doctor if you have any problems or symptoms that concern you. Where can you learn more? Go to http://gordo-ghassan.info/. Enter V634 in the search box to learn more about \"Well Visit, Over 65: Care Instructions. \"  Current as of: March 29, 2018  Content Version: 11.8  © 0250-4518 [employer]. Care instructions adapted under license by [employer] (which disclaims liability or warranty for this information). If you have questions about a medical condition or this instruction, always ask your healthcare professional. [place] [address] any warranty or liability for your use of this information.

## 2019-01-15 NOTE — ED PROVIDER NOTES
EMERGENCY DEPARTMENT HISTORY AND PHYSICAL EXAM 
 
1:29 PM 
 
 
Date: 1/15/2019 Patient Name: Ben Leventhal History of Presenting Illness Chief Complaint Patient presents with  Melena History Provided By: Patient Chief Complaint: Melena Duration:  Days Timing:  Intermittent Location: n/a Quality: n/a Severity: Moderate Modifying Factors: baby ASA daily Associated Symptoms: denies any other associated signs or symptoms Additional History (Context): Ben Leventhal is a 68 y.o. male with CVA with left side deficits who presents with intermittent episodes of black tarry stools for the past 3 days. The pt presents with his daughter who states the pt is mentating at his baseline and has been acting/eating appropriately. The pt notes he feels well and denies any other complaints. The pt's daughter notes the pt isn't on any blood thinners except for 81 mg ASA daily. Per daughter, she doesn't believe the pt is having bloody stools and thinks it's related to a medication she is giving the pt. The pt denies fever, chills, abdominal pain, CP, SOB, and any other associated sxs. PCP: Yumiko Stanton MD 
 
 
Past History Past Medical History: 
Past Medical History:  
Diagnosis Date  Alcohol use disorder  Benign hypertensive heart disease with systolic congestive heart failure, NYHA class 2 ([address]  Cardiac pacemaker in situ  Cerebrovascular accident (CVA) due to occlusion of right carotid artery ([facility] 8/14/2017 Acute Ischemic Stroke (acute infarctions involving right parieto-occipital area and occipitotemporal area and anterior and midportion of right corona radiata) with residual left hemiparesis, dysphagia and cognitive-communication deficits  Cognitive communication deficit 8/14/2017  Depression On Trazodone  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) 8/14/2017  Dysphagia as late effect of cerebrovascular accident (CVA) 8/14/2017  Erectile dysfunction On Sildenafil citrate  Hemiparesis affecting left side as late effect of cerebrovascular accident (CVA) ([facility] 8/14/2017  Hyperlipidemia with target LDL less than 70 8/15/2017 Lipid profile (8/15/2017) showed TG 99, , HDL 81, LDL 84  Hypertension  Ischemic cardiomyopathy 8/15/2017 EF 40%  Occlusion of right internal carotid artery 8/15/2017  On chronic clopidogrel therapy  Osteoarthritis On Etodolac and Oxycodone-Acetaminophen  Other ill-defined conditions(799.89) PVD  Peripheral artery disease ([facility]  Seizure, late effect of stroke ([facility] 8/15/2017 On Levetiracetam  
 Tobacco use disorder Past Surgical History: 
Past Surgical History:  
Procedure Laterality Date  HX ANGIOPLASTY  12/05/2012 S/P Balloon angioplasty and stent of left common iliac artery (12/5/2012 - Dr. Raymond Ferrara) Family History: 
History reviewed. No pertinent family history. Social History: 
Social History Tobacco Use  Smoking status: Never Smoker  Smokeless tobacco: Never Used Substance Use Topics  Alcohol use: No  
  Frequency: Never  Drug use: Not on file Allergies: 
No Known Allergies Review of Systems Review of Systems Constitutional: Negative for appetite change, chills and fever. Most of ROS obtained from daughter HENT: Negative for congestion, rhinorrhea, sore throat and trouble swallowing. Eyes: Negative for visual disturbance. Respiratory: Negative for cough, shortness of breath and wheezing. Cardiovascular: Negative for chest pain and leg swelling. Gastrointestinal: Positive for blood in stool. Negative for abdominal pain, constipation, nausea and vomiting. Endocrine: Negative for polyuria. Genitourinary: Negative for difficulty urinating and dysuria. Musculoskeletal: Negative for arthralgias and neck stiffness. Skin: Negative for rash. Neurological: Negative for dizziness, weakness, numbness and headaches. Hematological: Does not bruise/bleed easily. Psychiatric/Behavioral: Negative for confusion and dysphoric mood. All other systems reviewed and are negative. Physical Exam  
 
Visit Vitals /75 (BP 1 Location: Right arm, BP Patient Position: At rest) Pulse 82 Temp 97.5 °F (36.4 °C) Resp 14 Ht 6' (1.829 m) Wt 65.8 kg (145 lb) SpO2 95% BMI 19.67 kg/m² Physical Exam  
Constitutional: He is oriented to person, place, and time. He appears well-developed and well-nourished. No distress. HENT:  
Head: Normocephalic and atraumatic. Mouth/Throat: Oropharynx is clear and moist.  
Eyes: Conjunctivae are normal. Pupils are equal, round, and reactive to light. No scleral icterus. Pink Conjuntiva Neck: Normal range of motion. Neck supple. Cardiovascular: Normal rate and intact distal pulses. Pulmonary/Chest: Effort normal and breath sounds normal. No respiratory distress. He has no wheezes. Abdominal: Soft. Bowel sounds are normal. He exhibits no distension. There is no tenderness. Rectal Exam: Guaiac negative brown stool, good rectal tone Musculoskeletal: Normal range of motion. He exhibits no edema. Lymphadenopathy:  
  He has no cervical adenopathy. Neurological: He is alert and oriented to person, place, and time. No cranial nerve deficit. Left side hemiparesis s/p CVA in August 2017 Skin: Skin is warm and dry. He is not diaphoretic. Nursing note and vitals reviewed. Diagnostic Study Results Labs - No results found for this or any previous visit (from the past 12 hour(s)). Radiologic Studies - No orders to display Medical Decision Making I am the first provider for this patient. I reviewed the vital signs, available nursing notes, past medical history, past surgical history, family history and social history. Vital Signs-Reviewed the patient's vital signs. Pulse Oximetry Analysis -  85% on RA (Interpretation)normal 
 
Cardiac Monitor: 
Rate: 82 Rhythm: NSR Records Reviewed: Nursing Notes and Old Medical Records (Time of Review: 1:29 PM) Provider Notes (Medical Decision Making): BARRY DDx: GI bleed, metabolic, check labs, rectal exam. Pt is hemodynamically stable. Daughter states the pt she doesn't believe the pt has bloody stool, she thinks its associated with medicine she was giving him. Medications - No data to display ED Course: Progress Notes, Reevaluation, and Consults: (-)hemocult Labs reassuring I have reassessed the patient. I have discussed the workup, results and plan with the patient and patients daughter and are in agreement. Patient is feeling better. She will set him up f/u pcp. Patient was discharge in stable condition. Patient was given outpatient follow up. Patient is to return to emergency department if any new or worsening condition. Diagnosis Clinical Impression:  
1. Well adult health check Disposition: d/c Medication List  
  
ASK your doctor about these medications   
acetaminophen 325 mg tablet Commonly known as:  TYLENOL 
  
aspirin delayed-release 81 mg tablet Take 1 Tab by mouth daily. * etodolac 400 mg tablet Commonly known as:  LODINE 
  
* etodolac 400 mg tablet Commonly known as:  LODINE 
  
levETIRAcetam 1,000 mg tablet Take 1 Tab by mouth every twelve (12) hours. metoprolol tartrate 50 mg tablet Commonly known as:  LOPRESSOR 
  
thiamine  mg tablet Commonly known as:  B-1 Take 1 Tab by mouth daily. VITAMIN C 250 mg tablet Generic drug:  ascorbic acid (vitamin C) VITAMIN D3 2,000 unit Tab Generic drug:  cholecalciferol (vitamin D3) * This list has 2 medication(s) that are the same as other medications prescribed for you.  Read the directions carefully, and ask your doctor or other care provider to review them with you.  
  
  
  
 
_______________________________ Attestations: 
Scribe Attestation Waldo Herrera acting as a scribe for and in the presence of Hannah Pedraza DO     
January 15, 2019 at 1:29 PM 
    
Provider Attestation:     
I personally performed the services described in the documentation, reviewed the documentation, as recorded by the scribe in my presence, and it accurately and completely records my words and actions. January 15, 2019 at 1:29 PM - Hannah Pedraza DO   
_______________________________

## 2019-01-15 NOTE — ED NOTES
I performed a brief evaluation, including history and physical, of the patient here in triage and I have determined that pt will need further treatment and evaluation from the main side ER physician. I have placed initial orders to help in expediting patients care. January 15, 2019 at 1:16 PM - [place] Visit Vitals /75 (BP 1 Location: Right arm, BP Patient Position: At rest) Pulse 82 Temp 97.5 °F (36.4 °C) Resp 14 Ht 6' (1.829 m) Wt 65.8 kg (145 lb) SpO2 95% BMI 19.67 kg/m²

## 2019-02-14 ENCOUNTER — APPOINTMENT (OUTPATIENT)
Dept: GENERAL RADIOLOGY | Age: 74
End: 2019-02-14
Attending: NURSE PRACTITIONER
Payer: MEDICARE

## 2019-02-14 ENCOUNTER — HOSPITAL ENCOUNTER (EMERGENCY)
Age: 74
Discharge: HOME | End: 2019-02-14
Attending: EMERGENCY MEDICINE
Payer: MEDICARE

## 2019-02-14 VITALS
DIASTOLIC BLOOD PRESSURE: 66 MMHG | HEART RATE: 60 BPM | SYSTOLIC BLOOD PRESSURE: 142 MMHG | WEIGHT: 150 LBS | OXYGEN SATURATION: 99 % | RESPIRATION RATE: 18 BRPM | TEMPERATURE: 97.2 F | BODY MASS INDEX: 20.34 KG/M2

## 2019-02-14 DIAGNOSIS — M25.552 PAIN OF LEFT HIP JOINT: Primary | ICD-10-CM

## 2019-02-14 PROCEDURE — 73521 X-RAY EXAM HIPS BI 2 VIEWS: CPT

## 2019-02-14 PROCEDURE — 99283 EMERGENCY DEPT VISIT LOW MDM: CPT

## 2019-02-14 PROCEDURE — 74011250637 HC RX REV CODE- 250/637: Performed by: NURSE PRACTITIONER

## 2019-02-14 RX ORDER — OXYCODONE AND ACETAMINOPHEN 5; 325 MG/1; MG/1
1 TABLET ORAL
Status: COMPLETED | OUTPATIENT
Start: 2019-02-14 | End: 2019-02-14

## 2019-02-14 RX ORDER — OXYCODONE AND ACETAMINOPHEN 5; 325 MG/1; MG/1
1 TABLET ORAL
Qty: 15 TAB | Refills: 0 | Status: SHIPPED | OUTPATIENT
Start: 2019-02-14 | End: 2019-02-14

## 2019-02-14 RX ADMIN — OXYCODONE AND ACETAMINOPHEN 1 TABLET: 5; 325 TABLET ORAL at 18:42

## 2019-02-14 NOTE — ED PROVIDER NOTES
Aliyah Funes a 68 y.o. M presents with left hip pain that has been going on for \"a while\" according to the daughter. The daughter stated that the left hip has been hurting him but he recently started yelling out in pain. She said that he yelled out in pain when changing his diaper today. Denies fever, ha, SOB, and CP. The pt has a history of PAD and stroke. The history is provided by a caregiver. Past Medical History:  
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) 8/14/2017  Dysphagia as late effect of cerebrovascular accident (CVA) 8/14/2017  Erectile dysfunction On Sildenafil citrate  Hemiparesis affecting left side as late effect of cerebrovascular accident (CVA) ([address] 8/14/2017  Hyperlipidemia with target LDL less than 70 8/15/2017 Lipid profile (8/15/2017) showed TG 99, , HDL 81, LDL 84  Hypertension  Ischemic cardiomyopathy 8/15/2017 EF 40%  Occlusion of right internal carotid artery 8/15/2017  On chronic clopidogrel therapy  Osteoarthritis On Etodolac and Oxycodone-Acetaminophen  Other ill-defined conditions(799.89) PVD  Peripheral artery disease ([address]  Seizure, late effect of stroke ([address] 8/15/2017 On Levetiracetam  
 Tobacco use disorder Past Surgical History:  
Procedure Laterality Date  HX ANGIOPLASTY  12/05/2012 S/P Balloon angioplasty and stent of left common iliac artery (12/5/2012 - Dr. Nichelle Reed) No family history on file. Social History Socioeconomic History  Marital status: SINGLE Spouse name: Not on file  Number of children: Not on file  Years of education: Not on file  Highest education level: Not on file Social Needs  Financial resource strain: Not on file  Food insecurity - worry: Not on file  Food insecurity - inability: Not on file  Transportation needs - medical: Not on file  Transportation needs - non-medical: Not on file Occupational History  Not on file Tobacco Use  Smoking status: Never Smoker  Smokeless tobacco: Never Used Substance and Sexual Activity  Alcohol use: No  
  Frequency: Never  Drug use: Not on file  Sexual activity: Not on file Other Topics Concern  Not on file Social History Narrative  Not on file ALLERGIES: Patient has no known allergies. Review of Systems Constitutional: Negative for fever. Respiratory: Negative for shortness of breath. Cardiovascular: Negative for chest pain. Musculoskeletal: Positive for arthralgias and myalgias. Neurological: Negative for headaches. Vitals:  
 02/14/19 1817 02/14/19 1843 BP: 142/66 Pulse: 60 Resp: 18 Temp: 97.2 °F (36.2 °C) SpO2: 99% 99% Weight: 68 kg (150 lb) Physical Exam  
Constitutional: He is oriented to person, place, and time. He appears well-developed and well-nourished. HENT:  
Head: Normocephalic and atraumatic. Eyes: Conjunctivae and EOM are normal. Pupils are equal, round, and reactive to light. Neck: Normal range of motion. Neck supple. Cardiovascular: Normal rate and regular rhythm. Pulmonary/Chest: Effort normal and breath sounds normal.  
Abdominal: Soft. Bowel sounds are normal.  
Musculoskeletal:  
Pre existing left sided contractures and plegia. No new symtoms Neurological: He is alert and oriented to person, place, and time. He has normal reflexes. Skin: Skin is warm and dry. Psychiatric: He has a normal mood and affect. His behavior is normal. Judgment and thought content normal.  
Nursing note and vitals reviewed. MDM Number of Diagnoses or Management Options Pain of left hip joint: established and improving Diagnosis management comments: Pt treated for pain and states he feels much better, pt and daughter are asking to go home. Xray is negative for acute finding per radiology. I do not suspect an infectious or traumatic process. I will rx pt percocet a small rx and he will f/u with pcp Amount and/or Complexity of Data Reviewed Tests in the radiology section of CPT®: ordered and reviewed Review and summarize past medical records: yes Independent visualization of images, tracings, or specimens: yes Risk of Complications, Morbidity, and/or Mortality Presenting problems: low Diagnostic procedures: low Management options: low Patient Progress Patient progress: improved Procedures Vitals: 
Patient Vitals for the past 12 hrs: 
 Temp Pulse Resp BP SpO2  
02/14/19 1843     99 % 02/14/19 1817 97.2 °F (36.2 °C) 60 18 142/66 99 % Medications ordered:  
Medications  
oxyCODONE-acetaminophen (PERCOCET) 5-325 mg per tablet 1 Tab (1 Tab Oral Given 2/14/19 1842) X-Ray, CT or other radiology findings or impressions: XR HIPS BI W AP PELV Final Result IMPRESSION:  
  
Negative bilateral hips. Disposition: 
 
Diagnosis: 1. Pain of left hip joint Disposition: to Home Follow-up Information Follow up With Specialties Details Why Contact Info Jose Jacob MD Family Practice In 2 days  [address] [facility] [address] [phone] MD Gabriella Galvan [address] [facility] [address] [phone] Medication List  
  
START taking these medications   
oxyCODONE-acetaminophen 5-325 mg per tablet Commonly known as:  PERCOCET Take 1 Tab by mouth now for 1 dose. Max Daily Amount: 1 Tab. ASK your doctor about these medications   
acetaminophen 325 mg tablet Commonly known as:  TYLENOL 
  
aspirin delayed-release 81 mg tablet Take 1 Tab by mouth daily. * etodolac 400 mg tablet Commonly known as:  LODINE 
  
* etodolac 400 mg tablet Commonly known as:  LODINE 
  
levETIRAcetam 1,000 mg tablet Take 1 Tab by mouth every twelve (12) hours. metoprolol tartrate 50 mg tablet Commonly known as:  LOPRESSOR 
  
thiamine  mg tablet Commonly known as:  B-1 Take 1 Tab by mouth daily. VITAMIN C 250 mg tablet Generic drug:  ascorbic acid (vitamin C) VITAMIN D3 2,000 unit Tab Generic drug:  cholecalciferol (vitamin D3) * This list has 2 medication(s) that are the same as other medications prescribed for you. Read the directions carefully, and ask your doctor or other care provider to review them with you. Where to Get Your Medications Information about where to get these medications is not yet available Ask your nurse or doctor about these medications · oxyCODONE-acetaminophen 5-325 mg per tablet Return to the ER if you are unable to obtain referral as directed. Rafael Martinez  results have been reviewed with him. He has been counseled regarding his diagnosis, treatment, and plan. He verbally conveys understanding and agreement of the signs, symptoms, diagnosis, treatment and prognosis and additionally agrees to follow up as discussed. He also agrees with the care-plan and conveys that all of his questions have been answered.   I have also provided discharge instructions for him that include: educational information regarding their diagnosis and treatment, and list of reasons why they would want to return to the ED prior to their follow-up appointment, should his condition change. SUNIL Rodgers Scribe Attestation Charles Gamboa acting as a scribe for and in the presence of SUNIL Greco ENP-C February 14, 2019 at 6:24 PM 
    
Provider Attestation:     
I personally performed the services described in the documentation, reviewed the documentation, as recorded by the scribe in my presence, and it accurately and completely records my words and actions.  February 14, 2019 at 6:24 PM - SUNIL Greco ENP-C

## 2019-02-14 NOTE — ED TRIAGE NOTES
Pt. 's daughter states \"he's having left hip pain; I was changing his diaper and he yelled out in pain. \"

## 2019-02-15 NOTE — DISCHARGE INSTRUCTIONS
Hip Pain: Care Instructions  Your Care Instructions    Hip pain may be caused by many things, including overuse, a fall, or a twisting movement. Another cause of hip pain is arthritis. Your pain may increase when you stand up, walk, or squat. The pain may come and go or may be constant. Home treatment can help relieve hip pain, swelling, and stiffness. If your pain is ongoing, you may need more tests and treatment. Follow-up care is a key part of your treatment and safety. Be sure to make and go to all appointments, and call your doctor if you are having problems. It's also a good idea to know your test results and keep a list of the medicines you take. How can you care for yourself at home? · Take pain medicines exactly as directed. ? If the doctor gave you a prescription medicine for pain, take it as prescribed. ? If you are not taking a prescription pain medicine, ask your doctor if you can take an over-the-counter medicine. · Rest and protect your hip. Take a break from any activity, including standing or walking, that may cause pain. · Put ice or a cold pack against your hip for 10 to 20 minutes at a time. Try to do this every 1 to 2 hours for the next 3 days (when you are awake) or until the swelling goes down. Put a thin cloth between the ice and your skin. · Sleep on your healthy side with a pillow between your knees, or sleep on your back with pillows under your knees. · If there is no swelling, you can put moist heat, a heating pad, or a warm cloth on your hip. Do gentle stretching exercises to help keep your hip flexible. · Learn how to prevent falls. Have your vision and hearing checked regularly. Wear slippers or shoes with a nonskid sole. · Stay at a healthy weight. · Wear comfortable shoes. When should you call for help? Call 911 anytime you think you may need emergency care.  For example, call if:    · You have sudden chest pain and shortness of breath, or you cough up blood.     · You are not able to stand or walk or bear weight.     · Your buttocks, legs, or feet feel numb or tingly.     · Your leg or foot is cool or pale or changes color.     · You have severe pain.    Call your doctor now or seek immediate medical care if:    · You have signs of infection, such as:  ? Increased pain, swelling, warmth, or redness in the hip area. ? Red streaks leading from the hip area. ? Pus draining from the hip area. ? A fever.     · You have signs of a blood clot, such as:  ? Pain in your calf, back of the knee, thigh, or groin. ? Redness and swelling in your leg or groin.     · You are not able to bend, straighten, or move your leg normally.     · You have trouble urinating or having bowel movements.    Watch closely for changes in your health, and be sure to contact your doctor if:    · You do not get better as expected. Where can you learn more? Go to http://gordo-ghassan.info/. Enter O503 in the search box to learn more about \"Hip Pain: Care Instructions. \"  Current as of: September 23, 2018  Content Version: 11.9  © 4151-0312 [employer]. Care instructions adapted under license by [employer] (which disclaims liability or warranty for this information). If you have questions about a medical condition or this instruction, always ask your healthcare professional. [place] [address] any warranty or liability for your use of this information. Patient Education        Hip Pain: Care Instructions  Your Care Instructions    Hip pain may be caused by many things, including overuse, a fall, or a twisting movement. Another cause of hip pain is arthritis. Your pain may increase when you stand up, walk, or squat. The pain may come and go or may be constant. Home treatment can help relieve hip pain, swelling, and stiffness. If your pain is ongoing, you may need more tests and treatment.   Follow-up care is a key part of your treatment and safety. Be sure to make and go to all appointments, and call your doctor if you are having problems. It's also a good idea to know your test results and keep a list of the medicines you take. How can you care for yourself at home? · Take pain medicines exactly as directed. ? If the doctor gave you a prescription medicine for pain, take it as prescribed. ? If you are not taking a prescription pain medicine, ask your doctor if you can take an over-the-counter medicine. · Rest and protect your hip. Take a break from any activity, including standing or walking, that may cause pain. · Put ice or a cold pack against your hip for 10 to 20 minutes at a time. Try to do this every 1 to 2 hours for the next 3 days (when you are awake) or until the swelling goes down. Put a thin cloth between the ice and your skin. · Sleep on your healthy side with a pillow between your knees, or sleep on your back with pillows under your knees. · If there is no swelling, you can put moist heat, a heating pad, or a warm cloth on your hip. Do gentle stretching exercises to help keep your hip flexible. · Learn how to prevent falls. Have your vision and hearing checked regularly. Wear slippers or shoes with a nonskid sole. · Stay at a healthy weight. · Wear comfortable shoes. When should you call for help? Call 911 anytime you think you may need emergency care. For example, call if:    · You have sudden chest pain and shortness of breath, or you cough up blood.     · You are not able to stand or walk or bear weight.     · Your buttocks, legs, or feet feel numb or tingly.     · Your leg or foot is cool or pale or changes color.     · You have severe pain.    Call your doctor now or seek immediate medical care if:    · You have signs of infection, such as:  ? Increased pain, swelling, warmth, or redness in the hip area. ? Red streaks leading from the hip area. ? Pus draining from the hip area.   ? A fever.     · You have signs of a blood clot, such as:  ? Pain in your calf, back of the knee, thigh, or groin. ? Redness and swelling in your leg or groin.     · You are not able to bend, straighten, or move your leg normally.     · You have trouble urinating or having bowel movements.    Watch closely for changes in your health, and be sure to contact your doctor if:    · You do not get better as expected. Where can you learn more? Go to http://gordo-ghassan.info/. Enter S802 in the search box to learn more about \"Hip Pain: Care Instructions. \"  Current as of: September 23, 2018  Content Version: 11.9  © 6702-7243 [employer]. Care instructions adapted under license by [employer] (which disclaims liability or warranty for this information). If you have questions about a medical condition or this instruction, always ask your healthcare professional. [place] [address] any warranty or liability for your use of this information.

## 2019-02-15 NOTE — ED NOTES
I have reviewed discharge instructions with the patient. The patient verbalized understanding. Discharge medications reviewed with patient and appropriate educational materials and side effects teaching were provided. Patient was given the opportunity to ask questions and he stated that he did not have any at this time.

## 2020-01-01 ENCOUNTER — HOSPITAL ENCOUNTER (EMERGENCY)
Age: 75
Discharge: HOME | End: 2020-09-08
Attending: EMERGENCY MEDICINE
Payer: OTHER GOVERNMENT

## 2020-01-01 ENCOUNTER — APPOINTMENT (OUTPATIENT)
Dept: CT IMAGING | Age: 75
End: 2020-01-01
Attending: PHYSICIAN ASSISTANT
Payer: OTHER GOVERNMENT

## 2020-01-01 VITALS
RESPIRATION RATE: 16 BRPM | TEMPERATURE: 98 F | DIASTOLIC BLOOD PRESSURE: 77 MMHG | HEART RATE: 62 BPM | OXYGEN SATURATION: 100 % | SYSTOLIC BLOOD PRESSURE: 132 MMHG
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
## 2020-01-01 PROCEDURE — 72125 CT NECK SPINE W/O DYE: CPT

## 2020-01-01 PROCEDURE — 99284 EMERGENCY DEPT VISIT MOD MDM: CPT

## 2020-01-01 PROCEDURE — 70450 CT HEAD/BRAIN W/O DYE: CPT

## 2020-08-11 ENCOUNTER — OFFICE VISIT (OUTPATIENT)
Dept: ORTHOPEDIC SURGERY | Facility: CLINIC | Age: 75
End: 2020-08-11

## 2020-08-11 VITALS
SYSTOLIC BLOOD PRESSURE: 110 MMHG | HEIGHT: 72 IN | OXYGEN SATURATION: 98 % | RESPIRATION RATE: 15 BRPM | TEMPERATURE: 98.8 F | BODY MASS INDEX: 20.32 KG/M2 | WEIGHT: 150 LBS | DIASTOLIC BLOOD PRESSURE: 69 MMHG | HEART RATE: 60 BPM

## 2020-08-11 DIAGNOSIS — M75.101 ROTATOR CUFF SYNDROME, RIGHT: Primary | ICD-10-CM

## 2020-08-11 RX ORDER — CILOSTAZOL 100 MG/1
100 TABLET ORAL
Status: ON HOLD | COMMUNITY
End: 2021-01-01 | Stop reason: SDUPTHER

## 2020-08-11 RX ORDER — GABAPENTIN 300 MG/1
600 CAPSULE ORAL
COMMUNITY
End: 2021-01-01

## 2020-08-11 RX ORDER — LANOLIN ALCOHOL/MO/W.PET/CERES
500 CREAM (GRAM) TOPICAL DAILY
COMMUNITY
End: 2021-01-01

## 2020-08-11 RX ORDER — TRIAMCINOLONE ACETONIDE 40 MG/ML
40 INJECTION, SUSPENSION INTRA-ARTICULAR; INTRAMUSCULAR ONCE
Qty: 1 VIAL | Refills: 0
Start: 2020-08-11 | End: 2020-08-11

## 2020-08-11 RX ORDER — LANOLIN ALCOHOL/MO/W.PET/CERES
3 CREAM (GRAM) TOPICAL AT BEDTIME
COMMUNITY

## 2020-08-11 RX ORDER — ALLOPURINOL 100 MG/1
100 TABLET ORAL DAILY
COMMUNITY
End: 2021-01-01

## 2020-08-11 RX ORDER — METOPROLOL SUCCINATE 50 MG/1
25 TABLET, EXTENDED RELEASE ORAL
COMMUNITY
End: 2021-01-01

## 2020-08-11 RX ORDER — LOSARTAN POTASSIUM 25 MG/1
50 TABLET ORAL DAILY
COMMUNITY

## 2020-08-11 RX ORDER — SERTRALINE HYDROCHLORIDE 50 MG/1
50 TABLET, FILM COATED ORAL DAILY
COMMUNITY
End: 2021-01-01

## 2020-08-11 RX ORDER — DICLOFENAC SODIUM 10 MG/G
GEL TOPICAL
COMMUNITY
End: 2021-01-01

## 2020-08-11 NOTE — PROGRESS NOTES
Patient: Vern Ambrosio                MRN: [id number]       SSN: [id number] YOB: 1945        AGE: 77 y.o. SEX: male Body mass index is 20.34 kg/m². PCP: Ochoa Mcgovern MD 
08/11/20 CHIEF COMPLAINT: Right shoulder pain HPI: Vern Ambrosio is a 77 y.o. male patient who has multiple medical problems who presents to the office today with 7 to 8 months of right shoulder pain. He had atraumatic onset of his shoulder pain. Is worse when he tries to move his arm. He is disabled in the left arm due to a stroke. X-rays primarily uses his right arm. The pain is worse when he tries to move his arm. He has not had any surgery injections or trauma. Past Medical History:  
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) 8/14/2017  Dysphagia as late effect of cerebrovascular accident (CVA) 8/14/2017  Erectile dysfunction On Sildenafil citrate  Hemiparesis affecting left side as late effect of cerebrovascular accident (CVA) ([address] 8/14/2017  Hyperlipidemia with target LDL less than 70 8/15/2017 Lipid profile (8/15/2017) showed TG 99, , HDL 81, LDL 84  Hypertension  Ischemic cardiomyopathy 8/15/2017 EF 40%  Occlusion of right internal carotid artery 8/15/2017  On chronic clopidogrel therapy  Osteoarthritis On Etodolac and Oxycodone-Acetaminophen  Other ill-defined conditions(799.89) PVD  Peripheral artery disease [address]  Seizure, late effect of stroke ([facility] 8/15/2017 On Levetiracetam  
 Tobacco use disorder No family history on file. Current Outpatient Medications Medication Sig Dispense Refill  melatonin 3 mg tablet Take 3 mg by mouth At bedtime.  sertraline (ZOLOFT) 50 mg tablet Take 50 mg by mouth daily.  losartan (COZAAR) 25 mg tablet Take 25 mg by mouth daily.  metoprolol succinate (TOPROL-XL) 50 mg XL tablet Take 25 mg by mouth.  diclofenac (VOLTAREN) 1 % gel Apply  to affected area.  gabapentin (NEURONTIN) 300 mg capsule Take 600 mg by mouth.  cilostazoL (PLETAL) 100 mg tablet Take 100 mg by mouth.  cyanocobalamin (VITAMIN B12) 500 mcg tablet Take 500 mcg by mouth daily.  triamcinolone acetonide (KENALOG-40) 40 mg/mL injection 1 mL by Intra artICUlar route once for 1 dose. 1 Vial 0  
 acetaminophen (TYLENOL) 325 mg tablet Take 325 mg by mouth three (3) times daily as needed for Pain.  aspirin delayed-release 81 mg tablet Take 1 Tab by mouth daily. 30 Tab 0  
 ascorbic acid (VITAMIN C) 250 mg tablet Take  by mouth.  allopurinoL (ZYLOPRIM) 100 mg tablet Take 100 mg by mouth daily.  etodolac (LODINE) 400 mg tablet Take 400 mg by mouth two (2) times a day.  etodolac (LODINE) 400 mg tablet Take 400 mg by mouth two (2) times daily as needed (pain).  levETIRAcetam 1,000 mg tablet Take 1 Tab by mouth every twelve (12) hours. 60 Tab 0  
 thiamine (B-1) 100 mg tablet Take 1 Tab by mouth daily. (Patient taking differently: Take 250 mg by mouth daily.) 30 Tab 0  
 metoprolol tartrate (LOPRESSOR) 50 mg tablet Take  by mouth daily.  cholecalciferol, vitamin D3, (VITAMIN D3) 2,000 unit Tab Take  by mouth. No Known Allergies Past Surgical History:  
Procedure Laterality Date  HX ANGIOPLASTY  12/05/2012 S/P Balloon angioplasty and stent of left common iliac artery (12/5/2012 - Dr. Aria Wong) Social History Socioeconomic History  Marital status: SINGLE Spouse name: Not on file  Number of children: Not on file  Years of education: Not on file  Highest education level: Not on file Occupational History  Not on file Social Needs  Financial resource strain: Not on file  Food insecurity Worry: Not on file Inability: Not on file  Transportation needs Medical: Not on file Non-medical: Not on file Tobacco Use  Smoking status: Never Smoker  Smokeless tobacco: Never Used Substance and Sexual Activity  Alcohol use: No  
  Frequency: Never  Drug use: Not on file  Sexual activity: Not on file Lifestyle  Physical activity Days per week: Not on file Minutes per session: Not on file  Stress: Not on file Relationships  Social connections Talks on phone: Not on file Gets together: Not on file Attends [employer] service: Not on file Active member of club or organization: Not on file Attends meetings of clubs or organizations: Not on file Relationship status: Not on file  Intimate partner violence Fear of current or ex partner: Not on file Emotionally abused: Not on file Physically abused: Not on file Forced sexual activity: Not on file Other Topics Concern  Not on file Social History Narrative  Not on file REVIEW OF SYSTEMS:   
 
CON: negative for recent weight loss/gain, fever, or chills EYE: negative for double or blurry vision ENT: negative for hoarseness RS:   negative for cough, URI, SOB 
CV:  negative for chest pain, palpitations GI:    negative for blood in stool, nausea/vomiting :  negative for blood in urine MS: As per HPI Other systems reviewed and noted below. PHYSICAL EXAMINATION: 
Visit Vitals /69 (BP 1 Location: Right arm, BP Patient Position: Sitting) Pulse 60 Temp 98.8 °F (37.1 °C) (Oral) Resp 15 Ht 6' (1.829 m) Wt 150 lb (68 kg) SpO2 98% BMI 20.34 kg/m² Body mass index is 20.34 kg/m². GENERAL: Alert and oriented x3, in no acute distress, well-developed, well-nourished. HEENT: Normocephalic, atraumatic. RESP: Non labored breathing with equal chest rise on inspiration. CV: Well perfused extremities. No cyanosis or clubbing noted. ABDOMEN: Soft, non-tender, non-distended. Exam of the right shoulder with full passive range of motion. He is able to hold his arm in position when placed in forward flexion and external rotation. He has pain with supraspinatus rotator cuff testing. He also has mild pain with infraspinatus testing. No obvious pain with belly press testing. No 
Pain with impingement testing. IMAGING: 
X-rays from an outside facility were reviewed of the right shoulder which do not show any significant bony abnormalities ASSESSMENT & PLAN Diagnosis: Right shoulder rotator cuff pain Betsy Mason has likely right shoulder rotator cuff pain on exam.  I would treat this conservatively. He is not a good candidate for any surgery due to his history of stroke. I recommended a cortisone injection as well as adding physical therapy exercises to his home therapy. I will see him back as needed. [facility] 
OFFICE PROCEDURE PROGRESS NOTE Chart reviewed for the following: 
 Shannon Khalil MD, have reviewed the History, Physical and updated the Allergic reactions for [facility] TIME OUT performed immediately prior to start of procedure: 
 Shannon Khalil MD, have performed the following reviews on [facility] prior to the start of the procedure: 
         
* Patient was identified by name and date of birth * Agreement on procedure being performed was verified * Risks and Benefits explained to the patient * Procedure site verified and marked as necessary * Patient was positioned for comfort * Consent was signed and verified Time: 2:43 PM 
 
Location: Right shoulder Kenalog 40mg & 3cc Lidocaine Date of procedure: 8/11/2020 Procedure performed by:  Irene Herring MD 
 
Provider assisted by: Cassie Brandt LPN Patient assisted by: self How tolerated by patient: tolerated the procedure well with no complications Post Procedural Pain Scale: 0 - No Hurt Comments: none Electronically signed by: Irene Herring MD

## 2020-09-08 NOTE — ED PROVIDER NOTES
EMERGENCY DEPARTMENT HISTORY AND PHYSICAL EXAM 
 
Date: 9/8/2020 Patient Name: Maite Last History of Presenting Illness Chief Complaint Patient presents with  Fall History Provided By: patient Additional History (Context): Maite Last is a 28-year-old male with significant past medical history specifically stroke causing left-sided weakness presenting to the emergency department with fall. Patient lives with daughter, however states he does take care of himself to a point. States he was in the bathroom, attempting to take a shower and slipped out of his shower chair. States he is unsure if he hit his head. Denies dizziness, chest pain, shortness of breath or any other symptoms prior to slip and fall. Patient has no pain at this time and denies any symptoms of chest pain, shortness of breath, dizziness, headache at present. States he has a baseline of left-sided weakness but this is his baseline. PCP: Fahad Bernal MD 
 
Current Outpatient Medications Medication Sig Dispense Refill  allopurinoL (ZYLOPRIM) 100 mg tablet Take 100 mg by mouth daily.  melatonin 3 mg tablet Take 3 mg by mouth At bedtime.  sertraline (ZOLOFT) 50 mg tablet Take 50 mg by mouth daily.  losartan (COZAAR) 25 mg tablet Take 25 mg by mouth daily.  metoprolol succinate (TOPROL-XL) 50 mg XL tablet Take 25 mg by mouth.  diclofenac (VOLTAREN) 1 % gel Apply  to affected area.  gabapentin (NEURONTIN) 300 mg capsule Take 600 mg by mouth.  cilostazoL (PLETAL) 100 mg tablet Take 100 mg by mouth.  cyanocobalamin (VITAMIN B12) 500 mcg tablet Take 500 mcg by mouth daily.  etodolac (LODINE) 400 mg tablet Take 400 mg by mouth two (2) times a day.  acetaminophen (TYLENOL) 325 mg tablet Take 325 mg by mouth three (3) times daily as needed for Pain.  etodolac (LODINE) 400 mg tablet Take 400 mg by mouth two (2) times daily as needed (pain).  aspirin delayed-release 81 mg tablet Take 1 Tab by mouth daily. 30 Tab 0  
 levETIRAcetam 1,000 mg tablet Take 1 Tab by mouth every twelve (12) hours. 60 Tab 0  
 thiamine (B-1) 100 mg tablet Take 1 Tab by mouth daily. (Patient taking differently: Take 250 mg by mouth daily.) 30 Tab 0  
 metoprolol tartrate (LOPRESSOR) 50 mg tablet Take  by mouth daily.  cholecalciferol, vitamin D3, (VITAMIN D3) 2,000 unit Tab Take  by mouth.  ascorbic acid (VITAMIN C) 250 mg tablet Take  by mouth. Past History Past Medical History: 
Past Medical History:  
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) 8/14/2017  Dysphagia as late effect of cerebrovascular accident (CVA) 8/14/2017  Erectile dysfunction On Sildenafil citrate  Hemiparesis affecting left side as late effect of cerebrovascular accident (CVA) ([facility] [address] 8/14/2017  Hyperlipidemia with target LDL less than 70 8/15/2017 Lipid profile (8/15/2017) showed TG 99, , HDL 81, LDL 84  Hypertension  Ischemic cardiomyopathy 8/15/2017 EF 40%  Occlusion of right internal carotid artery 8/15/2017  On chronic clopidogrel therapy  Osteoarthritis On Etodolac and Oxycodone-Acetaminophen  Other ill-defined conditions(799.89) PVD  Peripheral artery disease ([address]  Seizure, late effect of stroke ([address] 8/15/2017 On Levetiracetam  
 Tobacco use disorder Past Surgical History: 
Past Surgical History:  
Procedure Laterality Date  HX ANGIOPLASTY  12/05/2012 S/P Balloon angioplasty and stent of left common iliac artery (12/5/2012 - Dr. Pinzon Members) Family History: No family history on file. Social History: 
Social History Tobacco Use  Smoking status: Never Smoker  Smokeless tobacco: Never Used Substance Use Topics  Alcohol use: No  
  Frequency: Never  Drug use: Not on file Allergies: 
No Known Allergies Review of Systems Review of Systems Constitutional: Negative for chills and fever. HENT: Negative for nasal congestion, sore throat, rhinorrhea Eyes: Negative. Respiratory: Negative for cough and negative for shortness of breath. Cardiovascular: Negative for chest pain and palpitations. Gastrointestinal: Negative for abdominal pain, constipation, diarrhea, nausea and vomiting. Genitourinary: Negative. Negative for difficulty urinating and flank pain. Musculoskeletal: Negative for back pain. Negative for gait problem and neck pain. Skin: Negative. Allergic/Immunologic: Negative. Neurological: Negative for dizziness, weakness, numbness and headaches. Psychiatric/Behavioral: Negative. All other systems reviewed and are negative. All Other Systems Negative Physical Exam  
 
Vitals:  
 09/08/20 0721 BP: 132/77 Pulse: 62 Resp: 16 Temp: 98 °F (36.7 °C) SpO2: 100% Physical Exam 
Vitals signs and nursing note reviewed. Constitutional:   
   General: He is not in acute distress. Appearance: He is well-developed. He is not diaphoretic. Comments: NAD, well hydrated, non toxic HENT:  
   Head: Normocephalic and atraumatic. Nose: Nose normal.  
   Mouth/Throat:  
   Pharynx: No oropharyngeal exudate. Eyes:  
   Conjunctiva/sclera: Conjunctivae normal.  
   Pupils: Pupils are equal, round, and reactive to light. Neck: Musculoskeletal: Normal range of motion and neck supple. No neck rigidity or muscular tenderness. Vascular: No carotid bruit. Cardiovascular:  
   Rate and Rhythm: Normal rate and regular rhythm. Heart sounds: Normal heart sounds. No murmur. Pulmonary:  
   Effort: Pulmonary effort is normal. No respiratory distress. Breath sounds: Normal breath sounds. No wheezing or rales. Abdominal:  
   General: There is no distension. Palpations: Abdomen is soft. Tenderness: There is no abdominal tenderness. There is no guarding. Musculoskeletal: Normal range of motion. Lymphadenopathy:  
   Cervical: No cervical adenopathy. Skin: 
   General: Skin is warm. Findings: No rash. Neurological:  
   General: No focal deficit present. Mental Status: He is alert. Mental status is at baseline. Cranial Nerves: No cranial nerve deficit. Sensory: No sensory deficit. Motor: No weakness. Coordination: Coordination normal.  
   Gait: Gait normal.  
Psychiatric:     
   Behavior: Behavior normal.  
 
 
 
 
Diagnostic Study Results Labs - No results found for this or any previous visit (from the past 12 hour(s)). Radiologic Studies -  
CT HEAD WO CONT Final Result IMPRESSION:  
  
1. No acute intracranial abnormality. 2. Moderate microvascular disease of white matter and remote infarctions. Thank you for your referral.  
  
CT SPINE CERV WO CONT Final Result IMPRESSION:  
  
No CT evidence of acute C-spine injury seen. Multilevel moderate to severe degenerative changes since C-spine as above. Thank you for your referral.  
  
 
CT Results  (Last 48 hours) 09/08/20 0953  CT HEAD WO CONT Final result Impression:  IMPRESSION:  
   
1. No acute intracranial abnormality. 2. Moderate microvascular disease of white matter and remote infarctions.   
   
Thank you for your referral.  
  
 Narrative:  CT of the head without contrast  
   
HISTORY: Trauma. COMPARISON: 9/28/17 TECHNIQUE: Helical axial scan to the head was performed from the skull base to  
the vertex without IV contrast administration. All CT scans at this facility performed using dose optimization techniques as  
appreciated to a performed exam, to include automated exposure control,  
adjustment of the mA and or KU according to patient size (including appropriate  
matching for site specific examination), or use of iterative reconstruction  
technique. FINDINGS: Mild global atrophy identified. Several remote infarctions also seen  
with hypodensity and better defined border in right parietal lobe and  
periventricular right deep white matter with associated negative mass effect to  
the right lateral ventricle. Superimposed moderate periventricular white matter  
chronic ischemic small vessel disease. There is no evidence of acute  
intracranial hemorrhage, mass effect or midline shift identified. No skull  
fracture or extra axial fluid collections identified. Visualized sinuses and  
mastoid air cells appear unremarkable. 09/08/20 0953  CT SPINE CERV WO CONT Final result Impression:  IMPRESSION:  
   
No CT evidence of acute C-spine injury seen. Multilevel moderate to severe degenerative changes since C-spine as above. Thank you for your referral.  
  
 Narrative:  CT cervical spine without contrast   
   
HISTORY: Trauma. COMPARISON: None. TECHNIQUE: Helical axial images to the cervical spine are obtained without  
intrathecal contrast. Sagittal and coronal reconstructions were obtained to  
better evaluate alignment, disc space heights, interfacet relations and the  
vertebral integrity.   
   
All CT scans at this facility performed using dose optimization techniques as  
appreciated to a performed exam, to include automated exposure control,  
 adjustment of the mA and or KU according to patient size (including appropriate  
matching for site specific examination), or use of iterative reconstruction  
technique. FINDINGS: The vertebral column and alignment of the cervical spine are within  
normal limits. No evidence of acute compression fracture or listhesis  
identified. The odontoid and C1-2 relationship appear within normal.  There are  
multilevel and significant degenerative changes identified throughout C-spine,  
including moderate disc space narrowing, moderate endplate sclerosis and  
anterior and posterior osteophytes. There are mild to moderate ventral canal  
stenosis and moderate to severe bony foramina stenosis demonstrated, severe at C4-5 through C7-T1. The prevertebral soft tissue space appears unremarkable. CXR Results  (Last 48 hours) None Medical Decision Making I am the first provider for this patient. I reviewed the vital signs, available nursing notes, past medical history, past surgical history, family history and social history. Vital Signs-Reviewed the patient's vital signs. Records Reviewed: Nursing notes, old medical records and any previous labs, imaging, visits, consultations pertinent to patient care Procedures: 
Procedures ED Course: Progress Notes, Reevaluation, and Consults: 
 
 
Provider Notes (Medical Decision Making):  
Medications ordered:  
Medications - No data to display Lab findings: 
No results found for this or any previous visit (from the past 12 hour(s)). X-Ray, CT or other radiology findings or impressions: 
Ct Head Wo Cont Result Date: 9/8/2020 CT of the head without contrast HISTORY: Trauma. COMPARISON: 9/27/29 TECHNIQUE: Helical axial scan to the head was performed from the skull base to the vertex without IV contrast administration.  All CT scans at this facility performed using dose optimization techniques as appreciated to a performed exam, to include automated exposure control, adjustment of the mA and or KU according to patient size (including appropriate matching for site specific examination), or use of iterative reconstruction technique. FINDINGS: Mild global atrophy identified. Several remote infarctions also seen with hypodensity and better defined border in right parietal lobe and periventricular right deep white matter with associated negative mass effect to the right lateral ventricle. Superimposed moderate periventricular white matter chronic ischemic small vessel disease. There is no evidence of acute intracranial hemorrhage, mass effect or midline shift identified. No skull fracture or extra axial fluid collections identified. Visualized sinuses and mastoid air cells appear unremarkable. IMPRESSION: 1. No acute intracranial abnormality. 2. Moderate microvascular disease of white matter and remote infarctions. Thank you for your referral. 
 
Ct Spine Cerv Wo Cont Result Date: 9/8/2020 CT cervical spine without contrast HISTORY: Trauma. COMPARISON: None. TECHNIQUE: Helical axial images to the cervical spine are obtained without intrathecal contrast. Sagittal and coronal reconstructions were obtained to better evaluate alignment, disc space heights, interfacet relations and the vertebral integrity. All CT scans at this facility performed using dose optimization techniques as appreciated to a performed exam, to include automated exposure control, adjustment of the mA and or KU according to patient size (including appropriate matching for site specific examination), or use of iterative reconstruction technique. FINDINGS: The vertebral column and alignment of the cervical spine are within normal limits. No evidence of acute compression fracture or listhesis identified.  The odontoid and C1-2 relationship appear within normal.  There are multilevel and significant degenerative changes identified throughout C-spine, including moderate disc space narrowing, moderate endplate sclerosis and anterior and posterior osteophytes. There are mild to moderate ventral canal stenosis and moderate to severe bony foramina stenosis demonstrated, severe at C4-5 through C7-T1. The prevertebral soft tissue space appears unremarkable. IMPRESSION: No CT evidence of acute C-spine injury seen. Multilevel moderate to severe degenerative changes since C-spine as above. Thank you for your referral. 
  
 
Progress notes, Consult notes or additional Procedure notes:  
 
Images reviewed, NAP. Pt is asking to go home. Daughter in [place] waiting to take home. No sx or findings to warrant work up. Per family/patient mechanical fall. Dispo: 
Patient was home in stable condition. Return to the ER if you are unable to obtain referral as directed. 
  
  
Results have been reviewed with patient and/or caretaker. They have been counseled regarding the diagnosis, treatment, and plan. They verbally convey understanding and agreement of the signs, symptoms, diagnosis, treatment and prognosis and additionally agrees to follow up as discussed. They also agrees with the care-plan and conveys that all of her questions have been answered. I have also provided discharge instructions for her that include: educational information regarding their diagnosis and treatment, and list of reasons why they would want to return to the ED prior to their follow-up appointment, should the condition change. Alba Marvin PA-C Diagnosis: 1. Fall, initial encounter Follow-up Information Follow up With Specialties Details Why Contact Info Lavon Alexander MD Family Medicine   [address] [facility] [address] [id number] 
[phone] [facility] EMERGENCY DEPT Emergency Medicine   [address] [place] [id number] 
[phone] Discharge Medication List as of 9/8/2020 12:24 PM  
  
 CONTINUE these medications which have NOT CHANGED Details  
allopurinoL (ZYLOPRIM) 100 mg tablet Take 100 mg by mouth daily. , Historical Med  
  
melatonin 3 mg tablet Take 3 mg by mouth At bedtime. , Historical Med  
  
sertraline (ZOLOFT) 50 mg tablet Take 50 mg by mouth daily. , Historical Med  
  
losartan (COZAAR) 25 mg tablet Take 25 mg by mouth daily. , Historical Med  
  
metoprolol succinate (TOPROL-XL) 50 mg XL tablet Take 25 mg by mouth., Historical Med  
  
diclofenac (VOLTAREN) 1 % gel Apply  to affected area., Historical Med  
  
gabapentin (NEURONTIN) 300 mg capsule Take 600 mg by mouth., Historical Med  
  
cilostazoL (PLETAL) 100 mg tablet Take 100 mg by mouth., Historical Med  
  
cyanocobalamin (VITAMIN B12) 500 mcg tablet Take 500 mcg by mouth daily. , Historical Med  
  
!! etodolac (LODINE) 400 mg tablet Take 400 mg by mouth two (2) times a day., Historical Med  
  
acetaminophen (TYLENOL) 325 mg tablet Take 325 mg by mouth three (3) times daily as needed for Pain., Historical Med  
  
!! etodolac (LODINE) 400 mg tablet Take 400 mg by mouth two (2) times daily as needed (pain). , Historical Med  
  
aspirin delayed-release 81 mg tablet Take 1 Tab by mouth daily. , Print, Disp-30 Tab, R-0  
  
levETIRAcetam 1,000 mg tablet Take 1 Tab by mouth every twelve (12) hours. , No Print, Disp-60 Tab, R-0  
  
thiamine (B-1) 100 mg tablet Take 1 Tab by mouth daily. , No Print, Disp-30 Tab, R-0  
  
metoprolol tartrate (LOPRESSOR) 50 mg tablet Take  by mouth daily. , Historical Med  
  
cholecalciferol, vitamin D3, (VITAMIN D3) 2,000 unit Tab Take  by mouth.  , Historical Med  
  
ascorbic acid (VITAMIN C) 250 mg tablet Take  by mouth.  , Historical Med  
  
 !! - Potential duplicate medications found. Please discuss with provider. MED RECONCILIATION: 
No current facility-administered medications for this encounter. Current Outpatient Medications Medication Sig  
  allopurinoL (ZYLOPRIM) 100 mg tablet Take 100 mg by mouth daily.  melatonin 3 mg tablet Take 3 mg by mouth At bedtime.  sertraline (ZOLOFT) 50 mg tablet Take 50 mg by mouth daily.  losartan (COZAAR) 25 mg tablet Take 25 mg by mouth daily.  metoprolol succinate (TOPROL-XL) 50 mg XL tablet Take 25 mg by mouth.  diclofenac (VOLTAREN) 1 % gel Apply  to affected area.  gabapentin (NEURONTIN) 300 mg capsule Take 600 mg by mouth.  cilostazoL (PLETAL) 100 mg tablet Take 100 mg by mouth.  cyanocobalamin (VITAMIN B12) 500 mcg tablet Take 500 mcg by mouth daily.  etodolac (LODINE) 400 mg tablet Take 400 mg by mouth two (2) times a day.  acetaminophen (TYLENOL) 325 mg tablet Take 325 mg by mouth three (3) times daily as needed for Pain.  etodolac (LODINE) 400 mg tablet Take 400 mg by mouth two (2) times daily as needed (pain).  aspirin delayed-release 81 mg tablet Take 1 Tab by mouth daily.  levETIRAcetam 1,000 mg tablet Take 1 Tab by mouth every twelve (12) hours.  thiamine (B-1) 100 mg tablet Take 1 Tab by mouth daily. (Patient taking differently: Take 250 mg by mouth daily.)  metoprolol tartrate (LOPRESSOR) 50 mg tablet Take  by mouth daily.  cholecalciferol, vitamin D3, (VITAMIN D3) 2,000 unit Tab Take  by mouth.  ascorbic acid (VITAMIN C) 250 mg tablet Take  by mouth.

## 2020-09-08 NOTE — ED NOTES
Pt discharge instructions reviewed with patient's caregiver (daughter), caregiver denies questions and verbalizes understanding. All belongings with patient. Pt alert, no signs of distress.

## 2021-01-01 ENCOUNTER — APPOINTMENT (OUTPATIENT)
Dept: CT IMAGING | Age: 76
DRG: 689 | End: 2021-01-01
Attending: EMERGENCY MEDICINE
Payer: MEDICARE

## 2021-01-01 ENCOUNTER — HOSPITAL ENCOUNTER (INPATIENT)
Age: 76
LOS: 2 days | Discharge: HOME HEALTH | DRG: 300 | End: 2021-08-09
Attending: EMERGENCY MEDICINE | Admitting: INTERNAL MEDICINE
Payer: MEDICARE

## 2021-01-01 ENCOUNTER — APPOINTMENT (OUTPATIENT)
Dept: GENERAL RADIOLOGY | Age: 76
End: 2021-01-01
Attending: PHYSICIAN ASSISTANT
Payer: OTHER GOVERNMENT

## 2021-01-01 ENCOUNTER — HOSPITAL ENCOUNTER (EMERGENCY)
Age: 76
Discharge: LEFT BEFORE DISCHARGE | End: 2021-01-23
Payer: MEDICARE

## 2021-01-01 ENCOUNTER — APPOINTMENT (OUTPATIENT)
Dept: CT IMAGING | Age: 76
DRG: 300 | End: 2021-01-01
Attending: EMERGENCY MEDICINE
Payer: MEDICARE

## 2021-01-01 ENCOUNTER — APPOINTMENT (OUTPATIENT)
Dept: GENERAL RADIOLOGY | Age: 76
DRG: 300 | End: 2021-01-01
Attending: NURSE PRACTITIONER
Payer: MEDICARE

## 2021-01-01 ENCOUNTER — APPOINTMENT (OUTPATIENT)
Dept: CT IMAGING | Age: 76
DRG: 300 | End: 2021-01-01
Attending: INTERNAL MEDICINE
Payer: MEDICARE

## 2021-01-01 ENCOUNTER — PATIENT OUTREACH (OUTPATIENT)
Dept: CASE MANAGEMENT | Age: 76
End: 2021-01-01

## 2021-01-01 ENCOUNTER — ANESTHESIA EVENT (OUTPATIENT)
Dept: SURGERY | Age: 76
DRG: 981 | End: 2021-01-01
Payer: MEDICARE

## 2021-01-01 ENCOUNTER — HOSPITAL ENCOUNTER (EMERGENCY)
Age: 76
Discharge: HOME | End: 2021-02-27
Attending: EMERGENCY MEDICINE
Payer: OTHER GOVERNMENT

## 2021-01-01 ENCOUNTER — APPOINTMENT (OUTPATIENT)
Dept: GENERAL RADIOLOGY | Age: 76
DRG: 981 | End: 2021-01-01
Attending: INTERNAL MEDICINE
Payer: MEDICARE

## 2021-01-01 ENCOUNTER — APPOINTMENT (OUTPATIENT)
Dept: CT IMAGING | Age: 76
DRG: 689 | End: 2021-01-01
Attending: INTERNAL MEDICINE
Payer: MEDICARE

## 2021-01-01 ENCOUNTER — APPOINTMENT (OUTPATIENT)
Dept: VASCULAR SURGERY | Age: 76
DRG: 300 | End: 2021-01-01
Attending: NURSE PRACTITIONER
Payer: MEDICARE

## 2021-01-01 ENCOUNTER — HOSPITAL ENCOUNTER (INPATIENT)
Age: 76
LOS: 10 days | Discharge: HOME HEALTH | DRG: 689 | End: 2021-03-22
Attending: EMERGENCY MEDICINE | Admitting: FAMILY MEDICINE
Payer: MEDICARE

## 2021-01-01 ENCOUNTER — ANESTHESIA (OUTPATIENT)
Dept: SURGERY | Age: 76
DRG: 981 | End: 2021-01-01
Payer: MEDICARE

## 2021-01-01 ENCOUNTER — APPOINTMENT (OUTPATIENT)
Dept: GENERAL RADIOLOGY | Age: 76
DRG: 689 | End: 2021-01-01
Attending: EMERGENCY MEDICINE
Payer: MEDICARE

## 2021-01-01 ENCOUNTER — APPOINTMENT (OUTPATIENT)
Dept: GENERAL RADIOLOGY | Age: 76
DRG: 981 | End: 2021-01-01
Attending: PHYSICIAN ASSISTANT
Payer: MEDICARE

## 2021-01-01 ENCOUNTER — HOSPITAL ENCOUNTER (INPATIENT)
Dept: INTERVENTIONAL RADIOLOGY/VASCULAR | Age: 76
Discharge: HOME | DRG: 981 | End: 2021-08-31
Attending: PHYSICIAN ASSISTANT
Payer: MEDICARE

## 2021-01-01 ENCOUNTER — HOSPITAL ENCOUNTER (INPATIENT)
Age: 76
LOS: 8 days | DRG: 981 | End: 2021-09-02
Attending: STUDENT IN AN ORGANIZED HEALTH CARE EDUCATION/TRAINING PROGRAM | Admitting: FAMILY MEDICINE
Payer: MEDICARE

## 2021-01-01 ENCOUNTER — ANESTHESIA (OUTPATIENT)
Dept: ENDOSCOPY | Age: 76
DRG: 689 | End: 2021-01-01
Payer: MEDICARE

## 2021-01-01 ENCOUNTER — ANESTHESIA EVENT (OUTPATIENT)
Dept: ENDOSCOPY | Age: 76
DRG: 689 | End: 2021-01-01
Payer: MEDICARE

## 2021-01-01 ENCOUNTER — APPOINTMENT (OUTPATIENT)
Dept: CT IMAGING | Age: 76
End: 2021-01-01
Attending: EMERGENCY MEDICINE
Payer: OTHER GOVERNMENT

## 2021-01-01 ENCOUNTER — APPOINTMENT (OUTPATIENT)
Dept: MRI IMAGING | Age: 76
DRG: 300 | End: 2021-01-01
Attending: PODIATRIST
Payer: MEDICARE

## 2021-01-01 ENCOUNTER — APPOINTMENT (OUTPATIENT)
Dept: VASCULAR SURGERY | Age: 76
DRG: 300 | End: 2021-01-01
Attending: PODIATRIST
Payer: MEDICARE

## 2021-01-01 ENCOUNTER — APPOINTMENT (OUTPATIENT)
Dept: CT IMAGING | Age: 76
DRG: 981 | End: 2021-01-01
Attending: PHYSICIAN ASSISTANT
Payer: MEDICARE

## 2021-01-01 VITALS
BODY MASS INDEX: 20.34 KG/M2 | DIASTOLIC BLOOD PRESSURE: 70 MMHG | OXYGEN SATURATION: 99 % | WEIGHT: 150 LBS | TEMPERATURE: 98.3 F | SYSTOLIC BLOOD PRESSURE: 165 MMHG | RESPIRATION RATE: 18 BRPM | HEART RATE: 71 BPM

## 2021-01-01 VITALS
RESPIRATION RATE: 18 BRPM | BODY MASS INDEX: 18.69 KG/M2 | SYSTOLIC BLOOD PRESSURE: 110 MMHG | OXYGEN SATURATION: 94 % | DIASTOLIC BLOOD PRESSURE: 69 MMHG | TEMPERATURE: 98.4 F | HEIGHT: 72 IN | HEART RATE: 64 BPM | WEIGHT: 138 LBS

## 2021-01-01 VITALS
OXYGEN SATURATION: 96 % | WEIGHT: 150.1 LBS | SYSTOLIC BLOOD PRESSURE: 151 MMHG | HEART RATE: 69 BPM | DIASTOLIC BLOOD PRESSURE: 64 MMHG | RESPIRATION RATE: 16 BRPM | HEIGHT: 72 IN | TEMPERATURE: 98.5 F | BODY MASS INDEX: 20.33 KG/M2

## 2021-01-01 VITALS
OXYGEN SATURATION: 96 % | SYSTOLIC BLOOD PRESSURE: 150 MMHG | HEART RATE: 62 BPM | RESPIRATION RATE: 19 BRPM | HEIGHT: 72 IN | DIASTOLIC BLOOD PRESSURE: 80 MMHG | BODY MASS INDEX: 20.16 KG/M2 | TEMPERATURE: 97 F | WEIGHT: 148.81 LBS

## 2021-01-01 DIAGNOSIS — Z86.73 HISTORY OF CVA (CEREBROVASCULAR ACCIDENT): ICD-10-CM

## 2021-01-01 DIAGNOSIS — I10 ELEVATED BLOOD PRESSURE READING WITH DIAGNOSIS OF HYPERTENSION: ICD-10-CM

## 2021-01-01 DIAGNOSIS — E87.6 HYPOKALEMIA: ICD-10-CM

## 2021-01-01 DIAGNOSIS — I69.322 DYSARTHRIA AS LATE EFFECT OF CEREBROVASCULAR ACCIDENT (CVA): ICD-10-CM

## 2021-01-01 DIAGNOSIS — N12 PYELONEPHRITIS: ICD-10-CM

## 2021-01-01 DIAGNOSIS — N17.9 AKI (ACUTE KIDNEY INJURY) (HCC): ICD-10-CM

## 2021-01-01 DIAGNOSIS — Z71.89 GOALS OF CARE, COUNSELING/DISCUSSION: ICD-10-CM

## 2021-01-01 DIAGNOSIS — N39.0 URINARY TRACT INFECTION WITH HEMATURIA, SITE UNSPECIFIED: ICD-10-CM

## 2021-01-01 DIAGNOSIS — R53.83 FATIGUE, UNSPECIFIED TYPE: Primary | ICD-10-CM

## 2021-01-01 DIAGNOSIS — R53.81 DEBILITY: ICD-10-CM

## 2021-01-01 DIAGNOSIS — A41.9 SEPSIS WITHOUT ACUTE ORGAN DYSFUNCTION, DUE TO UNSPECIFIED ORGANISM (HCC): Primary | ICD-10-CM

## 2021-01-01 DIAGNOSIS — N30.00 ACUTE CYSTITIS WITHOUT HEMATURIA: ICD-10-CM

## 2021-01-01 DIAGNOSIS — R31.9 URINARY TRACT INFECTION WITH HEMATURIA, SITE UNSPECIFIED: ICD-10-CM

## 2021-01-01 DIAGNOSIS — R40.4 ALTERATION OF CONSCIOUSNESS: Primary | ICD-10-CM

## 2021-01-01 DIAGNOSIS — I73.9 PERIPHERAL ARTERY DISEASE (HCC): Chronic | ICD-10-CM

## 2021-01-01 DIAGNOSIS — I69.391 DYSPHAGIA AS LATE EFFECT OF CEREBROVASCULAR ACCIDENT (CVA): ICD-10-CM

## 2021-01-01 DIAGNOSIS — I70.229 CRITICAL LOWER LIMB ISCHEMIA (HCC): Primary | ICD-10-CM

## 2021-01-01 DIAGNOSIS — E43 SEVERE PROTEIN-CALORIE MALNUTRITION (HCC): ICD-10-CM

## 2021-01-01 LAB
ABO + RH BLD: NORMAL
ABO + RH BLD: NORMAL
ALBUMIN SERPL-MCNC: 1.7 G/DL (ref 3.4–5)
ALBUMIN SERPL-MCNC: 1.7 G/DL (ref 3.4–5)
ALBUMIN SERPL-MCNC: 1.8 G/DL (ref 3.4–5)
ALBUMIN SERPL-MCNC: 1.8 G/DL (ref 3.4–5)
ALBUMIN SERPL-MCNC: 3.1 G/DL (ref 3.4–5)
ALBUMIN SERPL-MCNC: 3.2 G/DL (ref 3.4–5)
ALBUMIN SERPL-MCNC: 3.6 G/DL (ref 3.4–5)
ALBUMIN SERPL-MCNC: 3.6 G/DL (ref 3.4–5)
ALBUMIN/GLOB SERPL: 0.3 {RATIO} (ref 0.8–1.7)
ALBUMIN/GLOB SERPL: 0.4 {RATIO} (ref 0.8–1.7)
ALBUMIN/GLOB SERPL: 0.6 {RATIO} (ref 0.8–1.7)
ALBUMIN/GLOB SERPL: 0.7 {RATIO} (ref 0.8–1.7)
ALBUMIN/GLOB SERPL: 0.7 {RATIO} (ref 0.8–1.7)
ALBUMIN/GLOB SERPL: 0.8 {RATIO} (ref 0.8–1.7)
ALP SERPL-CCNC: 100 U/L (ref 45–117)
ALP SERPL-CCNC: 242 U/L (ref 45–117)
ALP SERPL-CCNC: 254 U/L (ref 45–117)
ALP SERPL-CCNC: 294 U/L (ref 45–117)
ALP SERPL-CCNC: 350 U/L (ref 45–117)
ALP SERPL-CCNC: 76 U/L (ref 45–117)
ALP SERPL-CCNC: 89 U/L (ref 45–117)
ALP SERPL-CCNC: 95 U/L (ref 45–117)
ALT SERPL-CCNC: 17 U/L (ref 16–61)
ALT SERPL-CCNC: 205 U/L (ref 16–61)
ALT SERPL-CCNC: 215 U/L (ref 16–61)
ALT SERPL-CCNC: 218 U/L (ref 16–61)
ALT SERPL-CCNC: 25 U/L (ref 16–61)
ALT SERPL-CCNC: 275 U/L (ref 16–61)
ALT SERPL-CCNC: 59 U/L (ref 16–61)
ALT SERPL-CCNC: 74 U/L (ref 16–61)
AMMONIA PLAS-SCNC: 11 UMOL/L (ref 11–32)
AMPHET UR QL SCN: NEGATIVE
ANION GAP SERPL CALC-SCNC: 10 MMOL/L (ref 3–18)
ANION GAP SERPL CALC-SCNC: 10 MMOL/L (ref 3–18)
ANION GAP SERPL CALC-SCNC: 4 MMOL/L (ref 3–18)
ANION GAP SERPL CALC-SCNC: 5 MMOL/L (ref 3–18)
ANION GAP SERPL CALC-SCNC: 5 MMOL/L (ref 3–18)
ANION GAP SERPL CALC-SCNC: 6 MMOL/L (ref 3–18)
ANION GAP SERPL CALC-SCNC: 7 MMOL/L (ref 3–18)
ANION GAP SERPL CALC-SCNC: 8 MMOL/L (ref 3–18)
ANION GAP SERPL CALC-SCNC: 9 MMOL/L (ref 3–18)
APAP SERPL-MCNC: <2 UG/ML (ref 10–30)
APPEARANCE UR: ABNORMAL
APPEARANCE UR: CLEAR
APTT PPP: 37.8 SEC (ref 23–36.4)
APTT PPP: >180 SEC (ref 23–36.4)
AST SERPL-CCNC: 16 U/L (ref 10–38)
AST SERPL-CCNC: 176 U/L (ref 10–38)
AST SERPL-CCNC: 184 U/L (ref 10–38)
AST SERPL-CCNC: 19 U/L (ref 10–38)
AST SERPL-CCNC: 191 U/L (ref 10–38)
AST SERPL-CCNC: 254 U/L (ref 10–38)
AST SERPL-CCNC: 48 U/L (ref 10–38)
AST SERPL-CCNC: 62 U/L (ref 10–38)
ATRIAL RATE: 73 BPM
ATRIAL RATE: 78 BPM
ATRIAL RATE: 92 BPM
ATRIAL RATE: 97 BPM
BACTERIA SPEC CULT: ABNORMAL
BACTERIA SPEC CULT: NORMAL
BACTERIA URNS QL MICRO: ABNORMAL /HPF
BACTERIA URNS QL MICRO: ABNORMAL /HPF
BARBITURATES UR QL SCN: NEGATIVE
BASOPHILS # BLD: 0 K/UL (ref 0–0.1)
BASOPHILS NFR BLD: 0 % (ref 0–2)
BASOPHILS NFR BLD: 1 % (ref 0–2)
BENZODIAZ UR QL: NEGATIVE
BILIRUB DIRECT SERPL-MCNC: 0.2 MG/DL (ref 0–0.2)
BILIRUB SERPL-MCNC: 0.2 MG/DL (ref 0.2–1)
BILIRUB SERPL-MCNC: 0.2 MG/DL (ref 0.2–1)
BILIRUB SERPL-MCNC: 0.3 MG/DL (ref 0.2–1)
BILIRUB SERPL-MCNC: 0.4 MG/DL (ref 0.2–1)
BILIRUB SERPL-MCNC: 0.6 MG/DL (ref 0.2–1)
BILIRUB SERPL-MCNC: 0.9 MG/DL (ref 0.2–1)
BILIRUB UR QL: ABNORMAL
BILIRUB UR QL: NEGATIVE
BLD PROD TYP BPU: NORMAL
BLOOD GROUP ANTIBODIES SERPL: NORMAL
BLOOD GROUP ANTIBODIES SERPL: NORMAL
BNP SERPL-MCNC: 1454 PG/ML (ref 0–900)
BPU ID: NORMAL
BUN SERPL-MCNC: 11 MG/DL (ref 7–18)
BUN SERPL-MCNC: 11 MG/DL (ref 7–18)
BUN SERPL-MCNC: 2 MG/DL (ref 7–18)
BUN SERPL-MCNC: 20 MG/DL (ref 7–18)
BUN SERPL-MCNC: 22 MG/DL (ref 7–18)
BUN SERPL-MCNC: 23 MG/DL (ref 7–18)
BUN SERPL-MCNC: 24 MG/DL (ref 7–18)
BUN SERPL-MCNC: 25 MG/DL (ref 7–18)
BUN SERPL-MCNC: 27 MG/DL (ref 7–18)
BUN SERPL-MCNC: 3 MG/DL (ref 7–18)
BUN SERPL-MCNC: 3 MG/DL (ref 7–18)
BUN SERPL-MCNC: 35 MG/DL (ref 7–18)
BUN SERPL-MCNC: 37 MG/DL (ref 7–18)
BUN SERPL-MCNC: 4 MG/DL (ref 7–18)
BUN SERPL-MCNC: 41 MG/DL (ref 7–18)
BUN SERPL-MCNC: 5 MG/DL (ref 7–18)
BUN SERPL-MCNC: 5 MG/DL (ref 7–18)
BUN SERPL-MCNC: 59 MG/DL (ref 7–18)
BUN SERPL-MCNC: 6 MG/DL (ref 7–18)
BUN SERPL-MCNC: 9 MG/DL (ref 7–18)
BUN/CREAT SERPL: 10 (ref 12–20)
BUN/CREAT SERPL: 14 (ref 12–20)
BUN/CREAT SERPL: 16 (ref 12–20)
BUN/CREAT SERPL: 21 (ref 12–20)
BUN/CREAT SERPL: 27 (ref 12–20)
BUN/CREAT SERPL: 27 (ref 12–20)
BUN/CREAT SERPL: 28 (ref 12–20)
BUN/CREAT SERPL: 29 (ref 12–20)
BUN/CREAT SERPL: 3 (ref 12–20)
BUN/CREAT SERPL: 30 (ref 12–20)
BUN/CREAT SERPL: 33 (ref 12–20)
BUN/CREAT SERPL: 34 (ref 12–20)
BUN/CREAT SERPL: 37 (ref 12–20)
BUN/CREAT SERPL: 39 (ref 12–20)
BUN/CREAT SERPL: 4 (ref 12–20)
BUN/CREAT SERPL: 43 (ref 12–20)
BUN/CREAT SERPL: 5 (ref 12–20)
BUN/CREAT SERPL: 7 (ref 12–20)
BUN/CREAT SERPL: 9 (ref 12–20)
BUN/CREAT SERPL: 9 (ref 12–20)
CALCIUM SERPL-MCNC: 10.1 MG/DL (ref 8.5–10.1)
CALCIUM SERPL-MCNC: 7.9 MG/DL (ref 8.5–10.1)
CALCIUM SERPL-MCNC: 7.9 MG/DL (ref 8.5–10.1)
CALCIUM SERPL-MCNC: 8 MG/DL (ref 8.5–10.1)
CALCIUM SERPL-MCNC: 8.1 MG/DL (ref 8.5–10.1)
CALCIUM SERPL-MCNC: 8.2 MG/DL (ref 8.5–10.1)
CALCIUM SERPL-MCNC: 8.2 MG/DL (ref 8.5–10.1)
CALCIUM SERPL-MCNC: 8.3 MG/DL (ref 8.5–10.1)
CALCIUM SERPL-MCNC: 8.3 MG/DL (ref 8.5–10.1)
CALCIUM SERPL-MCNC: 8.4 MG/DL (ref 8.5–10.1)
CALCIUM SERPL-MCNC: 8.5 MG/DL (ref 8.5–10.1)
CALCIUM SERPL-MCNC: 8.6 MG/DL (ref 8.5–10.1)
CALCIUM SERPL-MCNC: 8.6 MG/DL (ref 8.5–10.1)
CALCIUM SERPL-MCNC: 8.7 MG/DL (ref 8.5–10.1)
CALCIUM SERPL-MCNC: 8.9 MG/DL (ref 8.5–10.1)
CALCIUM SERPL-MCNC: 8.9 MG/DL (ref 8.5–10.1)
CALCIUM SERPL-MCNC: 9.1 MG/DL (ref 8.5–10.1)
CALCIUM SERPL-MCNC: 9.4 MG/DL (ref 8.5–10.1)
CALCIUM SERPL-MCNC: 9.6 MG/DL (ref 8.5–10.1)
CALCIUM SERPL-MCNC: 9.7 MG/DL (ref 8.5–10.1)
CALCULATED R AXIS, ECG10: -67 DEGREES
CALCULATED R AXIS, ECG10: -68 DEGREES
CALCULATED R AXIS, ECG10: -76 DEGREES
CALCULATED R AXIS, ECG10: -84 DEGREES
CALCULATED T AXIS, ECG11: 104 DEGREES
CALCULATED T AXIS, ECG11: 105 DEGREES
CALCULATED T AXIS, ECG11: 122 DEGREES
CALCULATED T AXIS, ECG11: 92 DEGREES
CALLED TO:,BCALL1: NORMAL
CANNABINOIDS UR QL SCN: NEGATIVE
CC UR VC: NORMAL
CHLORIDE SERPL-SCNC: 102 MMOL/L (ref 100–111)
CHLORIDE SERPL-SCNC: 105 MMOL/L (ref 100–111)
CHLORIDE SERPL-SCNC: 105 MMOL/L (ref 100–111)
CHLORIDE SERPL-SCNC: 107 MMOL/L (ref 100–111)
CHLORIDE SERPL-SCNC: 108 MMOL/L (ref 100–111)
CHLORIDE SERPL-SCNC: 109 MMOL/L (ref 100–111)
CHLORIDE SERPL-SCNC: 110 MMOL/L (ref 100–111)
CHLORIDE SERPL-SCNC: 111 MMOL/L (ref 100–111)
CHLORIDE SERPL-SCNC: 113 MMOL/L (ref 100–111)
CHLORIDE SERPL-SCNC: 94 MMOL/L (ref 100–111)
CHLORIDE SERPL-SCNC: 98 MMOL/L (ref 100–111)
CHLORIDE SERPL-SCNC: 98 MMOL/L (ref 100–111)
CHLORIDE SERPL-SCNC: 99 MMOL/L (ref 100–111)
CK MB CFR SERPL CALC: NORMAL % (ref 0–4)
CK MB SERPL-MCNC: <1 NG/ML (ref 5–25)
CK SERPL-CCNC: 182 U/L (ref 39–308)
CO2 SERPL-SCNC: 19 MMOL/L (ref 21–32)
CO2 SERPL-SCNC: 19 MMOL/L (ref 21–32)
CO2 SERPL-SCNC: 20 MMOL/L (ref 21–32)
CO2 SERPL-SCNC: 21 MMOL/L (ref 21–32)
CO2 SERPL-SCNC: 22 MMOL/L (ref 21–32)
CO2 SERPL-SCNC: 23 MMOL/L (ref 21–32)
CO2 SERPL-SCNC: 23 MMOL/L (ref 21–32)
CO2 SERPL-SCNC: 24 MMOL/L (ref 21–32)
CO2 SERPL-SCNC: 25 MMOL/L (ref 21–32)
CO2 SERPL-SCNC: 26 MMOL/L (ref 21–32)
CO2 SERPL-SCNC: 27 MMOL/L (ref 21–32)
CO2 SERPL-SCNC: 28 MMOL/L (ref 21–32)
CO2 SERPL-SCNC: 29 MMOL/L (ref 21–32)
CO2 SERPL-SCNC: 30 MMOL/L (ref 21–32)
COCAINE UR QL SCN: NEGATIVE
COLOR UR: ABNORMAL
COLOR UR: YELLOW
COVID-19 RAPID TEST, COVR: NOT DETECTED
COVID-19 RAPID TEST, COVR: NOT DETECTED
CREAT SERPL-MCNC: 0.52 MG/DL (ref 0.6–1.3)
CREAT SERPL-MCNC: 0.56 MG/DL (ref 0.6–1.3)
CREAT SERPL-MCNC: 0.57 MG/DL (ref 0.6–1.3)
CREAT SERPL-MCNC: 0.58 MG/DL (ref 0.6–1.3)
CREAT SERPL-MCNC: 0.58 MG/DL (ref 0.6–1.3)
CREAT SERPL-MCNC: 0.61 MG/DL (ref 0.6–1.3)
CREAT SERPL-MCNC: 0.63 MG/DL (ref 0.6–1.3)
CREAT SERPL-MCNC: 0.67 MG/DL (ref 0.6–1.3)
CREAT SERPL-MCNC: 0.69 MG/DL (ref 0.6–1.3)
CREAT SERPL-MCNC: 0.76 MG/DL (ref 0.6–1.3)
CREAT SERPL-MCNC: 0.83 MG/DL (ref 0.6–1.3)
CREAT SERPL-MCNC: 0.83 MG/DL (ref 0.6–1.3)
CREAT SERPL-MCNC: 0.84 MG/DL (ref 0.6–1.3)
CREAT SERPL-MCNC: 0.84 MG/DL (ref 0.6–1.3)
CREAT SERPL-MCNC: 0.85 MG/DL (ref 0.6–1.3)
CREAT SERPL-MCNC: 0.96 MG/DL (ref 0.6–1.3)
CREAT SERPL-MCNC: 0.97 MG/DL (ref 0.6–1.3)
CREAT SERPL-MCNC: 0.99 MG/DL (ref 0.6–1.3)
CREAT SERPL-MCNC: 1.02 MG/DL (ref 0.6–1.3)
CREAT SERPL-MCNC: 1.04 MG/DL (ref 0.6–1.3)
CREAT SERPL-MCNC: 1.37 MG/DL (ref 0.6–1.3)
CREAT SERPL-MCNC: 1.67 MG/DL (ref 0.6–1.3)
CROSSMATCH RESULT,%XM: NORMAL
DATE LAST DOSE: ABNORMAL
DIAGNOSIS, 93000: NORMAL
DIFFERENTIAL METHOD BLD: ABNORMAL
EOSINOPHIL # BLD: 0 K/UL (ref 0–0.4)
EOSINOPHIL # BLD: 0.1 K/UL (ref 0–0.4)
EOSINOPHIL NFR BLD: 0 % (ref 0–5)
EOSINOPHIL NFR BLD: 1 % (ref 0–5)
EOSINOPHIL NFR BLD: 2 % (ref 0–5)
EPITH CASTS URNS QL MICRO: NEGATIVE /LPF (ref 0–5)
EPITH CASTS URNS QL MICRO: NEGATIVE /LPF (ref 0–5)
ERYTHROCYTE [DISTWIDTH] IN BLOOD BY AUTOMATED COUNT: 14.6 % (ref 11.6–14.5)
ERYTHROCYTE [DISTWIDTH] IN BLOOD BY AUTOMATED COUNT: 14.7 % (ref 11.6–14.5)
ERYTHROCYTE [DISTWIDTH] IN BLOOD BY AUTOMATED COUNT: 14.8 % (ref 11.6–14.5)
ERYTHROCYTE [DISTWIDTH] IN BLOOD BY AUTOMATED COUNT: 15.9 % (ref 11.6–14.5)
ERYTHROCYTE [DISTWIDTH] IN BLOOD BY AUTOMATED COUNT: 15.9 % (ref 11.6–14.5)
ERYTHROCYTE [DISTWIDTH] IN BLOOD BY AUTOMATED COUNT: 16 % (ref 11.6–14.5)
ERYTHROCYTE [DISTWIDTH] IN BLOOD BY AUTOMATED COUNT: 16.1 % (ref 11.6–14.5)
ERYTHROCYTE [DISTWIDTH] IN BLOOD BY AUTOMATED COUNT: 16.2 % (ref 11.6–14.5)
ERYTHROCYTE [DISTWIDTH] IN BLOOD BY AUTOMATED COUNT: 16.2 % (ref 11.6–14.5)
ERYTHROCYTE [DISTWIDTH] IN BLOOD BY AUTOMATED COUNT: 16.3 % (ref 11.6–14.5)
ERYTHROCYTE [DISTWIDTH] IN BLOOD BY AUTOMATED COUNT: 16.4 % (ref 11.6–14.5)
ERYTHROCYTE [DISTWIDTH] IN BLOOD BY AUTOMATED COUNT: 16.5 % (ref 11.6–14.5)
ERYTHROCYTE [DISTWIDTH] IN BLOOD BY AUTOMATED COUNT: 17.2 % (ref 11.6–14.5)
GLOBULIN SER CALC-MCNC: 4.3 G/DL (ref 2–4)
GLOBULIN SER CALC-MCNC: 4.5 G/DL (ref 2–4)
GLOBULIN SER CALC-MCNC: 4.6 G/DL (ref 2–4)
GLOBULIN SER CALC-MCNC: 4.8 G/DL (ref 2–4)
GLOBULIN SER CALC-MCNC: 5.1 G/DL (ref 2–4)
GLOBULIN SER CALC-MCNC: 5.3 G/DL (ref 2–4)
GLOBULIN SER CALC-MCNC: 5.4 G/DL (ref 2–4)
GLOBULIN SER CALC-MCNC: 6 G/DL (ref 2–4)
GLUCOSE BLD STRIP.AUTO-MCNC: 109 MG/DL (ref 70–110)
GLUCOSE BLD STRIP.AUTO-MCNC: 113 MG/DL (ref 70–110)
GLUCOSE BLD STRIP.AUTO-MCNC: 113 MG/DL (ref 70–110)
GLUCOSE BLD STRIP.AUTO-MCNC: 115 MG/DL (ref 70–110)
GLUCOSE BLD STRIP.AUTO-MCNC: 121 MG/DL (ref 70–110)
GLUCOSE BLD STRIP.AUTO-MCNC: 126 MG/DL (ref 70–110)
GLUCOSE BLD STRIP.AUTO-MCNC: 155 MG/DL (ref 70–110)
GLUCOSE BLD STRIP.AUTO-MCNC: 169 MG/DL (ref 70–110)
GLUCOSE BLD STRIP.AUTO-MCNC: 180 MG/DL (ref 70–110)
GLUCOSE BLD STRIP.AUTO-MCNC: 206 MG/DL (ref 70–110)
GLUCOSE BLD STRIP.AUTO-MCNC: 209 MG/DL (ref 70–110)
GLUCOSE BLD STRIP.AUTO-MCNC: 210 MG/DL (ref 70–110)
GLUCOSE BLD STRIP.AUTO-MCNC: 92 MG/DL (ref 70–110)
GLUCOSE BLD STRIP.AUTO-MCNC: 96 MG/DL (ref 70–110)
GLUCOSE SERPL-MCNC: 100 MG/DL (ref 74–99)
GLUCOSE SERPL-MCNC: 101 MG/DL (ref 74–99)
GLUCOSE SERPL-MCNC: 103 MG/DL (ref 74–99)
GLUCOSE SERPL-MCNC: 108 MG/DL (ref 74–99)
GLUCOSE SERPL-MCNC: 117 MG/DL (ref 74–99)
GLUCOSE SERPL-MCNC: 118 MG/DL (ref 74–99)
GLUCOSE SERPL-MCNC: 118 MG/DL (ref 74–99)
GLUCOSE SERPL-MCNC: 120 MG/DL (ref 74–99)
GLUCOSE SERPL-MCNC: 132 MG/DL (ref 74–99)
GLUCOSE SERPL-MCNC: 133 MG/DL (ref 74–99)
GLUCOSE SERPL-MCNC: 141 MG/DL (ref 74–99)
GLUCOSE SERPL-MCNC: 199 MG/DL (ref 74–99)
GLUCOSE SERPL-MCNC: 200 MG/DL (ref 74–99)
GLUCOSE SERPL-MCNC: 284 MG/DL (ref 74–99)
GLUCOSE SERPL-MCNC: 79 MG/DL (ref 74–99)
GLUCOSE SERPL-MCNC: 88 MG/DL (ref 74–99)
GLUCOSE SERPL-MCNC: 90 MG/DL (ref 74–99)
GLUCOSE SERPL-MCNC: 91 MG/DL (ref 74–99)
GLUCOSE SERPL-MCNC: 93 MG/DL (ref 74–99)
GLUCOSE SERPL-MCNC: 94 MG/DL (ref 74–99)
GLUCOSE SERPL-MCNC: 94 MG/DL (ref 74–99)
GLUCOSE SERPL-MCNC: 96 MG/DL (ref 74–99)
GLUCOSE SERPL-MCNC: 97 MG/DL (ref 74–99)
GLUCOSE SERPL-MCNC: 98 MG/DL (ref 74–99)
GLUCOSE SERPL-MCNC: 98 MG/DL (ref 74–99)
GLUCOSE SERPL-MCNC: 99 MG/DL (ref 74–99)
GLUCOSE UR STRIP.AUTO-MCNC: NEGATIVE MG/DL
GLUCOSE UR STRIP.AUTO-MCNC: NEGATIVE MG/DL
HCT VFR BLD AUTO: 21.7 % (ref 36–48)
HCT VFR BLD AUTO: 22.8 % (ref 36–48)
HCT VFR BLD AUTO: 23.2 % (ref 36–48)
HCT VFR BLD AUTO: 23.9 % (ref 36–48)
HCT VFR BLD AUTO: 24.5 % (ref 36–48)
HCT VFR BLD AUTO: 24.5 % (ref 36–48)
HCT VFR BLD AUTO: 25.2 % (ref 36–48)
HCT VFR BLD AUTO: 25.5 % (ref 36–48)
HCT VFR BLD AUTO: 26 % (ref 36–48)
HCT VFR BLD AUTO: 26.1 % (ref 36–48)
HCT VFR BLD AUTO: 26.7 % (ref 36–48)
HCT VFR BLD AUTO: 26.7 % (ref 36–48)
HCT VFR BLD AUTO: 27.1 % (ref 36–48)
HCT VFR BLD AUTO: 28 % (ref 36–48)
HCT VFR BLD AUTO: 28.5 % (ref 36–48)
HCT VFR BLD AUTO: 29.1 % (ref 36–48)
HCT VFR BLD AUTO: 29.6 % (ref 36–48)
HCT VFR BLD AUTO: 33.3 % (ref 36–48)
HCT VFR BLD AUTO: 34.3 % (ref 36–48)
HCT VFR BLD AUTO: 36.9 % (ref 36–48)
HDSCOM,HDSCOM: NORMAL
HEMOCCULT STL QL: NEGATIVE
HGB BLD-MCNC: 10.7 G/DL (ref 13–16)
HGB BLD-MCNC: 10.8 G/DL (ref 13–16)
HGB BLD-MCNC: 11.8 G/DL (ref 13–16)
HGB BLD-MCNC: 6.5 G/DL (ref 13–16)
HGB BLD-MCNC: 7 G/DL (ref 13–16)
HGB BLD-MCNC: 7.4 G/DL (ref 13–16)
HGB BLD-MCNC: 7.6 G/DL (ref 13–16)
HGB BLD-MCNC: 7.7 G/DL (ref 13–16)
HGB BLD-MCNC: 7.8 G/DL (ref 13–16)
HGB BLD-MCNC: 8 G/DL (ref 13–16)
HGB BLD-MCNC: 8 G/DL (ref 13–16)
HGB BLD-MCNC: 8.1 G/DL (ref 13–16)
HGB BLD-MCNC: 8.3 G/DL (ref 13–16)
HGB BLD-MCNC: 8.4 G/DL (ref 13–16)
HGB BLD-MCNC: 8.5 G/DL (ref 13–16)
HGB BLD-MCNC: 8.6 G/DL (ref 13–16)
HGB BLD-MCNC: 9 G/DL (ref 13–16)
HGB BLD-MCNC: 9.1 G/DL (ref 13–16)
HGB BLD-MCNC: 9.2 G/DL (ref 13–16)
HGB BLD-MCNC: 9.3 G/DL (ref 13–16)
HGB UR QL STRIP: ABNORMAL
HGB UR QL STRIP: ABNORMAL
HISTORY CHECKED?,CKHIST: NORMAL
INR PPP: 1.1 (ref 0.8–1.2)
INR PPP: 1.3 (ref 0.8–1.2)
KETONES UR QL STRIP.AUTO: ABNORMAL MG/DL
KETONES UR QL STRIP.AUTO: NEGATIVE MG/DL
LACTATE BLD-SCNC: 1.28 MMOL/L (ref 0.4–2)
LACTATE BLD-SCNC: 1.94 MMOL/L (ref 0.4–2)
LEUKOCYTE ESTERASE UR QL STRIP.AUTO: ABNORMAL
LEUKOCYTE ESTERASE UR QL STRIP.AUTO: ABNORMAL
LIPASE SERPL-CCNC: 165 U/L (ref 73–393)
LYMPHOCYTES # BLD: 0.8 K/UL (ref 0.9–3.6)
LYMPHOCYTES # BLD: 1 K/UL (ref 0.9–3.6)
LYMPHOCYTES # BLD: 1.2 K/UL (ref 0.9–3.6)
LYMPHOCYTES # BLD: 1.2 K/UL (ref 0.9–3.6)
LYMPHOCYTES # BLD: 1.3 K/UL (ref 0.9–3.6)
LYMPHOCYTES # BLD: 1.3 K/UL (ref 0.9–3.6)
LYMPHOCYTES # BLD: 1.7 K/UL (ref 0.9–3.6)
LYMPHOCYTES # BLD: 1.9 K/UL (ref 0.9–3.6)
LYMPHOCYTES # BLD: 1.9 K/UL (ref 0.9–3.6)
LYMPHOCYTES NFR BLD: 10 % (ref 21–52)
LYMPHOCYTES NFR BLD: 19 % (ref 21–52)
LYMPHOCYTES NFR BLD: 20 % (ref 21–52)
LYMPHOCYTES NFR BLD: 21 % (ref 21–52)
LYMPHOCYTES NFR BLD: 25 % (ref 21–52)
LYMPHOCYTES NFR BLD: 25 % (ref 21–52)
LYMPHOCYTES NFR BLD: 39 % (ref 21–52)
LYMPHOCYTES NFR BLD: 7 % (ref 21–52)
LYMPHOCYTES NFR BLD: 7 % (ref 21–52)
LYMPHOCYTES NFR BLD: 8 % (ref 21–52)
LYMPHOCYTES NFR BLD: 9 % (ref 21–52)
MAGNESIUM SERPL-MCNC: 1.5 MG/DL (ref 1.6–2.6)
MAGNESIUM SERPL-MCNC: 1.6 MG/DL (ref 1.6–2.6)
MAGNESIUM SERPL-MCNC: 1.7 MG/DL (ref 1.6–2.6)
MAGNESIUM SERPL-MCNC: 1.8 MG/DL (ref 1.6–2.6)
MAGNESIUM SERPL-MCNC: 1.9 MG/DL (ref 1.6–2.6)
MAGNESIUM SERPL-MCNC: 2 MG/DL (ref 1.6–2.6)
MAGNESIUM SERPL-MCNC: 2 MG/DL (ref 1.6–2.6)
MAGNESIUM SERPL-MCNC: 2.1 MG/DL (ref 1.6–2.6)
MCH RBC QN AUTO: 22.5 PG (ref 24–34)
MCH RBC QN AUTO: 22.6 PG (ref 24–34)
MCH RBC QN AUTO: 22.6 PG (ref 24–34)
MCH RBC QN AUTO: 22.7 PG (ref 24–34)
MCH RBC QN AUTO: 22.8 PG (ref 24–34)
MCH RBC QN AUTO: 22.9 PG (ref 24–34)
MCH RBC QN AUTO: 22.9 PG (ref 24–34)
MCH RBC QN AUTO: 23 PG (ref 24–34)
MCH RBC QN AUTO: 23.5 PG (ref 24–34)
MCH RBC QN AUTO: 23.7 PG (ref 24–34)
MCH RBC QN AUTO: 23.8 PG (ref 24–34)
MCH RBC QN AUTO: 24.2 PG (ref 24–34)
MCHC RBC AUTO-ENTMCNC: 30 G/DL (ref 31–37)
MCHC RBC AUTO-ENTMCNC: 30.7 G/DL (ref 31–37)
MCHC RBC AUTO-ENTMCNC: 31 G/DL (ref 31–37)
MCHC RBC AUTO-ENTMCNC: 31.2 G/DL (ref 31–37)
MCHC RBC AUTO-ENTMCNC: 31.4 G/DL (ref 31–37)
MCHC RBC AUTO-ENTMCNC: 31.5 G/DL (ref 31–37)
MCHC RBC AUTO-ENTMCNC: 31.5 G/DL (ref 31–37)
MCHC RBC AUTO-ENTMCNC: 31.6 G/DL (ref 31–37)
MCHC RBC AUTO-ENTMCNC: 31.6 G/DL (ref 31–37)
MCHC RBC AUTO-ENTMCNC: 31.7 G/DL (ref 31–37)
MCHC RBC AUTO-ENTMCNC: 31.8 G/DL (ref 31–37)
MCHC RBC AUTO-ENTMCNC: 31.8 G/DL (ref 31–37)
MCHC RBC AUTO-ENTMCNC: 31.9 G/DL (ref 31–37)
MCHC RBC AUTO-ENTMCNC: 32 G/DL (ref 31–37)
MCHC RBC AUTO-ENTMCNC: 32.1 G/DL (ref 31–37)
MCHC RBC AUTO-ENTMCNC: 32.2 G/DL (ref 31–37)
MCHC RBC AUTO-ENTMCNC: 32.2 G/DL (ref 31–37)
MCHC RBC AUTO-ENTMCNC: 32.5 G/DL (ref 31–37)
MCV RBC AUTO: 70.4 FL (ref 74–97)
MCV RBC AUTO: 70.8 FL (ref 74–97)
MCV RBC AUTO: 71.3 FL (ref 74–97)
MCV RBC AUTO: 71.3 FL (ref 74–97)
MCV RBC AUTO: 71.6 FL (ref 74–97)
MCV RBC AUTO: 71.7 FL (ref 74–97)
MCV RBC AUTO: 71.8 FL (ref 74–97)
MCV RBC AUTO: 71.8 FL (ref 74–97)
MCV RBC AUTO: 71.8 FL (ref 78–100)
MCV RBC AUTO: 72.7 FL (ref 74–97)
MCV RBC AUTO: 73 FL (ref 74–97)
MCV RBC AUTO: 73.4 FL (ref 74–97)
MCV RBC AUTO: 74 FL (ref 78–100)
MCV RBC AUTO: 74.9 FL (ref 74–97)
MCV RBC AUTO: 75.1 FL (ref 78–100)
MCV RBC AUTO: 75.4 FL (ref 74–97)
MCV RBC AUTO: 75.7 FL (ref 78–100)
MCV RBC AUTO: 75.8 FL (ref 78–100)
MCV RBC AUTO: 76 FL (ref 78–100)
MCV RBC AUTO: 76.6 FL (ref 78–100)
METHADONE UR QL: NEGATIVE
MONOCYTES # BLD: 0.4 K/UL (ref 0.05–1.2)
MONOCYTES # BLD: 0.5 K/UL (ref 0.05–1.2)
MONOCYTES # BLD: 0.5 K/UL (ref 0.05–1.2)
MONOCYTES # BLD: 0.6 K/UL (ref 0.05–1.2)
MONOCYTES # BLD: 0.6 K/UL (ref 0.05–1.2)
MONOCYTES # BLD: 0.7 K/UL (ref 0.05–1.2)
MONOCYTES # BLD: 0.8 K/UL (ref 0.05–1.2)
MONOCYTES # BLD: 0.9 K/UL (ref 0.05–1.2)
MONOCYTES # BLD: 1 K/UL (ref 0.05–1.2)
MONOCYTES # BLD: 1.2 K/UL (ref 0.05–1.2)
MONOCYTES # BLD: 1.3 K/UL (ref 0.05–1.2)
MONOCYTES NFR BLD: 10 % (ref 3–10)
MONOCYTES NFR BLD: 11 % (ref 3–10)
MONOCYTES NFR BLD: 13 % (ref 3–10)
MONOCYTES NFR BLD: 7 % (ref 3–10)
MONOCYTES NFR BLD: 8 % (ref 3–10)
MONOCYTES NFR BLD: 8 % (ref 3–10)
MONOCYTES NFR BLD: 9 % (ref 3–10)
NEUTS SEG # BLD: 10.7 K/UL (ref 1.8–8)
NEUTS SEG # BLD: 12.1 K/UL (ref 1.8–8)
NEUTS SEG # BLD: 14.3 K/UL (ref 1.8–8)
NEUTS SEG # BLD: 2.4 K/UL (ref 1.8–8)
NEUTS SEG # BLD: 3.6 K/UL (ref 1.8–8)
NEUTS SEG # BLD: 4.1 K/UL (ref 1.8–8)
NEUTS SEG # BLD: 4.2 K/UL (ref 1.8–8)
NEUTS SEG # BLD: 4.7 K/UL (ref 1.8–8)
NEUTS SEG # BLD: 4.8 K/UL (ref 1.8–8)
NEUTS SEG # BLD: 6.6 K/UL (ref 1.8–8)
NEUTS SEG # BLD: 9.1 K/UL (ref 1.8–8)
NEUTS SEG NFR BLD: 49 % (ref 40–73)
NEUTS SEG NFR BLD: 60 % (ref 40–73)
NEUTS SEG NFR BLD: 63 % (ref 40–73)
NEUTS SEG NFR BLD: 66 % (ref 40–73)
NEUTS SEG NFR BLD: 70 % (ref 40–73)
NEUTS SEG NFR BLD: 71 % (ref 40–73)
NEUTS SEG NFR BLD: 82 % (ref 40–73)
NEUTS SEG NFR BLD: 82 % (ref 40–73)
NEUTS SEG NFR BLD: 83 % (ref 40–73)
NITRITE UR QL STRIP.AUTO: NEGATIVE
NITRITE UR QL STRIP.AUTO: POSITIVE
OPIATES UR QL: NEGATIVE
PCP UR QL: NEGATIVE
PH UR STRIP: 5 [PH] (ref 5–8)
PH UR STRIP: 7.5 [PH] (ref 5–8)
PHOSPHATE SERPL-MCNC: 2.5 MG/DL (ref 2.5–4.9)
PHOSPHATE SERPL-MCNC: 2.9 MG/DL (ref 2.5–4.9)
PHOSPHATE SERPL-MCNC: 3.2 MG/DL (ref 2.5–4.9)
PHOSPHATE SERPL-MCNC: 3.4 MG/DL (ref 2.5–4.9)
PLATELET # BLD AUTO: 228 K/UL (ref 135–420)
PLATELET # BLD AUTO: 233 K/UL (ref 135–420)
PLATELET # BLD AUTO: 236 K/UL (ref 135–420)
PLATELET # BLD AUTO: 250 K/UL (ref 135–420)
PLATELET # BLD AUTO: 251 K/UL (ref 135–420)
PLATELET # BLD AUTO: 319 K/UL (ref 135–420)
PLATELET # BLD AUTO: 335 K/UL (ref 135–420)
PLATELET # BLD AUTO: 336 K/UL (ref 135–420)
PLATELET # BLD AUTO: 366 K/UL (ref 135–420)
PLATELET # BLD AUTO: 368 K/UL (ref 135–420)
PLATELET # BLD AUTO: 373 K/UL (ref 135–420)
PLATELET # BLD AUTO: 380 K/UL (ref 135–420)
PLATELET # BLD AUTO: 410 K/UL (ref 135–420)
PLATELET # BLD AUTO: 470 K/UL (ref 135–420)
PLATELET # BLD AUTO: 472 K/UL (ref 135–420)
PLATELET # BLD AUTO: 472 K/UL (ref 135–420)
PLATELET # BLD AUTO: 489 K/UL (ref 135–420)
PLATELET # BLD AUTO: 532 K/UL (ref 135–420)
PLATELET # BLD AUTO: 550 K/UL (ref 135–420)
PLATELET # BLD AUTO: 582 K/UL (ref 135–420)
PMV BLD AUTO: 10 FL (ref 9.2–11.8)
PMV BLD AUTO: 10 FL (ref 9.2–11.8)
PMV BLD AUTO: 10.3 FL (ref 9.2–11.8)
PMV BLD AUTO: 10.8 FL (ref 9.2–11.8)
PMV BLD AUTO: 11 FL (ref 9.2–11.8)
PMV BLD AUTO: 9.4 FL (ref 9.2–11.8)
PMV BLD AUTO: 9.5 FL (ref 9.2–11.8)
PMV BLD AUTO: 9.6 FL (ref 9.2–11.8)
PMV BLD AUTO: 9.8 FL (ref 9.2–11.8)
PMV BLD AUTO: 9.9 FL (ref 9.2–11.8)
POTASSIUM SERPL-SCNC: 2.9 MMOL/L (ref 3.5–5.5)
POTASSIUM SERPL-SCNC: 3 MMOL/L (ref 3.5–5.5)
POTASSIUM SERPL-SCNC: 3.1 MMOL/L (ref 3.5–5.5)
POTASSIUM SERPL-SCNC: 3.1 MMOL/L (ref 3.5–5.5)
POTASSIUM SERPL-SCNC: 3.2 MMOL/L (ref 3.5–5.5)
POTASSIUM SERPL-SCNC: 3.4 MMOL/L (ref 3.5–5.5)
POTASSIUM SERPL-SCNC: 3.5 MMOL/L (ref 3.5–5.5)
POTASSIUM SERPL-SCNC: 3.5 MMOL/L (ref 3.5–5.5)
POTASSIUM SERPL-SCNC: 3.6 MMOL/L (ref 3.5–5.5)
POTASSIUM SERPL-SCNC: 3.7 MMOL/L (ref 3.5–5.5)
POTASSIUM SERPL-SCNC: 3.8 MMOL/L (ref 3.5–5.5)
POTASSIUM SERPL-SCNC: 3.8 MMOL/L (ref 3.5–5.5)
POTASSIUM SERPL-SCNC: 4.1 MMOL/L (ref 3.5–5.5)
POTASSIUM SERPL-SCNC: 4.1 MMOL/L (ref 3.5–5.5)
POTASSIUM SERPL-SCNC: 4.2 MMOL/L (ref 3.5–5.5)
POTASSIUM SERPL-SCNC: 4.3 MMOL/L (ref 3.5–5.5)
POTASSIUM SERPL-SCNC: 4.6 MMOL/L (ref 3.5–5.5)
POTASSIUM SERPL-SCNC: 4.7 MMOL/L (ref 3.5–5.5)
POTASSIUM SERPL-SCNC: 5.3 MMOL/L (ref 3.5–5.5)
POTASSIUM SERPL-SCNC: 5.4 MMOL/L (ref 3.5–5.5)
PROCALCITONIN SERPL-MCNC: 8.18 NG/ML
PROT SERPL-MCNC: 6.3 G/DL (ref 6.4–8.2)
PROT SERPL-MCNC: 7 G/DL (ref 6.4–8.2)
PROT SERPL-MCNC: 7.1 G/DL (ref 6.4–8.2)
PROT SERPL-MCNC: 7.5 G/DL (ref 6.4–8.2)
PROT SERPL-MCNC: 7.8 G/DL (ref 6.4–8.2)
PROT SERPL-MCNC: 7.9 G/DL (ref 6.4–8.2)
PROT SERPL-MCNC: 8.2 G/DL (ref 6.4–8.2)
PROT SERPL-MCNC: 8.7 G/DL (ref 6.4–8.2)
PROT UR STRIP-MCNC: 30 MG/DL
PROT UR STRIP-MCNC: ABNORMAL MG/DL
PROTHROMBIN TIME: 14.2 SEC (ref 11.5–15.2)
PROTHROMBIN TIME: 15.6 SEC (ref 11.5–15.2)
Q-T INTERVAL, ECG07: 398 MS
Q-T INTERVAL, ECG07: 420 MS
Q-T INTERVAL, ECG07: 492 MS
Q-T INTERVAL, ECG07: 494 MS
QRS DURATION, ECG06: 146 MS
QRS DURATION, ECG06: 152 MS
QRS DURATION, ECG06: 154 MS
QRS DURATION, ECG06: 166 MS
QTC CALCULATION (BEZET), ECG08: 508 MS
QTC CALCULATION (BEZET), ECG08: 516 MS
QTC CALCULATION (BEZET), ECG08: 544 MS
QTC CALCULATION (BEZET), ECG08: 581 MS
RBC # BLD AUTO: 2.89 M/UL (ref 4.35–5.65)
RBC # BLD AUTO: 3.06 M/UL (ref 4.35–5.65)
RBC # BLD AUTO: 3.08 M/UL (ref 4.35–5.65)
RBC # BLD AUTO: 3.2 M/UL (ref 4.35–5.65)
RBC # BLD AUTO: 3.35 M/UL (ref 4.7–5.5)
RBC # BLD AUTO: 3.37 M/UL (ref 4.35–5.65)
RBC # BLD AUTO: 3.41 M/UL (ref 4.35–5.65)
RBC # BLD AUTO: 3.42 M/UL (ref 4.35–5.65)
RBC # BLD AUTO: 3.51 M/UL (ref 4.7–5.5)
RBC # BLD AUTO: 3.66 M/UL (ref 4.7–5.5)
RBC # BLD AUTO: 3.72 M/UL (ref 4.7–5.5)
RBC # BLD AUTO: 3.73 M/UL (ref 4.7–5.5)
RBC # BLD AUTO: 3.77 M/UL (ref 4.7–5.5)
RBC # BLD AUTO: 3.95 M/UL (ref 4.35–5.65)
RBC # BLD AUTO: 3.98 M/UL (ref 4.7–5.5)
RBC # BLD AUTO: 3.98 M/UL (ref 4.7–5.5)
RBC # BLD AUTO: 4.06 M/UL (ref 4.7–5.5)
RBC # BLD AUTO: 4.55 M/UL (ref 4.35–5.65)
RBC # BLD AUTO: 4.56 M/UL (ref 4.7–5.5)
RBC # BLD AUTO: 5.03 M/UL (ref 4.7–5.5)
RBC #/AREA URNS HPF: ABNORMAL /HPF (ref 0–5)
RBC #/AREA URNS HPF: NEGATIVE /HPF (ref 0–5)
REPORTED DOSE,DOSE: ABNORMAL UNITS
REPORTED DOSE/TIME,TMG: 1300
RIGHT CFA DIST SYS PSV: 34.3 CM/S
RIGHT CFA MID SYS PSV: 43.6 CM/S
RIGHT PERONEAL MID SYS PSV: 0 CM/S
RIGHT PTA MID SYS PSV: 0 CM/S
RIGHT SFA PROX VEL RATIO: 1.1
RIGHT SUPER FEMORAL PROX SYS PSV: 50 CM/S
SALICYLATES SERPL-MCNC: <1.7 MG/DL (ref 2.8–20)
SARS-COV-2, COV2: NORMAL
SARS-COV-2, COV2NT: NOT DETECTED
SERVICE CMNT-IMP: ABNORMAL
SERVICE CMNT-IMP: NORMAL
SODIUM SERPL-SCNC: 129 MMOL/L (ref 136–145)
SODIUM SERPL-SCNC: 132 MMOL/L (ref 136–145)
SODIUM SERPL-SCNC: 132 MMOL/L (ref 136–145)
SODIUM SERPL-SCNC: 134 MMOL/L (ref 136–145)
SODIUM SERPL-SCNC: 134 MMOL/L (ref 136–145)
SODIUM SERPL-SCNC: 137 MMOL/L (ref 136–145)
SODIUM SERPL-SCNC: 139 MMOL/L (ref 136–145)
SODIUM SERPL-SCNC: 139 MMOL/L (ref 136–145)
SODIUM SERPL-SCNC: 140 MMOL/L (ref 136–145)
SODIUM SERPL-SCNC: 140 MMOL/L (ref 136–145)
SODIUM SERPL-SCNC: 141 MMOL/L (ref 136–145)
SODIUM SERPL-SCNC: 142 MMOL/L (ref 136–145)
SODIUM SERPL-SCNC: 143 MMOL/L (ref 136–145)
SODIUM SERPL-SCNC: 144 MMOL/L (ref 136–145)
SODIUM SERPL-SCNC: 145 MMOL/L (ref 136–145)
SOURCE, COVRS: NORMAL
SOURCE, COVRS: NORMAL
SP GR UR REFRACTOMETRY: 1.02 (ref 1–1.03)
SP GR UR REFRACTOMETRY: 1.03 (ref 1–1.03)
SPECIMEN EXP DATE BLD: NORMAL
SPECIMEN EXP DATE BLD: NORMAL
STATUS OF UNIT,%ST: NORMAL
TROPONIN I SERPL-MCNC: 0.02 NG/ML (ref 0–0.04)
UNIT DIVISION, %UDIV: 0
UROBILINOGEN UR QL STRIP.AUTO: 1 EU/DL (ref 0.2–1)
UROBILINOGEN UR QL STRIP.AUTO: 1 EU/DL (ref 0.2–1)
VANCOMYCIN SERPL-MCNC: 17.8 UG/ML (ref 5–40)
VANCOMYCIN SERPL-MCNC: 6.5 UG/ML (ref 5–40)
VANCOMYCIN TROUGH SERPL-MCNC: 9 UG/ML (ref 10–20)
VENTRICULAR RATE, ECG03: 101 BPM
VENTRICULAR RATE, ECG03: 73 BPM
VENTRICULAR RATE, ECG03: 84 BPM
VENTRICULAR RATE, ECG03: 88 BPM
WBC # BLD AUTO: 11.2 K/UL (ref 4.6–13.2)
WBC # BLD AUTO: 12.7 K/UL (ref 4.6–13.2)
WBC # BLD AUTO: 13 K/UL (ref 4.6–13.2)
WBC # BLD AUTO: 13.6 K/UL (ref 4.6–13.2)
WBC # BLD AUTO: 13.7 K/UL (ref 4.6–13.2)
WBC # BLD AUTO: 14.8 K/UL (ref 4.6–13.2)
WBC # BLD AUTO: 17.2 K/UL (ref 4.6–13.2)
WBC # BLD AUTO: 4.7 K/UL (ref 4.6–13.2)
WBC # BLD AUTO: 4.9 K/UL (ref 4.6–13.2)
WBC # BLD AUTO: 5 K/UL (ref 4.6–13.2)
WBC # BLD AUTO: 5.2 K/UL (ref 4.6–13.2)
WBC # BLD AUTO: 5.5 K/UL (ref 4.6–13.2)
WBC # BLD AUTO: 5.7 K/UL (ref 4.6–13.2)
WBC # BLD AUTO: 5.7 K/UL (ref 4.6–13.2)
WBC # BLD AUTO: 6 K/UL (ref 4.6–13.2)
WBC # BLD AUTO: 6.7 K/UL (ref 4.6–13.2)
WBC # BLD AUTO: 6.8 K/UL (ref 4.6–13.2)
WBC # BLD AUTO: 7.3 K/UL (ref 4.6–13.2)
WBC # BLD AUTO: 7.3 K/UL (ref 4.6–13.2)
WBC # BLD AUTO: 7.9 K/UL (ref 4.6–13.2)
WBC URNS QL MICRO: ABNORMAL /HPF (ref 0–4)
WBC URNS QL MICRO: ABNORMAL /HPF (ref 0–4)

## 2021-01-01 PROCEDURE — 82962 GLUCOSE BLOOD TEST: CPT

## 2021-01-01 PROCEDURE — 93922 UPR/L XTREMITY ART 2 LEVELS: CPT

## 2021-01-01 PROCEDURE — 74011250636 HC RX REV CODE- 250/636: Performed by: INTERNAL MEDICINE

## 2021-01-01 PROCEDURE — 74011250637 HC RX REV CODE- 250/637: Performed by: INTERNAL MEDICINE

## 2021-01-01 PROCEDURE — 2709999900 HC NON-CHARGEABLE SUPPLY

## 2021-01-01 PROCEDURE — 77030040831 HC BAG URINE DRNG MDII -A

## 2021-01-01 PROCEDURE — 84100 ASSAY OF PHOSPHORUS: CPT

## 2021-01-01 PROCEDURE — 83735 ASSAY OF MAGNESIUM: CPT

## 2021-01-01 PROCEDURE — 97162 PT EVAL MOD COMPLEX 30 MIN: CPT

## 2021-01-01 PROCEDURE — 80048 BASIC METABOLIC PNL TOTAL CA: CPT

## 2021-01-01 PROCEDURE — 65660000000 HC RM CCU STEPDOWN

## 2021-01-01 PROCEDURE — 36430 TRANSFUSION BLD/BLD COMPNT: CPT

## 2021-01-01 PROCEDURE — 80053 COMPREHEN METABOLIC PANEL: CPT

## 2021-01-01 PROCEDURE — 92526 ORAL FUNCTION THERAPY: CPT

## 2021-01-01 PROCEDURE — 75635 CT ANGIO ABDOMINAL ARTERIES: CPT

## 2021-01-01 PROCEDURE — 97110 THERAPEUTIC EXERCISES: CPT

## 2021-01-01 PROCEDURE — 74011000258 HC RX REV CODE- 258: Performed by: INTERNAL MEDICINE

## 2021-01-01 PROCEDURE — 77030027138 HC INCENT SPIROMETER -A

## 2021-01-01 PROCEDURE — 99233 SBSQ HOSP IP/OBS HIGH 50: CPT | Performed by: INTERNAL MEDICINE

## 2021-01-01 PROCEDURE — 93005 ELECTROCARDIOGRAM TRACING: CPT

## 2021-01-01 PROCEDURE — 87040 BLOOD CULTURE FOR BACTERIA: CPT

## 2021-01-01 PROCEDURE — 74011000250 HC RX REV CODE- 250: Performed by: NURSE ANESTHETIST, CERTIFIED REGISTERED

## 2021-01-01 PROCEDURE — 96365 THER/PROPH/DIAG IV INF INIT: CPT

## 2021-01-01 PROCEDURE — 99232 SBSQ HOSP IP/OBS MODERATE 35: CPT | Performed by: EMERGENCY MEDICINE

## 2021-01-01 PROCEDURE — 74011250636 HC RX REV CODE- 250/636: Performed by: NURSE ANESTHETIST, CERTIFIED REGISTERED

## 2021-01-01 PROCEDURE — 99284 EMERGENCY DEPT VISIT MOD MDM: CPT

## 2021-01-01 PROCEDURE — 74011000250 HC RX REV CODE- 250: Performed by: INTERNAL MEDICINE

## 2021-01-01 PROCEDURE — 85025 COMPLETE CBC W/AUTO DIFF WBC: CPT

## 2021-01-01 PROCEDURE — 99239 HOSP IP/OBS DSCHRG MGMT >30: CPT | Performed by: INTERNAL MEDICINE

## 2021-01-01 PROCEDURE — 85027 COMPLETE CBC AUTOMATED: CPT

## 2021-01-01 PROCEDURE — 65270000029 HC RM PRIVATE

## 2021-01-01 PROCEDURE — 80179 DRUG ASSAY SALICYLATE: CPT

## 2021-01-01 PROCEDURE — 74011250636 HC RX REV CODE- 250/636: Performed by: FAMILY MEDICINE

## 2021-01-01 PROCEDURE — 5A12012 PERFORMANCE OF CARDIAC OUTPUT, SINGLE, MANUAL: ICD-10-PCS | Performed by: HOSPITALIST

## 2021-01-01 PROCEDURE — 74011000250 HC RX REV CODE- 250: Performed by: EMERGENCY MEDICINE

## 2021-01-01 PROCEDURE — 77030040922 HC BLNKT HYPOTHRM STRY -A

## 2021-01-01 PROCEDURE — 36415 COLL VENOUS BLD VENIPUNCTURE: CPT

## 2021-01-01 PROCEDURE — 74011250637 HC RX REV CODE- 250/637: Performed by: FAMILY MEDICINE

## 2021-01-01 PROCEDURE — 83880 ASSAY OF NATRIURETIC PEPTIDE: CPT

## 2021-01-01 PROCEDURE — 74011000258 HC RX REV CODE- 258: Performed by: PHYSICIAN ASSISTANT

## 2021-01-01 PROCEDURE — 87086 URINE CULTURE/COLONY COUNT: CPT

## 2021-01-01 PROCEDURE — 00731 ANES UPR GI NDSC PX NOS: CPT | Performed by: ANESTHESIOLOGY

## 2021-01-01 PROCEDURE — 74011250636 HC RX REV CODE- 250/636: Performed by: EMERGENCY MEDICINE

## 2021-01-01 PROCEDURE — 99223 1ST HOSP IP/OBS HIGH 75: CPT | Performed by: PHYSICIAN ASSISTANT

## 2021-01-01 PROCEDURE — 74011000250 HC RX REV CODE- 250: Performed by: PHYSICIAN ASSISTANT

## 2021-01-01 PROCEDURE — P9016 RBC LEUKOCYTES REDUCED: HCPCS

## 2021-01-01 PROCEDURE — 01232 ANES OPN PX UPR 2/3 FEM AMP: CPT | Performed by: ANESTHESIOLOGY

## 2021-01-01 PROCEDURE — 96366 THER/PROPH/DIAG IV INF ADDON: CPT

## 2021-01-01 PROCEDURE — 74011000258 HC RX REV CODE- 258: Performed by: FAMILY MEDICINE

## 2021-01-01 PROCEDURE — 43246 EGD PLACE GASTROSTOMY TUBE: CPT

## 2021-01-01 PROCEDURE — 80202 ASSAY OF VANCOMYCIN: CPT

## 2021-01-01 PROCEDURE — 99232 SBSQ HOSP IP/OBS MODERATE 35: CPT | Performed by: HOSPITALIST

## 2021-01-01 PROCEDURE — 94761 N-INVAS EAR/PLS OXIMETRY MLT: CPT

## 2021-01-01 PROCEDURE — 75810000275 HC EMERGENCY DEPT VISIT NO LEVEL OF CARE

## 2021-01-01 PROCEDURE — 74011250636 HC RX REV CODE- 250/636: Performed by: HOSPITALIST

## 2021-01-01 PROCEDURE — 92950 HEART/LUNG RESUSCITATION CPR: CPT

## 2021-01-01 PROCEDURE — 97535 SELF CARE MNGMENT TRAINING: CPT

## 2021-01-01 PROCEDURE — 77030008462 HC STPLR SKN PROX J&J -A

## 2021-01-01 PROCEDURE — 0D20XUZ CHANGE FEEDING DEVICE IN UPPER INTESTINAL TRACT, EXTERNAL APPROACH: ICD-10-PCS | Performed by: RADIOLOGY

## 2021-01-01 PROCEDURE — 77030040392 HC DRSG OPTIFOAM MDII -A

## 2021-01-01 PROCEDURE — 73630 X-RAY EXAM OF FOOT: CPT

## 2021-01-01 PROCEDURE — 83605 ASSAY OF LACTIC ACID: CPT

## 2021-01-01 PROCEDURE — 30233N1 TRANSFUSION OF NONAUTOLOGOUS RED BLOOD CELLS INTO PERIPHERAL VEIN, PERCUTANEOUS APPROACH: ICD-10-PCS | Performed by: HOSPITALIST

## 2021-01-01 PROCEDURE — 77030018798 HC PMP KT ENTRL FED COVD -A

## 2021-01-01 PROCEDURE — 87186 SC STD MICRODIL/AGAR DIL: CPT

## 2021-01-01 PROCEDURE — 99100 ANES PT EXTEME AGE<1 YR&>70: CPT | Performed by: ANESTHESIOLOGY

## 2021-01-01 PROCEDURE — 01232 ANES OPN PX UPR 2/3 FEM AMP: CPT | Performed by: NURSE ANESTHETIST, CERTIFIED REGISTERED

## 2021-01-01 PROCEDURE — 97530 THERAPEUTIC ACTIVITIES: CPT

## 2021-01-01 PROCEDURE — 74176 CT ABD & PELVIS W/O CONTRAST: CPT

## 2021-01-01 PROCEDURE — 0Y6D0Z3 DETACHMENT AT LEFT UPPER LEG, LOW, OPEN APPROACH: ICD-10-PCS

## 2021-01-01 PROCEDURE — 0DH63UZ INSERTION OF FEEDING DEVICE INTO STOMACH, PERCUTANEOUS APPROACH: ICD-10-PCS | Performed by: INTERNAL MEDICINE

## 2021-01-01 PROCEDURE — 51701 INSERT BLADDER CATHETER: CPT

## 2021-01-01 PROCEDURE — 88311 DECALCIFY TISSUE: CPT

## 2021-01-01 PROCEDURE — 74018 RADEX ABDOMEN 1 VIEW: CPT

## 2021-01-01 PROCEDURE — 99239 HOSP IP/OBS DSCHRG MGMT >30: CPT | Performed by: HOSPITALIST

## 2021-01-01 PROCEDURE — 81001 URINALYSIS AUTO W/SCOPE: CPT

## 2021-01-01 PROCEDURE — 99223 1ST HOSP IP/OBS HIGH 75: CPT | Performed by: FAMILY MEDICINE

## 2021-01-01 PROCEDURE — 99140 ANES COMP EMERGENCY COND: CPT | Performed by: ANESTHESIOLOGY

## 2021-01-01 PROCEDURE — 74011250637 HC RX REV CODE- 250/637: Performed by: STUDENT IN AN ORGANIZED HEALTH CARE EDUCATION/TRAINING PROGRAM

## 2021-01-01 PROCEDURE — 77030031139 HC SUT VCRL2 J&J -A

## 2021-01-01 PROCEDURE — 74011000636 HC RX REV CODE- 636: Performed by: HOSPITALIST

## 2021-01-01 PROCEDURE — 92610 EVALUATE SWALLOWING FUNCTION: CPT

## 2021-01-01 PROCEDURE — 2709999900 HC NON-CHARGEABLE SUPPLY: Performed by: INTERNAL MEDICINE

## 2021-01-01 PROCEDURE — 71045 X-RAY EXAM CHEST 1 VIEW: CPT

## 2021-01-01 PROCEDURE — 82272 OCCULT BLD FECES 1-3 TESTS: CPT

## 2021-01-01 PROCEDURE — 77030002996 HC SUT SLK J&J -A

## 2021-01-01 PROCEDURE — 99232 SBSQ HOSP IP/OBS MODERATE 35: CPT

## 2021-01-01 PROCEDURE — 74011000258 HC RX REV CODE- 258: Performed by: EMERGENCY MEDICINE

## 2021-01-01 PROCEDURE — 76060000031 HC ANESTHESIA FIRST 0.5 HR: Performed by: INTERNAL MEDICINE

## 2021-01-01 PROCEDURE — 70450 CT HEAD/BRAIN W/O DYE: CPT

## 2021-01-01 PROCEDURE — 77030026438 HC STYL ET INTUB CARD -A: Performed by: ANESTHESIOLOGY

## 2021-01-01 PROCEDURE — 99285 EMERGENCY DEPT VISIT HI MDM: CPT

## 2021-01-01 PROCEDURE — 74011250637 HC RX REV CODE- 250/637: Performed by: PHYSICIAN ASSISTANT

## 2021-01-01 PROCEDURE — 96367 TX/PROPH/DG ADDL SEQ IV INF: CPT

## 2021-01-01 PROCEDURE — 87077 CULTURE AEROBIC IDENTIFY: CPT

## 2021-01-01 PROCEDURE — 77030019605

## 2021-01-01 PROCEDURE — 85610 PROTHROMBIN TIME: CPT

## 2021-01-01 PROCEDURE — 76060000032 HC ANESTHESIA 0.5 TO 1 HR

## 2021-01-01 PROCEDURE — 74011250637 HC RX REV CODE- 250/637: Performed by: NURSE PRACTITIONER

## 2021-01-01 PROCEDURE — 77030040393 HC DRSG OPTIFOAM GENT MDII -B

## 2021-01-01 PROCEDURE — 80076 HEPATIC FUNCTION PANEL: CPT

## 2021-01-01 PROCEDURE — 86923 COMPATIBILITY TEST ELECTRIC: CPT

## 2021-01-01 PROCEDURE — 77030006835 HC BLD SAW SAG STRY -B

## 2021-01-01 PROCEDURE — 88307 TISSUE EXAM BY PATHOLOGIST: CPT

## 2021-01-01 PROCEDURE — 99223 1ST HOSP IP/OBS HIGH 75: CPT | Performed by: INTERNAL MEDICINE

## 2021-01-01 PROCEDURE — 74011250636 HC RX REV CODE- 250/636: Performed by: PHYSICIAN ASSISTANT

## 2021-01-01 PROCEDURE — 76040000019: Performed by: INTERNAL MEDICINE

## 2021-01-01 PROCEDURE — 76450000000

## 2021-01-01 PROCEDURE — 74011000636 HC RX REV CODE- 636: Performed by: STUDENT IN AN ORGANIZED HEALTH CARE EDUCATION/TRAINING PROGRAM

## 2021-01-01 PROCEDURE — 97167 OT EVAL HIGH COMPLEX 60 MIN: CPT

## 2021-01-01 PROCEDURE — 99100 ANES PT EXTEME AGE<1 YR&>70: CPT | Performed by: NURSE ANESTHETIST, CERTIFIED REGISTERED

## 2021-01-01 PROCEDURE — 99222 1ST HOSP IP/OBS MODERATE 55: CPT | Performed by: NURSE PRACTITIONER

## 2021-01-01 PROCEDURE — 77030005122 HC CATH GASTMY PEG BSC -B: Performed by: INTERNAL MEDICINE

## 2021-01-01 PROCEDURE — 85730 THROMBOPLASTIN TIME PARTIAL: CPT

## 2021-01-01 PROCEDURE — 74177 CT ABD & PELVIS W/CONTRAST: CPT

## 2021-01-01 PROCEDURE — 77030019905 HC CATH URETH INTMIT MDII -A

## 2021-01-01 PROCEDURE — 74011000250 HC RX REV CODE- 250: Performed by: HOSPITALIST

## 2021-01-01 PROCEDURE — 27590 AMPUTATE LEG AT THIGH: CPT

## 2021-01-01 PROCEDURE — 80307 DRUG TEST PRSMV CHEM ANLYZR: CPT

## 2021-01-01 PROCEDURE — 74011250636 HC RX REV CODE- 250/636: Performed by: STUDENT IN AN ORGANIZED HEALTH CARE EDUCATION/TRAINING PROGRAM

## 2021-01-01 PROCEDURE — 99231 SBSQ HOSP IP/OBS SF/LOW 25: CPT | Performed by: NURSE PRACTITIONER

## 2021-01-01 PROCEDURE — U0003 INFECTIOUS AGENT DETECTION BY NUCLEIC ACID (DNA OR RNA); SEVERE ACUTE RESPIRATORY SYNDROME CORONAVIRUS 2 (SARS-COV-2) (CORONAVIRUS DISEASE [COVID-19]), AMPLIFIED PROBE TECHNIQUE, MAKING USE OF HIGH THROUGHPUT TECHNOLOGIES AS DESCRIBED BY CMS-2020-01-R: HCPCS

## 2021-01-01 PROCEDURE — 77010033678 HC OXYGEN DAILY

## 2021-01-01 PROCEDURE — 76210000006 HC OR PH I REC 0.5 TO 1 HR

## 2021-01-01 PROCEDURE — 99281 EMR DPT VST MAYX REQ PHY/QHP: CPT

## 2021-01-01 PROCEDURE — APPSS30 APP SPLIT SHARED TIME 16-30 MINUTES: Performed by: NURSE PRACTITIONER

## 2021-01-01 PROCEDURE — 82550 ASSAY OF CK (CPK): CPT

## 2021-01-01 PROCEDURE — 83690 ASSAY OF LIPASE: CPT

## 2021-01-01 PROCEDURE — 86901 BLOOD TYPING SEROLOGIC RH(D): CPT

## 2021-01-01 PROCEDURE — 84145 PROCALCITONIN (PCT): CPT

## 2021-01-01 PROCEDURE — 99140 ANES COMP EMERGENCY COND: CPT | Performed by: NURSE ANESTHETIST, CERTIFIED REGISTERED

## 2021-01-01 PROCEDURE — 74011000250 HC RX REV CODE- 250

## 2021-01-01 PROCEDURE — 73700 CT LOWER EXTREMITY W/O DYE: CPT

## 2021-01-01 PROCEDURE — 77030008683 HC TU ET CUF COVD -A: Performed by: ANESTHESIOLOGY

## 2021-01-01 PROCEDURE — 80143 DRUG ASSAY ACETAMINOPHEN: CPT

## 2021-01-01 PROCEDURE — 82140 ASSAY OF AMMONIA: CPT

## 2021-01-01 PROCEDURE — 87635 SARS-COV-2 COVID-19 AMP PRB: CPT

## 2021-01-01 PROCEDURE — 97165 OT EVAL LOW COMPLEX 30 MIN: CPT

## 2021-01-01 PROCEDURE — 00731 ANES UPR GI NDSC PX NOS: CPT | Performed by: NURSE ANESTHETIST, CERTIFIED REGISTERED

## 2021-01-01 PROCEDURE — 96374 THER/PROPH/DIAG INJ IV PUSH: CPT

## 2021-01-01 PROCEDURE — 97166 OT EVAL MOD COMPLEX 45 MIN: CPT

## 2021-01-01 PROCEDURE — 99232 SBSQ HOSP IP/OBS MODERATE 35: CPT | Performed by: INTERNAL MEDICINE

## 2021-01-01 PROCEDURE — 74011000636 HC RX REV CODE- 636: Performed by: EMERGENCY MEDICINE

## 2021-01-01 PROCEDURE — 76010000138 HC OR TIME 0.5 TO 1 HR

## 2021-01-01 PROCEDURE — APPSS60 APP SPLIT SHARED TIME 46-60 MINUTES: Performed by: NURSE PRACTITIONER

## 2021-01-01 PROCEDURE — 90000 NO LOS: CPT | Performed by: PHYSICIAN ASSISTANT

## 2021-01-01 PROCEDURE — 99221 1ST HOSP IP/OBS SF/LOW 40: CPT | Performed by: PHYSICIAN ASSISTANT

## 2021-01-01 RX ORDER — SULFAMETHOXAZOLE AND TRIMETHOPRIM 800; 160 MG/1; MG/1
1 TABLET ORAL EVERY 12 HOURS
Status: DISCONTINUED | OUTPATIENT
Start: 2021-01-01 | End: 2021-01-01 | Stop reason: HOSPADM

## 2021-01-01 RX ORDER — OXYCODONE AND ACETAMINOPHEN 5; 325 MG/1; MG/1
1 TABLET ORAL
Status: DISCONTINUED | OUTPATIENT
Start: 2021-01-01 | End: 2021-01-01 | Stop reason: HOSPADM

## 2021-01-01 RX ORDER — FERROUS SULFATE 300 MG/5ML
300 LIQUID (ML) ORAL
Status: DISCONTINUED | OUTPATIENT
Start: 2021-01-01 | End: 2021-01-01

## 2021-01-01 RX ORDER — SODIUM CHLORIDE 0.9 % (FLUSH) 0.9 %
5-10 SYRINGE (ML) INJECTION AS NEEDED
Status: DISCONTINUED | OUTPATIENT
Start: 2021-01-01 | End: 2021-01-01 | Stop reason: HOSPADM

## 2021-01-01 RX ORDER — LANOLIN ALCOHOL/MO/W.PET/CERES
500 CREAM (GRAM) TOPICAL DAILY
Status: DISCONTINUED | OUTPATIENT
Start: 2021-01-01 | End: 2021-01-01 | Stop reason: HOSPADM

## 2021-01-01 RX ORDER — ONDANSETRON 2 MG/ML
4 INJECTION INTRAMUSCULAR; INTRAVENOUS
Status: DISCONTINUED | OUTPATIENT
Start: 2021-01-01 | End: 2021-01-01 | Stop reason: HOSPADM

## 2021-01-01 RX ORDER — OXYCODONE AND ACETAMINOPHEN 5; 325 MG/1; MG/1
1 TABLET ORAL
Status: DISCONTINUED | OUTPATIENT
Start: 2021-01-01 | End: 2021-01-01

## 2021-01-01 RX ORDER — ENOXAPARIN SODIUM 100 MG/ML
40 INJECTION SUBCUTANEOUS DAILY
Status: CANCELLED | OUTPATIENT
Start: 2021-01-01

## 2021-01-01 RX ORDER — SODIUM CHLORIDE 9 MG/ML
250 INJECTION, SOLUTION INTRAVENOUS AS NEEDED
Status: DISCONTINUED | OUTPATIENT
Start: 2021-01-01 | End: 2021-01-01 | Stop reason: HOSPADM

## 2021-01-01 RX ORDER — GUAIFENESIN 100 MG/5ML
81 LIQUID (ML) ORAL DAILY
Status: DISCONTINUED | OUTPATIENT
Start: 2021-01-01 | End: 2021-01-01 | Stop reason: HOSPADM

## 2021-01-01 RX ORDER — SODIUM CHLORIDE 0.9 % (FLUSH) 0.9 %
5-40 SYRINGE (ML) INJECTION EVERY 8 HOURS
Status: DISCONTINUED | OUTPATIENT
Start: 2021-01-01 | End: 2021-01-01 | Stop reason: HOSPADM

## 2021-01-01 RX ORDER — TRAMADOL HYDROCHLORIDE 50 MG/1
50 TABLET ORAL
Status: DISCONTINUED | OUTPATIENT
Start: 2021-01-01 | End: 2021-01-01 | Stop reason: HOSPADM

## 2021-01-01 RX ORDER — LANOLIN ALCOHOL/MO/W.PET/CERES
3 CREAM (GRAM) TOPICAL
Status: DISCONTINUED | OUTPATIENT
Start: 2021-01-01 | End: 2021-01-01 | Stop reason: HOSPADM

## 2021-01-01 RX ORDER — PROMETHAZINE HYDROCHLORIDE 12.5 MG/1
12.5 TABLET ORAL
Status: DISCONTINUED | OUTPATIENT
Start: 2021-01-01 | End: 2021-01-01 | Stop reason: HOSPADM

## 2021-01-01 RX ORDER — SODIUM CHLORIDE 0.9 % (FLUSH) 0.9 %
5-40 SYRINGE (ML) INJECTION AS NEEDED
Status: DISCONTINUED | OUTPATIENT
Start: 2021-01-01 | End: 2021-01-01 | Stop reason: HOSPADM

## 2021-01-01 RX ORDER — HYDROMORPHONE HYDROCHLORIDE 2 MG/ML
INJECTION, SOLUTION INTRAMUSCULAR; INTRAVENOUS; SUBCUTANEOUS AS NEEDED
Status: DISCONTINUED | OUTPATIENT
Start: 2021-01-01 | End: 2021-01-01 | Stop reason: HOSPADM

## 2021-01-01 RX ORDER — LIDOCAINE HYDROCHLORIDE 10 MG/ML
0.1 INJECTION, SOLUTION EPIDURAL; INFILTRATION; INTRACAUDAL; PERINEURAL AS NEEDED
Status: DISCONTINUED | OUTPATIENT
Start: 2021-01-01 | End: 2021-01-01 | Stop reason: HOSPADM

## 2021-01-01 RX ORDER — FERROUS SULFATE 300 MG/5ML
300 LIQUID (ML) ORAL EVERY OTHER DAY
Status: DISCONTINUED | OUTPATIENT
Start: 2021-01-01 | End: 2021-01-01 | Stop reason: HOSPADM

## 2021-01-01 RX ORDER — VANCOMYCIN HYDROCHLORIDE
1250 EVERY 24 HOURS
Status: DISCONTINUED | OUTPATIENT
Start: 2021-01-01 | End: 2021-01-01

## 2021-01-01 RX ORDER — ACETAMINOPHEN 325 MG/1
650 TABLET ORAL
Status: DISCONTINUED | OUTPATIENT
Start: 2021-01-01 | End: 2021-01-01 | Stop reason: HOSPADM

## 2021-01-01 RX ORDER — OXYCODONE AND ACETAMINOPHEN 5; 325 MG/1; MG/1
1 TABLET ORAL
Status: CANCELLED | OUTPATIENT
Start: 2021-01-01

## 2021-01-01 RX ORDER — CILOSTAZOL 100 MG/1
100 TABLET ORAL
Qty: 30 TABLET | Refills: 0 | Status: SHIPPED | OUTPATIENT
Start: 2021-01-01

## 2021-01-01 RX ORDER — ACETAMINOPHEN 325 MG/1
650 TABLET ORAL
Status: COMPLETED | OUTPATIENT
Start: 2021-01-01 | End: 2021-01-01

## 2021-01-01 RX ORDER — CLONIDINE HYDROCHLORIDE 0.1 MG/1
0.1 TABLET ORAL
Status: DISCONTINUED | OUTPATIENT
Start: 2021-01-01 | End: 2021-01-01

## 2021-01-01 RX ORDER — CILOSTAZOL 50 MG/1
100 TABLET ORAL
Status: DISCONTINUED | OUTPATIENT
Start: 2021-01-01 | End: 2021-01-01 | Stop reason: HOSPADM

## 2021-01-01 RX ORDER — FERROUS GLUCONATE 324(37.5)
324 TABLET ORAL EVERY OTHER DAY
Status: DISCONTINUED | OUTPATIENT
Start: 2021-01-01 | End: 2021-01-01

## 2021-01-01 RX ORDER — TRAMADOL HYDROCHLORIDE 50 MG/1
50 TABLET ORAL
Qty: 9 TABLET | Refills: 0 | Status: SHIPPED | OUTPATIENT
Start: 2021-01-01 | End: 2021-01-01

## 2021-01-01 RX ORDER — ONDANSETRON 4 MG/1
4 TABLET, ORALLY DISINTEGRATING ORAL
Status: DISCONTINUED | OUTPATIENT
Start: 2021-01-01 | End: 2021-01-01 | Stop reason: HOSPADM

## 2021-01-01 RX ORDER — CLONIDINE HYDROCHLORIDE 0.1 MG/1
0.1 TABLET ORAL
Status: DISCONTINUED | OUTPATIENT
Start: 2021-01-01 | End: 2021-01-01 | Stop reason: HOSPADM

## 2021-01-01 RX ORDER — ACETAMINOPHEN 650 MG/1
650 SUPPOSITORY RECTAL
Status: DISCONTINUED | OUTPATIENT
Start: 2021-01-01 | End: 2021-01-01 | Stop reason: CLARIF

## 2021-01-01 RX ORDER — POLYETHYLENE GLYCOL 3350 17 G/17G
17 POWDER, FOR SOLUTION ORAL DAILY PRN
Status: DISCONTINUED | OUTPATIENT
Start: 2021-01-01 | End: 2021-01-01

## 2021-01-01 RX ORDER — ACETAMINOPHEN 325 MG/1
650 TABLET ORAL
Status: DISCONTINUED | OUTPATIENT
Start: 2021-01-01 | End: 2021-01-01

## 2021-01-01 RX ORDER — DICLOFENAC SODIUM 10 MG/G
4 GEL TOPICAL 4 TIMES DAILY
COMMUNITY

## 2021-01-01 RX ORDER — CIPROFLOXACIN 2 MG/ML
200 INJECTION, SOLUTION INTRAVENOUS EVERY 12 HOURS
Status: DISCONTINUED | OUTPATIENT
Start: 2021-01-01 | End: 2021-01-01

## 2021-01-01 RX ORDER — ALLOPURINOL 100 MG/1
100 TABLET ORAL DAILY
Status: DISCONTINUED | OUTPATIENT
Start: 2021-01-01 | End: 2021-01-01 | Stop reason: HOSPADM

## 2021-01-01 RX ORDER — SULFAMETHOXAZOLE AND TRIMETHOPRIM 800; 160 MG/1; MG/1
1 TABLET ORAL EVERY 12 HOURS
Qty: 12 TAB | Refills: 0 | Status: SHIPPED | OUTPATIENT
Start: 2021-01-01 | End: 2021-01-01

## 2021-01-01 RX ORDER — PROMETHAZINE HYDROCHLORIDE 25 MG/1
12.5 TABLET ORAL
Status: DISCONTINUED | OUTPATIENT
Start: 2021-01-01 | End: 2021-01-01

## 2021-01-01 RX ORDER — CEFAZOLIN SODIUM 2 G/50ML
2 SOLUTION INTRAVENOUS
Status: COMPLETED | OUTPATIENT
Start: 2021-01-01 | End: 2021-01-01

## 2021-01-01 RX ORDER — CLONIDINE HYDROCHLORIDE 0.1 MG/1
0.1 TABLET ORAL 2 TIMES DAILY
Status: DISCONTINUED | OUTPATIENT
Start: 2021-01-01 | End: 2021-01-01

## 2021-01-01 RX ORDER — VANCOMYCIN HYDROCHLORIDE
1250 ONCE
Status: COMPLETED | OUTPATIENT
Start: 2021-01-01 | End: 2021-01-01

## 2021-01-01 RX ORDER — SODIUM CHLORIDE, SODIUM LACTATE, POTASSIUM CHLORIDE, CALCIUM CHLORIDE 600; 310; 30; 20 MG/100ML; MG/100ML; MG/100ML; MG/100ML
INJECTION, SOLUTION INTRAVENOUS
Status: DISCONTINUED | OUTPATIENT
Start: 2021-01-01 | End: 2021-01-01 | Stop reason: HOSPADM

## 2021-01-01 RX ORDER — ROCURONIUM BROMIDE 10 MG/ML
INJECTION, SOLUTION INTRAVENOUS AS NEEDED
Status: DISCONTINUED | OUTPATIENT
Start: 2021-01-01 | End: 2021-01-01 | Stop reason: HOSPADM

## 2021-01-01 RX ORDER — ONDANSETRON 2 MG/ML
4 INJECTION INTRAMUSCULAR; INTRAVENOUS
Status: DISCONTINUED | OUTPATIENT
Start: 2021-01-01 | End: 2021-01-01

## 2021-01-01 RX ORDER — MELATONIN
2000 DAILY
Status: DISCONTINUED | OUTPATIENT
Start: 2021-01-01 | End: 2021-01-01 | Stop reason: HOSPADM

## 2021-01-01 RX ORDER — POLYETHYLENE GLYCOL 3350 17 G/17G
17 POWDER, FOR SOLUTION ORAL DAILY PRN
Status: DISCONTINUED | OUTPATIENT
Start: 2021-01-01 | End: 2021-01-01 | Stop reason: HOSPADM

## 2021-01-01 RX ORDER — LOSARTAN POTASSIUM 50 MG/1
50 TABLET ORAL DAILY
Status: DISCONTINUED | OUTPATIENT
Start: 2021-01-01 | End: 2021-01-01 | Stop reason: HOSPADM

## 2021-01-01 RX ORDER — FERROUS GLUCONATE 324(37.5)
324 TABLET ORAL EVERY OTHER DAY
COMMUNITY

## 2021-01-01 RX ORDER — LANOLIN ALCOHOL/MO/W.PET/CERES
3 CREAM (GRAM) TOPICAL
Status: DISCONTINUED | OUTPATIENT
Start: 2021-01-01 | End: 2021-01-01

## 2021-01-01 RX ORDER — MORPHINE SULFATE 2 MG/ML
1 INJECTION, SOLUTION INTRAMUSCULAR; INTRAVENOUS
Status: DISCONTINUED | OUTPATIENT
Start: 2021-01-01 | End: 2021-01-01 | Stop reason: HOSPADM

## 2021-01-01 RX ORDER — MAGNESIUM SULFATE 1 G/100ML
1 INJECTION INTRAVENOUS ONCE
Status: COMPLETED | OUTPATIENT
Start: 2021-01-01 | End: 2021-01-01

## 2021-01-01 RX ORDER — NALOXONE HYDROCHLORIDE 4 MG/.1ML
1 SPRAY NASAL
COMMUNITY

## 2021-01-01 RX ORDER — LEVETIRACETAM 10 MG/ML
1000 INJECTION INTRAVASCULAR EVERY 12 HOURS
Status: DISCONTINUED | OUTPATIENT
Start: 2021-01-01 | End: 2021-01-01 | Stop reason: HOSPADM

## 2021-01-01 RX ORDER — AMLODIPINE BESYLATE 5 MG/1
5 TABLET ORAL DAILY
Status: DISCONTINUED | OUTPATIENT
Start: 2021-01-01 | End: 2021-01-01 | Stop reason: HOSPADM

## 2021-01-01 RX ORDER — POLYETHYLENE GLYCOL 3350 17 G/17G
17 POWDER, FOR SOLUTION ORAL
Qty: 15 PACKET | Refills: 0 | Status: SHIPPED | OUTPATIENT
Start: 2021-01-01

## 2021-01-01 RX ORDER — LEVOFLOXACIN 5 MG/ML
750 INJECTION, SOLUTION INTRAVENOUS EVERY 24 HOURS
Status: DISCONTINUED | OUTPATIENT
Start: 2021-01-01 | End: 2021-01-01

## 2021-01-01 RX ORDER — DEXTROSE MONOHYDRATE AND SODIUM CHLORIDE 5; .45 G/100ML; G/100ML
50 INJECTION, SOLUTION INTRAVENOUS CONTINUOUS
Status: DISCONTINUED | OUTPATIENT
Start: 2021-01-01 | End: 2021-01-01 | Stop reason: HOSPADM

## 2021-01-01 RX ORDER — LIDOCAINE HYDROCHLORIDE 20 MG/ML
INJECTION, SOLUTION EPIDURAL; INFILTRATION; INTRACAUDAL; PERINEURAL AS NEEDED
Status: DISCONTINUED | OUTPATIENT
Start: 2021-01-01 | End: 2021-01-01 | Stop reason: HOSPADM

## 2021-01-01 RX ORDER — SODIUM CHLORIDE 9 MG/ML
50 INJECTION, SOLUTION INTRAVENOUS CONTINUOUS
Status: DISCONTINUED | OUTPATIENT
Start: 2021-01-01 | End: 2021-01-01 | Stop reason: HOSPADM

## 2021-01-01 RX ORDER — FERROUS SULFATE 300 MG/5ML
300 LIQUID (ML) ORAL
Status: DISCONTINUED | OUTPATIENT
Start: 2021-01-01 | End: 2021-01-01 | Stop reason: HOSPADM

## 2021-01-01 RX ORDER — SODIUM CHLORIDE 9 MG/ML
INJECTION, SOLUTION INTRAVENOUS
Status: DISCONTINUED | OUTPATIENT
Start: 2021-01-01 | End: 2021-01-01 | Stop reason: HOSPADM

## 2021-01-01 RX ORDER — MORPHINE SULFATE 4 MG/ML
1 INJECTION INTRAVENOUS
Status: CANCELLED | OUTPATIENT
Start: 2021-01-01

## 2021-01-01 RX ORDER — PROPOFOL 10 MG/ML
INJECTION, EMULSION INTRAVENOUS AS NEEDED
Status: DISCONTINUED | OUTPATIENT
Start: 2021-01-01 | End: 2021-01-01 | Stop reason: HOSPADM

## 2021-01-01 RX ORDER — ASPIRIN 81 MG/1
81 TABLET ORAL DAILY
Status: DISCONTINUED | OUTPATIENT
Start: 2021-01-01 | End: 2021-01-01

## 2021-01-01 RX ORDER — HEPARIN SODIUM 1000 [USP'U]/ML
80 INJECTION, SOLUTION INTRAVENOUS; SUBCUTANEOUS ONCE
Status: COMPLETED | OUTPATIENT
Start: 2021-01-01 | End: 2021-01-01

## 2021-01-01 RX ORDER — GLYCOPYRROLATE 1 MG/1
1 TABLET ORAL 3 TIMES DAILY
COMMUNITY
End: 2021-01-01

## 2021-01-01 RX ORDER — ACETAMINOPHEN 650 MG/1
650 SUPPOSITORY RECTAL
Status: DISCONTINUED | OUTPATIENT
Start: 2021-01-01 | End: 2021-01-01 | Stop reason: HOSPADM

## 2021-01-01 RX ORDER — INSULIN LISPRO 100 [IU]/ML
INJECTION, SOLUTION INTRAVENOUS; SUBCUTANEOUS ONCE
Status: DISCONTINUED | OUTPATIENT
Start: 2021-01-01 | End: 2021-01-01 | Stop reason: HOSPADM

## 2021-01-01 RX ORDER — ONDANSETRON 2 MG/ML
4 INJECTION INTRAMUSCULAR; INTRAVENOUS ONCE
Status: DISCONTINUED | OUTPATIENT
Start: 2021-01-01 | End: 2021-01-01 | Stop reason: HOSPADM

## 2021-01-01 RX ORDER — HYDROMORPHONE HYDROCHLORIDE 2 MG/ML
0.5 INJECTION, SOLUTION INTRAMUSCULAR; INTRAVENOUS; SUBCUTANEOUS AS NEEDED
Status: DISCONTINUED | OUTPATIENT
Start: 2021-01-01 | End: 2021-01-01 | Stop reason: HOSPADM

## 2021-01-01 RX ORDER — LANOLIN ALCOHOL/MO/W.PET/CERES
100 CREAM (GRAM) TOPICAL DAILY
Status: DISCONTINUED | OUTPATIENT
Start: 2021-01-01 | End: 2021-01-01 | Stop reason: HOSPADM

## 2021-01-01 RX ORDER — DICLOFENAC SODIUM 10 MG/G
4 GEL TOPICAL 4 TIMES DAILY
Status: DISCONTINUED | OUTPATIENT
Start: 2021-01-01 | End: 2021-01-01 | Stop reason: HOSPADM

## 2021-01-01 RX ORDER — MORPHINE SULFATE 4 MG/ML
4 INJECTION INTRAVENOUS
Status: COMPLETED | OUTPATIENT
Start: 2021-01-01 | End: 2021-01-01

## 2021-01-01 RX ORDER — TRAMADOL HYDROCHLORIDE 50 MG/1
50 TABLET ORAL
Status: ON HOLD | COMMUNITY
End: 2021-01-01 | Stop reason: SDUPTHER

## 2021-01-01 RX ORDER — POTASSIUM CHLORIDE 7.45 MG/ML
10 INJECTION INTRAVENOUS
Status: COMPLETED | OUTPATIENT
Start: 2021-01-01 | End: 2021-01-01

## 2021-01-01 RX ORDER — ACETAMINOPHEN 325 MG/1
650 TABLET ORAL
Status: DISCONTINUED | OUTPATIENT
Start: 2021-01-01 | End: 2021-01-01 | Stop reason: CLARIF

## 2021-01-01 RX ORDER — FENTANYL CITRATE 50 UG/ML
INJECTION, SOLUTION INTRAMUSCULAR; INTRAVENOUS AS NEEDED
Status: DISCONTINUED | OUTPATIENT
Start: 2021-01-01 | End: 2021-01-01 | Stop reason: HOSPADM

## 2021-01-01 RX ORDER — ASCORBIC ACID 250 MG
250 TABLET ORAL DAILY
Status: DISCONTINUED | OUTPATIENT
Start: 2021-01-01 | End: 2021-01-01 | Stop reason: HOSPADM

## 2021-01-01 RX ORDER — ACETAMINOPHEN 650 MG/1
650 SUPPOSITORY RECTAL
Status: DISCONTINUED | OUTPATIENT
Start: 2021-01-01 | End: 2021-01-01

## 2021-01-01 RX ORDER — ONDANSETRON 4 MG/1
4 TABLET, ORALLY DISINTEGRATING ORAL
Status: DISCONTINUED | OUTPATIENT
Start: 2021-01-01 | End: 2021-01-01

## 2021-01-01 RX ORDER — ONDANSETRON 2 MG/ML
INJECTION INTRAMUSCULAR; INTRAVENOUS AS NEEDED
Status: DISCONTINUED | OUTPATIENT
Start: 2021-01-01 | End: 2021-01-01 | Stop reason: HOSPADM

## 2021-01-01 RX ORDER — HEPARIN SODIUM 10000 [USP'U]/100ML
18-36 INJECTION, SOLUTION INTRAVENOUS
Status: DISCONTINUED | OUTPATIENT
Start: 2021-01-01 | End: 2021-01-01

## 2021-01-01 RX ORDER — MORPHINE SULFATE 4 MG/ML
2 INJECTION INTRAVENOUS
Status: CANCELLED | OUTPATIENT
Start: 2021-01-01

## 2021-01-01 RX ORDER — ATORVASTATIN CALCIUM 10 MG/1
10 TABLET, FILM COATED ORAL DAILY
Status: DISCONTINUED | OUTPATIENT
Start: 2021-01-01 | End: 2021-01-01 | Stop reason: HOSPADM

## 2021-01-01 RX ORDER — SODIUM CHLORIDE, SODIUM LACTATE, POTASSIUM CHLORIDE, CALCIUM CHLORIDE 600; 310; 30; 20 MG/100ML; MG/100ML; MG/100ML; MG/100ML
25 INJECTION, SOLUTION INTRAVENOUS CONTINUOUS
Status: DISCONTINUED | OUTPATIENT
Start: 2021-01-01 | End: 2021-01-01 | Stop reason: HOSPADM

## 2021-01-01 RX ORDER — SERTRALINE HYDROCHLORIDE 50 MG/1
50 TABLET, FILM COATED ORAL DAILY
Status: DISCONTINUED | OUTPATIENT
Start: 2021-01-01 | End: 2021-01-01 | Stop reason: HOSPADM

## 2021-01-01 RX ORDER — LEVETIRACETAM 500 MG/1
1000 TABLET ORAL EVERY 12 HOURS
Status: DISCONTINUED | OUTPATIENT
Start: 2021-01-01 | End: 2021-01-01

## 2021-01-01 RX ORDER — GABAPENTIN 300 MG/1
600 CAPSULE ORAL 2 TIMES DAILY
COMMUNITY

## 2021-01-01 RX ORDER — GLYCOPYRROLATE 1 MG/1
1 TABLET ORAL 3 TIMES DAILY
Status: DISCONTINUED | OUTPATIENT
Start: 2021-01-01 | End: 2021-01-01 | Stop reason: HOSPADM

## 2021-01-01 RX ORDER — SODIUM CHLORIDE 9 MG/ML
100 INJECTION, SOLUTION INTRAVENOUS CONTINUOUS
Status: DISCONTINUED | OUTPATIENT
Start: 2021-01-01 | End: 2021-01-01

## 2021-01-01 RX ORDER — LIDOCAINE HYDROCHLORIDE 10 MG/ML
INJECTION, SOLUTION EPIDURAL; INFILTRATION; INTRACAUDAL; PERINEURAL AS NEEDED
Status: DISCONTINUED | OUTPATIENT
Start: 2021-01-01 | End: 2021-01-01 | Stop reason: SDUPTHER

## 2021-01-01 RX ORDER — LOSARTAN POTASSIUM 25 MG/1
25 TABLET ORAL DAILY
Status: DISCONTINUED | OUTPATIENT
Start: 2021-01-01 | End: 2021-01-01 | Stop reason: HOSPADM

## 2021-01-01 RX ORDER — CILOSTAZOL 50 MG/1
100 TABLET ORAL DAILY
Status: DISCONTINUED | OUTPATIENT
Start: 2021-01-01 | End: 2021-01-01 | Stop reason: HOSPADM

## 2021-01-01 RX ORDER — LOSARTAN POTASSIUM 50 MG/1
50 TABLET ORAL DAILY
Status: DISCONTINUED | OUTPATIENT
Start: 2021-01-01 | End: 2021-01-01

## 2021-01-01 RX ORDER — OXYCODONE AND ACETAMINOPHEN 5; 325 MG/1; MG/1
1 TABLET ORAL
COMMUNITY

## 2021-01-01 RX ORDER — ATORVASTATIN CALCIUM 10 MG/1
10 TABLET, FILM COATED ORAL DAILY
COMMUNITY

## 2021-01-01 RX ORDER — SODIUM CHLORIDE 9 MG/ML
1000 INJECTION, SOLUTION INTRAVENOUS CONTINUOUS
Status: DISCONTINUED | OUTPATIENT
Start: 2021-01-01 | End: 2021-01-01 | Stop reason: HOSPADM

## 2021-01-01 RX ORDER — POTASSIUM CHLORIDE 7.45 MG/ML
10 INJECTION INTRAVENOUS ONCE
Status: ACTIVE | OUTPATIENT
Start: 2021-01-01 | End: 2021-01-01

## 2021-01-01 RX ORDER — ASPIRIN 300 MG/1
300 SUPPOSITORY RECTAL DAILY
Status: DISCONTINUED | OUTPATIENT
Start: 2021-01-01 | End: 2021-01-01

## 2021-01-01 RX ORDER — ATORVASTATIN CALCIUM 10 MG/1
10 TABLET, FILM COATED ORAL DAILY
Status: DISCONTINUED | OUTPATIENT
Start: 2021-01-01 | End: 2021-01-01

## 2021-01-01 RX ORDER — GABAPENTIN 300 MG/1
600 CAPSULE ORAL 2 TIMES DAILY
Status: DISCONTINUED | OUTPATIENT
Start: 2021-01-01 | End: 2021-01-01 | Stop reason: HOSPADM

## 2021-01-01 RX ORDER — LEVETIRACETAM 500 MG/1
1000 TABLET ORAL 2 TIMES DAILY
Status: DISCONTINUED | OUTPATIENT
Start: 2021-01-01 | End: 2021-01-01

## 2021-01-01 RX ORDER — VANCOMYCIN HYDROCHLORIDE
1250
Status: DISCONTINUED | OUTPATIENT
Start: 2021-01-01 | End: 2021-01-01 | Stop reason: HOSPADM

## 2021-01-01 RX ORDER — ENOXAPARIN SODIUM 100 MG/ML
40 INJECTION SUBCUTANEOUS DAILY
Status: DISCONTINUED | OUTPATIENT
Start: 2021-01-01 | End: 2021-01-01 | Stop reason: HOSPADM

## 2021-01-01 RX ORDER — LEVOFLOXACIN 5 MG/ML
750 INJECTION, SOLUTION INTRAVENOUS
Status: DISCONTINUED | OUTPATIENT
Start: 2021-01-01 | End: 2021-01-01

## 2021-01-01 RX ORDER — GABAPENTIN 300 MG/1
600 CAPSULE ORAL 2 TIMES DAILY
Status: DISCONTINUED | OUTPATIENT
Start: 2021-01-01 | End: 2021-01-01

## 2021-01-01 RX ORDER — CILOSTAZOL 50 MG/1
100 TABLET ORAL
Status: DISCONTINUED | OUTPATIENT
Start: 2021-01-01 | End: 2021-01-01

## 2021-01-01 RX ADMIN — LIDOCAINE HYDROCHLORIDE 20 MG: 20 INJECTION, SOLUTION EPIDURAL; INFILTRATION; INTRACAUDAL; PERINEURAL at 15:17

## 2021-01-01 RX ADMIN — SULFAMETHOXAZOLE AND TRIMETHOPRIM 1 TABLET: 800; 160 TABLET ORAL at 22:58

## 2021-01-01 RX ADMIN — PIPERACILLIN AND TAZOBACTAM 2.25 G: 2; .25 INJECTION, POWDER, LYOPHILIZED, FOR SOLUTION INTRAVENOUS at 05:56

## 2021-01-01 RX ADMIN — Medication 10 ML: at 22:02

## 2021-01-01 RX ADMIN — Medication 10 ML: at 08:40

## 2021-01-01 RX ADMIN — CIPROFLOXACIN 200 MG: 2 INJECTION, SOLUTION INTRAVENOUS at 14:31

## 2021-01-01 RX ADMIN — CILOSTAZOL 100 MG: 50 TABLET ORAL at 08:51

## 2021-01-01 RX ADMIN — ASPIRIN 300 MG: 300 SUPPOSITORY RECTAL at 08:32

## 2021-01-01 RX ADMIN — Medication 10 ML: at 21:13

## 2021-01-01 RX ADMIN — PIPERACILLIN SODIUM AND TAZOBACTAM SODIUM 3.38 G: 3; .375 INJECTION, POWDER, LYOPHILIZED, FOR SOLUTION INTRAVENOUS at 08:31

## 2021-01-01 RX ADMIN — ASPIRIN 81 MG: 81 TABLET, CHEWABLE ORAL at 10:14

## 2021-01-01 RX ADMIN — PIPERACILLIN SODIUM AND TAZOBACTAM SODIUM 3.38 G: 3; .375 INJECTION, POWDER, LYOPHILIZED, FOR SOLUTION INTRAVENOUS at 01:02

## 2021-01-01 RX ADMIN — CILOSTAZOL 100 MG: 50 TABLET ORAL at 18:35

## 2021-01-01 RX ADMIN — PIPERACILLIN SODIUM AND TAZOBACTAM SODIUM 3.38 G: 3; .375 INJECTION, POWDER, LYOPHILIZED, FOR SOLUTION INTRAVENOUS at 20:35

## 2021-01-01 RX ADMIN — ALLOPURINOL 100 MG: 100 TABLET ORAL at 08:24

## 2021-01-01 RX ADMIN — Medication 3 MG: at 22:40

## 2021-01-01 RX ADMIN — Medication 10 ML: at 16:07

## 2021-01-01 RX ADMIN — LOSARTAN POTASSIUM 50 MG: 50 TABLET, FILM COATED ORAL at 09:10

## 2021-01-01 RX ADMIN — LEVOFLOXACIN 750 MG: 750 INJECTION, SOLUTION INTRAVENOUS at 18:00

## 2021-01-01 RX ADMIN — PIPERACILLIN SODIUM AND TAZOBACTAM SODIUM 3.38 G: 3; .375 INJECTION, POWDER, LYOPHILIZED, FOR SOLUTION INTRAVENOUS at 22:10

## 2021-01-01 RX ADMIN — SODIUM CHLORIDE 100 ML/HR: 900 INJECTION, SOLUTION INTRAVENOUS at 08:44

## 2021-01-01 RX ADMIN — Medication 10 ML: at 21:33

## 2021-01-01 RX ADMIN — LEVETIRACETAM 1000 MG: 10 INJECTION INTRAVENOUS at 05:53

## 2021-01-01 RX ADMIN — PIPERACILLIN SODIUM AND TAZOBACTAM SODIUM 3.38 G: 3; .375 INJECTION, POWDER, LYOPHILIZED, FOR SOLUTION INTRAVENOUS at 22:02

## 2021-01-01 RX ADMIN — DEXTROSE MONOHYDRATE AND SODIUM CHLORIDE 100 ML/HR: 5; .45 INJECTION, SOLUTION INTRAVENOUS at 23:31

## 2021-01-01 RX ADMIN — ONDANSETRON 4 MG: 2 INJECTION INTRAMUSCULAR; INTRAVENOUS at 08:22

## 2021-01-01 RX ADMIN — MINERAL SUPPLEMENT IRON 300 MG / 5 ML STRENGTH LIQUID 100 PER BOX UNFLAVORED 300 MG: at 10:14

## 2021-01-01 RX ADMIN — MEROPENEM 500 MG: 500 INJECTION INTRAVENOUS at 20:26

## 2021-01-01 RX ADMIN — Medication 10 ML: at 06:50

## 2021-01-01 RX ADMIN — PIPERACILLIN AND TAZOBACTAM 3.38 G: 3; .375 INJECTION, POWDER, LYOPHILIZED, FOR SOLUTION INTRAVENOUS at 10:08

## 2021-01-01 RX ADMIN — Medication 10 ML: at 14:46

## 2021-01-01 RX ADMIN — LEVETIRACETAM 1000 MG: 10 INJECTION INTRAVENOUS at 05:44

## 2021-01-01 RX ADMIN — DEXTROSE MONOHYDRATE AND SODIUM CHLORIDE 75 ML/HR: 5; .45 INJECTION, SOLUTION INTRAVENOUS at 01:02

## 2021-01-01 RX ADMIN — ACETAMINOPHEN 650 MG: 325 TABLET ORAL at 08:52

## 2021-01-01 RX ADMIN — Medication 10 ML: at 13:24

## 2021-01-01 RX ADMIN — ENOXAPARIN SODIUM 40 MG: 40 INJECTION SUBCUTANEOUS at 14:36

## 2021-01-01 RX ADMIN — SODIUM CHLORIDE 100 MG: 9 INJECTION, SOLUTION INTRAVENOUS at 13:31

## 2021-01-01 RX ADMIN — SODIUM CHLORIDE 100 ML/HR: 900 INJECTION, SOLUTION INTRAVENOUS at 17:43

## 2021-01-01 RX ADMIN — PIPERACILLIN SODIUM AND TAZOBACTAM SODIUM 3.38 G: 3; .375 INJECTION, POWDER, LYOPHILIZED, FOR SOLUTION INTRAVENOUS at 14:44

## 2021-01-01 RX ADMIN — DICLOFENAC SODIUM 4 G: 10 GEL TOPICAL at 09:09

## 2021-01-01 RX ADMIN — VANCOMYCIN HYDROCHLORIDE 500 MG: 500 INJECTION, POWDER, LYOPHILIZED, FOR SOLUTION INTRAVENOUS at 17:41

## 2021-01-01 RX ADMIN — ENOXAPARIN SODIUM 40 MG: 40 INJECTION SUBCUTANEOUS at 10:14

## 2021-01-01 RX ADMIN — ACETAMINOPHEN 650 MG: 325 TABLET ORAL at 18:27

## 2021-01-01 RX ADMIN — LEVETIRACETAM 1000 MG: 10 INJECTION INTRAVENOUS at 06:00

## 2021-01-01 RX ADMIN — HYDROMORPHONE HYDROCHLORIDE 0.5 MG: 2 INJECTION, SOLUTION INTRAMUSCULAR; INTRAVENOUS; SUBCUTANEOUS at 08:41

## 2021-01-01 RX ADMIN — POTASSIUM CHLORIDE 10 MEQ: 7.46 INJECTION, SOLUTION INTRAVENOUS at 10:40

## 2021-01-01 RX ADMIN — ASPIRIN 300 MG: 300 SUPPOSITORY RECTAL at 09:35

## 2021-01-01 RX ADMIN — POTASSIUM CHLORIDE 10 MEQ: 7.46 INJECTION, SOLUTION INTRAVENOUS at 10:39

## 2021-01-01 RX ADMIN — Medication 10 ML: at 05:24

## 2021-01-01 RX ADMIN — ASPIRIN 81 MG: 81 TABLET, CHEWABLE ORAL at 10:49

## 2021-01-01 RX ADMIN — Medication 10 ML: at 22:58

## 2021-01-01 RX ADMIN — PIPERACILLIN SODIUM AND TAZOBACTAM SODIUM 3.38 G: 3; .375 INJECTION, POWDER, LYOPHILIZED, FOR SOLUTION INTRAVENOUS at 08:29

## 2021-01-01 RX ADMIN — Medication 10 ML: at 05:10

## 2021-01-01 RX ADMIN — ALLOPURINOL 100 MG: 100 TABLET ORAL at 10:19

## 2021-01-01 RX ADMIN — SODIUM CHLORIDE: 900 INJECTION, SOLUTION INTRAVENOUS at 07:46

## 2021-01-01 RX ADMIN — Medication 10 ML: at 09:04

## 2021-01-01 RX ADMIN — VANCOMYCIN HYDROCHLORIDE 1250 MG: 10 INJECTION, POWDER, LYOPHILIZED, FOR SOLUTION INTRAVENOUS at 08:40

## 2021-01-01 RX ADMIN — LEVETIRACETAM 1000 MG: 10 INJECTION INTRAVENOUS at 18:00

## 2021-01-01 RX ADMIN — SERTRALINE 50 MG: 50 TABLET, FILM COATED ORAL at 08:51

## 2021-01-01 RX ADMIN — CEFAZOLIN SODIUM 2 G: 2 SOLUTION INTRAVENOUS at 15:00

## 2021-01-01 RX ADMIN — POTASSIUM CHLORIDE 10 MEQ: 7.46 INJECTION, SOLUTION INTRAVENOUS at 13:24

## 2021-01-01 RX ADMIN — AMLODIPINE BESYLATE 5 MG: 5 TABLET ORAL at 11:05

## 2021-01-01 RX ADMIN — PIPERACILLIN AND TAZOBACTAM 2.25 G: 2; .25 INJECTION, POWDER, LYOPHILIZED, FOR SOLUTION INTRAVENOUS at 08:49

## 2021-01-01 RX ADMIN — OXYCODONE HYDROCHLORIDE AND ACETAMINOPHEN 1 TABLET: 5; 325 TABLET ORAL at 23:54

## 2021-01-01 RX ADMIN — Medication 100 MG: at 08:51

## 2021-01-01 RX ADMIN — Medication 10 ML: at 15:56

## 2021-01-01 RX ADMIN — MEROPENEM 500 MG: 500 INJECTION INTRAVENOUS at 10:49

## 2021-01-01 RX ADMIN — Medication 10 ML: at 00:05

## 2021-01-01 RX ADMIN — CILOSTAZOL 100 MG: 50 TABLET ORAL at 10:09

## 2021-01-01 RX ADMIN — POTASSIUM CHLORIDE 10 MEQ: 7.46 INJECTION, SOLUTION INTRAVENOUS at 14:45

## 2021-01-01 RX ADMIN — CILOSTAZOL 100 MG: 50 TABLET ORAL at 10:50

## 2021-01-01 RX ADMIN — ACETAMINOPHEN ORAL SOLUTION 650 MG: 650 SOLUTION ORAL at 11:05

## 2021-01-01 RX ADMIN — Medication 100 MG: at 10:45

## 2021-01-01 RX ADMIN — LEVETIRACETAM 1000 MG: 10 INJECTION INTRAVENOUS at 19:21

## 2021-01-01 RX ADMIN — Medication 100 MG: at 08:24

## 2021-01-01 RX ADMIN — LOSARTAN POTASSIUM 50 MG: 50 TABLET, FILM COATED ORAL at 10:09

## 2021-01-01 RX ADMIN — LEVETIRACETAM 1000 MG: 10 INJECTION INTRAVENOUS at 05:24

## 2021-01-01 RX ADMIN — Medication 100 MG: at 09:57

## 2021-01-01 RX ADMIN — Medication 3 MG: at 21:16

## 2021-01-01 RX ADMIN — Medication 10 ML: at 21:41

## 2021-01-01 RX ADMIN — ACETAMINOPHEN 650 MG: 650 SUPPOSITORY RECTAL at 06:12

## 2021-01-01 RX ADMIN — SUGAMMADEX 200 MG: 100 INJECTION, SOLUTION INTRAVENOUS at 08:42

## 2021-01-01 RX ADMIN — LEVETIRACETAM 1000 MG: 10 INJECTION INTRAVENOUS at 17:25

## 2021-01-01 RX ADMIN — IOPAMIDOL 100 ML: 612 INJECTION, SOLUTION INTRAVENOUS at 19:44

## 2021-01-01 RX ADMIN — ACETAMINOPHEN 650 MG: 325 TABLET ORAL at 05:51

## 2021-01-01 RX ADMIN — ATORVASTATIN CALCIUM 10 MG: 10 TABLET, FILM COATED ORAL at 09:10

## 2021-01-01 RX ADMIN — LOSARTAN POTASSIUM 25 MG: 25 TABLET, FILM COATED ORAL at 11:14

## 2021-01-01 RX ADMIN — Medication 2000 UNITS: at 08:24

## 2021-01-01 RX ADMIN — PROPOFOL 30 MG: 10 INJECTION, EMULSION INTRAVENOUS at 15:20

## 2021-01-01 RX ADMIN — Medication 10 ML: at 22:04

## 2021-01-01 RX ADMIN — SERTRALINE 50 MG: 50 TABLET, FILM COATED ORAL at 08:16

## 2021-01-01 RX ADMIN — Medication 3 MG: at 21:38

## 2021-01-01 RX ADMIN — CILOSTAZOL 100 MG: 50 TABLET ORAL at 17:36

## 2021-01-01 RX ADMIN — MINERAL SUPPLEMENT IRON 300 MG / 5 ML STRENGTH LIQUID 100 PER BOX UNFLAVORED 300 MG: at 09:12

## 2021-01-01 RX ADMIN — LEVETIRACETAM 1000 MG: 10 INJECTION INTRAVENOUS at 17:22

## 2021-01-01 RX ADMIN — Medication 10 ML: at 14:00

## 2021-01-01 RX ADMIN — ATORVASTATIN CALCIUM 10 MG: 10 TABLET, FILM COATED ORAL at 09:00

## 2021-01-01 RX ADMIN — Medication 10 ML: at 06:10

## 2021-01-01 RX ADMIN — SODIUM CHLORIDE 1000 ML: 900 INJECTION, SOLUTION INTRAVENOUS at 14:00

## 2021-01-01 RX ADMIN — ATORVASTATIN CALCIUM 10 MG: 10 TABLET, FILM COATED ORAL at 09:12

## 2021-01-01 RX ADMIN — Medication 10 ML: at 23:33

## 2021-01-01 RX ADMIN — CILOSTAZOL 100 MG: 50 TABLET ORAL at 16:06

## 2021-01-01 RX ADMIN — ALLOPURINOL 100 MG: 100 TABLET ORAL at 08:14

## 2021-01-01 RX ADMIN — Medication 10 ML: at 18:14

## 2021-01-01 RX ADMIN — DEXTROSE MONOHYDRATE AND SODIUM CHLORIDE 100 ML/HR: 5; .45 INJECTION, SOLUTION INTRAVENOUS at 10:28

## 2021-01-01 RX ADMIN — MEROPENEM 500 MG: 500 INJECTION INTRAVENOUS at 13:30

## 2021-01-01 RX ADMIN — SERTRALINE 50 MG: 50 TABLET, FILM COATED ORAL at 10:44

## 2021-01-01 RX ADMIN — VANCOMYCIN HYDROCHLORIDE 1250 MG: 10 INJECTION, POWDER, LYOPHILIZED, FOR SOLUTION INTRAVENOUS at 20:37

## 2021-01-01 RX ADMIN — PIPERACILLIN SODIUM AND TAZOBACTAM SODIUM 3.38 G: 3; .375 INJECTION, POWDER, LYOPHILIZED, FOR SOLUTION INTRAVENOUS at 09:36

## 2021-01-01 RX ADMIN — DEXTROSE MONOHYDRATE AND SODIUM CHLORIDE 50 ML/HR: 5; .45 INJECTION, SOLUTION INTRAVENOUS at 06:11

## 2021-01-01 RX ADMIN — SULFAMETHOXAZOLE AND TRIMETHOPRIM 1 TABLET: 800; 160 TABLET ORAL at 08:51

## 2021-01-01 RX ADMIN — MINERAL SUPPLEMENT IRON 300 MG / 5 ML STRENGTH LIQUID 100 PER BOX UNFLAVORED 300 MG: at 14:31

## 2021-01-01 RX ADMIN — Medication 10 ML: at 15:38

## 2021-01-01 RX ADMIN — DEXTROSE MONOHYDRATE AND SODIUM CHLORIDE 75 ML/HR: 5; .45 INJECTION, SOLUTION INTRAVENOUS at 17:24

## 2021-01-01 RX ADMIN — CILOSTAZOL 100 MG: 50 TABLET ORAL at 14:30

## 2021-01-01 RX ADMIN — LEVETIRACETAM 1000 MG: 10 INJECTION INTRAVENOUS at 05:09

## 2021-01-01 RX ADMIN — SODIUM CHLORIDE 50 ML/HR: 900 INJECTION, SOLUTION INTRAVENOUS at 23:55

## 2021-01-01 RX ADMIN — PROPOFOL 30 MG: 10 INJECTION, EMULSION INTRAVENOUS at 15:18

## 2021-01-01 RX ADMIN — DIATRIZOATE MEGLUMINE AND DIATRIZOATE SODIUM 120 ML: 660; 100 LIQUID ORAL; RECTAL at 09:00

## 2021-01-01 RX ADMIN — ASPIRIN 81 MG: 81 TABLET, CHEWABLE ORAL at 09:00

## 2021-01-01 RX ADMIN — LEVETIRACETAM 1000 MG: 500 TABLET ORAL at 08:51

## 2021-01-01 RX ADMIN — MORPHINE SULFATE 1 MG: 2 INJECTION, SOLUTION INTRAMUSCULAR; INTRAVENOUS at 02:15

## 2021-01-01 RX ADMIN — PIPERACILLIN SODIUM AND TAZOBACTAM SODIUM 3.38 G: 3; .375 INJECTION, POWDER, LYOPHILIZED, FOR SOLUTION INTRAVENOUS at 04:06

## 2021-01-01 RX ADMIN — Medication 10 ML: at 15:15

## 2021-01-01 RX ADMIN — OXYCODONE HYDROCHLORIDE AND ACETAMINOPHEN 1 TABLET: 5; 325 TABLET ORAL at 15:53

## 2021-01-01 RX ADMIN — ENOXAPARIN SODIUM 40 MG: 40 INJECTION SUBCUTANEOUS at 09:11

## 2021-01-01 RX ADMIN — CIPROFLOXACIN 200 MG: 2 INJECTION, SOLUTION INTRAVENOUS at 22:26

## 2021-01-01 RX ADMIN — Medication 3 MG: at 22:44

## 2021-01-01 RX ADMIN — GLYCOPYRROLATE 1 MG: 1 TABLET ORAL at 18:27

## 2021-01-01 RX ADMIN — Medication 10 ML: at 05:44

## 2021-01-01 RX ADMIN — CLINDAMYCIN PHOSPHATE 600 MG: 150 INJECTION, SOLUTION INTRAVENOUS at 21:50

## 2021-01-01 RX ADMIN — MEROPENEM 500 MG: 500 INJECTION INTRAVENOUS at 00:05

## 2021-01-01 RX ADMIN — Medication 250 MG: at 10:19

## 2021-01-01 RX ADMIN — Medication 10 ML: at 06:00

## 2021-01-01 RX ADMIN — PIPERACILLIN AND TAZOBACTAM 3.38 G: 3; .375 INJECTION, POWDER, LYOPHILIZED, FOR SOLUTION INTRAVENOUS at 17:36

## 2021-01-01 RX ADMIN — GABAPENTIN 600 MG: 300 CAPSULE ORAL at 19:37

## 2021-01-01 RX ADMIN — Medication 10 ML: at 23:54

## 2021-01-01 RX ADMIN — CIPROFLOXACIN 200 MG: 2 INJECTION, SOLUTION INTRAVENOUS at 23:38

## 2021-01-01 RX ADMIN — LOSARTAN POTASSIUM 25 MG: 25 TABLET, FILM COATED ORAL at 08:24

## 2021-01-01 RX ADMIN — MINERAL SUPPLEMENT IRON 300 MG / 5 ML STRENGTH LIQUID 100 PER BOX UNFLAVORED 300 MG: at 10:09

## 2021-01-01 RX ADMIN — MEROPENEM 500 MG: 500 INJECTION INTRAVENOUS at 08:39

## 2021-01-01 RX ADMIN — PIPERACILLIN SODIUM AND TAZOBACTAM SODIUM 3.38 G: 3; .375 INJECTION, POWDER, LYOPHILIZED, FOR SOLUTION INTRAVENOUS at 09:58

## 2021-01-01 RX ADMIN — CEFAZOLIN SODIUM 2 G: 2 SOLUTION INTRAVENOUS at 15:13

## 2021-01-01 RX ADMIN — DEXTROSE MONOHYDRATE AND SODIUM CHLORIDE 75 ML/HR: 5; .45 INJECTION, SOLUTION INTRAVENOUS at 22:03

## 2021-01-01 RX ADMIN — CILOSTAZOL 100 MG: 50 TABLET ORAL at 16:30

## 2021-01-01 RX ADMIN — AMLODIPINE BESYLATE 5 MG: 5 TABLET ORAL at 09:00

## 2021-01-01 RX ADMIN — ACETAMINOPHEN ORAL SOLUTION 650 MG: 650 SOLUTION ORAL at 18:37

## 2021-01-01 RX ADMIN — PIPERACILLIN SODIUM AND TAZOBACTAM SODIUM 3.38 G: 3; .375 INJECTION, POWDER, LYOPHILIZED, FOR SOLUTION INTRAVENOUS at 14:00

## 2021-01-01 RX ADMIN — POTASSIUM BICARBONATE 40 MEQ: 391 TABLET, EFFERVESCENT ORAL at 14:22

## 2021-01-01 RX ADMIN — ACETAMINOPHEN ORAL SOLUTION 650 MG: 650 SOLUTION ORAL at 00:05

## 2021-01-01 RX ADMIN — Medication 10 ML: at 22:00

## 2021-01-01 RX ADMIN — CIPROFLOXACIN 200 MG: 2 INJECTION, SOLUTION INTRAVENOUS at 12:05

## 2021-01-01 RX ADMIN — LEVETIRACETAM 1000 MG: 10 INJECTION INTRAVENOUS at 17:38

## 2021-01-01 RX ADMIN — SULFAMETHOXAZOLE AND TRIMETHOPRIM 1 TABLET: 800; 160 TABLET ORAL at 23:20

## 2021-01-01 RX ADMIN — PIPERACILLIN SODIUM AND TAZOBACTAM SODIUM 3.38 G: 3; .375 INJECTION, POWDER, LYOPHILIZED, FOR SOLUTION INTRAVENOUS at 14:40

## 2021-01-01 RX ADMIN — Medication 10 ML: at 13:30

## 2021-01-01 RX ADMIN — Medication 250 MG: at 08:52

## 2021-01-01 RX ADMIN — CILOSTAZOL 100 MG: 50 TABLET ORAL at 15:44

## 2021-01-01 RX ADMIN — PIPERACILLIN AND TAZOBACTAM 3.38 G: 3; .375 INJECTION, POWDER, LYOPHILIZED, FOR SOLUTION INTRAVENOUS at 21:50

## 2021-01-01 RX ADMIN — SODIUM CHLORIDE 800 ML: 900 INJECTION, SOLUTION INTRAVENOUS at 16:20

## 2021-01-01 RX ADMIN — FENTANYL CITRATE 50 MCG: 50 INJECTION, SOLUTION INTRAMUSCULAR; INTRAVENOUS at 08:09

## 2021-01-01 RX ADMIN — Medication 3 MG: at 00:05

## 2021-01-01 RX ADMIN — PIPERACILLIN AND TAZOBACTAM 3.38 G: 3; .375 INJECTION, POWDER, LYOPHILIZED, FOR SOLUTION INTRAVENOUS at 03:25

## 2021-01-01 RX ADMIN — PIPERACILLIN AND TAZOBACTAM 3.38 G: 3; .375 INJECTION, POWDER, LYOPHILIZED, FOR SOLUTION INTRAVENOUS at 21:38

## 2021-01-01 RX ADMIN — MORPHINE SULFATE 4 MG: 4 INJECTION, SOLUTION INTRAMUSCULAR; INTRAVENOUS at 00:55

## 2021-01-01 RX ADMIN — PIPERACILLIN AND TAZOBACTAM 3.38 G: 3; .375 INJECTION, POWDER, LYOPHILIZED, FOR SOLUTION INTRAVENOUS at 03:00

## 2021-01-01 RX ADMIN — PROPOFOL 20 MG: 10 INJECTION, EMULSION INTRAVENOUS at 15:23

## 2021-01-01 RX ADMIN — POTASSIUM CHLORIDE 10 MEQ: 7.46 INJECTION, SOLUTION INTRAVENOUS at 05:38

## 2021-01-01 RX ADMIN — POTASSIUM CHLORIDE 10 MEQ: 7.46 INJECTION, SOLUTION INTRAVENOUS at 06:23

## 2021-01-01 RX ADMIN — DEXTROSE MONOHYDRATE AND SODIUM CHLORIDE 50 ML/HR: 5; .45 INJECTION, SOLUTION INTRAVENOUS at 01:24

## 2021-01-01 RX ADMIN — PIPERACILLIN AND TAZOBACTAM 4.5 G: 4; .5 INJECTION, POWDER, FOR SOLUTION INTRAVENOUS at 15:20

## 2021-01-01 RX ADMIN — DEXTROSE MONOHYDRATE AND SODIUM CHLORIDE 50 ML/HR: 5; .45 INJECTION, SOLUTION INTRAVENOUS at 02:30

## 2021-01-01 RX ADMIN — Medication 10 ML: at 23:21

## 2021-01-01 RX ADMIN — PIPERACILLIN AND TAZOBACTAM 3.38 G: 3; .375 INJECTION, POWDER, LYOPHILIZED, FOR SOLUTION INTRAVENOUS at 15:44

## 2021-01-01 RX ADMIN — SULFAMETHOXAZOLE AND TRIMETHOPRIM 1 TABLET: 800; 160 TABLET ORAL at 17:25

## 2021-01-01 RX ADMIN — SODIUM CHLORIDE, SODIUM LACTATE, POTASSIUM CHLORIDE, AND CALCIUM CHLORIDE: 600; 310; 30; 20 INJECTION, SOLUTION INTRAVENOUS at 15:06

## 2021-01-01 RX ADMIN — ASPIRIN 300 MG: 300 SUPPOSITORY RECTAL at 09:00

## 2021-01-01 RX ADMIN — SODIUM CHLORIDE 100 MG: 9 INJECTION, SOLUTION INTRAVENOUS at 11:21

## 2021-01-01 RX ADMIN — MEROPENEM 500 MG: 500 INJECTION INTRAVENOUS at 21:36

## 2021-01-01 RX ADMIN — HEPARIN SODIUM 18 UNITS/KG/HR: 10000 INJECTION, SOLUTION INTRAVENOUS at 20:52

## 2021-01-01 RX ADMIN — Medication 250 MG: at 08:14

## 2021-01-01 RX ADMIN — PIPERACILLIN AND TAZOBACTAM 3.38 G: 3; .375 INJECTION, POWDER, LYOPHILIZED, FOR SOLUTION INTRAVENOUS at 09:10

## 2021-01-01 RX ADMIN — ACETAMINOPHEN 650 MG: 325 TABLET ORAL at 21:47

## 2021-01-01 RX ADMIN — CIPROFLOXACIN 200 MG: 2 INJECTION, SOLUTION INTRAVENOUS at 22:45

## 2021-01-01 RX ADMIN — Medication 20 ML: at 22:00

## 2021-01-01 RX ADMIN — MORPHINE SULFATE 1 MG: 2 INJECTION, SOLUTION INTRAMUSCULAR; INTRAVENOUS at 17:26

## 2021-01-01 RX ADMIN — DEXTROSE MONOHYDRATE AND SODIUM CHLORIDE 50 ML/HR: 5; .45 INJECTION, SOLUTION INTRAVENOUS at 18:19

## 2021-01-01 RX ADMIN — MEROPENEM 500 MG: 500 INJECTION INTRAVENOUS at 02:32

## 2021-01-01 RX ADMIN — CYANOCOBALAMIN TAB 1000 MCG 500 MCG: 1000 TAB at 08:25

## 2021-01-01 RX ADMIN — ASPIRIN 81 MG: 81 TABLET, CHEWABLE ORAL at 09:10

## 2021-01-01 RX ADMIN — Medication 10 ML: at 21:54

## 2021-01-01 RX ADMIN — SODIUM CHLORIDE 878 ML: 900 INJECTION, SOLUTION INTRAVENOUS at 13:42

## 2021-01-01 RX ADMIN — SULFAMETHOXAZOLE AND TRIMETHOPRIM 1 TABLET: 800; 160 TABLET ORAL at 08:24

## 2021-01-01 RX ADMIN — CILOSTAZOL 100 MG: 50 TABLET ORAL at 15:31

## 2021-01-01 RX ADMIN — LOSARTAN POTASSIUM 50 MG: 50 TABLET, FILM COATED ORAL at 18:20

## 2021-01-01 RX ADMIN — CYANOCOBALAMIN TAB 1000 MCG 500 MCG: 1000 TAB at 10:44

## 2021-01-01 RX ADMIN — PIPERACILLIN AND TAZOBACTAM 3.38 G: 3; .375 INJECTION, POWDER, LYOPHILIZED, FOR SOLUTION INTRAVENOUS at 22:10

## 2021-01-01 RX ADMIN — SODIUM CHLORIDE 1000 ML: 900 INJECTION, SOLUTION INTRAVENOUS at 13:13

## 2021-01-01 RX ADMIN — Medication 100 MG: at 10:18

## 2021-01-01 RX ADMIN — Medication 10 ML: at 05:56

## 2021-01-01 RX ADMIN — LIDOCAINE HYDROCHLORIDE 80 MG: 20 INJECTION, SOLUTION EPIDURAL; INFILTRATION; INTRACAUDAL; PERINEURAL at 07:54

## 2021-01-01 RX ADMIN — VANCOMYCIN HYDROCHLORIDE 750 MG: 750 INJECTION, POWDER, LYOPHILIZED, FOR SOLUTION INTRAVENOUS at 12:05

## 2021-01-01 RX ADMIN — VANCOMYCIN HYDROCHLORIDE 1250 MG: 10 INJECTION, POWDER, LYOPHILIZED, FOR SOLUTION INTRAVENOUS at 16:32

## 2021-01-01 RX ADMIN — ALLOPURINOL 100 MG: 100 TABLET ORAL at 08:51

## 2021-01-01 RX ADMIN — PIPERACILLIN AND TAZOBACTAM 3.38 G: 3; .375 INJECTION, POWDER, LYOPHILIZED, FOR SOLUTION INTRAVENOUS at 03:29

## 2021-01-01 RX ADMIN — SULFAMETHOXAZOLE AND TRIMETHOPRIM 1 TABLET: 800; 160 TABLET ORAL at 20:27

## 2021-01-01 RX ADMIN — ENOXAPARIN SODIUM 40 MG: 40 INJECTION SUBCUTANEOUS at 09:10

## 2021-01-01 RX ADMIN — Medication 10 ML: at 05:52

## 2021-01-01 RX ADMIN — MEROPENEM 500 MG: 500 INJECTION INTRAVENOUS at 15:26

## 2021-01-01 RX ADMIN — LOSARTAN POTASSIUM 50 MG: 50 TABLET, FILM COATED ORAL at 10:14

## 2021-01-01 RX ADMIN — IOPAMIDOL 100 ML: 755 INJECTION, SOLUTION INTRAVENOUS at 01:33

## 2021-01-01 RX ADMIN — SODIUM CHLORIDE 100 ML/HR: 900 INJECTION, SOLUTION INTRAVENOUS at 00:18

## 2021-01-01 RX ADMIN — Medication 2000 UNITS: at 08:51

## 2021-01-01 RX ADMIN — Medication 100 MG: at 08:16

## 2021-01-01 RX ADMIN — ENOXAPARIN SODIUM 40 MG: 40 INJECTION SUBCUTANEOUS at 10:08

## 2021-01-01 RX ADMIN — MEROPENEM 500 MG: 500 INJECTION INTRAVENOUS at 16:06

## 2021-01-01 RX ADMIN — OXYCODONE HYDROCHLORIDE AND ACETAMINOPHEN 1 TABLET: 5; 325 TABLET ORAL at 18:23

## 2021-01-01 RX ADMIN — ASPIRIN 81 MG: 81 TABLET, CHEWABLE ORAL at 10:09

## 2021-01-01 RX ADMIN — CYANOCOBALAMIN TAB 1000 MCG 500 MCG: 1000 TAB at 10:18

## 2021-01-01 RX ADMIN — Medication 3 MG: at 21:54

## 2021-01-01 RX ADMIN — Medication 10 ML: at 15:26

## 2021-01-01 RX ADMIN — POTASSIUM PHOSPHATE, MONOBASIC AND POTASSIUM PHOSPHATE, DIBASIC: 224; 236 INJECTION, SOLUTION, CONCENTRATE INTRAVENOUS at 15:38

## 2021-01-01 RX ADMIN — Medication 10 ML: at 21:18

## 2021-01-01 RX ADMIN — PIPERACILLIN SODIUM AND TAZOBACTAM SODIUM 3.38 G: 3; .375 INJECTION, POWDER, LYOPHILIZED, FOR SOLUTION INTRAVENOUS at 06:09

## 2021-01-01 RX ADMIN — ASPIRIN 81 MG: 81 TABLET, CHEWABLE ORAL at 09:12

## 2021-01-01 RX ADMIN — CILOSTAZOL 100 MG: 50 TABLET ORAL at 08:40

## 2021-01-01 RX ADMIN — Medication 250 MG: at 08:24

## 2021-01-01 RX ADMIN — LEVETIRACETAM 1000 MG: 10 INJECTION INTRAVENOUS at 05:26

## 2021-01-01 RX ADMIN — VANCOMYCIN HYDROCHLORIDE 1000 MG: 1 INJECTION, POWDER, LYOPHILIZED, FOR SOLUTION INTRAVENOUS at 13:25

## 2021-01-01 RX ADMIN — Medication 81 MG: at 08:51

## 2021-01-01 RX ADMIN — ENOXAPARIN SODIUM 40 MG: 40 INJECTION SUBCUTANEOUS at 10:49

## 2021-01-01 RX ADMIN — CYANOCOBALAMIN TAB 1000 MCG 500 MCG: 1000 TAB at 08:51

## 2021-01-01 RX ADMIN — LOSARTAN POTASSIUM 50 MG: 50 TABLET, FILM COATED ORAL at 09:12

## 2021-01-01 RX ADMIN — ACETAMINOPHEN ORAL SOLUTION 650 MG: 650 SOLUTION ORAL at 12:37

## 2021-01-01 RX ADMIN — MEROPENEM 500 MG: 500 INJECTION INTRAVENOUS at 02:31

## 2021-01-01 RX ADMIN — PIPERACILLIN AND TAZOBACTAM 3.38 G: 3; .375 INJECTION, POWDER, LYOPHILIZED, FOR SOLUTION INTRAVENOUS at 08:13

## 2021-01-01 RX ADMIN — CLINDAMYCIN PHOSPHATE 600 MG: 150 INJECTION, SOLUTION INTRAVENOUS at 13:24

## 2021-01-01 RX ADMIN — CYANOCOBALAMIN TAB 1000 MCG 500 MCG: 1000 TAB at 08:15

## 2021-01-01 RX ADMIN — ATORVASTATIN CALCIUM 10 MG: 10 TABLET, FILM COATED ORAL at 18:20

## 2021-01-01 RX ADMIN — SULFAMETHOXAZOLE AND TRIMETHOPRIM 1 TABLET: 800; 160 TABLET ORAL at 10:18

## 2021-01-01 RX ADMIN — Medication 10 ML: at 14:37

## 2021-01-01 RX ADMIN — ROCURONIUM BROMIDE 30 MG: 50 INJECTION INTRAVENOUS at 07:54

## 2021-01-01 RX ADMIN — PROPOFOL 100 MG: 10 INJECTION, EMULSION INTRAVENOUS at 07:54

## 2021-01-01 RX ADMIN — Medication 10 ML: at 14:28

## 2021-01-01 RX ADMIN — Medication 10 ML: at 05:48

## 2021-01-01 RX ADMIN — VANCOMYCIN HYDROCHLORIDE 1250 MG: 10 INJECTION, POWDER, LYOPHILIZED, FOR SOLUTION INTRAVENOUS at 21:41

## 2021-01-01 RX ADMIN — SERTRALINE 50 MG: 50 TABLET, FILM COATED ORAL at 08:24

## 2021-01-01 RX ADMIN — PIPERACILLIN SODIUM AND TAZOBACTAM SODIUM 3.38 G: 3; .375 INJECTION, POWDER, LYOPHILIZED, FOR SOLUTION INTRAVENOUS at 13:24

## 2021-01-01 RX ADMIN — Medication 3 MG: at 21:41

## 2021-01-01 RX ADMIN — Medication 10 ML: at 22:40

## 2021-01-01 RX ADMIN — ACETAMINOPHEN ORAL SOLUTION 650 MG: 650 SOLUTION ORAL at 16:34

## 2021-01-01 RX ADMIN — Medication 10 ML: at 17:45

## 2021-01-01 RX ADMIN — LEVETIRACETAM 1000 MG: 500 TABLET ORAL at 23:30

## 2021-01-01 RX ADMIN — ATORVASTATIN CALCIUM 10 MG: 10 TABLET, FILM COATED ORAL at 09:07

## 2021-01-01 RX ADMIN — PIPERACILLIN AND TAZOBACTAM 3.38 G: 3; .375 INJECTION, POWDER, LYOPHILIZED, FOR SOLUTION INTRAVENOUS at 17:56

## 2021-01-01 RX ADMIN — VANCOMYCIN HYDROCHLORIDE 1250 MG: 10 INJECTION, POWDER, LYOPHILIZED, FOR SOLUTION INTRAVENOUS at 14:37

## 2021-01-01 RX ADMIN — DEXTROSE MONOHYDRATE AND SODIUM CHLORIDE 50 ML/HR: 5; .45 INJECTION, SOLUTION INTRAVENOUS at 05:13

## 2021-01-01 RX ADMIN — Medication 3 MG: at 21:33

## 2021-01-01 RX ADMIN — SODIUM CHLORIDE 200 MG: 9 INJECTION, SOLUTION INTRAVENOUS at 11:57

## 2021-01-01 RX ADMIN — ATORVASTATIN CALCIUM 10 MG: 10 TABLET, FILM COATED ORAL at 10:49

## 2021-01-01 RX ADMIN — CIPROFLOXACIN 200 MG: 2 INJECTION, SOLUTION INTRAVENOUS at 11:49

## 2021-01-01 RX ADMIN — Medication 10 ML: at 05:58

## 2021-01-01 RX ADMIN — LEVETIRACETAM 1000 MG: 10 INJECTION INTRAVENOUS at 05:38

## 2021-01-01 RX ADMIN — PIPERACILLIN AND TAZOBACTAM 3.38 G: 3; .375 INJECTION, POWDER, LYOPHILIZED, FOR SOLUTION INTRAVENOUS at 04:34

## 2021-01-01 RX ADMIN — AMLODIPINE BESYLATE 5 MG: 5 TABLET ORAL at 10:49

## 2021-01-01 RX ADMIN — MAGNESIUM SULFATE HEPTAHYDRATE 1 G: 1 INJECTION, SOLUTION INTRAVENOUS at 15:15

## 2021-01-01 RX ADMIN — POTASSIUM CHLORIDE 10 MEQ: 7.46 INJECTION, SOLUTION INTRAVENOUS at 16:21

## 2021-01-01 RX ADMIN — Medication 10 ML: at 13:50

## 2021-01-01 RX ADMIN — SODIUM CHLORIDE 100 ML/HR: 900 INJECTION, SOLUTION INTRAVENOUS at 23:55

## 2021-01-01 RX ADMIN — VANCOMYCIN HYDROCHLORIDE 1250 MG: 10 INJECTION, POWDER, LYOPHILIZED, FOR SOLUTION INTRAVENOUS at 19:00

## 2021-01-01 RX ADMIN — MAGNESIUM SULFATE HEPTAHYDRATE 1 G: 1 INJECTION, SOLUTION INTRAVENOUS at 18:50

## 2021-01-01 RX ADMIN — LOSARTAN POTASSIUM 50 MG: 50 TABLET, FILM COATED ORAL at 14:28

## 2021-01-01 RX ADMIN — HEPARIN SODIUM 15 UNITS/KG/HR: 10000 INJECTION, SOLUTION INTRAVENOUS at 06:51

## 2021-01-01 RX ADMIN — ACETAMINOPHEN ORAL SOLUTION 650 MG: 650 SOLUTION ORAL at 00:23

## 2021-01-01 RX ADMIN — GABAPENTIN 600 MG: 300 CAPSULE ORAL at 18:20

## 2021-01-01 RX ADMIN — MAGNESIUM SULFATE HEPTAHYDRATE 1 G: 1 INJECTION, SOLUTION INTRAVENOUS at 14:25

## 2021-01-01 RX ADMIN — PIPERACILLIN AND TAZOBACTAM 3.38 G: 3; .375 INJECTION, POWDER, LYOPHILIZED, FOR SOLUTION INTRAVENOUS at 21:00

## 2021-01-01 RX ADMIN — Medication 10 ML: at 06:15

## 2021-01-01 RX ADMIN — ATORVASTATIN CALCIUM 10 MG: 10 TABLET, FILM COATED ORAL at 10:14

## 2021-01-01 RX ADMIN — MEROPENEM 500 MG: 500 INJECTION INTRAVENOUS at 02:17

## 2021-01-01 RX ADMIN — Medication 100 MG: at 08:52

## 2021-01-01 RX ADMIN — LEVETIRACETAM 1000 MG: 10 INJECTION INTRAVENOUS at 17:24

## 2021-01-01 RX ADMIN — SERTRALINE 50 MG: 50 TABLET, FILM COATED ORAL at 09:57

## 2021-01-01 RX ADMIN — Medication 10 ML: at 14:48

## 2021-01-01 RX ADMIN — ASPIRIN 81 MG: 81 TABLET, CHEWABLE ORAL at 14:27

## 2021-01-01 RX ADMIN — LOSARTAN POTASSIUM 50 MG: 50 TABLET, FILM COATED ORAL at 09:07

## 2021-01-01 RX ADMIN — CILOSTAZOL 100 MG: 50 TABLET ORAL at 07:30

## 2021-01-01 RX ADMIN — VANCOMYCIN HYDROCHLORIDE 1250 MG: 10 INJECTION, POWDER, LYOPHILIZED, FOR SOLUTION INTRAVENOUS at 16:20

## 2021-01-01 RX ADMIN — VANCOMYCIN HYDROCHLORIDE 1250 MG: 10 INJECTION, POWDER, LYOPHILIZED, FOR SOLUTION INTRAVENOUS at 12:57

## 2021-01-01 RX ADMIN — AMLODIPINE BESYLATE 5 MG: 5 TABLET ORAL at 09:13

## 2021-01-01 RX ADMIN — SULFAMETHOXAZOLE AND TRIMETHOPRIM 1 TABLET: 800; 160 TABLET ORAL at 08:14

## 2021-01-01 RX ADMIN — POTASSIUM CHLORIDE 10 MEQ: 7.46 INJECTION, SOLUTION INTRAVENOUS at 09:34

## 2021-01-01 RX ADMIN — MORPHINE SULFATE 1 MG: 2 INJECTION, SOLUTION INTRAMUSCULAR; INTRAVENOUS at 21:33

## 2021-01-01 RX ADMIN — HEPARIN SODIUM 5440 UNITS: 1000 INJECTION INTRAVENOUS; SUBCUTANEOUS at 20:51

## 2021-01-01 RX ADMIN — SODIUM CHLORIDE 100 ML/HR: 900 INJECTION, SOLUTION INTRAVENOUS at 18:23

## 2021-01-01 RX ADMIN — FENTANYL CITRATE 50 MCG: 50 INJECTION, SOLUTION INTRAMUSCULAR; INTRAVENOUS at 07:54

## 2021-01-01 RX ADMIN — Medication 10 ML: at 05:39

## 2021-01-01 RX ADMIN — Medication 10 ML: at 22:11

## 2021-01-01 RX ADMIN — Medication 2000 UNITS: at 10:18

## 2021-01-01 RX ADMIN — ALLOPURINOL 100 MG: 100 TABLET ORAL at 08:52

## 2021-01-01 RX ADMIN — ASPIRIN 300 MG: 300 SUPPOSITORY RECTAL at 10:45

## 2021-01-01 RX ADMIN — PIPERACILLIN SODIUM AND TAZOBACTAM SODIUM 3.38 G: 3; .375 INJECTION, POWDER, LYOPHILIZED, FOR SOLUTION INTRAVENOUS at 20:27

## 2021-01-01 RX ADMIN — SULFAMETHOXAZOLE AND TRIMETHOPRIM 1 TABLET: 800; 160 TABLET ORAL at 21:47

## 2021-01-01 RX ADMIN — ACETAMINOPHEN 650 MG: 325 TABLET ORAL at 20:27

## 2021-01-01 RX ADMIN — Medication 10 ML: at 05:43

## 2021-01-01 RX ADMIN — ATORVASTATIN CALCIUM 10 MG: 10 TABLET, FILM COATED ORAL at 10:09

## 2021-01-01 RX ADMIN — MINERAL SUPPLEMENT IRON 300 MG / 5 ML STRENGTH LIQUID 100 PER BOX UNFLAVORED 300 MG: at 08:00

## 2021-01-01 RX ADMIN — VANCOMYCIN HYDROCHLORIDE 1250 MG: 10 INJECTION, POWDER, LYOPHILIZED, FOR SOLUTION INTRAVENOUS at 02:43

## 2021-01-01 RX ADMIN — SERTRALINE 50 MG: 50 TABLET, FILM COATED ORAL at 10:19

## 2021-01-01 RX ADMIN — LOSARTAN POTASSIUM 25 MG: 25 TABLET, FILM COATED ORAL at 08:14

## 2021-01-01 RX ADMIN — PIPERACILLIN AND TAZOBACTAM 2.25 G: 2; .25 INJECTION, POWDER, LYOPHILIZED, FOR SOLUTION INTRAVENOUS at 23:31

## 2021-01-01 RX ADMIN — LEVETIRACETAM 1000 MG: 10 INJECTION INTRAVENOUS at 06:10

## 2021-01-01 RX ADMIN — LEVETIRACETAM 1000 MG: 10 INJECTION INTRAVENOUS at 18:37

## 2021-01-01 RX ADMIN — LOSARTAN POTASSIUM 50 MG: 50 TABLET, FILM COATED ORAL at 09:00

## 2021-01-01 RX ADMIN — PIPERACILLIN SODIUM AND TAZOBACTAM SODIUM 3.38 G: 3; .375 INJECTION, POWDER, LYOPHILIZED, FOR SOLUTION INTRAVENOUS at 14:23

## 2021-01-01 RX ADMIN — Medication 10 ML: at 17:36

## 2021-01-01 RX ADMIN — CILOSTAZOL 100 MG: 50 TABLET ORAL at 10:14

## 2021-01-01 RX ADMIN — MEROPENEM 500 MG: 500 INJECTION INTRAVENOUS at 15:55

## 2021-01-01 RX ADMIN — LOSARTAN POTASSIUM 50 MG: 50 TABLET, FILM COATED ORAL at 10:50

## 2021-01-01 RX ADMIN — OXYCODONE HYDROCHLORIDE AND ACETAMINOPHEN 1 TABLET: 5; 325 TABLET ORAL at 00:34

## 2021-01-01 RX ADMIN — TRAMADOL HYDROCHLORIDE 50 MG: 50 TABLET, COATED ORAL at 21:17

## 2021-01-01 RX ADMIN — LEVETIRACETAM 1000 MG: 10 INJECTION INTRAVENOUS at 20:41

## 2021-01-01 RX ADMIN — Medication 2000 UNITS: at 08:14

## 2021-01-01 RX ADMIN — LOSARTAN POTASSIUM 25 MG: 25 TABLET, FILM COATED ORAL at 10:19

## 2021-01-01 RX ADMIN — MINERAL SUPPLEMENT IRON 300 MG / 5 ML STRENGTH LIQUID 100 PER BOX UNFLAVORED 300 MG: at 10:49

## 2021-01-01 RX ADMIN — ACETAMINOPHEN 650 MG: 650 SUPPOSITORY RECTAL at 12:04

## 2021-01-01 RX ADMIN — SERTRALINE 50 MG: 50 TABLET, FILM COATED ORAL at 08:52

## 2021-01-01 RX ADMIN — PIPERACILLIN SODIUM AND TAZOBACTAM SODIUM 3.38 G: 3; .375 INJECTION, POWDER, LYOPHILIZED, FOR SOLUTION INTRAVENOUS at 01:18

## 2021-01-01 RX ADMIN — MEROPENEM 500 MG: 500 INJECTION INTRAVENOUS at 11:05

## 2021-01-01 RX ADMIN — LEVETIRACETAM 1000 MG: 10 INJECTION INTRAVENOUS at 05:48

## 2021-01-01 RX ADMIN — Medication 2000 UNITS: at 08:52

## 2021-01-01 RX ADMIN — GABAPENTIN 600 MG: 300 CAPSULE ORAL at 09:07

## 2021-01-01 RX ADMIN — CILOSTAZOL 100 MG: 50 TABLET ORAL at 09:10

## 2021-01-01 RX ADMIN — MEROPENEM 500 MG: 500 INJECTION INTRAVENOUS at 20:38

## 2021-02-26 NOTE — ED PROVIDER NOTES
EMERGENCY DEPARTMENT HISTORY AND PHYSICAL EXAM 
 
11:25 PM 
 
 
Date: 2/25/2021 Patient Name: Terrell Aparicio History of Presenting Illness Chief Complaint Patient presents with  Lethargy  Headache History Provided By: Patient Additional History (Context): Terrell Aparicio is a 77 y.o. male with Past medical history of stroke, depression, hypertension, pacemaker who presents with chief complaint of fatigue for the past few days. Nothing makes the pain better or worse. He also complains of headache. No fever, chest pain, shortness of breath, dizziness, dysuria, flank pain, back pain, cough, vomiting, diarrhea and no other complaint. PCP: Erlin, MD Chasidy 
 
 
 
Past History Past Medical History: 
Past Medical History:  
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) 8/14/2017  Dysphagia as late effect of cerebrovascular accident (CVA) 8/14/2017  Erectile dysfunction On Sildenafil citrate  Hemiparesis affecting left side as late effect of cerebrovascular accident (CVA) ([address] 8/14/2017  Hyperlipidemia with target LDL less than 70 8/15/2017 Lipid profile (8/15/2017) showed TG 99, , HDL 81, LDL 84  Hypertension  Ischemic cardiomyopathy 8/15/2017 EF 40%  Occlusion of right internal carotid artery 8/15/2017  On chronic clopidogrel therapy  Osteoarthritis On Etodolac and Oxycodone-Acetaminophen  Other ill-defined conditions(799.89) PVD  Peripheral artery disease ([facility]  Seizure, late effect of stroke ([facility] 8/15/2017 On Levetiracetam  
 Tobacco use disorder Past Surgical History: 
Past Surgical History:  
Procedure Laterality Date  HX ANGIOPLASTY  12/05/2012 S/P Balloon angioplasty and stent of left common iliac artery (12/5/2012 - Dr. Dariel Wilson) Family History: 
History reviewed. No pertinent family history. Social History: 
Social History Tobacco Use  Smoking status: Never Smoker  Smokeless tobacco: Never Used Substance Use Topics  Alcohol use: No  
  Frequency: Never  Drug use: Not on file Allergies: 
No Known Allergies Review of Systems Review of Systems Constitutional: Positive for fatigue. Negative for chills and fever. HENT: Negative for congestion, rhinorrhea, sore throat and trouble swallowing. Eyes: Negative for visual disturbance. Respiratory: Negative for cough, shortness of breath and wheezing. Cardiovascular: Negative for chest pain and leg swelling. Gastrointestinal: Negative for abdominal pain, nausea and vomiting. Endocrine: Negative for polyuria. Genitourinary: Negative for difficulty urinating, dysuria, flank pain, frequency and urgency. Musculoskeletal: Negative for arthralgias and neck stiffness. Skin: Negative for rash. Neurological: Positive for headaches. Negative for dizziness, weakness and numbness. Hematological: Does not bruise/bleed easily. Psychiatric/Behavioral: Negative for confusion and dysphoric mood. All other systems reviewed and are negative. Physical Exam  
 
Visit Vitals BP (!) 165/70 (BP 1 Location: Right upper arm, BP Patient Position: At rest) Pulse 71 Temp 98.3 °F (36.8 °C) Resp 18 Wt 68 kg (150 lb) SpO2 99% BMI 20.34 kg/m² Physical Exam 
Vitals signs and nursing note reviewed. Constitutional:   
   General: He is not in acute distress. Appearance: He is well-developed. He is not diaphoretic. HENT:  
   Head: Normocephalic and atraumatic. Eyes:  
   General: No scleral icterus. Conjunctiva/sclera: Conjunctivae normal.  
   Pupils: Pupils are equal, round, and reactive to light. Neck: Musculoskeletal: Normal range of motion and neck supple. Cardiovascular:  
   Rate and Rhythm: Normal rate. Pulses: Normal pulses. Heart sounds: Normal heart sounds. Comments: Capillary refill < 3 seconds Pulmonary:  
   Effort: Pulmonary effort is normal. No respiratory distress. Breath sounds: Normal breath sounds. No wheezing. Abdominal:  
   General: Bowel sounds are normal. There is no distension. Palpations: Abdomen is soft. Tenderness: There is no abdominal tenderness. Musculoskeletal: Normal range of motion. Lymphadenopathy:  
   Cervical: No cervical adenopathy. Skin: 
   General: Skin is warm and dry. Neurological:  
   Mental Status: He is alert and oriented to person, place, and time. Cranial Nerves: No cranial nerve deficit. Sensory: No sensory deficit. Comments: Left-sided weakness from old stroke Psychiatric:     
   Thought Content: Thought content normal.  
 
 
 
 
Diagnostic Study Results Labs - Recent Results (from the past 12 hour(s)) CBC WITH AUTOMATED DIFF Collection Time: 02/25/21  9:07 PM  
Result Value Ref Range WBC 4.9 4.6 - 13.2 K/uL  
 RBC 5.03 4.70 - 5.50 M/uL  
 HGB 11.8 (L) 13.0 - 16.0 g/dL HCT 36.9 36.0 - 48.0 % MCV 73.4 (L) 74.0 - 97.0 FL  
 MCH 23.5 (L) 24.0 - 34.0 PG  
 MCHC 32.0 31.0 - 37.0 g/dL  
 RDW 15.9 (H) 11.6 - 14.5 % PLATELET 200 960 - 435 K/uL MPV 9.5 9.2 - 11.8 FL  
 NEUTROPHILS 49 40 - 73 % LYMPHOCYTES 39 21 - 52 % MONOCYTES 9 3 - 10 % EOSINOPHILS 2 0 - 5 % BASOPHILS 1 0 - 2 %  
 ABS. NEUTROPHILS 2.4 1.8 - 8.0 K/UL ABS. LYMPHOCYTES 1.9 0.9 - 3.6 K/UL  
 ABS. MONOCYTES 0.4 0.05 - 1.2 K/UL  
 ABS. EOSINOPHILS 0.1 0.0 - 0.4 K/UL  
 ABS. BASOPHILS 0.0 0.0 - 0.1 K/UL  
 DF AUTOMATED METABOLIC PANEL, COMPREHENSIVE Collection Time: 02/25/21  9:07 PM  
Result Value Ref Range Sodium 139 136 - 145 mmol/L Potassium 3.8 3.5 - 5.5 mmol/L Chloride 105 100 - 111 mmol/L  
 CO2 30 21 - 32 mmol/L Anion gap 4 3.0 - 18 mmol/L Glucose 93 74 - 99 mg/dL BUN 11 7.0 - 18 MG/DL Creatinine 0.76 0.6 - 1.3 MG/DL  
 BUN/Creatinine ratio 14 12 - 20 GFR est AA >60 >60 ml/min/1.73m2 GFR est non-AA >60 >60 ml/min/1.73m2 Calcium 9.7 8.5 - 10.1 MG/DL Bilirubin, total 0.2 0.2 - 1.0 MG/DL  
 ALT (SGPT) 25 16 - 61 U/L  
 AST (SGOT) 16 10 - 38 U/L Alk. phosphatase 100 45 - 117 U/L Protein, total 8.2 6.4 - 8.2 g/dL Albumin 3.6 3.4 - 5.0 g/dL Globulin 4.6 (H) 2.0 - 4.0 g/dL A-G Ratio 0.8 0.8 - 1.7 MAGNESIUM Collection Time: 02/25/21  9:07 PM  
Result Value Ref Range Magnesium 2.0 1.6 - 2.6 mg/dL LIPASE Collection Time: 02/25/21  9:07 PM  
Result Value Ref Range Lipase 165 73 - 393 U/L  
NT-PRO BNP Collection Time: 02/25/21  9:07 PM  
Result Value Ref Range NT pro-BNP 1,454 (H) 0 - 900 PG/ML Radiologic Studies -  
CT HEAD WO CONT Final Result 1. No acute intracranial findings. 2.  Stable atrophy with chronic microvascular ischemic disease and chronic right  
cerebral infarcts. XR CHEST PORT Final Result No acute disease. Medical Decision Making I am the first provider for this patient. I reviewed the vital signs, available nursing notes, past medical history, past surgical history, family history and social history. Vital Signs-Reviewed the patient's vital signs. Pulse Oximetry Analysis -  99% on room air (Interpretation) normal 
 
 
Records Reviewed: Nursing Notes and Old Medical Records (Time of Review: 7:54 AM) Provider Notes (Medical Decision Making): DDX: Metabolic, dehydration, infectious brain mass, ICH, I doubt SAH Labs, chest x-ray, CT brain MDM Medications - No data to display ED Course: Progress Notes, Reevaluation, and Consults: 
No alarming labs CT brain nothing acute Chest x-ray nothing acute COVID-19 results pending We will have him self isolate and follow-up PCP I have reassessed the patient. I have discussed the workup, results and plan with the patient and patient is in agreement. Patient was discharge in stable condition. Patient was given outpatient follow up. Patient is to return to emergency department if any new or worsening condition. Diagnosis Clinical Impression: 1. Fatigue, unspecified type 2. Elevated blood pressure reading with diagnosis of hypertension Disposition: Discharged Follow-up Information Follow up With Specialties Details Why Contact Info Your primary care provider at [facility] an appointment as soon as possible for a visit in 3 days [facility] EMERGENCY DEPT Emergency Medicine  As needed, If symptoms worsen [address] [facility] [address] 
[phone] Patient's Medications Start Taking No medications on file Continue Taking ACETAMINOPHEN (TYLENOL) 325 MG TABLET    Take 325 mg by mouth three (3) times daily as needed for Pain. ALLOPURINOL (ZYLOPRIM) 100 MG TABLET    Take 100 mg by mouth daily. ASCORBIC ACID (VITAMIN C) 250 MG TABLET    Take  by mouth. ASPIRIN DELAYED-RELEASE 81 MG TABLET    Take 1 Tab by mouth daily. CHOLECALCIFEROL, VITAMIN D3, (VITAMIN D3) 2,000 UNIT TAB    Take  by mouth. CILOSTAZOL (PLETAL) 100 MG TABLET    Take 100 mg by mouth. CYANOCOBALAMIN (VITAMIN B12) 500 MCG TABLET    Take 500 mcg by mouth daily. DICLOFENAC (VOLTAREN) 1 % GEL    Apply  to affected area. ETODOLAC (LODINE) 400 MG TABLET    Take 400 mg by mouth two (2) times daily as needed (pain). ETODOLAC (LODINE) 400 MG TABLET    Take 400 mg by mouth two (2) times a day. GABAPENTIN (NEURONTIN) 300 MG CAPSULE    Take 600 mg by mouth. LEVETIRACETAM 1,000 MG TABLET    Take 1 Tab by mouth every twelve (12) hours. LOSARTAN (COZAAR) 25 MG TABLET    Take 25 mg by mouth daily. MELATONIN 3 MG TABLET    Take 3 mg by mouth At bedtime. METOPROLOL SUCCINATE (TOPROL-XL) 50 MG XL TABLET    Take 25 mg by mouth. METOPROLOL TARTRATE (LOPRESSOR) 50 MG TABLET    Take  by mouth daily. SERTRALINE (ZOLOFT) 50 MG TABLET    Take 50 mg by mouth daily. THIAMINE (B-1) 100 MG TABLET    Take 1 Tab by mouth daily. These Medications have changed No medications on file Stop Taking No medications on file Pankaj Wilkerson DO 
 
Dragon medical dictation software was used for portions of this report. Unintended transcription errors may occur. My signature above authenticates this document and my orders, the final   
diagnosis (es), discharge prescription (s), and instructions in the Epic   
record.

## 2021-02-26 NOTE — DISCHARGE INSTRUCTIONS
You are to self isolate with your COVID-19 results pending. Self isolate for 14 days or until your results are back and your primary care provider determines further as to when you are removed from isolation. If you were prescribed any medication take as directed. Follow up with your primary care physician or with specialist as directed. Return to the emergency room with any new or worsening conditions.

## 2021-02-26 NOTE — ED TRIAGE NOTES
Daughter brought patient to ER due to increased weakness, lethargy, and headache that has been present for 1 week.

## 2021-03-12 PROBLEM — R40.4 ALTERATION CONSCIOUSNESS: Status: ACTIVE | Noted: 2021-01-01

## 2021-03-12 PROBLEM — N39.0 UTI (URINARY TRACT INFECTION): Status: ACTIVE | Noted: 2021-01-01

## 2021-03-12 NOTE — H&P
Hospitalist Admission History and Physical 
 
NAME:  Enrique Sutton :   1945 MRN:   [id number] PCP:  Sherif Ritchie MD 
Date/Time:  3/12/2021 6:44 PM 
Subjective: CHIEF COMPLAINT:  Lethargy HISTORY OF PRESENT ILLNESS:    
Mr. Barb Rangel is a 76 yo AA male with  Past medical hx of pacemaker, CVA with left sided hemiparesis, HTN, chronic anemia , vascular disease s/p stents, seizure, depression who comes from home via EMS with lethargy. History not feasible from patient due to AMS. I called the daughter to obtain history. Patient has been lethargic since Wednesday. He is not eating , sleeping all day, and slurred speech. + ROS + for pain in low back, constipation No fever, chills, chest pain, SOB, cough, abd pain, n/v, diarrha, dysuria, hematuria. No one in home has covid. His baseline is wheelchair bound. The daughter cuts up food for patient, he can feed himself with the right hand. Speech is clear at baseline. He does have issues with swallowing and is seen by SLP therapist. Daughter has not noted any aspiration events recently. ED course:  
Vital signs: 98.3 F, HR 72, /52, RR 20, 94% RA Labs remarkable for: lactic acid 1.28, Hgb 10.7 , Cr 1.67  Troponin negative Imaging: CT head No acute intracranial abnormality. CT is known to be relatively insensitive to acute ischemia in the first 24-48 
hours and MRI may be useful if clinically indicated. Stable chronic right cerebral infarcts and chronic microvascular disease. Chest xray 1. Small atelectasis/scarring in the right lung base. Mild hypoinflation. EKG: v paced Qtc 544 Medications received levaquion, vanc, zosyn , IVFs Procedures performed:   
 
 
REVIEW OF SYSTEMS:   
 [x] Unable to obtain  ROS due to  [x]mental status change  []sedated   []intubated # Past medical history: see above in HPI # Past surgical history: none in past 12 months # Family history: # Medications: I have reviewed medications with patients family # Allergies: none # Social history: patient lives with. Daughter. patient ambulates with wheelchair. Patient denies recreational drugs, alcohol, tobacco.  
# Specialists:  
# Family Contact:  
 
Past Medical History:  
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) 8/14/2017  Dysphagia as late effect of cerebrovascular accident (CVA) 8/14/2017  Erectile dysfunction On Sildenafil citrate  Hemiparesis affecting left side as late effect of cerebrovascular accident (CVA) ([facility] 8/14/2017  Hyperlipidemia with target LDL less than 70 8/15/2017 Lipid profile (8/15/2017) showed TG 99, , HDL 81, LDL 84  Hypertension  Ischemic cardiomyopathy 8/15/2017 EF 40%  Occlusion of right internal carotid artery 8/15/2017  On chronic clopidogrel therapy  Osteoarthritis On Etodolac and Oxycodone-Acetaminophen  Other ill-defined conditions(799.89) PVD  Peripheral artery disease ([address]  Seizure, late effect of stroke ([facility] 8/15/2017 On Levetiracetam  
 Tobacco use disorder Past Surgical History:  
Procedure Laterality Date  HX ANGIOPLASTY  12/05/2012 S/P Balloon angioplasty and stent of left common iliac artery (12/5/2012 - Dr. Jayson Maddox) Social History Tobacco Use  Smoking status: Never Smoker  Smokeless tobacco: Never Used Substance Use Topics  Alcohol use: No  
  Frequency: Never No family history on file. No Known Allergies Prior to Admission Medications Prescriptions Last Dose Informant Patient Reported? Taking?  
acetaminophen (TYLENOL) 325 mg tablet   Yes No  
Sig: Take 325 mg by mouth three (3) times daily as needed for Pain. allopurinoL (ZYLOPRIM) 100 mg tablet   Yes No  
Sig: Take 100 mg by mouth daily. ascorbic acid (VITAMIN C) 250 mg tablet   Yes No  
Sig: Take  by mouth. aspirin delayed-release 81 mg tablet   No No  
Sig: Take 1 Tab by mouth daily. cholecalciferol, vitamin D3, (VITAMIN D3) 2,000 unit Tab   Yes No  
Sig: Take  by mouth.    
cilostazoL (PLETAL) 100 mg tablet   Yes No  
Sig: Take 100 mg by mouth.  
cyanocobalamin (VITAMIN B12) 500 mcg tablet   Yes No  
Sig: Take 500 mcg by mouth daily. diclofenac (VOLTAREN) 1 % gel   Yes No  
Sig: Apply  to affected area. etodolac (LODINE) 400 mg tablet   Yes No  
Sig: Take 400 mg by mouth two (2) times daily as needed (pain). etodolac (LODINE) 400 mg tablet   Yes No  
Sig: Take 400 mg by mouth two (2) times a day.  
gabapentin (NEURONTIN) 300 mg capsule   Yes No  
Sig: Take 600 mg by mouth.  
levETIRAcetam 1,000 mg tablet   No No  
Sig: Take 1 Tab by mouth every twelve (12) hours. losartan (COZAAR) 25 mg tablet   Yes No  
Sig: Take 25 mg by mouth daily. melatonin 3 mg tablet   Yes No  
Sig: Take 3 mg by mouth At bedtime. metoprolol succinate (TOPROL-XL) 50 mg XL tablet   Yes No  
Sig: Take 25 mg by mouth.  
metoprolol tartrate (LOPRESSOR) 50 mg tablet   Yes No  
Sig: Take  by mouth daily. sertraline (ZOLOFT) 50 mg tablet   Yes No  
Sig: Take 50 mg by mouth daily. thiamine (B-1) 100 mg tablet   No No  
Sig: Take 1 Tab by mouth daily. Patient taking differently: Take 250 mg by mouth daily. Facility-Administered Medications: None Objective: VITALS:   
Visit Vitals /62 (BP 1 Location: Left upper arm, BP Patient Position:  At rest) Pulse 70 Temp 98.4 °F (36.9 °C) Resp 12 Ht 6' (1.829 m) Wt 65.8 kg (145 lb) SpO2 100% BMI 19.67 kg/m² Temp (24hrs), Av.4 °F (36.9 °C), Min:98.3 °F (36.8 °C), Max:98.4 °F (36.9 °C) PHYSICAL EXAM:  
General:    Elderly AA man laying up in bed, no acute distress, appears stated age. Head:   Normocephalic, without obvious abnormality Eyes:   Conjunctivae clear, anicteric sclerae, pupils are equal 
Nose:  Nares normal, no drainage Throat:    Lips, mucosa, and tongue DRY Neck:  Supple, symmetrical, no JVD. Lungs: On room air,  clear to auscultation bilaterally- no wheezing, rhonchi or rales Chest wall:  No tenderness or deformity. No Accessory muscle use. Heart:   Regular rate and rhythm;  no murmur, rub or gallop. Abdomen:   Soft, non-tender. Not distended. Bowel sounds normal. No masses Extremities: No LE edema. Skin:     No rashes or lesions. Not Jaundiced Psych:  Poor  insight. Not depressed, anxious or agitated. Neurologic:  
Awake and alert and oriented to person, place and February He is following all commands Speech is slurred and hard to understand Maintains eye contact Voice is somewhat hoarse Right eyelid is closed. Remains closed. Poor dentition LAB DATA REVIEWED:   
No components found for: [place] Recent Labs  
  21 
1320   
K 3.8  CO2 28 BUN 27* CREA 1.67* * CA 9.4 ALB 3.1* WBC 7.9 HGB 10.7* HCT 33.3*  
 IMAGING RESULTS: 
 []  I have personally reviewed the actual   []CXR  []CT scan CXR:  
XR Results (most recent): 
Results from [facility] Encounter encounter on 21 XR CHEST PORT Narrative EXAM: CHEST PORTABLE CLINICAL HISTORY/INDICATION: Sepsis, lethargy, history of stroke COMPARISON: 2017, 2021. TECHNIQUE: Portable AP view was obtained. FINDINGS:  
 
LINES/DEVICES: Dual lead left subclavian approach pacemaker in stable position. HEART/MEDIASTINUM: The cardiomediastinal silhouette is unremarkable in size. LUNGS: Tiny linear opacity in the right lung base, likely atelectasis or 
scarring. Left lung is clear. No pneumothorax or significant pleural effusion. Mild hypoinflation. SOFT TISSUES: Unremarkable. BONES: Unremarkable for age. Impression 1. Small atelectasis/scarring in the right lung base. Mild hypoinflation. Thank you for enabling us to participate in the care of this patient. CT : 
 
Assessment/Plan: Active Problems: 
  UTI (urinary tract infection) (3/12/2021) Alteration consciousness (3/12/2021) Consults:  
  
1. Acute encephalopathy, suspect metabolic from infection with UTI. 2. Dysarthria  - CT head nothing acute. 3. UTI 4. hypotension 5. RAQUEL 6. Chronic Anemia 7. Prolonged Qtc 8. Chronic infarct with  Residual left sided hemiparesis  In 2017 9. Depression 10. Seizure disorder 11. S/p pacemaker 12. HTN  
13. Peripheral Vascular disease 14. Gout 15. Vitamin d deficiency 16. Hx of alcohol abuse? Hx of tobacco abuse? 
 
- check ammonia level, UDS.  
- obtain brain MRI to rule out CVA given slurred speech since Wednesday  
- continue broad spectrum antibiotics for now. follow blood and urine  cultures. Check procal.  
- continue IVFs. Monitor renal function and blood pressures. Hold all home BP meds  , hold nephrotoxic agents  
- repeat EKG in AM. Monitor qtc  
- continue keppra , zoloft , allopurinol , vitamin supplements , asa , pletal  
- keep NPO, obtain slp bedside and formal evals first. Maintain aspiration precautions. - pt ot , turn and position - palliative care consult I spoke to HOOD daughter. Code status: full code per [facility]  
DVT/GI prophylaxis: scds Diet: npo Disposition: tbd  
__________________________________________________ Risk of deterioration:  []Low    [x]Moderate  []High Care Plan discussed with: 
  [x]Patient   [x]Family []ED Care Manager  []ED Doc   []Specialist : 
 
Total Time Coordinating Admission:   60   minutes []Total Critical Care Time:    
___________________________________________________ Admitting Physician: VERONICA Mcgill

## 2021-03-12 NOTE — Clinical Note
Status:: INPATIENT [101]   Type of Bed: Telemetry [19]   Inpatient Hospitalization Certified Necessary for the Following Reasons: 3. Patient receiving treatment that can only be provided in an inpatient setting (further clarification in H&P documentation)   Admitting Diagnosis: UTI (urinary tract infection) [600433]   Admitting Diagnosis: Alteration consciousness [637962]   Admitting Physician: CHELLY BRANCH [[id number]]   Attending Physician: CHELLY BRANCH [[id number]]   Estimated Length of Stay: 2 Midnights   Discharge Plan:: Home with Office Follow-up

## 2021-03-12 NOTE — ED PROVIDER NOTES
EMERGENCY DEPARTMENT HISTORY AND PHYSICAL EXAM 
This was created with voice recognition software and transcription errors may be present. 1:32 PM 
Date: 3/12/2021 Patient Name: Felipe Shea History of Presenting Illness Chief Complaint: 
 
History Provided By:  
 
HPI: Felipe Shea is a 77 y.o. male past medical history of alcohol use disorder cardiac pacemaker stroke with residual left hemiparesis dysphagia and cognitive communicative deficits depression dysarthria dysphagia hyperlipidemia ischemic cardiomyopathy peripheral artery disease who presents with lethargy. History is essentially obtained via EMS at this point per family patient is having more weakness and generalized fatigue over the last 3 days. Patient tried to speak but really could not get anything out he is awake making eye contact but not able to speak well she is limited 1:36 PM d/w Daughter. On Wednesday pt started being very lethergic but was up all night Tuesday night. Pt then slept all Wed, didn't eat Thursday, wouldn't drink on Thursday. Speech is much more slurred than normal, lately pt has been horse but understandable. PCP: Other, MD Chasidy 
 
 
Past History Past Medical History: 
Past Medical History:  
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) 8/14/2017  Dysphagia as late effect of cerebrovascular accident (CVA) 8/14/2017  Erectile dysfunction On Sildenafil citrate  Hemiparesis affecting left side as late effect of cerebrovascular accident (CVA) ([facility] 8/14/2017  Hyperlipidemia with target LDL less than 70 8/15/2017 Lipid profile (8/15/2017) showed TG 99, , HDL 81, LDL 84  Hypertension  Ischemic cardiomyopathy 8/15/2017 EF 40%  Occlusion of right internal carotid artery 8/15/2017  On chronic clopidogrel therapy  Osteoarthritis On Etodolac and Oxycodone-Acetaminophen  Other ill-defined conditions(799.89) PVD  Peripheral artery disease ([facility]  Seizure, late effect of stroke ([facility] 8/15/2017 On Levetiracetam  
 Tobacco use disorder Past Surgical History: 
Past Surgical History:  
Procedure Laterality Date  HX ANGIOPLASTY  12/05/2012 S/P Balloon angioplasty and stent of left common iliac artery (12/5/2012 - Dr. Jayson Maddox) Family History: No family history on file. Social History: 
Social History Tobacco Use  Smoking status: Never Smoker  Smokeless tobacco: Never Used Substance Use Topics  Alcohol use: No  
  Frequency: Never  Drug use: Not on file Allergies: 
No Known Allergies Review of Systems Review of Systems All other systems reviewed and are negative. 10 point review of systems otherwise negative unless noted in HPI. Physical Exam  
 
 
Physical Exam 
Constitutional:   
   Appearance: He is well-developed. Comments: Cachectic and frail elderly gentleman HENT:  
   Head: Normocephalic and atraumatic. Eyes:  
   Comments: Left eye is normal color right eyes closed Neck: Musculoskeletal: Normal range of motion and neck supple. Cardiovascular:  
   Rate and Rhythm: Normal rate and regular rhythm. Heart sounds: Normal heart sounds. No murmur. No friction rub. Pulmonary:  
   Effort: Pulmonary effort is normal. No respiratory distress. Breath sounds: Normal breath sounds.  No wheezing. Abdominal:  
   General: There is no distension. Palpations: Abdomen is soft. Tenderness: There is no abdominal tenderness. There is no guarding or rebound. Musculoskeletal: Normal range of motion. Skin: 
   General: Skin is warm and dry. Neurological:  
   Mental Status: He is oriented to person, place, and time. Psychiatric:     
   Behavior: Behavior normal.     
   Thought Content: Thought content normal.  
 
 
 
Diagnostic Study Results Vital Signs There were no vitals taken for this visit. EKG: 
Labs:  
Imaging:  
IMPRESSION No acute intracranial abnormality. CT is known to be relatively insensitive to acute ischemia in the first 24-48 
hours and MRI may be useful if clinically indicated.  
  
  
Stable chronic right cerebral infarcts and chronic microvascular disease. IMPRESSION 1. Small atelectasis/scarring in the right lung base. Mild hypoinflation. 
  
Thank you for enabling us to participate in the care of this patient. Medical Decision Making ED Course: Progress Notes, Reevaluation, and Consults: 
 
 
Provider Notes (Medical Decision Making):  
 
  
 
Diagnosis Clinical Impression: No diagnosis found. Disposition: 
 
Patient's Medications Start Taking No medications on file Continue Taking ACETAMINOPHEN (TYLENOL) 325 MG TABLET    Take 325 mg by mouth three (3) times daily as needed for Pain. ALLOPURINOL (ZYLOPRIM) 100 MG TABLET    Take 100 mg by mouth daily. ASCORBIC ACID (VITAMIN C) 250 MG TABLET    Take  by mouth. ASPIRIN DELAYED-RELEASE 81 MG TABLET    Take 1 Tab by mouth daily. CHOLECALCIFEROL, VITAMIN D3, (VITAMIN D3) 2,000 UNIT TAB    Take  by mouth. CILOSTAZOL (PLETAL) 100 MG TABLET    Take 100 mg by mouth. CYANOCOBALAMIN (VITAMIN B12) 500 MCG TABLET    Take 500 mcg by mouth daily. DICLOFENAC (VOLTAREN) 1 % GEL    Apply  to affected area.   
 ETODOLAC (LODINE) 400 MG TABLET    Take 400 mg by mouth two (2) times daily as needed (pain). ETODOLAC (LODINE) 400 MG TABLET    Take 400 mg by mouth two (2) times a day. GABAPENTIN (NEURONTIN) 300 MG CAPSULE    Take 600 mg by mouth. LEVETIRACETAM 1,000 MG TABLET    Take 1 Tab by mouth every twelve (12) hours. LOSARTAN (COZAAR) 25 MG TABLET    Take 25 mg by mouth daily. MELATONIN 3 MG TABLET    Take 3 mg by mouth At bedtime. METOPROLOL SUCCINATE (TOPROL-XL) 50 MG XL TABLET    Take 25 mg by mouth. METOPROLOL TARTRATE (LOPRESSOR) 50 MG TABLET    Take  by mouth daily. SERTRALINE (ZOLOFT) 50 MG TABLET    Take 50 mg by mouth daily. THIAMINE (B-1) 100 MG TABLET    Take 1 Tab by mouth daily. These Medications have changed No medications on file Stop Taking No medications on file

## 2021-03-12 NOTE — ED TRIAGE NOTES
Patient arrives from home via EMS d/t complaints of lethargy. Per medics, pt daughter states pt has been feeling lethargic x3 days

## 2021-03-13 NOTE — PROGRESS NOTES
ST Note; 
 
ST eval completed; Rec NPO; non oral meds with Strict aspiration precautions. Consider alt means nutrition/hydration vs comfort feeds if mentation doesn't improve. ST will continue to follow. D/w RN, Christopher Almonte Thank you for this referral, Therese Garcia, [address] CCC-SLP Speech Language Patholoogist

## 2021-03-13 NOTE — PROGRESS NOTES
Problem: Dysphagia (Adult) Goal: *Acute Goals and Plan of Care (Insert Text) Description: Patient will: 1. Tolerate PO trials with 0 s/s overt distress in 4/5 trials 2. Utilize compensatory swallow strategies/maneuvers (decrease bite/sip, size/rate, alt. liq/sol) with min cues in 4/5 trials 3. Perform oral-motor/laryngeal exercises to increase oropharyngeal swallow function with min cues 4. Complete an objective swallow study (i.e., MBSS) to assess swallow integrity, r/o aspiration, and determine of safest LRD, min A 5. Pt will utilize comp strategies to improve respiration to swallow coordination to reduce aspiration risk and improve activity tolerance for sustained nutrition/ hydration given min-mod cues. Rec:    
NPO Consider alt means of nutrition v if mentation doesn't improve Aspiration precautions HOB >45 during po intake, remain >30 for 30-45 minutes after po Small bites/sips; alternate liquid/solid with slow feeding rate Oral care TID Meds  non orally MBS to further assess oropharyngeal swallow fxn Outcome: Progressing Towards Goal 
  
SPEECH LANGUAGE PATHOLOGY BEDSIDE SWALLOW  
EVALUATION & TREATMENT Patient: Homar Allred (55 y.o. male) Date: 3/13/2021 Primary Diagnosis: UTI (urinary tract infection) [N39.0] Alteration consciousness [R40.4] Precautions: Aspiration Risk; dysarthric PLOF: Per H&P 
 
ASSESSMENT : 
Based on the objective data described below, the patient presents with severe oral and suspected moderate pharyngeal dysphagia . Chart Review and clearance obtained by RN. Today, SLP conducted a Clinical Beside Swallow Evaluation, per MD orders. Per nursing, pt presented with dysphagia during med pass this morning. Pt A&Ox self only and able to follow basic commands. Pt has significant PMHX of CVA with residual dysarthria and dysphagia. Speech/voice: dysarthric, low volume, ~ 30% intelligible.   Oral mech examination revealed structures functional for speech and deglutition; significant R facial droop with oral secretion spillage noted. Pt was repositioned in bed and oral care was provided. Pt observed with water via single cup sip, demo'ing mod labial spillage, delayed/weak swallow, reduced laryngeal elevation to palpation and  Immediate coughing. Pt demo'd acceptance of puree via tsp with immediate pocketing to the R pocketing and absent swallow initiation. A tsp of Honey thick liquids was given to promote eliciting a volitional swallow, however, pt demo'd oral holding and despite max A/cues, pt unable to initiate volitional swallow. All po was removed from oral cavity. SLP performed oral care and placed clean washcloth on pt's R chest for spillage of oral secretions. Pt left in > 45* in bed with call bell/bed alarm in place. D/w RN, Elliot Alfaro SLP RECS: NPO, meds non orally with strict aspiration precautions and oral care TID. ST will continue to follow. May consider alternative means of nutrition/hydration vs comfort feeds if mentation does not improve. TREATMENT : 
Skilled therapy initiated; Educated pt on aspiration precautions and importance of compensatory swallow techniques to decrease aspiration risk (decrease rate of intake & sip/bite size, upright @HOB for all po intake and ~30 minutes after po); verbalized comprehension. SLP to follow as indicated. Patient will benefit from skilled intervention to address the above impairments. Patient's rehabilitation potential is considered to be Good Factors which may influence rehabilitation potential include:  
[]            None noted []            Mental ability/status [x]            Medical condition [x]            Home/family situation and support systems [x]            Safety awareness 
[]            Pain tolerance/management []            Other: PLAN : 
Recommendations and Planned Interventions: 
See above Frequency/Duration: Patient will be followed by speech-language pathology 1-2 times per day/3-5 days per week to address goals. Discharge Recommendations: To Be Determined SUBJECTIVE:  
Patient stated is my daughter here? . OBJECTIVE:  
 
Past Medical History:  
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) 8/14/2017  Dysphagia as late effect of cerebrovascular accident (CVA) 8/14/2017  Erectile dysfunction On Sildenafil citrate  Hemiparesis affecting left side as late effect of cerebrovascular accident (CVA) ([address] 8/14/2017  Hyperlipidemia with target LDL less than 70 8/15/2017 Lipid profile (8/15/2017) showed TG 99, , HDL 81, LDL 84  Hypertension  Ischemic cardiomyopathy 8/15/2017 EF 40%  Occlusion of right internal carotid artery 8/15/2017  On chronic clopidogrel therapy  Osteoarthritis On Etodolac and Oxycodone-Acetaminophen  Other ill-defined conditions(799.89) PVD  Peripheral artery disease ([address]  Seizure, late effect of stroke ([address] 8/15/2017 On Levetiracetam  
 Tobacco use disorder Past Surgical History:  
Procedure Laterality Date  HX ANGIOPLASTY  12/05/2012 S/P Balloon angioplasty and stent of left common iliac artery (12/5/2012 - Dr. Jesus Manuel Lucas) Home Situation:  
Home Situation Home Environment: Long term care # Steps to Enter: 0 One/Two Story Residence: One story Living Alone: No 
Support Systems: Family member(s) Patient Expects to be Discharged to[de-identified] Skilled nursing facility Current DME Used/Available at Home: None Diet prior to admission: per chart review, pt previously d/c'd on mech soft with thin liquids Current Diet:  Npo 
 
Cognitive and Communication Status: 
Neurologic State: Alert Orientation Level: Oriented to place Cognition: Follows commands Oral Assessment: P.O. Trials: 
  
Vocal quality prior to P.O.: Low volume Consistency Presented: Honey thick liquid;Puree; Thin liquid How Presented: SLP-fed/presented Bolus Acceptance: Impaired Bolus Formation/Control: Impaired Type of Impairment: Delayed; Incomplete;Poor;Premature spillage;Lip closure; Other (comment) Propulsion: Absent Laryngeal Elevation: Weak Aspiration Signs/Symptoms: Weak cough Pharyngeal Phase Characteristics: Easily fatigued ; Poor endurance Effective Modifications: None Cues for Modifications: Maximal 
  
 
Oral Phase Severity: Severe Pharyngeal Phase Severity : Moderate PAIN: 
Pain level pre-treatment: 0/10 Pain level post-treatment: 0/10 Pain Intervention(s): Medication (see MAR); Rest, Ice, Repositioning Response to intervention: Nurse notified, See doc flow After treatment:  
[]            Patient left in no apparent distress sitting up in chair 
[x]            Patient left in no apparent distress in bed 
[x]            Call bell left within reach [x]            Nursing notified []            Family present 
[]            Caregiver present 
[]            Bed alarm activated COMMUNICATION/EDUCATION:  
[x]            Aspiration precautions; swallow safety; compensatory techniques. []            Patient/family have participated as able in goal setting and plan of care. []            Patient/family agree to work toward stated goals and plan of care. []            Patient understands intent and goals of therapy; neutral about participation.  
[x]            Patient unable to participate in goal setting/plan of care; educ ongoing with interdisciplinary staff [x]         Posted safety precautions in patient's room. Thank you for this referral. 
Robbie Montgomery, SLP 
MA, CCC-SLP Speech-Language Pathologist 
 
Time Calculation: 23 mins Evaluation Time: 9  minutes Treatment Time: 14 minutes

## 2021-03-13 NOTE — PROGRESS NOTES
MRI Screening form needs to be filled out and faxed to [phone] MRI can be scheduled. If unable to obtain information from pt, [facility] needs to be contacted.  If pt is claustro or will need pain meds, please have ordered in advance in order to facilitate exam.

## 2021-03-13 NOTE — PROGRESS NOTES
3/13/2021 12:53 pm Notified Dr Tegan Hartmann that patient has a unidentifiable pacemaker and would not be able to scan patient (STAT MRI of the Brain) until we have more information inorder to determine if the patient can have a MRI./ Dr Tegan Hartmann advised to put exam on hold for now. Thank you, Chevy MURRELL (R) MR

## 2021-03-13 NOTE — PROGRESS NOTES
MRI called and said MRI could not be done since patient has pace maker. She states she will let Dr. Yoshi Olivier know.

## 2021-03-13 NOTE — PROGRESS NOTES
[facility] Patient: Felipe Shea Age: 77 y.o. : 1945 MR#: [id number] SSN: [id number] Date/Time: 3/13/2021 Subjective:  
Patient does not engage. Eyes are open but does not make eye contact nor respond to questions. He appears comfortable. Seen with nursing at bedside. Assessment/Plan:  
Lethargy as cause for presentation 
 
-Acute metabolic encephalopathy Patient per MRI tech report is unable to get MRI brain due to having a pacemaker and there not being specific information on what type of pacemaker 
 
-Urinary tract infection, currently on levofloxacin, Zosyn and vancomycin -QTc prolongation with worsening prolongation today 
-Microcytic anemia with hemoglobin drop today, no reports of overt blood loss 
-RAQUEL: Resolved, likely prerenal azotemia 
-Dysphagia: s/p SLP eval with recommendations made for n.p.o. status and to consider alternative means of nutrition 
-History of stroke with left-sided hemiplegia PLAN: 
-Change Keppra from p.o. to IV 
- At this time plan is to hold the MRI study given that his presentation is likely due to urinary tract infection, change aspirin to rectal until he is able to have enteral intake. PT OT. 
-Follow hemoglobin and transfuse as needed, iron studies 
-De-escalate antibiotics. Stop vanc, discontinue levofloxacin given QTC prolongation, continue Zosyn for now and narrow down to likely Rocephin if he remains stable on current de escalated regimen to treat UTI 
-Monitor urine culture and adjust antibiotics 
-Continue to monitor QTC 
-Decrease IV fluids given improvement in renal function, maintenance IV fluids due to n.p.o. status 
-N.p.o. for now, continue to work with SLP 
-Follow renal function 
-Palliative care consult, patient has full CODE STATUS Called daughter Ms. Kasia Ambrosio phone #[phone] today and gave updates. Daughter states that he lives with her. At this time he remains full code.   I did discuss with her choices to be made regarding how aggressively to give nourishment and CODE STATUS. At present she has not made any decisions. Plan to hydrate with IV fluids, work with palliative care in continuing discussions with family members. Additional Notes:   
 
Case discussed with:  [x]Patient  []Family  [x]Nursing  []Case Management DVT Prophylaxis:  []Lovenox  []Hep SQ  []SCDs  []Coumadin   []On Heparin gtt Objective:  
VS:  
Visit Vitals /66 (BP 1 Location: Left upper arm, BP Patient Position: At rest) Pulse 61 Temp 99 °F (37.2 °C) Resp 20 Ht 6' (1.829 m) Wt 60.8 kg (134 lb) SpO2 98% BMI 18.17 kg/m² Tmax/24hrs: Temp (24hrs), Av.5 °F (36.9 °C), Min:97 °F (36.1 °C), Max:100.3 °F (37.9 °C) Input/Output:  
 
Intake/Output Summary (Last 24 hours) at 3/13/2021 1437 Last data filed at 3/13/2021 1340 Gross per 24 hour Intake 1350 ml Output 201 ml Net 1149 ml  
 
 
General:  Alert, awake, does not engage, does not make eye contact or respond to questions Cardiovascular: Regular rate rhythm no murmur Pulmonary: Clear to auscultation bilaterally GI: Diminished soft, nontender, nondistended Extremities: No peripheral edema Additional: Left hemiplegia both upper and lower extremities Labs:   
Recent Results (from the past 24 hour(s)) URINALYSIS W/ RFLX MICROSCOPIC Collection Time: 21  5:25 PM  
Result Value Ref Range Color DARK YELLOW Appearance CLOUDY Specific gravity 1.029 1.005 - 1.030    
 pH (UA) 5.0 5.0 - 8.0 Protein 30 (A) NEG mg/dL Glucose Negative NEG mg/dL Ketone TRACE (A) NEG mg/dL Bilirubin SMALL (A) NEG Blood MODERATE (A) NEG Urobilinogen 1.0 0.2 - 1.0 EU/dL Nitrites Negative NEG Leukocyte Esterase MODERATE (A) NEG URINE MICROSCOPIC ONLY Collection Time: 21  5:25 PM  
Result Value Ref Range WBC 50 to 100 0 - 4 /hpf  
 RBC 0 to 4 0 - 5 /hpf  Epithelial cells Negative 0 - 5 /lpf  
 Bacteria 1+ (A) NEG /hpf DRUG SCREEN, URINE Collection Time: 03/12/21  5:25 PM  
Result Value Ref Range BENZODIAZEPINES Negative NEG    
 BARBITURATES Negative NEG    
 THC (TH-CANNABINOL) Negative NEG    
 OPIATES Negative NEG    
 PCP(PHENCYCLIDINE) Negative NEG    
 COCAINE Negative NEG    
 AMPHETAMINES Negative NEG METHADONE Negative NEG HDSCOM (NOTE) AMMONIA Collection Time: 03/12/21  8:13 PM  
Result Value Ref Range Ammonia 11 11 - 32 UMOL/L  
METABOLIC PANEL, BASIC Collection Time: 03/13/21  4:29 AM  
Result Value Ref Range Sodium 142 136 - 145 mmol/L Potassium 3.6 3.5 - 5.5 mmol/L Chloride 109 100 - 111 mmol/L  
 CO2 26 21 - 32 mmol/L Anion gap 7 3.0 - 18 mmol/L Glucose 120 (H) 74 - 99 mg/dL BUN 20 (H) 7.0 - 18 MG/DL Creatinine 0.97 0.6 - 1.3 MG/DL  
 BUN/Creatinine ratio 21 (H) 12 - 20 GFR est AA >60 >60 ml/min/1.73m2 GFR est non-AA >60 >60 ml/min/1.73m2 Calcium 8.2 (L) 8.5 - 10.1 MG/DL  
CBC W/O DIFF Collection Time: 03/13/21  4:29 AM  
Result Value Ref Range WBC 5.7 4.6 - 13.2 K/uL  
 RBC 3.73 (L) 4.70 - 5.50 M/uL HGB 8.5 (L) 13.0 - 16.0 g/dL HCT 27.1 (L) 36.0 - 48.0 % MCV 72.7 (L) 74.0 - 97.0 FL  
 MCH 22.8 (L) 24.0 - 34.0 PG  
 MCHC 31.4 31.0 - 37.0 g/dL  
 RDW 16.1 (H) 11.6 - 14.5 % PLATELET 026 023 - 155 K/uL MPV 11.0 9.2 - 11.8 FL  
EKG, 12 LEAD, SUBSEQUENT Collection Time: 03/13/21  6:53 AM  
Result Value Ref Range Ventricular Rate 84 BPM  
 Atrial Rate 78 BPM  
 QRS Duration 154 ms Q-T Interval 492 ms QTC Calculation (Bezet) 581 ms Calculated R Axis -84 degrees Calculated T Axis 92 degrees Diagnosis Poor data quality, interpretation may be adversely affected Ventricular-paced rhythm Abnormal ECG When compared with ECG of 12-MAR-2021 14:11, 
Current undetermined rhythm precludes rhythm comparison, needs review Confirmed by Dee Carrasco MD, --- ([id number]) on 3/13/2021 8:49:01 AM 
  
 Additional Data Reviewed:   
 
Signed By: Prabha Venegas MD   
 March 13, 2021 2:37 PM

## 2021-03-13 NOTE — PROGRESS NOTES
Occupational Therapy Goals Initiated 3/13/2021 within 7 day(s). 1.  Patient will tolerate L palm protector X a minimum of 6 hours/day 2. Patient's care givers with demonstrate independence with palm protector program as evidenced by consistent follow through 3. Patient will participate in 50% of grooming tasks (3 day trial) OCCUPATIONAL THERAPY EVALUATION Patient: Jodi Marquez (26 y.o. male) Date: 3/13/2021 Primary Diagnosis: UTI (urinary tract infection) [N39.0] Alteration consciousness [R40.4] Precautions: aspiration PLOF: it is charted that he fed himself prior to admission; he is now NPO 
 
ASSESSMENT : 
Based on the objective data described below, the patient presents with out command following or response to automatic tasks. He is holding his left hand fisted and is 15' wrist flexion. feel he might benefit from participating in grooming for automatic participation since he is currently NPO. Patient will benefit from skilled intervention to address the above impairments. Patient's rehabilitation potential is considered to be Guarded Factors which may influence rehabilitation potential include:  
[]             None noted []             Mental ability/status [x]             Medical condition []             Home/family situation and support systems []             Safety awareness []             Pain tolerance/management 
[]             Other: PLAN : 
Recommendations and Planned Interventions:  
[x]               Self Care Training                  [x]      Therapeutic Activities []               Functional Mobility Training   []      Cognitive Retraining 
[]               Therapeutic Exercises           []      Endurance Activities []               Balance Training                    []      Neuromuscular Re-Education []               Visual/Perceptual Training     []      Home Safety Training 
[]               Patient Education                   []      Family Training/Education [x]               Other (comment): positioning/splinting Frequency/Duration: Patient will be followed by occupational therapy 1-2 times per day/4-7 days per week to address goals. Discharge Recommendations: Home Health Further Equipment Recommendations for Discharge: N/A  
 
SUBJECTIVE:  
No discernable voicings noted OBJECTIVE DATA SUMMARY:  
 
Past Medical History:  
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) 8/14/2017 Dysphagia as late effect of cerebrovascular accident (CVA) 8/14/2017 Erectile dysfunction On Sildenafil citrate Hemiparesis affecting left side as late effect of cerebrovascular accident (CVA) ([address] 8/14/2017 Hyperlipidemia with target LDL less than 70 8/15/2017 Lipid profile (8/15/2017) showed TG 99, , HDL 81, LDL 84 Hypertension Ischemic cardiomyopathy 8/15/2017 EF 40% Occlusion of right internal carotid artery 8/15/2017 On chronic clopidogrel therapy Osteoarthritis On Etodolac and Oxycodone-Acetaminophen Other ill-defined conditions(799.89) PVD Peripheral artery disease ([address] Seizure, late effect of stroke ([address] 8/15/2017 On Levetiracetam  
 Tobacco use disorder Past Surgical History:  
Procedure Laterality Date HX ANGIOPLASTY  12/05/2012  S/P Balloon angioplasty and stent of left common iliac artery (12/5/2012 - Dr. Truong Mishra) Barriers to Learning/Limitations: yes;  cognitive Compensate with: visual, verbal, tactile, kinesthetic cues/model Home Situation:  
Home Situation Home Environment: Long term care # Steps to Enter: 0 One/Two Story Residence: One story Living Alone: No 
Support Systems: Family member(s) Patient Expects to be Discharged to[de-identified] Skilled nursing facility Current DME Used/Available at Home: None 
[]  Right hand dominant   []  Left hand dominant Cognitive/Behavioral Status: 
Neurologic State: Alert Orientation Level: Oriented to place Cognition: Follows commands Skin: no skin integrity issues noted during OT evaluation Edema: no UE edema noted Vision/Perceptual:   
 Eye contact made and was visually following; unable to formally assess tracking Coordination: BUE 
 RUE is WFL; non functional LUE Strength: BUE 
RUE  generally 4-/5 No volitional movement noted in LUE Tone & Sensation: BUE 
RUE WFL 
LUE significant flexor tone Unable to assess sensation Range of Motion: BUE 
RUE: AROM WFL 
LUE: no volitional movement noted; PROM 25% shoulder flexion and elbow flexion; wrist held in 15' flexion and fully fisted (potential for skin breakdown in L palm Functional Mobility and Transfers for ADLs: 
Bed Mobility: 
 dependent ADL Assessment:  
 Self feeding: he is NPO Grooming: dependent UB bathing/dressing: dependent LB bathing/dressing: dependent Pain: 
Pain level pre-treatment: 0/10 Pain level post-treatment: 0/10 Activity Tolerance:  
No SOB noted; minimal activity participation Please refer to the flowsheet for vital signs taken during this treatment. After treatment:  
[] Patient left in no apparent distress sitting up in chair 
[x] Patient left in no apparent distress in bed 
[x] Call bell left within reach 
[] Nursing notified 
[] Caregiver present 
[] Bed alarm activated COMMUNICATION/EDUCATION:  
[x] Role of Occupational Therapy in the acute care setting 
[] Home safety education was provided and the patient/caregiver indicated understanding. [] Patient/family have participated as able in goal setting and plan of care. [] Patient/family agree to work toward stated goals and plan of care. [] Patient understands intent and goals of therapy, but is neutral about his/her participation. [x] Patient is unable to participate in goal setting and plan of care. Thank you for this referral. 
Erasmo Vitale, OTR/L Time Calculation: 8 mins Eval Complexity: History: LOW Complexity : Brief history review ; Examination: LOW Complexity : 1-3 performance deficits relating to physical, cognitive , or psychosocial skils that result in activity limitations and / or participation restrictions ; Decision Making:LOW Complexity : No comorbidities that affect functional and no verbal or physical assistance needed to complete eval tasks

## 2021-03-13 NOTE — ROUTINE PROCESS
Bedside and Verbal shift change report given to [address]  (oncoming nurse) by James Corea RN (offgoing nurse). Report included the following information SBAR, Kardex and MAR.

## 2021-03-14 PROBLEM — E43 SEVERE PROTEIN-CALORIE MALNUTRITION (HCC): Status: ACTIVE | Noted: 2021-01-01

## 2021-03-14 NOTE — PROGRESS NOTES
Problem: Mobility Impaired (Adult and Pediatric) Goal: *Acute Goals and Plan of Care (Insert Text) Description: Unable to receive much information from pt as his limited speech is hard to understand, able to nod \"yes/no\". Per chart, pt lives with his daughter and he is wheelchair bound at baseline. He is able to feed himself with his R hand with proper set up. Pt appears to be at baseline mobility, recommend d/c back to home with continued 24/7 care. If this is not possible, recommend SNF. Outcome: Resolved/Met PHYSICAL THERAPY EVALUATION AND DISCHARGE Patient: Kirill Hirsch (7 y.o. male) Date: 3/14/2021 Primary Diagnosis: UTI (urinary tract infection) [N39.0] Alteration consciousness [R40.4] Precautions:  Fall, Skin ASSESSMENT : 
Based on the objective data described below, the patient presents with L UE/LE weakness and flexion contractures, decreased verbal communication ability, significant deconditioning, and poor functional mobility tolerance. RN cleared for PT to work with pt. Pt found R side lying with his L UE flexed near his trunk with L hand flexed inwards. He does not speak but, but is able to nod yes/no to simple questions. Pt at times would try to speak sentences, but hard to understand speech. R LE grossly 3+/5 strength, L LE 2/5 strength. Pt able to use his R UE only for functional use and nod \"yes\" when asked if he uses that arm for feeding himself. L LE flexed at rest, but able to be stretches to ~-20* knee extension with increased tone noted. Unclear which equipment he has at home, but pt seemed to report having a hospital bed and wheelchair. Pt was left with HOB and call bell nearby. He appears to be at baseline mobility as is not in need of acute PT a this time. Recommend d/c home with continued 24/7 assist from daughter. If daughter is unable to provide this, recommend SNF. Patient does not require further skilled intervention at this level of care.  
   
 
PLAN : Recommendations and Planned Interventions:  
No formal PT needs identified at this time. Discharge Recommendations: Home Health with continued 24/7 assist from daughter Further Equipment Recommendations for Discharge: hospital bed, mechanical lift, and power wheelchair SUBJECTIVE:  
Patient stated Yes.  OBJECTIVE DATA SUMMARY:  
 
Past Medical History:  
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) 8/14/2017 Dysphagia as late effect of cerebrovascular accident (CVA) 8/14/2017 Erectile dysfunction On Sildenafil citrate Hemiparesis affecting left side as late effect of cerebrovascular accident (CVA) ([address] 8/14/2017 Hyperlipidemia with target LDL less than 70 8/15/2017 Lipid profile (8/15/2017) showed TG 99, , HDL 81, LDL 84 Hypertension Ischemic cardiomyopathy 8/15/2017 EF 40% Occlusion of right internal carotid artery 8/15/2017 On chronic clopidogrel therapy Osteoarthritis On Etodolac and Oxycodone-Acetaminophen Other ill-defined conditions(799.89) PVD Peripheral artery disease ([address] Seizure, late effect of stroke ([address] 8/15/2017 On Levetiracetam  
 Tobacco use disorder Past Surgical History:  
Procedure Laterality Date HX ANGIOPLASTY  12/05/2012 S/P Balloon angioplasty and stent of left common iliac artery (12/5/2012 - Dr. [address]) Barriers to Learning/Limitations: yes;  cognitive and sensory deficits-vision/hearing/speech Compensate with: N/A Home Situation:  
Home Situation Home Environment: Private residence # Steps to Enter: 0 One/Two Story Residence: One story Living Alone: No 
Support Systems: Child(keegan) Patient Expects to be Discharged to[de-identified] Skilled nursing facility Current DME Used/Available at Home: Wheelchair Critical Behavior: 
Neurologic State: Confused; Eyes open to stimulus Orientation Level: Unable to verbalize Cognition: Decreased command following Safety/Judgement: Fall prevention Psychosocial 
Patient Behaviors: Calm; Cooperative;Confused Needs Expressed: Other (comment)(none) Purposeful Interaction: Yes Pt Identified Daily Priority: Clinical issues (comment) [facility] Process: Teaching/learning; Attend basic human needs Caring Interventions: Reassure Reassure: Caring rounds Skin Condition/Temp: Dry;Warm 
  
Skin Integrity: Intact Skin Integumentary Skin Color: Appropriate for ethnicity Skin Condition/Temp: Dry;Warm 
Skin Integrity: Intact Turgor: Non-tenting Hair Growth: Absent Varicosities: Absent Nails: Exceptions to [facility] Exceptions to WDL: Discolored; Thick Strength:   
Strength: Grossly decreased, non-functional 
 
Tone & Sensation:  
Tone: Abnormal 
 
Sensation: Intact Range Of Motion: 
AROM: Grossly decreased, non-functional 
 
Pain: 
Pain level pre-treatment: 0/10 Pain level post-treatment: 0/10 Pain Intervention(s): Medication (see MAR); Rest, Repositioning Response to intervention: Nurse notified, See doc flow Activity Tolerance:  
Pt unable to participate much with PT 2/2 weakness Please refer to the flowsheet for vital signs taken during this treatment. After treatment:  
[]         Patient left in no apparent distress sitting up in chair 
[x]         Patient left in no apparent distress in bed 
[x]         Call bell left within reach [x]         Nursing notified 
[] Caregiver present [x]         Bed alarm activated 
[]         SCDs applied COMMUNICATION/EDUCATION:  
[x]         Role of Physical Therapy in the acute care setting. [x]         Fall prevention education was provided and the patient/caregiver indicated understanding. [x]         Patient/family have participated as able in goal setting and plan of care. []         Patient/family agree to work toward stated goals and plan of care. []         Patient understands intent and goals of therapy, but is neutral about his/her participation. []         Patient is unable to participate in goal setting/plan of care: ongoing with therapy staff. 
[]         Other: Thank you for this referral. 
[place] Body Time Calculation: 8 mins Eval Complexity: History: MEDIUM  Complexity : 1-2 comorbidities / personal factors will impact the outcome/ POC Exam:MEDIUM Complexity : 3 Standardized tests and measures addressing body structure, function, activity limitation and / or participation in recreation  Presentation: MEDIUM Complexity : Evolving with changing characteristics  Clinical Decision Making:Medium Complexity    Overall Complexity:MEDIUM

## 2021-03-14 NOTE — ROUTINE PROCESS
Bedside and Verbal shift change report given to QUETNIN RN (oncoming nurse) by Jr Heredia RN (offgoing nurse). Report included the following information SBAR, Kardex and MAR.

## 2021-03-14 NOTE — PROGRESS NOTES
Bedside shift change report given to [facility] (oncoming nurse) by Rebecca Dias  (offgoing nurse). Report included the following information SBAR, Kardex and MAR.

## 2021-03-14 NOTE — ROUTINE PROCESS
Bedside and Verbal shift change report given to Albina Anderson RN (oncoming nurse) by Kellie Campuzano RN (offgoing nurse). Report included the following information SBAR, Kardex, ED Summary, Procedure Summary, Intake/Output, MAR, Recent Results and Cardiac Rhythm Paced.

## 2021-03-14 NOTE — PROGRESS NOTES
Problem: Anxiety Goal: *Alleviation of anxiety Outcome: Progressing Towards Goal 
Goal: *Alleviation of anxiety (Palliative Care) Outcome: Progressing Towards Goal 
  
Problem: Patient Education: Go to Patient Education Activity Goal: Patient/Family Education Outcome: Progressing Towards Goal 
  
Problem: Patient Education: Go to Patient Education Activity Goal: Patient/Family Education Outcome: Progressing Towards Goal

## 2021-03-14 NOTE — PROGRESS NOTES
[facility] 
Progress Note 
 
Patient: Emil Singh Age: 75 y.o. : 1945 MR#: [id number] SSN: [id number] 
Date/Time: 3/14/2021 
 
Subjective:  
Patient does not engage.  Eyes are closed. 
Patient appears comfortable. 
No overnight events noted. 
 
 
Assessment/Plan:  
Lethargy as cause for presentation 
 
-Acute metabolic encephalopathy 
 
Patient per MRI tech report is unable to get MRI brain due to having a pacemaker and there not being specific information on what type of pacemaker 
 
-Urinary tract infection, currently on levofloxacin, Zosyn and vancomycin 
-QTc prolongation with worsening prolongation today 
-Microcytic anemia with hemoglobin drop today, no reports of overt blood loss 
-RAQUEL: Resolved, likely prerenal azotemia 
-Dysphagia: s/p SLP eval with recommendations made for n.p.o. status and to consider alternative means of nutrition 
-History of stroke with left-sided hemiplegia 
-Anemia of chronic disease–monitor H&H, keep hemoglobin greater than 7 
 
PLAN: 
-Change Keppra from p.o. to IV 
- At this time plan is to hold the MRI study given that his presentation is likely due to urinary tract infection, change aspirin to rectal until he is able to have enteral intake.  PT OT. 
-Follow hemoglobin and transfuse as needed, iron studies 
-De-escalate antibiotics. Stop vanc, discontinue levofloxacin given QTC prolongation, continue Zosyn for now and narrow down to likely Rocephin if he remains stable on current de escalated regimen to treat UTI 
-Monitor urine culture and adjust antibiotics 
-Continue to monitor QTC 
-Decrease IV fluids given improvement in renal function, maintenance IV fluids due to n.p.o. status 
-N.p.o. for now, continue to work with SLP 
-Follow renal function 
-Palliative care consult, patient has full CODE STATUS  
 
Called daughter Ms. Singh phone #[phone] today and reached a voicemail with a full mailbox. 
 
 
Additional Notes:   
 
Case  discussed with:  [x]Patient  []Family  [x]Nursing  []Case Management DVT Prophylaxis:  []Lovenox  []Hep SQ  []SCDs  []Coumadin   []On Heparin gtt Objective:  
VS:  
Visit Vitals /67 (BP 1 Location: Left upper arm, BP Patient Position: At rest) Pulse 73 Temp 97 °F (36.1 °C) Resp 20 Ht 6' (1.829 m) Wt 60.8 kg (134 lb) SpO2 98% BMI 18.17 kg/m² Tmax/24hrs: Temp (24hrs), Av.6 °F (37 °C), Min:97 °F (36.1 °C), Max:99.7 °F (37.6 °C) Input/Output:  
 
Intake/Output Summary (Last 24 hours) at 3/14/2021 1757 Last data filed at 3/14/2021 1728 Gross per 24 hour Intake 1215 ml Output 600 ml Net 615 ml General:  Alert, awake, does not engage, does not make eye contact or respond to questions Cardiovascular: Regular rate rhythm no murmur Pulmonary: Clear to auscultation bilaterally GI: Diminished soft, nontender, nondistended Extremities: No peripheral edema Additional: Left hemiplegia both upper and lower extremities Labs:   
Recent Results (from the past 24 hour(s)) METABOLIC PANEL, BASIC Collection Time: 21  6:56 AM  
Result Value Ref Range Sodium 142 136 - 145 mmol/L Potassium 3.0 (L) 3.5 - 5.5 mmol/L Chloride 111 100 - 111 mmol/L  
 CO2 24 21 - 32 mmol/L Anion gap 7 3.0 - 18 mmol/L Glucose 100 (H) 74 - 99 mg/dL BUN 11 7.0 - 18 MG/DL Creatinine 0.67 0.6 - 1.3 MG/DL  
 BUN/Creatinine ratio 16 12 - 20 GFR est AA >60 >60 ml/min/1.73m2 GFR est non-AA >60 >60 ml/min/1.73m2 Calcium 8.4 (L) 8.5 - 10.1 MG/DL  
CBC W/O DIFF Collection Time: 21  6:56 AM  
Result Value Ref Range WBC 5.0 4.6 - 13.2 K/uL  
 RBC 3.35 (L) 4.70 - 5.50 M/uL HGB 7.7 (L) 13.0 - 16.0 g/dL HCT 23.9 (L) 36.0 - 48.0 % MCV 71.3 (L) 74.0 - 97.0 FL  
 MCH 23.0 (L) 24.0 - 34.0 PG  
 MCHC 32.2 31.0 - 37.0 g/dL  
 RDW 16.2 (H) 11.6 - 14.5 % PLATELET 743 142 - 064 K/uL MPV 9.6 9.2 - 11.8 FL Additional Data Reviewed:   
 
Signed By: Katia Billings Ivelisse Anglin MD   
 March 14, 2021 2:37 PM

## 2021-03-14 NOTE — PROGRESS NOTES
Comprehensive Nutrition Assessment Type and Reason for Visit: Initial, Positive nutrition screen Nutrition Recommendations/Plan: Recommend enteral nutrition support if mentation/swallowing ability doesn't improve within the next 1-2 days, discussed with Dr. Leigh Ann Longoria. Nutrition Assessment:  Pt NPO after SLP eval.  Inadequate energy intake x 5 days (3 days PTA). Plan for SLP to continue to work with pt with goal of projected improvement as AMS resolves. Per discussion with MD, will do possible feeding tube placement if no improvement tomorrow. Malnutrition Assessment: 
Malnutrition Status:  Severe malnutrition Context:  Acute illness Findings of the 6 clinical characteristics of malnutrition:  
Energy Intake:  7 - 50% or less of est energy requirements for 5 or more days Weight Loss:  7.0 - Greater than 5% over 1 month Body Fat Loss:  1 - Mild body fat loss, Orbital, Buccal region Muscle Mass Loss:  7 - Moderate muscle mass loss, Calf, Thigh (quadraceps), Clavicles (pectoralis & deltoids) Fluid Accumulation:  Unable to assess,   
 Strength:  Not performed Nutrition History and Allergies: PMH: CVA with hemiparesis and cognitive communication disorder, hyperlipidemia, HTN. Presented with lethargy and altered mentation. Daughter reported that pt has been lethargic and not eating since Wednesday per H&P. Unable to obtain any additional information from pt upon visit. NKFA Estimated Daily Nutrient Needs: 
Energy (kcal): 7251-3688; Weight Used for Energy Requirements: Ideal 
Protein (g): 65-81; Weight Used for Protein Requirements: Ideal(0.8-1) Fluid (ml/day): 7515-6759; Method Used for Fluid Requirements: 1 ml/kcal 
 
 
Nutrition Related Findings:  BM 3/14. AMS. Meds: vitamin C, cyanocobalamin, thiamine. Wounds:   
None Current Nutrition Therapies: DIET NPO Anthropometric Measures: 
· Height:  6' (182.9 cm) · Current Body Wt:  60.8 kg (134 lb 0.6 oz) · Admission Body Wt:  145 lb 1 oz · Usual Body Wt:  68 kg (150 lb) · Ideal Body Wt:  178 lbs:  75.3 % · BMI Category:  Underweight (BMI less than 22) age over 72 Nutrition Diagnosis:  
· Inadequate protein-energy intake related to cognitive or neurological impairment, swallowing difficulty as evidenced by NPO or clear liquid status due to medical condition, swallowing study results · Severe malnutrition, In context of acute illness or injury related to inadequate protein-energy intake as evidenced by poor intake prior to admission, intake 0-25%, weight loss greater than or equal to 5% in 1 month, moderate muscle loss Nutrition Interventions:  
Food and/or Nutrient Delivery: Continue NPO, IV fluid delivery, Vitamin supplement Nutrition Education and Counseling: Education not indicated Coordination of Nutrition Care: Continue to monitor while inpatient Goals: 
Nutritional needs will be met through adequate oral intake or nutrition support within the next 7 days. Nutrition Monitoring and Evaluation:  
Behavioral-Environmental Outcomes: None identified Food/Nutrient Intake Outcomes: Diet advancement/tolerance, IVF intake, Vitamin/mineral intake Physical Signs/Symptoms Outcomes:   
 
Discharge Planning: Too soon to determine Electronically signed by Hali Lin RD on 3/14/2021 at 2:46 PM 
 
Contact: [phone]

## 2021-03-14 NOTE — PROGRESS NOTES
New OT orders received and chart reviewed. Patient was evaluated on 3/13/2021 and on OT caseload. New OT order will be acknowledged.  Thank you for the referral.  
 
Thom Leong, OTR/L

## 2021-03-15 NOTE — ROUTINE PROCESS
Bedside and Verbal shift change report given to Drew Beauchamp (oncoming nurse) by Don Estrada RN (offgoing nurse). Report included the following information SBAR, Kardex and MAR.

## 2021-03-15 NOTE — PROGRESS NOTES
Progress Note Patient: Jodi Marquez MRN: [id number]  SSN: [id number] YOB: 1945  Age: 77 y.o. Sex: male Admit Date: 3/12/2021 LOS: 3 days Subjective:  
 
69yo AA M with h/o pacemaker, CVA with L sided hemiparesis, HTN, chronic anemia, PVD s/p stents, seizure, depression who presented to the hospital for lethargy, encephalopathy being managed for UTI Patient intermittently mumbling and then speaking clearly. He states he would like to eat but has declined to participate in SLP evaluation or other trials of eating. He denies abdominal pain, urinary changes, CP, SOB. Objective:  
 
Vitals:  
 03/15/21 0000 03/15/21 0400 03/15/21 0815 03/15/21 1053 BP: (!) 171/76 (!) 176/83 (!) 155/87 (!) 155/84 Pulse: 85 96 81 87 Resp: 17 18 18 18 Temp: 97.6 °F (36.4 °C) 98.1 °F (36.7 °C) 97.8 °F (36.6 °C) 98 °F (36.7 °C) SpO2: 100% 100% 100% 98% Weight:      
Height:      
  
 
Intake and Output: 
Current Shift: No intake/output data recorded. Last three shifts: 03/13 1901 - 03/15 0700 In: 300 [I.V.:300] Out: 950 [Urine:950] Physical Exam:  
GENERAL: alert, cooperative, no distress, appears stated age LUNG: clear to auscultation bilaterally HEART: regular rate and rhythm, S1, S2 normal, no murmur, click, rub or gallop ABDOMEN: soft, non-tender. Bowel sounds normal. No masses,  no organomegaly NEUROLOGIC: AOx3. Gait normal. Cranial nerves 2-12 and sensation grossly intact. , positive findings: 2/5 muscular weakness in LUE and LLE with contracture of L hand and L ankle Lab/Data Review: 
BMP:  
Lab Results Component Value Date/Time   03/15/2021 03:05 AM  
 K 2.9 (LL) 03/15/2021 03:05 AM  
  03/15/2021 03:05 AM  
 CO2 25 03/15/2021 03:05 AM  
 AGAP 8 03/15/2021 03:05 AM  
 GLU 98 03/15/2021 03:05 AM  
 BUN 6 (L) 03/15/2021 03:05 AM  
 CREA 0.63 03/15/2021 03:05 AM  
 GFRAA >60 03/15/2021 03:05 AM  
 GFRNA >60 03/15/2021 03:05 AM  
 
CBC:  
Lab Results Component Value Date/Time WBC 4.7 03/15/2021 03:05 AM  
 HGB 8.3 (L) 03/15/2021 03:05 AM  
 HCT 26.1 (L) 03/15/2021 03:05 AM  
  03/15/2021 03:05 AM  
 
Results Procedure Component Value Units Date/Time CULTURE, URINE [[id number]]  (Abnormal)  (Susceptibility) Collected: 03/12/21 1825 Order Status: Completed Specimen: Cath Urine Updated: 03/15/21 6238 Special Requests: NO SPECIAL REQUESTS Culture result:    
  KLEBSIELLA PNEUMONIAE ** (EXTENDED SPECTRUM BETA LACTAMASE ) ** Susceptibility Klebsiella pneumoniae GLORIA Amikacin ($) Susceptible Ampicillin ($) Resistant Ampicillin/sulbactam ($) Susceptible Cefazolin ($) Resistant Cefepime ($$) Resistant Cefoxitin Susceptible Ceftazidime ($) Resistant Ceftriaxone ($) Resistant Ciprofloxacin ($) Susceptible [1] Gentamicin ($) Intermediate Levofloxacin ($) Susceptible [1] Meropenem ($$) Susceptible Nitrofurantoin Resistant Piperacillin/Tazobac ($) Susceptible Tobramycin ($) Intermediate Trimeth/Sulfa Susceptible  
      
  [1]   **FDA INTERPRETATION REFLECTED, REFER TO CLSI FOR ALTERNATE INTERPRETATIONS. **  
   
  
  
  
 CULTURE, BLOOD [[id number]] Collected: 03/12/21 1420 Order Status: Completed Specimen: Blood Updated: 03/15/21 0802 Special Requests: NO SPECIAL REQUESTS Culture result: NO GROWTH 3 DAYS     
 CULTURE, BLOOD [[id number]] Collected: 03/12/21 1415 Order Status: Completed Specimen: Blood Updated: 03/15/21 0802 Special Requests: NO SPECIAL REQUESTS Culture result: NO GROWTH 3 DAYS Assessment:  
 
Active Problems: 
  UTI (urinary tract infection) (3/12/2021) Alteration consciousness (3/12/2021) Severe protein-calorie malnutrition ([address] (3/14/2021) Plan:  
 
Acute metabolic encephalopathy - MRI cannot be completed due to uncertainty of pacemaker type.  Will retrieve pacemaker type prior to attempting MRI head Urinary tract infection: 3/15 UCx: MDR Klebsiella - pip/tazo susceptible - Continue Zosyn for 14 day course (end date: 25MAR). Will transition to PO antibiotic once able to tolerate PO Dysphagia - SLP evaluation declined this AM. Will attempt again this afternoon 
- NPO until evaluation completed - Will further discuss NG/PEG vs PO with risk of aspiration with daughter once SLP evaluation completed] - Fluids to maintain euvolemia H/O L-sided hemiplegia: At baseline without new deficits - Continue IV Keppra for seizure ppx RAQUEL: Resolved. Likely prerenal in poor PO intake Anemia of chronic disease: - Hgb transfusion goal >7 QTc prolongation 
- daily EKG Hypokalemia - 40mEq IV ordered. Repeat K this afternoon and replete to goal 3.5 Palliative care consulted. Patient's daughter, Seven Watters, updated with plan. No questions at this time. She will consider potential NG tube or PEG placement further once hearing back results of today's swallow evaluation. Signed By: Romana Jews, MD   
 March 15, 2021

## 2021-03-15 NOTE — PROGRESS NOTES
Per pt stated tht he is now ready to eat, advised would call ST to advise per pt he is now ready to complete his swallow eval

## 2021-03-15 NOTE — PROGRESS NOTES
Lab called a critical K level of 2.9 per Daren Michel. Notified Dr Abhijeet Diaz and orders given for replacement.

## 2021-03-15 NOTE — PROGRESS NOTES
Problem: Dysphagia (Adult) Goal: *Acute Goals and Plan of Care (Insert Text) Description: Patient will: 1. Tolerate PO trials with 0 s/s overt distress in 4/5 trials 2. Utilize compensatory swallow strategies/maneuvers (decrease bite/sip, size/rate, alt. liq/sol) with min cues in 4/5 trials 3. Perform oral-motor/laryngeal exercises to increase oropharyngeal swallow function with min cues 4. Complete an objective swallow study (i.e., MBSS) to assess swallow integrity, r/o aspiration, and determine of safest LRD, min A Rec:    
Full nectar-thick liquid diet Aspiration precautions HOB >45 during po intake, remain >30 for 30-45 minutes after po Small bites/sips; alternate liquid/solid with slow feeding rate Oral care TID Meds  crushed Assistance with PO 
 
 
3/15/2021 1907 by Thanh Nava Outcome: Progressing Towards Goal 
 
SPEECH LANGUAGE PATHOLOGY DYSPHAGIA TREATMENT Patient: Laxmi Rodriguez (45 y.o. male) Date: 3/15/2021 Diagnosis: UTI (urinary tract infection) [N39.0] Alteration consciousness [R40.4] <principal problem not specified> Precautions: aspiration Fall, Skin PLOF: As per H&P   
 
ASSESSMENT: 
Pt was seen at bedside for re-assessment per MD request. Pt minimally participatory and required max cues to attend to task. He tolerated nectar-thick and puree consistency trials with no overt s/sx of aspiration. ~25% lingual stasis post puree trials; positive oral clearance with nectar-thick liquids. Laryngeal elevation was weak to palpation. Pt demo multiple swallows and immediate cough post thin liquid trials. Recommend initiation of full nectar-thick liquid diet, aspiration precautions, oral care TID, and meds as tolerated. ST will continue to follow. Progression toward goals: 
[]         Improving appropriately and progressing toward goals [x]         Improving slowly and progressing toward goals 
[]         Not making progress toward goals and plan of care will be adjusted PLAN: 
Recommendations and Planned Interventions: see above Patient continues to benefit from skilled intervention to address the above impairments. Continue treatment per established plan of care. Discharge Recommendations:  Rodolfo Hartmann and To Be Determined SUBJECTIVE:  
Patient stated Get out of here. OBJECTIVE:  
Cognitive and Communication Status: 
Neurologic State: Alert Orientation Level: Oriented to person, Oriented to place, Disoriented to time, Disoriented to situation Cognition: Decreased command following Safety/Judgement: Fall prevention Dysphagia Treatment: 
 
 Vocal quality prior to P.O.: Low volume Consistency Presented: nectar thick liquid, Puree, Thin liquid How Presented: SLP-fed/presented Bolus Acceptance: Impaired Bolus Formation/Control: Impaired Type of Impairment: Delayed, Incomplete Propulsion: delayed with ~25% oral stasis Laryngeal Elevation: Weak Aspiration Signs/Symptoms: Weak cough Pharyngeal Phase Characteristics: Easily fatigued , Poor endurance Effective Modifications: None Cues for Modifications: Maximal 
   
 Oral Phase Severity: Severe Pharyngeal Phase Severity : Moderate PAIN: 
Start of Tx: 0 End of Tx: 0 After treatment:  
[]              Patient left in no apparent distress sitting up in chair 
[x]              Patient left in no apparent distress in bed 
[x]              Call bell left within reach [x]              Nursing notified 
[]              Family present 
[]              Caregiver present 
[]              Bed alarm activated COMMUNICATION/EDUCATION:  
[x] Aspiration precautions; swallow safety; compensatory techniques []        Patient/family able to participate in training and education  
[x]  Patient unable to participate in training and education, education ongoing with staff  
[] Patient understands goals and intent of therapy; neutral about participation Thank you for this referral. 
 
Shaji Merchant DUSTIN Hector. CCC-SLP/L Speech-Language Pathologist

## 2021-03-15 NOTE — PROGRESS NOTES
Pt's clinicals faxed to the Care Coordination office at the [facility]. CELY Trujillo RN Care Management Pager: [phone]

## 2021-03-15 NOTE — PROGRESS NOTES
Reason for Admission:  UTI (urinary tract infection) [N39.0] Alteration consciousness [R40.4] RUR Score:    12 Plan for utilizing home health: To be determined Likelihood of Readmission:   LOW Transition of Care Plan:         
 
 
Initial assessment completed with patient's daughter Doron Ferrari . Cognitive status of patient: disoriented. Face sheet information confirmed:  yes. The patient's daughter Doron Ferrari participates in his discharge plan and to receive any needed information. This patient lives in a apartment with daughter. Patient is not able to navigate steps as needed. Prior to hospitalization, patient was considered to be independent with ADLs/IADLS : no . If not independent,  patient needs assist with : dressing, bathing, food preparation, cooking, toileting and grooming Patient has a current ACP document on file: yes Healthcare Decision Maker:   Primary Decision Maker: Shirley Arteaga - Daughter - [phone] Secondary Decision Maker: Carlene Baez - Other Relative - [phone] Click here to complete [address] including selection of the Healthcare Decision Maker Relationship (ie \"Primary\") The daughter will be available to transport patient home upon discharge. The patient already hashospital bed, W/C, and shower chair medical equipment available in the home. Patient is not currently active with home health. Patient has stayed in a skilled nursing facility or rehab. Was  stay within last 60 days : no. This patient is on dialysis :no 
 Currently, the discharge plan is Home. Per pt's daughter Scot Graf, pt's pcp is with the [place]. She stated pt gets speech therapy at home from the [facility]. Discussed pt/ot recommendations of home health and she stated pt was getting pt/ot at home but pt not benefiting from it. She stated no need for home health for ptot.  
 
The patient's daughter assists with pt's medications. Patient's current insurance is [employer] [facility] Management Interventions PCP Verified by CM: No(per pt's daughter, his pcp is with the [place]) Palliative Care Criteria Met (RRAT>21 & CHF Dx)?: No 
Mode of Transport at Discharge: Other (see comment)(family) Transition of Care Consult (CM Consult): Discharge Planning Physical Therapy Consult: Yes Occupational Therapy Consult: Yes Current Support Network: Relative's Home(lives with daughter) Confirm Follow Up Transport: Family Discharge Location Discharge Placement: Unable to determine at this time CELY Smalls RN Care Management Pager: [phone]

## 2021-03-15 NOTE — PROGRESS NOTES
Problem: Dysphagia (Adult) Goal: *Acute Goals and Plan of Care (Insert Text) Description: Patient will: 1. Tolerate PO trials with 0 s/s overt distress in 4/5 trials 2. Utilize compensatory swallow strategies/maneuvers (decrease bite/sip, size/rate, alt. liq/sol) with min cues in 4/5 trials 3. Perform oral-motor/laryngeal exercises to increase oropharyngeal swallow function with min cues 4. Complete an objective swallow study (i.e., MBSS) to assess swallow integrity, r/o aspiration, and determine of safest LRD, min A 5. Pt will utilize comp strategies to improve respiration to swallow coordination to reduce aspiration risk and improve activity tolerance for sustained nutrition/ hydration given min-mod cues. Rec:    
NPO Consider alt means of nutrition v if mentation doesn't improve Aspiration precautions HOB >45 during po intake, remain >30 for 30-45 minutes after po Small bites/sips; alternate liquid/solid with slow feeding rate Oral care TID Meds  non orally Outcome: Not Progressing Towards Goal 
  
SPEECH LANGUAGE PATHOLOGY DYSPHAGIA TREATMENT Patient: Adria Cerda (6 y.o. male) Date: 3/15/2021 Diagnosis: UTI (urinary tract infection) [N39.0] Alteration consciousness [R40.4] <principal problem not specified> Precautions: aspiration Fall, Skin PLOF: As per H&P   
 
ASSESSMENT: 
Pt was seen at bedside for follow up dysphagia management. He was observed upon SLP arrival drinking water from a straw at bedside with strong overt cough. He refused any further PO trials despite max cues from SLP. Reviewed importance of adequate intake, role of SLP, and participation for diet initiation. He asked SLP to leave the room at that time. Recommend to continue NPO with consideration of an alternate means of nutrition/hydration vs comfort feeds, aspiration precautions, and oral care TID. ST will continue to follow.   
 
Progression toward goals: 
[]         Improving appropriately and progressing toward goals 
[]         Improving slowly and progressing toward goals [x]         Not making progress toward goals and plan of care will be adjusted PLAN: 
Recommendations and Planned Interventions: See above Patient continues to benefit from skilled intervention to address the above impairments. Continue treatment per established plan of care. Discharge Recommendations:  Rodolfo Hartmann and To Be Determined SUBJECTIVE:  
Patient stated You need to leave. OBJECTIVE:  
Cognitive and Communication Status: 
Neurologic State: Alert Orientation Level: disoriented to place, situation, and time Cognition: Follows commands Safety/Judgement: Fall prevention Dysphagia Treatment: P.O. Trials: 
 Vocal quality prior to P.O.: Low volume Consistency Presented: thin How Presented: SLP-fed/presented Bolus Acceptance: Impaired Bolus Formation/Control: Impaired Type of Impairment: Delayed, Incomplete, Poor, Premature spillage, Lip closure, Other (comment) Propulsion: Absent Laryngeal Elevation: Weak Aspiration Signs/Symptoms: strong overt cough Pharyngeal Phase Characteristics: Easily fatigued , Poor endurance Effective Modifications: None Cues for Modifications: Maximal 
 Oral Phase Severity: Severe Pharyngeal Phase Severity : severe PAIN: 
Start of Tx: 0 End of Tx: 0 After treatment:  
[]              Patient left in no apparent distress sitting up in chair 
[x]              Patient left in no apparent distress in bed 
[x]              Call bell left within reach [x]              Nursing notified 
[]              Family present 
[]              Caregiver present 
[]              Bed alarm activated COMMUNICATION/EDUCATION:  
[x] Aspiration precautions; swallow safety; compensatory techniques []        Patient/family able to participate in training and education  
[x]  Patient unable to participate in training and education, education ongoing with staff  
[] Patient understands goals and intent of therapy; neutral about participation Thank you for this referral. 
 
Daniel Montenegro M.S. CCC-SLP/L Speech-Language Pathologist

## 2021-03-15 NOTE — PROGRESS NOTES
Nutrition Note Pt remains NPO; with total inadequate energy intake x 6 days (3 days PTA). Per RN, SLP attempted po trials/ swallow assessment this morning, but pt refused. Discussed pt during interdisciplinary rounds; discussed recommendation for NGT placement, supplemental EN support. Per MD, not medically appropriate at this time; pt now expressing interest in eating. MD to have SLP re-evaluate. Palliative consulted for plan of care goals. Pt on IVF: D5 1/2NS at 75 mL/hr (90 gm dextrose, 306 kcal per day). K low, 2.9; replaced this morning (40 mEq). Mg low normal, 1.6; discussed replacing with MD, who plans to address. BM 3/15, 3/14. Nutrition goals are not being met. Nutrition Recommendations/Plan: - Recommend enteral nutrition support if mentation/swallowing ability doesn't improve within the next 1-2 days, discussed with Dr. Jagjit Cohen. - Recommend replacing Mg - MD to address 
- IVF per MD 
 
 
 
Electronically signed by Lorena Toth RD on 3/15/2021 at 12:58 PM 
 
Contact: [phone]

## 2021-03-16 NOTE — PROGRESS NOTES
conducted an initial consultation and Spiritual Assessment for Felipe Shea, who is a 77 y.o.,male. Patient's Primary Language is: [place]. According to the patient's EMR Synagogue Affiliation is: Ander Gonzalez.  
 
The reason the Patient came to the hospital is:  
Patient Active Problem List  
 Diagnosis Date Noted  Severe protein-calorie malnutrition ([address] 03/14/2021  UTI (urinary tract infection) 03/12/2021  Alteration consciousness 03/12/2021  Acute lower GI bleeding 09/22/2017  Lower GI bleed 09/21/2017  History of CVA (cerebrovascular accident) 09/21/2017  Peripheral artery disease ([address]  On chronic clopidogrel therapy  Alcohol use disorder  Tobacco use disorder  Benign hypertensive heart disease with systolic congestive heart failure, NYHA class 2 ([address]  Erectile dysfunction  Depression  Osteoarthritis  Ischemic cardiomyopathy 08/15/2017  Hyperlipidemia with target LDL less than 70 08/15/2017  Seizure, late effect of stroke ([address] 08/15/2017  Occlusion of right internal carotid artery 08/15/2017  Cerebrovascular accident (CVA) due to occlusion of right carotid artery ([address] 08/14/2017  Impaired mobility and ADLs 08/14/2017  Hemiparesis affecting left side as late effect of cerebrovascular accident (CVA) ([address] 08/14/2017  Dysphagia as late effect of cerebrovascular accident (CVA) 08/14/2017  Cognitive communication deficit 08/14/2017  Hypokalemia 08/14/2017  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 
 was unable to assess patient at this time. Assessment: 
Patient does not have any Sikh/cultural needs that will affect patient's preferences in health care. There are no spiritual or Sikh issues which require intervention at this time.   
 
Plan: 
Chaplains will continue to follow and will provide pastoral care on an as needed/requested basis.  recommends bedside caregivers page  on duty if patient shows signs of acute spiritual or emotional distress. [address] [facility]  
[phone]

## 2021-03-16 NOTE — CONSULTS
[facility] [phone]) [facility]: [phone] General acute hospital: [phone] Patient Name: Mirna Gaitan YOB: 1945 Date of Initial Consult: 3/16/2021 Reason for Consult: goals of care Requesting Provider: Dr Josiane Barrera 
Primary Care Physician: Other, MD Chasidy 
  
 SUMMARY:  
Mirna Gaitan is a 77y.o. year old with a past history of CVA, with left sided hemiparesis, htn, chronic anemia, vascular disease s/p stents, seizure, depression, who was admitted on 3/12/2021 from home  with a diagnosis of increased lethargy found to have an UTI/ Current medical issues leading to Palliative Medicine involvement include: 77year old male who is wheelchair / bed bound and very functionally debilitated requiring assistance with all ADL's who presents with UTI. Palliative medicine is consulted for goals of care conversations. PALLIATIVE DIAGNOSES:  
1. Goals of care 2. UTI 3. Hx of CVA 4. Debility PLAN:  
1. Goals of care Mr Vern Thompson seen along with WALESKA Bush. Patient is alert somewhat difficult to understand his speech pattern. He knows he is at [facility] , but otherwise confused thought it was 12. There is no AMD on file and patient is currently able to complete. We called his daughter Preston Styles ( [phone]). Daughter lives with patient and cares for him. He requires assistance with all ADL's . He has 2 children, Raymond Genao and her half sister Clavin. Paula Manley is somewhat estranged from patient and family. Raymond Genao is point of contact. Goals of care were discussed with Raymond Genao who shared with us she understands the benefits and burdens well and really would agree these efforts would not serve him well. However, her father's wishes have always been for all efforts to sustain life including CPR and life support.  She feels at this point she should honor these requests but is amenable to further discussions with her father and family. We have recommended against these efforts as they will not reserve any chronic debility, in fact will likely make worse. Goals of care full code with full interventions. 2. UTI managed by attending on Zosyn 3. Hx of CVA  Left sided hemiplegia 4. Debility wheel chair bound / bed bound. Dependent for all ADL's daughter cares for him PPS 40 indicating overall poor functional status. 5. Initial consult note routed to primary continuity provider 6. Communicated plan of care with: Palliative IDT, daughter Edmundo Garsia TREATMENT PREFERENCES:  
Code Status: Full Code Advance Care Planning: 
[] The [employer] Interdisciplinary Team has updated the ACP Navigator with [address] and Patient Capacity Primary Decision [facility] Agent):   Primary Decision MakerLen Car - Daughter - [phone] Secondary Decision Maker: Carlene Baez - Other Relative - [phone] Medical Interventions: Full interventions Other: As far as possible, the palliative care team has discussed with patient / health care proxy about goals of care / treatment preferences for patient. HISTORY:  
 
History obtained from: chart and daughter Edmundo Garsia CHIEF COMPLAINT: UTI  
 
HPI/SUBJECTIVE: The patient is:  
[] Verbal and participatory [x] Non-participatory due to: confusion Please see summary 3/16/2021 Alert being fed cream of wheat by CNA. Denies pain/ Difficult to understand his speech He has underlying confusion. Goals of care discussed with his daughter Clinical Pain Assessment (nonverbal scale for nonverbal patients): Clinical Pain Assessment Severity: 0 Duration: for how long has pt been experiencing pain (e.g., 2 days, 1 month, years) Frequency: how often pain is an issue (e.g., several times per day, once every few days, constant) FUNCTIONAL ASSESSMENT:  
 
Palliative Performance Scale (PPS): PPS: 40 ECOG 
ECOG Status : Completely disabled PSYCHOSOCIAL/SPIRITUAL SCREENING:  
  
Any spiritual / Zoroastrianism concerns: unable to assess for patient  
[] Yes /  [] No 
 
Caregiver Burnout: 
[] Yes /  [] No /  [x] No Caregiver Present Anticipatory grief assessment:  Unable to assess for patient  
[] Normal  / [] Maladaptive REVIEW OF SYSTEMS:  
 
Positive and pertinent negative findings in ROS are noted above in HPI. The following systems were [x] reviewed / [x] unable to be reviewed as noted in HPI Other findings are noted below. Systems: constitutional, ears/nose/mouth/throat, respiratory, gastrointestinal, genitourinary, musculoskeletal, integumentary, neurologic, psychiatric, endocrine. Positive findings noted below. Modified ESAS Completed by: provider Fatigue: 6 Pain: 0 Dyspnea: 0 Stool Occurrence(s): 1 PHYSICAL EXAM:  
 
Wt Readings from Last 3 Encounters:  
03/13/21 60.8 kg (134 lb)  
02/25/21 68 kg (150 lb) 08/11/20 68 kg (150 lb) Blood pressure 121/73, pulse 70, temperature 97.3 °F (36.3 °C), resp. rate 18, height 6' (1.829 m), weight 60.8 kg (134 lb), SpO2 99 %. Pain: 
Pain Scale 1: Numeric (0 - 10) Pain Intensity 1: 0 Last bowel movement: none recorded Constitutional: 77 weak, chronically ill appearing male in bed in NAD  
ENMT: moist MM Cardiovascular: RRR Respiratory: respirations not labored Gastrointestinal: soft non tender Skin: warm and dry Neurologic: alert oriented x 2 HISTORY:  
 
Active Problems: 
  UTI (urinary tract infection) (3/12/2021) Alteration consciousness (3/12/2021) Severe protein-calorie malnutrition ([address] (3/14/2021) Past Medical History:  
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) 8/14/2017  Dysphagia as late effect of cerebrovascular accident (CVA) 8/14/2017  Erectile dysfunction On Sildenafil citrate  Hemiparesis affecting left side as late effect of cerebrovascular accident (CVA) ([facility] 8/14/2017  Hyperlipidemia with target LDL less than 70 8/15/2017 Lipid profile (8/15/2017) showed TG 99, , HDL 81, LDL 84  Hypertension  Ischemic cardiomyopathy 8/15/2017 EF 40%  Occlusion of right internal carotid artery 8/15/2017  On chronic clopidogrel therapy  Osteoarthritis On Etodolac and Oxycodone-Acetaminophen  Other ill-defined conditions(799.89) PVD  Peripheral artery disease ([facility]  Seizure, late effect of stroke ([facility] 8/15/2017 On Levetiracetam  
 Tobacco use disorder Past Surgical History:  
Procedure Laterality Date  HX ANGIOPLASTY  12/05/2012 S/P Balloon angioplasty and stent of left common iliac artery (12/5/2012 - Dr. Rd David) No family history on file. History reviewed, no pertinent family history. Social History Tobacco Use  Smoking status: Never Smoker  Smokeless tobacco: Never Used Substance Use Topics  Alcohol use: No  
  Frequency: Never No Known Allergies Current Facility-Administered Medications Medication Dose Route Frequency  potassium chloride 10 mEq in 100 ml IVPB  10 mEq IntraVENous Q1H  
 piperacillin-tazobactam (ZOSYN) 3.375 g in 0.9% sodium chloride (MBP/ADV) 100 mL MBP  3.375 g IntraVENous Q6H  
 aspirin (ASA) suppository 300 mg  300 mg Rectal DAILY  levETIRAcetam in saline (iso-os) (KEPPRA) infusion 1,000 mg  1,000 mg IntraVENous Q12H  
 sodium chloride (NS) flush 5-10 mL  5-10 mL IntraVENous PRN  
 allopurinoL (ZYLOPRIM) tablet 100 mg  100 mg Oral DAILY  cholecalciferol (VITAMIN D3) (1000 Units /25 mcg) tablet 2,000 Units  2,000 Units Oral DAILY  cilostazoL (PLETAL) tablet 100 mg  100 mg Oral DAILY  cyanocobalamin tablet 500 mcg  500 mcg Oral DAILY  sertraline (ZOLOFT) tablet 50 mg  50 mg Oral DAILY  thiamine HCL (B-1) tablet 100 mg  100 mg Oral DAILY  ascorbic acid (vitamin C) (VITAMIN C) tablet 250 mg  250 mg Oral DAILY  sodium chloride (NS) flush 5-40 mL  5-40 mL IntraVENous Q8H  
 sodium chloride (NS) flush 5-40 mL  5-40 mL IntraVENous PRN  
 acetaminophen (TYLENOL) tablet 650 mg  650 mg Oral Q6H PRN Or  
 acetaminophen (TYLENOL) suppository 650 mg  650 mg Rectal Q6H PRN  polyethylene glycol (MIRALAX) packet 17 g  17 g Oral DAILY PRN  promethazine (PHENERGAN) tablet 12.5 mg  12.5 mg Oral Q6H PRN Or  
 ondansetron (ZOFRAN) injection 4 mg  4 mg IntraVENous Q6H PRN  
 dextrose 5 % - 0.45% NaCl infusion  75 mL/hr IntraVENous CONTINUOUS  
 
 
 LAB AND IMAGING FINDINGS:  
 
Lab Results Component Value Date/Time WBC 5.2 03/16/2021 03:04 AM  
 HGB 8.0 (L) 03/16/2021 03:04 AM  
 PLATELET 568 44/58/5365 03:04 AM  
 
Lab Results Component Value Date/Time Sodium 141 03/16/2021 03:04 AM  
 Potassium 3.1 (L) 03/16/2021 03:04 AM  
 Chloride 110 03/16/2021 03:04 AM  
 CO2 26 03/16/2021 03:04 AM  
 BUN 4 (L) 03/16/2021 03:04 AM  
 Creatinine 0.58 (L) 03/16/2021 03:04 AM  
 Calcium 8.5 03/16/2021 03:04 AM  
 Magnesium 1.7 03/16/2021 03:04 AM  
 Phosphorus 2.4 (L) 08/13/2010 02:30 AM  
  
Lab Results Component Value Date/Time Alk. phosphatase 89 03/12/2021 01:20 PM  
 Protein, total 7.9 03/12/2021 01:20 PM  
 Albumin 3.1 (L) 03/12/2021 01:20 PM  
 Globulin 4.8 (H) 03/12/2021 01:20 PM  
 
Lab Results Component Value Date/Time  INR 1.0 01/15/2019 01:25 PM  
 Prothrombin time 12.8 01/15/2019 01:25 PM  
 aPTT 33.6 09/11/2015 08:55 AM  
  
Lab Results Component Value Date/Time Iron 54 09/22/2017 01:47 PM  
 TIBC 181 (L) 09/22/2017 01:47 PM  
 Iron % saturation 30 09/22/2017 01:47 PM  
 Ferritin 945 (H) 09/22/2017 01:47 PM  
  
No results found for: PH, PCO2, PO2 No components found for: [place] Lab Results Component Value Date/Time  03/12/2021 01:20 PM  
 CK - MB <1.0 03/12/2021 01:20 PM  
  
 
   
 
Total time: 50 minutes Counseling / coordination time, spent as noted above:  
> 50% counseling / coordination: yes with daughter Manuel Dolan Prolonged service was provided for  []30 min   []75 min in face to face time in the presence of the patient, spent as noted above. Time Start:  
Time End:

## 2021-03-16 NOTE — PROGRESS NOTES
Palliative Medicine [facility] [phone] [phone] Palliative Care Support: This writer, along with Grover Britton NP, with the Palliative Care team; met with patient and spoke with his daughter Mercer Nageotte, #[phone]) via phone to offer support and discuss Advance Medical Directive (AMD) and goals of care. Patient was in bed and alert and oriented. Patient knew his name, where he was and what year it is. Patient was very difficult to understand, during the visit. Patient's daughter was then phoned. She is doing well during this time. She verified that she resides with patient, in his home. She also verified that patient is total care. He needs assistance with all of his ADLs and IADLs. Patient has a wheelchair and a hospital bed, in the home. Patient's daughter provides assistance with his ADLs and IADLs. This writer then asked if patient has ever completed an AMD or a POA. Daughter reported that patient has never completed any AMD or POA. She reports that patient has two living children; her and her half sister (Sonali Roldan). Sonali Roldan is not really involved with patient and is not a part of his support system. Mel Thapa is the main point of contact. This writer then asked daughter about patient's wishes regarding his goals of care. Mel Thapa was educated regarding the risks and burdens of CPR and how intubation goes hand in hand with CPR. Mel Thapa was well versed on CPR and intubation and stated that patient would \"want to live\". Patient has verbalized to Mel Thapa that he would want CPR. She was then asked about a feeding tube and Mel Thapa stated that they never discussed feeding tubes. Mel Thapa stated if it were up to her, she would agree to DNR/DNI; however, she wants to honor her father's wishes and continue to keep him a full code. At this time, patient will remain a FULL CODE WITH FULL INTERVENTIONS.  
 
This writer suggested to patient's daughter to have future conversation with her father regarding reconsidering goals of care. Also suggested to address his views on feeding tubes. She agreed to have that conversation with patient. Daughter was given the number to the Palliative Care team, to reach out if she had any questions or concerns. Recommendations: The Palliative Care team will continue to offer support to patient and his family; while patient remains hospitalized. Carroll Osborne., [facility]  PC#[phone]

## 2021-03-16 NOTE — PROGRESS NOTES
Nutrition Note Pt started on po diet yesterday following SLP reassessment. Viewed lunch tray; pt did not eat his meal. Agreeable to nutrition supplements. Encouraged po intake of meals and supplement. BM 3/15, 3/14. Per MD, noted possible discharge tomorrow. Receiving IVF: D5 1/2NS at 75 mL/hr (90 gm dextrose, 306 kcal per day). K low; receiving replacement. Mg replacement provided yesterday; improved from 1.6 yesterday to 1.7 today. Palliative consulted for plan of care goals. Poor progression towards nutrition goals. Nutrition Recommendations/Plan: - Add supplement: Magic Cup TID 
- encourage/ monitor po intake of meals and supplements. - IVF per MD 
 
 
 
Electronically signed by Samir lCaros RD on 3/16/2021 at 2:58 PM 
 
Contact: [phone]

## 2021-03-16 NOTE — PROGRESS NOTES
Problem: Self Care Deficits Care Plan (Adult) Goal: *Acute Goals and Plan of Care (Insert Text) Description: Occupational Therapy Goals Initiated 3/13/2021 within 7 day(s). 1.  Patient will tolerate L palm protector X a minimum of 6 hours/day 2. Patient's care givers with demonstrate independence with palm protector program as evidenced by consistent follow through 3. Patient will participate in 50% of grooming tasks Outcome: Progressing Towards Goal 
 OCCUPATIONAL THERAPY TREATMENT Patient: Kirill Hirsch (17 y.o. male) Date: 3/16/2021 Diagnosis: UTI (urinary tract infection) [N39.0] Alteration consciousness [R40.4] <principal problem not specified> Precautions: Fall, Skin PLOF: Pt dependent w/ADLs Chart, occupational therapy assessment, plan of care, and goals were reviewed. ASSESSMENT: 
Pt alert, oriented to self and place only. Pt soiled upon entry. Following commands w/bed mobility for toileting ADL at bed level. LUE contracted. Pt requires Max Assist w/UB dressing task, donning/doffing hospital gown for carryover w/ADL grooming tasks. Pt refusing dinner at this time, requesting to sleep. Plan palm protector p[placement following session and self-feeding ADL. Progression toward goals: 
[]          Improving appropriately and progressing toward goals [x]          Improving slowly and progressing toward goals 
[]          Not making progress toward goals and plan of care will be adjusted PLAN: 
Patient continues to benefit from skilled intervention to address the above impairments. Continue treatment per established plan of care. Discharge Recommendations:  return home w/24 hr assist 
Further Equipment Recommendations for Discharge:  hospital bed SUBJECTIVE:  
Patient stated I don't want to eat. I want to go back to sleep.  OBJECTIVE DATA SUMMARY:  
Cognitive/Behavioral Status: 
Neurologic State: Alert Orientation Level: Oriented to person, Oriented to place Cognition: Follows commands Safety/Judgement: Fall prevention Functional Mobility and Transfers for ADLs: 
 Bed Mobility: 
Rolling: Maximum assistance Scooting: Maximum assistance ADL Intervention: 
Upper Body Dressing Assistance Hospital Gown: Maximum assistance Toileting Toileting Assistance: Maximum assistance; Total assistance(dependent) Bowel Hygiene: Maximum assistance Clothing Management: Maximum assistance Pain: 
Pain level pre-treatment: 0/10 Pain level post-treatment: 0/10 Activity Tolerance:   
Poor Please refer to the flowsheet for vital signs taken during this treatment. After treatment:  
[]  Patient left in no apparent distress sitting up in chair 
[x]  Patient left in no apparent distress in bed 
[x]  Call bell left within reach [x]  Nursing notified 
[]  Caregiver present 
[]  Bed alarm activated COMMUNICATION/EDUCATION:  
[] Role of Occupational Therapy in the acute care setting 
[] Home safety education was provided and the patient/caregiver indicated understanding. [] Patient/family have participated as able in working towards goals and plan of care. [] Patient/family agree to work toward stated goals and plan of care. [x] Patient understands intent and goals of therapy, but is neutral about his/her participation. [] Patient is unable to participate in goal setting and plan of care. Thank you for this referral. 
GENARO Choi Time Calculation: 14 mins

## 2021-03-16 NOTE — PROGRESS NOTES
Progress Note Patient: Malaika Herron MRN: [id number]  SSN: [id number] YOB: 1945  Age: 77 y.o. Sex: male Admit Date: 3/12/2021 LOS: 4 days Subjective:  
 
67yo AA M with h/o pacemaker, CVA with L sided hemiparesis, HTN, chronic anemia, PVD s/p stents, seizure, depression who presented to the hospital for lethargy, encephalopathy being managed for UTI Patient intermittently mumbling and then speaking clearly. He denies abdominal pain, urinary changes, CP, SOB. He states that he would like to further consider long term care but cannot clearly articulate why other than concern for care burden on his daughter. He is tolerating nectar thickened diet as advised by SLP. Objective:  
 
Vitals:  
 03/16/21 0000 03/16/21 0400 03/16/21 0817 03/16/21 1131 BP: (!) 140/65 (!) 142/64 (!) 157/71 (!) 187/103 Pulse: 70 88 70 71 Resp: 18 18 18 18 Temp: 97.6 °F (36.4 °C) 97.1 °F (36.2 °C) 98.1 °F (36.7 °C) 97.4 °F (36.3 °C) SpO2: 98% 98% 97% 98% Weight:      
Height:      
  
 
Intake and Output: 
Current Shift: No intake/output data recorded. Last three shifts: 03/14 1901 - 03/16 0700 In: -  
Out: 529 [[id number]] Physical Exam:  
GENERAL: alert, cooperative, no distress, appears stated age LUNG: clear to auscultation bilaterally HEART: regular rate and rhythm, S1, S2 normal, no murmur, click, rub or gallop ABDOMEN: soft, non-tender. Bowel sounds normal. No masses,  no organomegaly NEUROLOGIC: AOx3. Gait normal. Cranial nerves 2-12 and sensation grossly intact. , positive findings: 2/5 muscular weakness in LUE and LLE with contracture of L hand and L ankle Lab/Data Review: 
BMP:  
Lab Results Component Value Date/Time   03/16/2021 03:04 AM  
 K 3.1 (L) 03/16/2021 03:04 AM  
  03/16/2021 03:04 AM  
 CO2 26 03/16/2021 03:04 AM  
 AGAP 5 03/16/2021 03:04 AM  
 GLU 96 03/16/2021 03:04 AM  
 BUN 4 (L) 03/16/2021 03:04 AM  
 CREA 0.58 (L) 03/16/2021 03:04 AM  
 GFRAA >60 03/16/2021 03:04 AM  
 GFRNA >60 03/16/2021 03:04 AM  
 
CBC:  
Lab Results Component Value Date/Time WBC 5.2 03/16/2021 03:04 AM  
 HGB 8.0 (L) 03/16/2021 03:04 AM  
 HCT 25.2 (L) 03/16/2021 03:04 AM  
  03/16/2021 03:04 AM  
 
Results Procedure Component Value Units Date/Time CULTURE, URINE [[id number]]  (Abnormal)  (Susceptibility) Collected: 03/12/21 1825 Order Status: Completed Specimen: Cath Urine Updated: 03/15/21 8644 Special Requests: NO SPECIAL REQUESTS Culture result:    
  KLEBSIELLA PNEUMONIAE ** (EXTENDED SPECTRUM BETA LACTAMASE ) ** Susceptibility Klebsiella pneumoniae GLORIA Amikacin ($) Susceptible Ampicillin ($) Resistant Ampicillin/sulbactam ($) Susceptible Cefazolin ($) Resistant Cefepime ($$) Resistant Cefoxitin Susceptible Ceftazidime ($) Resistant Ceftriaxone ($) Resistant Ciprofloxacin ($) Susceptible [1] Gentamicin ($) Intermediate Levofloxacin ($) Susceptible [1] Meropenem ($$) Susceptible Nitrofurantoin Resistant Piperacillin/Tazobac ($) Susceptible Tobramycin ($) Intermediate Trimeth/Sulfa Susceptible  
      
  [1]   **FDA INTERPRETATION REFLECTED, REFER TO CLSI FOR ALTERNATE INTERPRETATIONS. **  
   
  
  
  
 CULTURE, BLOOD [[id number]] Collected: 03/12/21 1420 Order Status: Completed Specimen: Blood Updated: 03/16/21 0720 Special Requests: NO SPECIAL REQUESTS Culture result: NO GROWTH 4 DAYS     
 CULTURE, BLOOD [[id number]] Collected: 03/12/21 1415 Order Status: Completed Specimen: Blood Updated: 03/16/21 0720 Special Requests: NO SPECIAL REQUESTS Culture result: NO GROWTH 4 DAYS Assessment:  
 
Active Problems: 
  UTI (urinary tract infection) (3/12/2021) Alteration consciousness (3/12/2021) Severe protein-calorie malnutrition ([address] (3/14/2021) Plan:  
 
Urinary tract infection: 3/15 UCx: MDR Klebsiella - pip/tazo susceptible 
- Continue Zosyn for 14 day course (end date: 25MAR). Will transition to PO antibiotic at time of discharge as patient pocketing medications in his mouth 
 
Dysphagia 
- Nectar-thickened full liquids diet. Appreciate SLP recs 
- Fluids PRN to maintain euvolemia 
 
Acute encephalopathy - resolved: Likely 2/2 UTI 
- MRI cannot be completed due to uncertainty of pacemaker type. Will retrieve pacemaker type prior to attempting MRI head. Can be done in outpatient environment 
 
H/O L-sided hemiplegia: At baseline without new deficits 
- Continue IV Keppra for seizure ppx until discharge. Will transition back to PO 
 
RAQUEL - resolved: Likely prerenal in poor PO intake 
 
Anemia of chron ic disease: Hgb transfusion goal >7 
QTc prolongation: Avoiding QT prolongating medications 
Hypokalemia 
- 40mEq IV ordered. Repeat K in AM 
 
Palliative care consulted. 
 
Patient's daughter, Gricel Singh, updated with plan. She feels prepared to take patient back at home when ready to discharge. Prior discussions had between daughter and patient about longer term care facilities, but they do not have the insurance coverage or means to make that doable. Will Plan for discharge to home, likely tomorrow. 
 
Signed By: Myke Coronel MD   
 March 16, 2021

## 2021-03-16 NOTE — DISCHARGE SUMMARY
Discharge Summary Patient: Luan Bingham MRN: [id number]  CSN: [id number] YOB: 1945  Age: 77 y.o. Sex: male DOA: 3/12/2021 LOS:  LOS: 4 days   Discharge Date:   
 
Admission Diagnoses: UTI (urinary tract infection) [N39.0] Alteration consciousness [R40.4] Discharge Diagnoses:   
Problem List as of 3/16/2021 Date Reviewed: 9/21/2017 Codes Class Noted - Resolved Severe protein-calorie malnutrition ([facility] [address] ICD-10-CM: H27 ICD-9-CM: 876  3/14/2021 - Present UTI (urinary tract infection) ICD-10-CM: N39.0 ICD-9-CM: 599.0  3/12/2021 - Present Alteration consciousness ICD-10-CM: R40.4 ICD-9-CM: 780.09  3/12/2021 - Present Acute lower GI bleeding ICD-10-CM: K92.2 ICD-9-CM: 578.9  9/22/2017 - Present Lower GI bleed ICD-10-CM: K92.2 ICD-9-CM: 578.9  9/21/2017 - Present History of CVA (cerebrovascular accident) ICD-10-CM: Z86.73 
ICD-9-CM: V12.54  9/21/2017 - Present Peripheral artery disease (HCC) (Chronic) ICD-10-CM: I73.9 ICD-9-CM: 443.9  Unknown - Present On chronic clopidogrel therapy (Chronic) ICD-10-CM: Z79.01 
ICD-9-CM: V58.61  Unknown - Present Alcohol use disorder (Chronic) ICD-10-CM: RNO5130 ICD-9-CM: V49.89  Unknown - Present Tobacco use disorder (Chronic) ICD-10-CM: V78.684 ICD-9-CM: 305.1  Unknown - Present Benign hypertensive heart disease with systolic congestive heart failure, NYHA class 2 (HCC) (Chronic) ICD-10-CM: I11.0, I50.20 ICD-9-CM: 402.11, 428.20, 428.0  Unknown - Present Erectile dysfunction (Chronic) ICD-10-CM: N52.9 ICD-9-CM: 607.84  Unknown - Present Overview Signed 8/18/2017  1:38 PM by Deep Graff MD  
  On Sildenafil citrate Depression (Chronic) ICD-10-CM: F32.9 ICD-9-CM: 311  Unknown - Present Overview Signed 8/18/2017  1:37 PM by Deep Graff MD  
  On Trazodone Osteoarthritis (Chronic) ICD-10-CM: M19.90 ICD-9-CM: 715.90  Unknown - Present Overview Signed 8/18/2017  1:37 PM by Dario Childers MD  
  On Etodolac and Oxycodone-Acetaminophen Ischemic cardiomyopathy (Chronic) ICD-10-CM: I25.5 ICD-9-CM: 414.8  8/15/2017 - Present Overview Signed 8/18/2017  1:31 PM by Dario Childers MD  
  EF 40% Hyperlipidemia with target LDL less than 70 (Chronic) ICD-10-CM: E78.5 ICD-9-CM: 272.4  8/15/2017 - Present Overview Signed 8/18/2017  1:32 PM by Dario Childers MD  
  Lipid profile (8/15/2017) showed TG 99, , HDL 81, LDL 84 Seizure, late effect of stroke [facility]) ICD-10-CM: I69.398, R56.9 ICD-9-CM: 438.89, 780.39  8/15/2017 - Present Overview Signed 8/18/2017  1:39 PM by Dario Childers MD  
  On Levetiracetam 
  
  
   
 Occlusion of right internal carotid artery ICD-10-CM: I65.21 ICD-9-CM: 433.10  8/15/2017 - Present Cerebrovascular accident (CVA) due to occlusion of right carotid artery ([facility] [address] ICD-10-CM: O90.989 ICD-9-CM: 433.11  8/14/2017 - Present Overview Addendum 8/18/2017  1:40 PM by Dario Childers MD  
  Acute Ischemic Stroke (acute infarctions involving right parieto-occipital area and occipitotemporal area and anterior and midportion of right corona radiata) with residual left hemiparesis, dysphagia, dysarthria and cognitive-communication deficits Cardiac pacemaker in situ (Chronic) ICD-10-CM: Z95.0 ICD-9-CM: V45.01  Unknown - Present Impaired mobility and ADLs ICD-10-CM: Z74.09, Z78.9 ICD-9-CM: V49.89  8/14/2017 - Present Hemiparesis affecting left side as late effect of cerebrovascular accident (CVA) ([facility] [address] ICD-10-CM: Z06.556 ICD-9-CM: 438.20  8/14/2017 - Present Dysphagia as late effect of cerebrovascular accident (CVA) ICD-10-CM: W02.550 ICD-9-CM: 438.82  8/14/2017 - Present Cognitive communication deficit ICD-10-CM: R41.841 ICD-9-CM: 799.52  8/14/2017 - Present Hypokalemia ICD-10-CM: E87.6 ICD-9-CM: 276.8  8/14/2017 - Present Overview Signed 8/18/2017  1:33 PM by MD KASSANDRA Turner (8/14/2017, on admission) = 2.8 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 
Visit Vitals BP (!) 187/103 Pulse 71 Temp 97.4 °F (36.3 °C) Resp 18 Ht 6' (1.829 m) Wt 60.8 kg (134 lb) SpO2 98% BMI 18.17 kg/m² GENERAL: alert, cooperative, no distress, appears stated age, cachectic LUNG: clear to auscultation bilaterally HEART: regular rate and rhythm, S1, S2 normal, no murmur, click, rub or gallop ABDOMEN: soft, non-tender. Bowel sounds normal. No masses,  no organomegaly EXTREMITIES:  no edema, redness or tenderness in the calves or thighs, no ulcers, gangrene or trophic changes NEUROLOGIC:AOx3. Cranial nerves 2-4, 6-12 and sensation grossly intact. R sided facial movements minimal, positive findings: 2/5 muscular weakness in LUE and LLE with contracture of L hand and L ankle Hospital Course:  
69yo M with h/o CVA with residual L sided hemiparesis c/b seizure disorder, HTN, PVD s/p stents, depression, and pacemaker presented to the hospital with encephalopathy and lethargy with treatment for complicated UTI with MDRO Klebsiella pneumoniae. Mr. Soraya Borja was admitted for lethargy and encephalopathy along with poor PO intake. He was initiated on broad-spectrum antibiotics (vancomycin, piperacillin/tazobactam, and levofloxacin) before being narrowed to piperacillin/tazobactam due to urine cultures and concern for QTc prolongation. He is planned to receive a total of 14 days of antibiotics with cessation date planned for 25MAR after transitioning to Bactrim in the outpatient setting. He was evaluated for worsening dysphagia while inpatient. He has baseline dysphagia and follows with SLP outpatient.  However, he had worsening dysphagia while inpatient with SLP evaluation determining that he required a honey-thickened full liquid diet going forward to prevent aspiration. However, he continued to have poor PO intake with already poor baseline nutritional status. To treat this, his daughter elected for PEG tube placement with tube feeds. PEG tube was placed on 3/18, and tube feeds were initiated on 3/19 and well-tolerated. His daughter received teaching from nursing staff prior to discharge. By the time of discharge, Mr. Hedy Bhardwaj encephalopathy had resolved. He required intermittent repletion of electrolytes while inpatient but remained stable throughout his hospital course. Plan for discharge to home health was discussed with daughter who agreed this was his best option. Potential for long-term care was discussed, but patient's daughter indicated that funding/coverage was not available for this option. He will be discharge to home in care of daughter with home health assistance. Consults:  
Speech Language Pathology Gastroenterology Lab/Data Review: 
Labs: Results:  
   
Chemistry Recent Labs  
  03/16/21 
0304 03/15/21 
1556 03/15/21 
0305 GLU 96 91 98  144 143  
K 3.1* 3.5 2.9*  
 111 110 CO2 26 24 25 BUN 4* 5* 6*  
CREA 0.58* 0.58* 0.63 CA 8.5 8.2* 8.4* AGAP 5 9 8 BUCR 7* 9* 10* CBC w/Diff Recent Labs  
  03/16/21 
0304 03/15/21 
0305 03/14/21 
5263 WBC 5.2 4.7 5.0  
RBC 3.51* 3.66* 3.35* HGB 8.0* 8.3* 7.7* HCT 25.2* 26.1* 23.9*  
 251 233 Cardiac Enzymes No results for input(s): CPK, CKND1, SHUBHAM in the last 72 hours. No lab exists for component: Arpit Ora Coagulation No results for input(s): PTP, INR, APTT, INREXT in the last 72 hours. Lipid Panel Lab Results Component Value Date/Time  Cholesterol, total 185 08/15/2017 03:08 AM  
 HDL Cholesterol 81 (H) 08/15/2017 03:08 AM  
 LDL, calculated 84.2 08/15/2017 03:08 AM  
 VLDL, calculated 19.8 08/15/2017 03:08 AM  
 Triglyceride 99 08/15/2017 03:08 AM  
 CHOL/HDL Ratio 2.3 08/15/2017 03:08 AM  
  
BNP No results for input(s): BNPP in the last 72 hours. Liver Enzymes No results for input(s): TP, ALB, TBIL, AP in the last 72 hours. No lab exists for component: SGOT, GPT, DBIL Thyroid Studies Lab Results Component Value Date/Time TSH 1.62 08/14/2017 08:45 AM  
    
 
No results found for: SDES Lab Results Component Value Date/Time Culture result: (A) 03/12/2021 06:25 PM  
  KLEBSIELLA PNEUMONIAE ** (EXTENDED SPECTRUM BETA LACTAMASE ) **  
 Culture result: NO GROWTH 4 DAYS 03/12/2021 02:20 PM  
 Culture result: NO GROWTH 4 DAYS 03/12/2021 02:15 PM  
 Culture result: NO GROWTH 1 DAY 08/24/2017 11:15 AM  
  
 
Imaging:  
 
Ct Head Wo Cont Result Date: 3/12/2021 No acute intracranial abnormality. CT is known to be relatively insensitive to acute ischemia in the first 24-48 hours and MRI may be useful if clinically indicated. Stable chronic right cerebral infarcts and chronic microvascular disease. Xr Chest [facility] Result Date: 3/12/2021 1. Small atelectasis/scarring in the right lung base. Mild hypoinflation. Thank you for enabling us to participate in the care of this patient. Discharge Medications:    
Current Discharge Medication List  
  
 
 
Activity: activity as tolerated Diet: Nectar thick liquids Wound Care: None needed Follow-up: with PCP, Other, Phys, MD in 7-10days Minutes spent on discharge: >30 minutes spent coordinating this discharge (review instructions/follow-up, prescriptions, preparing report for sign off)

## 2021-03-17 NOTE — PROGRESS NOTES
CM called and spoke with patient's daughter Camila Hart [phone], and let her know patient may be discharging today. Patient's daughter said patient will be coming back home to live with her, and she will transport him home at time of discharge. Joshua Crandall RN Case Management [phone]

## 2021-03-17 NOTE — PROGRESS NOTES
Problem: Self Care Deficits Care Plan (Adult) Goal: *Acute Goals and Plan of Care (Insert Text) Description: Occupational Therapy Goals Initiated 3/13/2021 within 7 day(s). 1.  Patient will tolerate L palm protector X a minimum of 6 hours/day 2. Patient's care givers with demonstrate independence with palm protector program as evidenced by consistent follow through 3. Patient will participate in 50% of grooming tasks Outcome: Progressing Towards Goal 
 OCCUPATIONAL THERAPY TREATMENT Patient: Bella Ferrara (85 y.o. male) Date: 3/17/2021 Diagnosis: UTI (urinary tract infection) [N39.0] Alteration consciousness [R40.4] <principal problem not specified> Precautions: Fall, Skin PLOF: assist w/BADLs Chart, occupational therapy assessment, plan of care, and goals were reviewed. ASSESSMENT: 
Pt w/lateral lean R upon entry. Requires Total Assist to reposition to midline. Pt oriented to self and place. Follows simple commands w/ADL grooming tasks. Requires assist for thoroughness. LUE contracted. Performed UE TherEx and donned palm protector to minimize skin breakdown. Pt requesting to eat chicken. Educated on [employer] for safety. Pt performs 2 trials self-feeding requiring max vc's for swallow. NSG, Tiegest, reports pt not swallowing medications this am and feels pt may benefit from PEG placement. Alerted, SLP, [employer]. Pt tolerates LUE palm protector ~ 1 hr. No signs redness at MCPs or thenar eminence. Doffed palm protector. Progression toward goals: 
[]          Improving appropriately and progressing toward goals [x]          Improving slowly and progressing toward goals 
[]          Not making progress toward goals and plan of care will be adjusted PLAN: 
Patient continues to benefit from skilled intervention to address the above impairments. Continue treatment per established plan of care.  
Discharge Recommendations:  Skilled Nursing Facility/LTC vs return home w/24 hr assist Further Equipment Recommendations for Discharge:  hospital bed SUBJECTIVE:  
Patient stated I want some chicken.  OBJECTIVE DATA SUMMARY:  
Cognitive/Behavioral Status: 
Neurologic State: Alert, Confused Orientation Level: Oriented to person, Oriented to place Cognition: Follows commands Safety/Judgement: Fall prevention Functional Mobility and Transfers for ADLs: 
ADL Intervention: 
Feeding Food to Mouth: Stand-by assistance Grooming Position Performed: Other (comment)(supine w/HOB raised) Washing Face: Minimum assistance(assist for thoroughness) Washing Hands: Maximum assistance UE Therapeutic Exercises: PROM LUE wrist extension w/prolonged stretch PROM LUE elbow extension w/prolonged stretch Pain: 
Pain level pre-treatment: 0/10 Pain level post-treatment: 0/10 Activity Tolerance:   
Poor Please refer to the flowsheet for vital signs taken during this treatment. After treatment:  
[]  Patient left in no apparent distress sitting up in chair 
[x]  Patient left in no apparent distress in bed 
[x]  Call bell left within reach [x]  Nursing notified 
[]  Caregiver present [x]  Bed alarm activated COMMUNICATION/EDUCATION:  
[] Role of Occupational Therapy in the acute care setting 
[] Home safety education was provided and the patient/caregiver indicated understanding. [] Patient/family have participated as able in working towards goals and plan of care. [] Patient/family agree to work toward stated goals and plan of care. [] Patient understands intent and goals of therapy, but is neutral about his/her participation. [x] Patient is unable to participate in goal setting and plan of care 2/2 confusion. Thank you for this referral. 
GENARO Beard Time Calculation: 23 mins

## 2021-03-17 NOTE — PROGRESS NOTES
Nutrition Note Pt continues to have poor/ no po intake of meals and supplements. Per Palliative note, patient's family wants full code/ aggressive measures; signed off. Pt discussed during interdisciplinary rounds; per MD, plan to discussed patient's po intake with his daughter and address possible need for feeding tube placement/ supplemental EN support. Will not plan for feeding tube placement at this time per MD, will hold off until decision from daughter/ family. IVF rate decreased; receiving IVF: D5 1/2NS at 50 mL/hr (60 gm dextrose, 204 kcal per day). K low; receiving replacement. BM 3/17. No progression towards nutrition goals. Nutrition Recommendations/Plan: - Encourage/ monitor po intake of meals and supplements. - IVF per MD 
- monitor the need for feeding tube placement and supplemental EN support pending plan of care goals per patient's daughter/ family. If plan to provide EN support, recommend consulting Nutrition for tube feed management. Electronically signed by Panchito Dominique RD on 3/17/2021 at 12:59 PM 
 
Contact: [phone]

## 2021-03-17 NOTE — PROGRESS NOTES
Problem: Dysphagia (Adult) Goal: *Acute Goals and Plan of Care (Insert Text) Description:  
 
Patient will: 1. Tolerate PO trials with 0 s/s overt distress in 4/5 trials 2. Utilize compensatory swallow strategies/maneuvers (decrease bite/sip, size/rate, alt. liq/sol) with min cues in 4/5 trials 3. Perform oral-motor/laryngeal exercises to increase oropharyngeal swallow function with min cues 4. Complete an objective swallow study (i.e., MBSS) to assess swallow integrity, r/o aspiration, and determine of safest LRD, min A Rec:    
Full honey-thick liquid diet Aspiration precautions HOB >45 during po intake, remain >30 for 30-45 minutes after po Small bites/sips; alternate liquid/solid with slow feeding rate Oral care TID Meds  crushed Assistance with PO Outcome: Not Progressing Towards Goal 
 SPEECH LANGUAGE PATHOLOGY DYSPHAGIA TREATMENT Patient: Kirill Hirsch (70 y.o. male) Date: 3/17/2021 Diagnosis: UTI (urinary tract infection) [N39.0] Alteration consciousness [R40.4] <principal problem not specified> Precautions:  Fall, Skin PLOF: As per H&P   
 
ASSESSMENT: 
Pt was seen at bedside for follow up dysphagia management. Per CNA: pt with overt coughing episode with full nectar-thick liquid breakfast. This AM, he demo immediate cough with nectar-thick. Improved tolerance of puree with honey-thick liquid trials. Pt demo very labored bolus manipulation of puree consistency with multiple swallows of puree. Recommend diet change to full honey-thick liquids, aspiration precautions, oral care TID, and meds crushed. He will require assistance with PO. Suspect limited intake secondary to poor endurance and mental status (he may benefit from a more permanent means of nutrition/hydration for adequate intake per MD discretion). ST will continue to follow.   
 
Progression toward goals: 
[]         Improving appropriately and progressing toward goals 
[]         Improving slowly and progressing toward goals [x]         Not making progress toward goals and plan of care will be adjusted PLAN: 
Recommendations and Planned Interventions: See above Patient continues to benefit from skilled intervention to address the above impairments. Continue treatment per established plan of care. Discharge Recommendations:  Rodolfo Hartmann and To Be Determined SUBJECTIVE:  
Patient stated No good. OBJECTIVE:  
Cognitive and Communication Status: 
Neurologic State: Confused, Alert Orientation Level: Oriented to place, Oriented to person, Disoriented to time, Disoriented to situation Cognition: Follows commands Safety/Judgement: Fall prevention Dysphagia Treatment: 
 Vocal quality prior to P.O.: Low volume Consistency Presented: Honey thick liquid, Puree, nectar-thick How Presented: SLP-fed/presented Bolus Acceptance: Impaired Bolus Formation/Control: Impaired Type of Impairment: Delayed, Incomplete, Poor, Premature spillage, Lip closure, Other (comment) Propulsion: delayed Laryngeal Elevation: Weak Aspiration Signs/Symptoms: Weak cough Pharyngeal Phase Characteristics: Easily fatigued , Poor endurance Effective Modifications: None Cues for Modifications: Maximal 
   
 Oral Phase Severity: Severe Pharyngeal Phase Severity : Moderate PAIN: 
Start of Tx: 0 End of Tx: 0 After treatment:  
[]              Patient left in no apparent distress sitting up in chair 
[x]              Patient left in no apparent distress in bed 
[x]              Call bell left within reach [x]              Nursing notified 
[]              Family present 
[]              Caregiver present 
[]              Bed alarm activated COMMUNICATION/EDUCATION:  
[x] Aspiration precautions; swallow safety; compensatory techniques []        Patient/family able to participate in training and education  
[x]  Patient unable to participate in training and education, education ongoing with staff  
[] Patient understands goals and intent of therapy; neutral about participation Thank you for this referral. 
 
Edyta Worthington M.S. CCC-SLP/L Speech-Language Pathologist

## 2021-03-17 NOTE — PROGRESS NOTES
Progress Note Patient: Niurka Guan MRN: [id number]  SSN: [id number] YOB: 1945  Age: 77 y.o. Sex: male Admit Date: 3/12/2021 LOS: 5 days Subjective:  
 
67yo AA M with h/o pacemaker, CVA with L sided hemiparesis, HTN, chronic anemia, PVD s/p stents, seizure, depression who presented to the hospital for lethargy, encephalopathy being managed for UTI Patient intermittently mumbling and then speaking clearly. He endorses being hungry and wanting to eat but by report has been eating very little. Wishes to have full diet but understands that he is at high risk of aspiration. He denies abdominal pain, urinary changes, CP, SOB. By self report, is tolerating diet well and not choking at all, but nursing report differs significantly and states that he is pocketing food and pills and spitting them out later. Objective:  
 
Vitals:  
 03/17/21 0000 03/17/21 0400 03/17/21 0816 03/17/21 1303 BP: 137/70 (!) 140/78 (!) 144/80 (!) 175/99 Pulse: 70 70 84 73 Resp: 18 18 16 18 Temp: 97.6 °F (36.4 °C) 97.4 °F (36.3 °C) 98.2 °F (36.8 °C) 97.6 °F (36.4 °C) SpO2: 99% 98% 97% 99% Weight:      
Height:      
  
 
Intake and Output: 
Current Shift: No intake/output data recorded. Last three shifts: No intake/output data recorded. Physical Exam:  
GENERAL: alert, cooperative, no distress, appears stated age LUNG: clear to auscultation bilaterally HEART: regular rate and rhythm, S1, S2 normal, no murmur, click, rub or gallop ABDOMEN: soft, non-tender. Bowel sounds normal. No masses,  no organomegaly NEUROLOGIC: AOx3. Gait normal. Cranial nerves 2-12 and sensation grossly intact. , positive findings: 2/5 muscular weakness in LUE and LLE with contracture of L hand and L ankle Lab/Data Review: 
BMP:  
Lab Results Component Value Date/Time   03/17/2021 03:31 AM  
 K 3.5 03/17/2021 03:31 AM  
  03/17/2021 03:31 AM  
 CO2 25 03/17/2021 03:31 AM  
 AGAP 6 03/17/2021 03:31 AM  
  (H) 03/17/2021 03:31 AM  
 BUN 3 (L) 03/17/2021 03:31 AM  
 CREA 0.56 (L) 03/17/2021 03:31 AM  
 GFRAA >60 03/17/2021 03:31 AM  
 GFRNA >60 03/17/2021 03:31 AM  
 
CBC:  
Lab Results Component Value Date/Time WBC 5.5 03/17/2021 03:31 AM  
 HGB 9.2 (L) 03/17/2021 03:31 AM  
 HCT 29.1 (L) 03/17/2021 03:31 AM  
  03/17/2021 03:31 AM  
 
Results Procedure Component Value Units Date/Time CULTURE, URINE [[id number]]  (Abnormal)  (Susceptibility) Collected: 03/12/21 1825 Order Status: Completed Specimen: Cath Urine Updated: 03/15/21 7010 Special Requests: NO SPECIAL REQUESTS Culture result:    
  KLEBSIELLA PNEUMONIAE ** (EXTENDED SPECTRUM BETA LACTAMASE ) ** Susceptibility Klebsiella pneumoniae GLORIA Amikacin ($) Susceptible Ampicillin ($) Resistant Ampicillin/sulbactam ($) Susceptible Cefazolin ($) Resistant Cefepime ($$) Resistant Cefoxitin Susceptible Ceftazidime ($) Resistant Ceftriaxone ($) Resistant Ciprofloxacin ($) Susceptible [1] Gentamicin ($) Intermediate Levofloxacin ($) Susceptible [1] Meropenem ($$) Susceptible Nitrofurantoin Resistant Piperacillin/Tazobac ($) Susceptible Tobramycin ($) Intermediate Trimeth/Sulfa Susceptible  
      
  [1]   **FDA INTERPRETATION REFLECTED, REFER TO [employer]SI FOR ALTERNATE INTERPRETATIONS. **  
   
  
  
  
 CULTURE, BLOOD [[id number]] Collected: 03/12/21 1420 Order Status: Completed Specimen: Blood Updated: 03/17/21 4123 Special Requests: NO SPECIAL REQUESTS Culture result: NO GROWTH 5 DAYS     
 CULTURE, BLOOD [[id number]] Collected: 03/12/21 1415 Order Status: Completed Specimen: Blood Updated: 03/17/21 0160 Special Requests: NO SPECIAL REQUESTS Culture result: NO GROWTH 5 DAYS Assessment:  
 
Active Problems: 
  UTI (urinary tract infection) (3/12/2021) Alteration consciousness (3/12/2021) Severe protein-calorie malnutrition ([facility] [address] (3/14/2021) Plan:  
 
Urinary tract infection: 3/15 UCx: MDR Klebsiella - pip/tazo susceptible - Transition to Bactrim to complete 14 day course (end date: 25MAR) - ID consult. Appreciate assistance Dysphagia, Severe Calorie deficiency 
- Honey-thickened full liquids diet. Appreciate SLP recs - Fluids PRN to maintain euvolemia 
- Continues to have very poor PO intake - After discussions with daughter, plan is for him to have PEG tube placed due to inadequate PO intake - GI consulted. Plan for PEG tomorrow AM 
 
Acute encephalopathy - resolved: Likely 2/2 UTI 
- MRI cannot be completed due to uncertainty of pacemaker type. Will retrieve pacemaker type prior to attempting MRI head. Can be done in outpatient environment H/O L-sided hemiplegia: At baseline without new deficits - Continue IV Keppra for seizure ppx until discharge. Will then transition to PO to be placed via NG 
 
RAQUEL - resolved: Likely prerenal in poor PO intake Anemia of chronic disease: Hgb transfusion goal >7. No transfusions necessary while inpatient QTc prolongation: Avoiding QT prolongating medications. Will d/c Phenergan, Zofran. Continue sertraline for depression Hypokalemia - resolved this AM 
 
Palliative care consulted. Patient's daughter, Seven Watters, updated with plan. Would like to have PEG tube placed for nutrition support and severe calorie deficiency. Signed By: Romana Jews, MD   
 March 17, 2021

## 2021-03-17 NOTE — CONSULTS
Infectious Disease Consultation Note Reason:esbl klebsiella cystitis Current abx Prior abx Pip/tazo since 3/12 Levofloxacin, vancomycin 3/12-3/13 Lines:  
 
 
Assessment : 
 
78 yo AA male with  Past medical hx of pacemaker, CVA with left sided hemiparesis, HTN, chronic anemia , vascular disease s/p stents, seizure, depression who came to ed via EMS on 3/12/21 with lethargy. Clinical presentation c/w esbl klebsiella cystitis - partially treated 
 
mx complicated due to poor po intake and difficulty giving po meds. D/w primary team. Plans for peg placement noted. Rule out complicated UTI- obstructive uropathy Recommendations: 
 
1. D/c zosyn. Start po bactrim 2. Obtain Ct abd/pelvis without contrast to evaluate for hydronephrosis 3. If unable to use bactrim due to worsening renal function/side effects, will need to place picc line for outpatient iv abx when patient is ready for discharge Thank you for consultation request. Above plan was discussed in details with patient and dr Nanine Dandy. Please call me if any further questions or concerns. Will continue to participate in the care of this patient. HPI: 
 
78 yo AA male with  Past medical hx of pacemaker, CVA with left sided hemiparesis, HTN, chronic anemia , vascular disease s/p stents, seizure, depression who came to ed via EMS on 3/12/21 with lethargy. History not feasible from patient. I obtained history from review of records, dr. Nanine Dandy. Per report,  patient has been lethargic since Wednesday. He was not eating , sleeping all day, and slurred speech.   
ED course:  
Vital signs: 98.3 F, HR 72, /52, RR 20, 94% RA Labs remarkable for: lactic acid 1.28, Hgb 10.7 , Cr 1.67  
Patient was noted to have pyuria. Started on zosyn. Urine culture now positive for esbl klebsiella. I have been consulted for further recommendations. Patient denies abdominal pain. Unable to communicate effectively. Detailed ROS not feasible. Past Medical History:  
Diagnosis Date  Alcohol use disorder  Benign hypertensive heart disease with systolic congestive heart failure, NYHA class 2 ([facility]  Cardiac pacemaker in situ  Cerebrovascular accident (CVA) due to occlusion of right carotid artery ([facility] 8/14/2017 Acute Ischemic Stroke (acute infarctions involving right parieto-occipital area and occipitotemporal area and anterior and midportion of right corona radiata) with residual left hemiparesis, dysphagia and cognitive-communication deficits  Cognitive communication deficit 8/14/2017  Depression On Trazodone  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) 8/14/2017  Dysphagia as late effect of cerebrovascular accident (CVA) 8/14/2017  Erectile dysfunction On Sildenafil citrate  Hemiparesis affecting left side as late effect of cerebrovascular accident (CVA) ([facility] 8/14/2017  Hyperlipidemia with target LDL less than 70 8/15/2017 Lipid profile (8/15/2017) showed TG 99, , HDL 81, LDL 84  Hypertension  Ischemic cardiomyopathy 8/15/2017 EF 40%  Occlusion of right internal carotid artery 8/15/2017  On chronic clopidogrel therapy  Osteoarthritis On Etodolac and Oxycodone-Acetaminophen  Other ill-defined conditions(799.89) PVD  Peripheral artery disease ([facility]  Seizure, late effect of stroke ([facility] 8/15/2017 On Levetiracetam  
 Tobacco use disorder Past Surgical History:  
Procedure Laterality Date  HX ANGIOPLASTY  12/05/2012 S/P Balloon angioplasty and stent of left common iliac artery (12/5/2012 - Dr. Dario Teran) Home Medication List  
 Details  
polyethylene glycol (MIRALAX) 17 gram packet Take 1 Packet by mouth daily as needed for Constipation. Qty: 15 Packet, Refills: 0  
  
L. acidophilus,casei,rhamnosus (Bio-K plus) 50 billion cell cpDR capsule Take 1 Cap by mouth daily for 14 days.  
Qty: 14 Cap, Refills: 0  
  
!! OTHER Incentive Spirometry- Use as directed Qty: 1 Each, Refills: 0  
  
!! OTHER Check CBC, CMP, Mg on 3/22/2021- results to PCP immediately, Diagnosis- UTI Qty: 1 Each, Refills: 0  
  
!! OTHER Graded Compression Stockings b/l LE- use as directed Qty: 1 Each, Refills: 0 Details  
allopurinoL (ZYLOPRIM) 100 mg tablet Take 100 mg by mouth daily. melatonin 3 mg tablet Take 3 mg by mouth At bedtime. sertraline (ZOLOFT) 50 mg tablet Take 50 mg by mouth daily. losartan (COZAAR) 25 mg tablet Take 25 mg by mouth daily. cilostazoL (PLETAL) 100 mg tablet Take 100 mg by mouth.  
  
cyanocobalamin (VITAMIN B12) 500 mcg tablet Take 500 mcg by mouth daily. acetaminophen (TYLENOL) 325 mg tablet Take 325 mg by mouth three (3) times daily as needed for Pain. aspirin delayed-release 81 mg tablet Take 1 Tab by mouth daily. Qty: 30 Tab, Refills: 0  
  
levETIRAcetam 1,000 mg tablet Take 1 Tab by mouth every twelve (12) hours. Qty: 60 Tab, Refills: 0  
  
thiamine (B-1) 100 mg tablet Take 1 Tab by mouth daily. Qty: 30 Tab, Refills: 0  
  
cholecalciferol, vitamin D3, (VITAMIN D3) 2,000 unit Tab Take  by mouth. ascorbic acid (VITAMIN C) 250 mg tablet Take  by mouth. Current Facility-Administered Medications Medication Dose Route Frequency  piperacillin-tazobactam (ZOSYN) 3.375 g in 0.9% sodium chloride (MBP/ADV) 100 mL MBP  3.375 g IntraVENous Q6H  
 aspirin (ASA) suppository 300 mg  300 mg Rectal DAILY  levETIRAcetam in saline (iso-os) (KEPPRA) infusion 1,000 mg  1,000 mg IntraVENous Q12H  
 sodium chloride (NS) flush 5-10 mL  5-10 mL IntraVENous PRN  
 allopurinoL (ZYLOPRIM) tablet 100 mg  100 mg Oral DAILY  cholecalciferol (VITAMIN D3) (1000 Units /25 mcg) tablet 2,000 Units  2,000 Units Oral DAILY  cilostazoL (PLETAL) tablet 100 mg  100 mg Oral DAILY  cyanocobalamin tablet 500 mcg  500 mcg Oral DAILY  sertraline (ZOLOFT) tablet 50 mg  50 mg Oral DAILY  thiamine HCL (B-1) tablet 100 mg  100 mg Oral DAILY  ascorbic acid (vitamin C) (VITAMIN C) tablet 250 mg  250 mg Oral DAILY  sodium chloride (NS) flush 5-40 mL  5-40 mL IntraVENous Q8H  
 sodium chloride (NS) flush 5-40 mL  5-40 mL IntraVENous PRN  
 acetaminophen (TYLENOL) tablet 650 mg  650 mg Oral Q6H PRN Or  
 acetaminophen (TYLENOL) suppository 650 mg  650 mg Rectal Q6H PRN  polyethylene glycol (MIRALAX) packet 17 g  17 g Oral DAILY PRN  promethazine (PHENERGAN) tablet 12.5 mg  12.5 mg Oral Q6H PRN Or  
 ondansetron (ZOFRAN) injection 4 mg  4 mg IntraVENous Q6H PRN  
 dextrose 5 % - 0.45% NaCl infusion  50 mL/hr IntraVENous CONTINUOUS Allergies: Patient has no known allergies. No family history on file. Social History Socioeconomic History  Marital status: SINGLE Spouse name: Not on file  Number of children: Not on file  Years of education: Not on file  Highest education level: Not on file Occupational History  Not on file Social Needs  Financial resource strain: Not on file  Food insecurity Worry: Not on file Inability: Not on file  Transportation needs Medical: Not on file Non-medical: Not on file Tobacco Use  Smoking status: Never Smoker  Smokeless tobacco: Never Used Substance and Sexual Activity  Alcohol use: No  
  Frequency: Never  Drug use: Not on file  Sexual activity: Not on file Lifestyle  Physical activity Days per week: Not on file Minutes per session: Not on file  Stress: Not on file Relationships  Social connections Talks on phone: Not on file Gets together: Not on file Attends Buddhist service: Not on file Active member of club or organization: Not on file Attends meetings of clubs or organizations: Not on file Relationship status: Not on file  Intimate partner violence Fear of current or ex partner: Not on file   Emotionally abused: Not on file Physically abused: Not on file Forced sexual activity: Not on file Other Topics Concern  Not on file Social History Narrative  Not on file Social History Tobacco Use Smoking Status Never Smoker Smokeless Tobacco Never Used Temp (24hrs), Av.6 °F (36.4 °C), Min:97.3 °F (36.3 °C), Max:98.2 °F (36.8 °C) Visit Vitals BP (!) 144/80 (BP 1 Location: Right lower arm, BP Patient Position: At rest) Pulse 84 Temp 98.2 °F (36.8 °C) Resp 16 Ht 6' (1.829 m) Wt 60.8 kg (134 lb) SpO2 97% BMI 18.17 kg/m² ROS: detailed ROS not feasible Physical Exam: 
 
General: Well developed, well nourished male laying on the bed, alert, in no acute distress. General:   awake alert and oriented HEENT:  Normocephalic, atraumatic,  EOMI, no scleral icterus or pallor; no conjunctival hemmohage;  nasal and oral mucous are moist and without evidence of lesions. Neck supple, no bruits. Lymph Nodes:   no cervical, axillary or inguinal adenopathy Lungs:   non-labored, bilaterally clear to auscultation- no crackles wheezes rales or rhonchi Heart:  RRR, s1 and s2; no rubs or gallops, no edema, + pedal pulses Abdomen:  soft, non-distended, active bowel sounds, no hepatomegaly, no splenomegaly. Non-tender Genitourinary:  deferred Extremities:   no clubbing, cyanosis; no joint effusions or swelling;  muscle mass appropriate for age Neurologic:  No gross focal sensory abnormalities; 5/5 muscle strength to upper and lower extremities. Speech appropriate. Cranial nerves intact Skin:  No rash or ulcers noted Back:  no spinal or paraspinal muscle tenderness or rigidity, no CVA tenderness Psychiatric:  No suicidal or homicidal ideations, appropriate mood and affect Labs: Results:  
Chemistry Recent Labs  
  21 
0331 21 
0304 03/15/21 
1556 * 96 91  141 144  
K 3.5 3.1* 3.5  110 111 CO2 25 26 24 BUN 3* 4* 5*  
CREA 0.56* 0.58* 0.58* CA 8.6 8.5 8.2* AGAP 6 5 9 BUCR 5* 7* 9*  
  
CBC w/Diff Recent Labs  
  03/17/21 
0331 03/16/21 
0304 03/15/21 
0305 WBC 5.5 5.2 4.7  
RBC 4.06* 3.51* 3.66* HGB 9.2* 8.0* 8.3* HCT 29.1* 25.2* 26.1*  
 250 251 GRANS 66  --   --   
LYMPH 21  --   --   
EOS 2  --   --   
  
Microbiology No results for input(s): CULT in the last 72 hours. RADIOLOGY: 
 
All available imaging studies/reports in [facility] care for this admission were reviewed Disclaimer: Sections of this note are dictated utilizing voice recognition software, which may have resulted in some phonetic based errors in grammar and contents. Even though attempts were made to correct all the mistakes, some may have been missed, and remained in the body of the document. If questions arise, please contact our department. Dr. Beti Alaniz, Infectious Disease Specialist 
[phone] March 17, 2021 
10:06 AM

## 2021-03-17 NOTE — PROGRESS NOTES
Palliative Medicine Goals of care defined. Pt and family wish for FULL code with FULL aggressive medical management. Will sign off. Please reconsult team as needed/if appropriate. Thank you for the Palliative Medicine consult and allowing us to participate in the care of Mr. Luan Barajas. Olga Vallejo, BSN Palliative Medicine Inpatient [facility] Palliative [facility] Line: [phone])

## 2021-03-17 NOTE — CONSULTS
[email or web address] 
[phone] GASTROENTEROLOGY CONSULT Impression: 1. Dysphagia with poor oral intake 
 - failed speech pathology intervention 2. Pacemake in situ 3. History of CVA with hemiparesis deficit 4. Chronic anemia without overt GI bleed 5. Encephalopathy 6. At risk for aspiration Plan: 1. Endoscopic PEG insertion scheduled for 3/18/21 with Dr Ambika Mackey. 2. Continue alternate feeding and NPO after midnight. 3. Please hold aspirin. 4. H/H management per primary team. Can consider concomitant low dose PPI if anticoagulant therapy will be restarted since high risk of GI bleed associated with anticoag/ASA therapy. 5. Rapid COVID as ordered Reason for consult: dysphagia HPI: 
Saud Bacon is a 77 y.o. male who I am being asked to see in consultation for an opinion regarding dysphagia. Patient with history of CVA (2017) with hemiparalysis and cognitive declines with worsening oral intake. Evaluated by speech pathologist with finding that patient is not make progress toward goals and at risk for aspiration. Seen today, patient was non-interactive and sleepy. On behalf of patient's daughter, hospitalist request PEG. PMH:  
Past Medical History:  
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) 8/14/2017  Dysphagia as late effect of cerebrovascular accident (CVA) 8/14/2017  Erectile dysfunction On Sildenafil citrate  Hemiparesis affecting left side as late effect of cerebrovascular accident (CVA) ([facility] 8/14/2017  Hyperlipidemia with target LDL less than 70 8/15/2017 Lipid profile (8/15/2017) showed TG 99, , HDL 81, LDL 84  Hypertension  Ischemic cardiomyopathy 8/15/2017 EF 40%  Occlusion of right internal carotid artery 8/15/2017  On chronic clopidogrel therapy  Osteoarthritis On Etodolac and Oxycodone-Acetaminophen  Other ill-defined conditions(799.89) PVD  Peripheral artery disease ([facility]  [facility] 8/15/2017 On Levetiracetam  
 Tobacco use disorder PSH:  
Past Surgical History:  
Procedure Laterality Date  HX ANGIOPLASTY  12/05/2012 S/P Balloon angioplasty and stent of left common iliac artery (12/5/2012 - Dr. Amber Higginbotham) Social HX:  
Social History Socioeconomic History  Marital status: SINGLE Spouse name: Not on file  Number of children: Not on file  Years of education: Not on file  Highest education level: Not on file Occupational History  Not on file Social Needs  Financial resource strain: Not on file  Food insecurity Worry: Not on file Inability: Not on file  Transportation needs Medical: Not on file Non-medical: Not on file Tobacco Use  Smoking status: Never Smoker  Smokeless tobacco: Never Used Substance and Sexual Activity  Alcohol use: No  
  Frequency: Never  Drug use: Not on file  Sexual activity: Not on file Lifestyle  Physical activity Days per week: Not on file Minutes per session: Not on file  Stress: Not on file Relationships  Social connections Talks on phone: Not on file Gets together: Not on file Attends Episcopalian service: Not on file Active member of club or organization: Not on file Attends meetings of clubs or organizations: Not on file Relationship status: Not on file  Intimate partner violence Fear of current or ex partner: Not on file Emotionally abused: Not on file Physically abused: Not on file Forced sexual activity: Not on file Other Topics Concern  Not on file Social History Narrative  Not on file FHX:  
No family history on file. Allergy: No Known Allergies Patient Active Problem List  
Diagnosis Code  Cerebrovascular accident ([facility]) due to [facility] of right carotid [facility] D33.480  
 Cardiac pacemaker in situ Z95.0  Impaired mobility and ADLs Z74.09, Z78.9  Hemiparesis affecting left side as late effect of cerebrovascular accident (CVA) ([facility] J00.045  Dysphagia as late effect of cerebrovascular accident (CVA) W28.579  Cognitive communication deficit R41.841  Ischemic cardiomyopathy I25.5  Peripheral artery disease (HCC) I73.9  Hyperlipidemia with target LDL less than 70 E78.5  Hypokalemia E87.6  On chronic clopidogrel therapy Z79.01  
 Alcohol use disorder [id number]  Tobacco use disorder F17.200  Benign hypertensive heart disease with systolic congestive heart failure, NYHA class 2 (HCC) I11.0, I50.20  Erectile dysfunction N52.9  Depression F32.9  Osteoarthritis M19.90  
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  
 Occlusion of right internal carotid artery I65.21  
 Lower GI bleed K92.2  History of CVA (cerebrovascular accident) Z80.78  
 Acute lower GI bleeding K92.2  
 UTI (urinary tract infection) N39.0  Alteration consciousness R40.4  Severe protein-calorie malnutrition ([facility] E43 Home Medications:  
 
Medications Prior to Admission Medication Sig  
 allopurinoL (ZYLOPRIM) 100 mg tablet Take 100 mg by mouth daily.  melatonin 3 mg tablet Take 3 mg by mouth At bedtime.  sertraline (ZOLOFT) 50 mg tablet Take 50 mg by mouth daily.   
 losartan (COZAAR) 25 mg tablet Take 25 mg by mouth daily.  metoprolol succinate (TOPROL-XL) 50 mg XL tablet Take 25 mg by mouth.  diclofenac (VOLTAREN) 1 % gel Apply  to affected area.  gabapentin (NEURONTIN) 300 mg capsule Take 600 mg by mouth.  cilostazoL (PLETAL) 100 mg tablet Take 100 mg by mouth.  cyanocobalamin (VITAMIN B12) 500 mcg tablet Take 500 mcg by mouth daily.  etodolac (LODINE) 400 mg tablet Take 400 mg by mouth two (2) times a day.  acetaminophen (TYLENOL) 325 mg tablet Take 325 mg by mouth three (3) times daily as needed for Pain.  etodolac (LODINE) 400 mg tablet Take 400 mg by mouth two (2) times daily as needed (pain).  aspirin delayed-release 81 mg tablet Take 1 Tab by mouth daily.  levETIRAcetam 1,000 mg tablet Take 1 Tab by mouth every twelve (12) hours.  thiamine (B-1) 100 mg tablet Take 1 Tab by mouth daily. (Patient taking differently: Take 250 mg by mouth daily.)  metoprolol tartrate (LOPRESSOR) 50 mg tablet Take  by mouth daily.  cholecalciferol, vitamin D3, (VITAMIN D3) 2,000 unit Tab Take  by mouth.  ascorbic acid (VITAMIN C) 250 mg tablet Take  by mouth. Review of Systems: unable to obtain due to patient factors Visit Vitals BP (!) 167/88 (BP 1 Location: Right lower arm, BP Patient Position: At rest) Pulse 70 Temp 98.1 °F (36.7 °C) Resp 18 Ht 6' (1.829 m) Wt 60.8 kg (134 lb) SpO2 99% BMI 18.17 kg/m² Physical Assessment:  
 
constitutional: appearance: somnolent, in no acute distress. skin: inspection: no visible rashes  
eyes: inspection: normal conjunctivae and lids ENMT: not examined 
neck: supple 
respiratory: effort: normal chest excursion; no intercostal retraction or accessory muscle use. cardiovascular: normal rate 
abdominal: abdomen: normal consistency; no tenderness or masses. rectal: hemoccult/guaiac: not performed. musculoskeletal: contracture.   
neurologic: not assessed  
psychiatric: impaired Basic Metabolic Profile Recent Labs  
  03/17/21 
0331   
K 3.5  CO2 25 BUN 3*  
* CA 8.6 MG 1.7  
  
  
CBC w/Diff Recent Labs  
  03/17/21 
0331 WBC 5.5  
RBC 4.06* HGB 9.2* HCT 29.1*  
MCV 71.7*  
MCH 22.7*  
MCHC 31.6 RDW 16.1*  
 Recent Labs  
  03/17/21 
0331 GRANS 66 LYMPH 21 EOS 2 Hepatic Function No results for input(s): ALB, TP, TBILI, AP, AML, LPSE in the last 72 hours. No lab exists for component: DBILI, GPT, SGOT Coags No results for input(s): PTP, INR, APTT, INREXT in the last 72 hours. VERONICA Eduardo. [facility] [address] Cell: [phone] [email or web address]

## 2021-03-18 NOTE — PROGRESS NOTES
[facility]: [phone] ([phone]) [facility]: [phone] [facility]: [phone] Patient Name: Shereen Dinh YOB: 1945 Date of Progress note: 3/18/21 Reason for Consult: goals of care Requesting Provider: Dr Isabelle Mcguire 
Primary Care Physician: Other, MD Chasidy 
  
 SUMMARY:  
Shereen Dinh is a 77y.o. year old with a past history of CVA, with left sided hemiparesis, htn, chronic anemia, vascular disease s/p stents, seizure, depression, who was admitted on 3/12/2021 from home  with a diagnosis of increased lethargy found to have an UTI/ Current medical issues leading to Palliative Medicine involvement include: 77year old male who is wheelchair / bed bound and very functionally debilitated requiring assistance with all ADL's who presents with UTI. Palliative medicine is consulted for goals of care conversations. 3/18/21: 
Pt in for PEG placement today. PALLIATIVE DIAGNOSES:  
1. Goals of care 2. UTI 3. Hx of CVA 4. Debility PLAN:  
3/18/21: 
Pt's daughter Chel Zavala called and said that she has thought long and hard about the conversation with our team and would like now to move her father Shereen Dinh to DNR/DNI status. We reviewed the POST with her and she is in agreement for no CPR, limited interventions, but ok with long term feeding tube. He is getting his PEG tube placed today. At this time patient is a DNR/DNI ok with PEG tube 1. Goals of care Mr Pop Solis seen along with Mr Gibson Merida, MSW. Patient is alert somewhat difficult to understand his speech pattern. He knows he is at [employer] , but otherwise confused thought it was 12. There is no AMD on file and patient is currently able to complete. We called his daughter Chel Zavala ( [phone]). Daughter lives with patient and cares for him. He requires assistance with all ADL's . He has 2 children, [facility] and her half sister [facility].  [facility] shares [facility] is somewhat estranged from patient and family. Tani Calloway is point of contact. Goals of care were discussed with Tani Calloway who shared with us she understands the benefits and burdens well and really would agree these efforts would not serve him well. However, her father's wishes have always been for all efforts to sustain life including CPR and life support. She feels at this point she should honor these requests but is amenable to further discussions with her father and family. We have recommended against these efforts as they will not reserve any chronic debility, in fact will likely make worse. Goals of care full code with full interventions. 1. UTI managed by attending on Zosyn 2. Hx of CVA  Left sided hemiplegia 3. Debility wheel chair bound / bed bound. Dependent for all ADL's daughter cares for him PPS 40 indicating overall poor functional status. 4. Initial consult note routed to primary continuity provider 5. Communicated plan of care with: Palliative IDT, daughter Tani Calloway TREATMENT PREFERENCES:  
Code Status: DNR Advance Care Planning: 
[] The [facility] Interdisciplinary Team has updated the ACP Navigator with [address] and Patient Capacity Primary Decision [facility] Agent):   Primary Decision MakerRoashli Garcia - Daughter - [phone] Secondary Decision Maker: Steven Baezores - Other Relative - [phone] Medical Interventions: Full interventions Other: As far as possible, the palliative care team has discussed with patient / health care proxy about goals of care / treatment preferences for patient. HISTORY:  
 
History obtained from: chart and daughter Tani Calloway CHIEF COMPLAINT: UTI  
 
HPI/SUBJECTIVE: The patient is:  
[] Verbal and participatory [x] Non-participatory due to: confusion Please see summary 3/16/2021 Alert being fed cream of wheat by CNA. Denies pain/ Difficult to understand his speech He has underlying confusion.   
Goals of care discussed with his daughter Clinical Pain Assessment (nonverbal scale for nonverbal patients): Clinical Pain Assessment Severity: 0 Duration: for how long has pt been experiencing pain (e.g., 2 days, 1 month, years) Frequency: how often pain is an issue (e.g., several times per day, once every few days, constant) FUNCTIONAL ASSESSMENT:  
 
Palliative Performance Scale (PPS): PPS: 40 ECOG 
ECOG Status : Completely disabled PSYCHOSOCIAL/SPIRITUAL SCREENING:  
  
Any spiritual / Congregation concerns: unable to assess for patient  
[] Yes /  [] No 
 
Caregiver Burnout: 
[] Yes /  [] No /  [x] No Caregiver Present Anticipatory grief assessment:  Unable to assess for patient  
[] Normal  / [] Maladaptive REVIEW OF SYSTEMS:  
 
Positive and pertinent negative findings in ROS are noted above in HPI. The following systems were [x] reviewed / [x] unable to be reviewed as noted in HPI Other findings are noted below. Systems: constitutional, ears/nose/mouth/throat, respiratory, gastrointestinal, genitourinary, musculoskeletal, integumentary, neurologic, psychiatric, endocrine. Positive findings noted below. Modified ESAS Completed by: provider Fatigue: 6 Pain: 0 Dyspnea: 0 Stool Occurrence(s): 1 PHYSICAL EXAM:  
 
Wt Readings from Last 3 Encounters:  
03/18/21 67.5 kg (148 lb 13 oz) 02/25/21 68 kg (150 lb) 08/11/20 68 kg (150 lb) Blood pressure (!) 168/90, pulse 70, temperature 97.7 °F (36.5 °C), resp. rate 20, height 6' (1.829 m), weight 67.5 kg (148 lb 13 oz), SpO2 100 %. Pain: 
Pain Scale 1: FACES Pain Intensity 1: 0 Pain Location 1: Knee Pain Orientation 1: Right Pain Intervention(s) 1: Medication (see MAR) Last bowel movement: none recorded Constitutional: 77 weak, chronically ill appearing male in bed in NAD  
ENMT: moist MM Cardiovascular: RRR Respiratory: respirations not labored Gastrointestinal: soft non tender Skin: warm and dry Neurologic: alert oriented x 2 HISTORY:  
 
Active Problems: 
  UTI (urinary tract infection) (3/12/2021) Alteration consciousness (3/12/2021) Severe protein-calorie malnutrition ([address] (3/14/2021) Past Medical History:  
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) 8/14/2017  Dysphagia as late effect of cerebrovascular accident (CVA) 8/14/2017  Erectile dysfunction On Sildenafil citrate  Hemiparesis affecting left side as late effect of cerebrovascular accident (CVA) ([address] 8/14/2017  Hyperlipidemia with target LDL less than 70 8/15/2017 Lipid profile (8/15/2017) showed TG 99, , HDL 81, LDL 84  Hypertension  Ischemic cardiomyopathy 8/15/2017 EF 40%  Occlusion of right internal carotid artery 8/15/2017  On chronic clopidogrel therapy  Osteoarthritis On Etodolac and Oxycodone-Acetaminophen  Other ill-defined conditions(799.89) PVD  Peripheral artery disease ([address]  Seizure, late effect of stroke ([address] 8/15/2017 On Levetiracetam  
 Tobacco use disorder Past Surgical History:  
Procedure Laterality Date  HX ANGIOPLASTY  12/05/2012 S/P Balloon angioplasty and stent of left common iliac artery (12/5/2012 - Dr. Michaeline Kawasaki) History reviewed. No pertinent family history.  
History reviewed, no pertinent family history. Social History Tobacco Use  Smoking status: Never Smoker  Smokeless tobacco: Never Used Substance Use Topics  Alcohol use: No  
  Frequency: Never No Known Allergies Current Facility-Administered Medications Medication Dose Route Frequency  potassium chloride 10 mEq in 100 ml IVPB  10 mEq IntraVENous ONCE  
 trimethoprim-sulfamethoxazole (BACTRIM DS, SEPTRA DS) 160-800 mg per tablet 1 Tab  1 Tab Oral Q12H  ceFAZolin (ANCEF) 2g IVPB in 50 mL D5W  2 g IntraVENous ON CALL TO OR  
 levETIRAcetam in saline (iso-os) (KEPPRA) infusion 1,000 mg  1,000 mg IntraVENous Q12H  
 sodium chloride (NS) flush 5-10 mL  5-10 mL IntraVENous PRN  
 allopurinoL (ZYLOPRIM) tablet 100 mg  100 mg Oral DAILY  cholecalciferol (VITAMIN D3) (1000 Units /25 mcg) tablet 2,000 Units  2,000 Units Oral DAILY  [Held by provider] cilostazoL (PLETAL) tablet 100 mg  100 mg Oral DAILY  cyanocobalamin tablet 500 mcg  500 mcg Oral DAILY  sertraline (ZOLOFT) tablet 50 mg  50 mg Oral DAILY  thiamine HCL (B-1) tablet 100 mg  100 mg Oral DAILY  ascorbic acid (vitamin C) (VITAMIN C) tablet 250 mg  250 mg Oral DAILY  sodium chloride (NS) flush 5-40 mL  5-40 mL IntraVENous Q8H  
 sodium chloride (NS) flush 5-40 mL  5-40 mL IntraVENous PRN  
 acetaminophen (TYLENOL) tablet 650 mg  650 mg Oral Q6H PRN Or  
 acetaminophen (TYLENOL) suppository 650 mg  650 mg Rectal Q6H PRN  polyethylene glycol (MIRALAX) packet 17 g  17 g Oral DAILY PRN  
 dextrose 5 % - 0.45% NaCl infusion  50 mL/hr IntraVENous CONTINUOUS  
 
 
 LAB AND IMAGING FINDINGS:  
 
Lab Results Component Value Date/Time WBC 6.7 03/18/2021 03:15 AM  
 HGB 9.0 (L) 03/18/2021 03:15 AM  
 PLATELET 086 39/24/7493 03:15 AM  
 
Lab Results Component Value Date/Time  Sodium 142 03/18/2021 03:15 AM  
 Potassium 3.4 (L) 03/18/2021 03:15 AM  
 Chloride 109 03/18/2021 03:15 AM  
 CO2 24 03/18/2021 03:15 AM  
 BUN 2 (L) 03/18/2021 03:15 AM  
 Creatinine 0.52 (L) 03/18/2021 03:15 AM  
 Calcium 8.3 (L) 03/18/2021 03:15 AM  
 Magnesium 1.7 03/18/2021 03:15 AM  
 Phosphorus 2.4 (L) 08/13/2010 02:30 AM  
  
Lab Results Component Value Date/Time Alk. phosphatase 89 03/12/2021 01:20 PM  
 Protein, total 7.9 03/12/2021 01:20 PM  
 Albumin 3.1 (L) 03/12/2021 01:20 PM  
 Globulin 4.8 (H) 03/12/2021 01:20 PM  
 
Lab Results Component Value Date/Time INR 1.3 (H) 03/18/2021 03:15 AM  
 Prothrombin time 15.6 (H) 03/18/2021 03:15 AM  
 aPTT 33.6 09/11/2015 08:55 AM  
  
Lab Results Component Value Date/Time Iron 54 09/22/2017 01:47 PM  
 TIBC 181 (L) 09/22/2017 01:47 PM  
 Iron % saturation 30 09/22/2017 01:47 PM  
 Ferritin 945 (H) 09/22/2017 01:47 PM  
  
No results found for: PH, PCO2, PO2 No components found for: [place] Lab Results Component Value Date/Time  03/12/2021 01:20 PM  
 CK - MB <1.0 03/12/2021 01:20 PM  
  
 
   
 
Total time: 15 minutes Counseling / coordination time, spent as noted above:  
> 50% counseling / coordination: yes with daughter Gema Al Prolonged service was provided for  []30 min   []75 min in face to face time in the presence of the patient, spent as noted above. Time Start:  
Time End:

## 2021-03-18 NOTE — PROGRESS NOTES
Patient returned to floor, no complaints, may use peg for meds starting today, may use peg on 3/19/21 for feeding.

## 2021-03-18 NOTE — ROUTINE PROCESS
TRANSFER - IN REPORT: 
 
Verbal report received from [facility]) on Arden Perfect  being received from [facility](unit) for ordered procedure Report consisted of patients Situation, Background, Assessment and  
Recommendations(SBAR). Information from the following report(s) SBAR and Kardex was reviewed with the receiving nurse. Opportunity for questions and clarification was provided. Assessment completed upon patients arrival to unit and care assumed.

## 2021-03-18 NOTE — CONSULTS
Nutrition Assessment Type and Reason for Visit: Reassess, Positive nutrition screen Nutrition Recommendations/Plan:  
- Start tube feeds at 8 AM on 3/19/21. Jevity 1.5, starting at 10 mL/hr and advance as tolerated by 10 mL q 8 hours to goal rate of 55 mL/hr with   150 mL q 4 hour water flushes  
        (goal regimen to provide 1980 kcal, 84 gm protein, 1003 mL free water, 100% RDIs) - Discontinue oral nutritional supplement given plan to start tube feeding 
- IVF per MD 
 
Nutrition Assessment:  Pt s/p PEG placement today; okay to start tube feeds tomorrow morning per GI note. Obtained tube feeding management consult from MD. Noted hypokalemia, replacement ordered. Malnutrition Assessment: 
Malnutrition Status: Severe malnutrition Estimated Daily Nutrient Needs: 
Energy (kcal):  6406-9500 Protein (g):  65-81 Fluid (ml/day):  7853-0772 Nutrition Related Findings:  Loose bowels on 3/17. Meds: cyanocobalamin, cholecalciferol, thiamine, IVF per MD   
 
Additional Caloric Sources: D5 1/2NS at 50 mL/hr to provide 60 gm dextrose, 204 kcal daily Current Nutrition Therapies: DIET NUTRITIONAL SUPPLEMENTS Breakfast, Lunch, Dinner; [place] DIET NPO Past Midnight Anthropometric Measures: 
· Height:  6' (182.9 cm) · Current Body Wt:  67.5 kg (148 lb 13 oz) · BMI: 20.2 Nutrition Diagnosis:  
· Inadequate protein-energy intake related to cognitive or neurological impairment, swallowing difficulty as evidenced by NPO or clear liquid status due to medical condition, swallowing study results · Severe malnutrition, In context of acute illness or injury related to inadequate protein-energy intake as evidenced by poor intake prior to admission, intake 0-25%, weight loss greater than or equal to 5% in 1 month, moderate muscle loss Nutrition Intervention: 
Food and/or Nutrient Delivery: Continue NPO, Discontinue oral nutrition supplement, Start tube feeding, IV fluid delivery 
Nutrition Education and Counseling: Education not indicated 
Coordination of Nutrition Care: Continue to monitor while inpatient, Interdisciplinary rounds 
 
Goals: 
Nutritional needs will be met through adequate oral intake or nutrition support within the next 7 days.    
 
Nutrition Monitoring and Evaluation:  
Behavioral-Environmental Outcomes: None identified 
Food/Nutrient Intake Outcomes: Enteral nutrition intake/tolerance, Vitamin/mineral intake, IVF intake 
Physical Signs/Symptoms Outcomes: Biochemical data, Nutrition focused physical findings 
 
Discharge Planning:   
Enteral nutrition  
 
Electronically signed by Yanna Alexander on 3/18/2021 at 3:55 PM 
 
Contact Number: [phone]

## 2021-03-18 NOTE — PERIOP NOTES
TRANSFER - OUT REPORT: 
 
Verbal report given to Edyta (name) on Cassandra Guerrero  being transferred to (unit) for routine post - op Report consisted of patients Situation, Background, Assessment and  
Recommendations(SBAR). Information from the following report(s) SBAR, Procedure Summary and Intake/Output was reviewed with the receiving nurse. Lines:  
Peripheral IV 03/12/21 Left Forearm (Active) Site Assessment Clean, dry, & intact 03/17/21 2030 Phlebitis Assessment 0 03/17/21 2030 Infiltration Assessment 0 03/17/21 2030 Dressing Status Clean, dry, & intact 03/17/21 2030 Dressing Type Transparent 03/17/21 2030 Hub Color/Line Status Blue; Infusing 03/17/21 2030 Action Taken Open ports on tubing capped 03/17/21 2030 Alcohol Cap Used Yes 03/17/21 2030 Peripheral IV Right Arm (Active) Site Assessment Clean, dry, & intact 03/17/21 2030 Phlebitis Assessment 0 03/17/21 2030 Infiltration Assessment 0 03/17/21 2030 Dressing Status Clean, dry, & intact 03/17/21 2030 Dressing Type Transparent 03/17/21 2030 Hub Color/Line Status Blue;Flushed 03/17/21 2030 Action Taken Open ports on tubing capped 03/17/21 2030 Alcohol Cap Used Yes 03/17/21 2030 Opportunity for questions and clarification was provided. Patient transported with: 
 [facility]

## 2021-03-18 NOTE — PROGRESS NOTES
I attempted to visit Mr. Sánchez Castellon during my daily rounds. Upon arrival patient was asleep. Patient is not available to be assessed at this time. I expressed silent prayers for patient. Chaplain R[facility]VEL[facility]  
[phone]

## 2021-03-18 NOTE — PROGRESS NOTES
Progress Note Patient: John Morris MRN: [id number]  SSN: [id number] YOB: 1945  Age: 77 y.o. Sex: male Admit Date: 3/12/2021 LOS: 6 days Subjective:  
 
67yo AA M with h/o pacemaker, CVA with L sided hemiparesis, HTN, chronic anemia, PVD s/p stents, seizure, depression who presented to the hospital for lethargy, encephalopathy being managed for UTI Overall, patient reports that he is doing well today. He denies having any new abdominal pain, urinary changes, CP, SOB. He continues to state that he is eating the majority of his meals but this is not corroborated with nursing. He wishes to leave the hospital and possible but acknowledges that her nutritional status is a concern. Objective:  
 
Vitals:  
 03/18/21 0612 03/18/21 0848 03/18/21 1126 03/18/21 1350 BP: (!) 172/95 (!) 162/84 (!) 168/90 (!) 167/86 Pulse: 70 69 70 70 Resp: 18 18 20 18 Temp: 98.2 °F (36.8 °C) 98.1 °F (36.7 °C) 97.7 °F (36.5 °C) 97.7 °F (36.5 °C) SpO2: 99% 98% 100% 98% Weight:      
Height:      
  
 
Intake and Output: 
Current Shift: 03/18 0701 - 03/18 1900 In: 0 Out: 500 [Urine:500] Last three shifts: 03/16 1901 - 03/18 0700 In: 0 Out: 100 [Urine:100] Physical Exam:  
GENERAL: alert, cooperative, no distress, appears stated age LUNG: clear to auscultation bilaterally HEART: regular rate and rhythm, S1, S2 normal, no murmur, click, rub or gallop ABDOMEN: soft, non-tender. Bowel sounds normal. No masses,  no organomegaly NEUROLOGIC: AOx3. Gait normal. Cranial nerves 2-12 and sensation grossly intact. , positive findings: 2/5 muscular weakness in LUE and LLE with contracture of L hand and L ankle Lab/Data Review: 
BMP:  
Lab Results Component Value Date/Time   03/18/2021 03:15 AM  
 K 3.4 (L) 03/18/2021 03:15 AM  
  03/18/2021 03:15 AM  
 CO2 24 03/18/2021 03:15 AM  
 AGAP 9 03/18/2021 03:15 AM  
 GLU 94 03/18/2021 03:15 AM  
 BUN 2 (L) 03/18/2021 03:15 AM  
 CREA 0.52 (L) 03/18/2021 03:15 AM  
 GFRAA >60 03/18/2021 03:15 AM  
 GFRNA >60 03/18/2021 03:15 AM  
 
CBC:  
Lab Results Component Value Date/Time WBC 6.7 03/18/2021 03:15 AM  
 HGB 9.0 (L) 03/18/2021 03:15 AM  
 HCT 28.5 (L) 03/18/2021 03:15 AM  
  03/18/2021 03:15 AM  
 
Results Procedure Component Value Units Date/Time COVID-19 RAPID TEST [[id number]] Collected: 03/17/21 2043 Order Status: Completed Specimen: Nasopharyngeal Updated: 03/17/21 2209 Specimen source Nasopharyngeal     
  COVID-19 rapid test Not detected Comment: Rapid Abbott ID Now Rapid NAAT:  The specimen is NEGATIVE for SARS-CoV-2, the novel coronavirus associated with COVID-19. Negative results should be treated as presumptive and, if inconsistent with clinical signs and symptoms or necessary for patient management, should be tested with an alternative molecular assay. Negative results do not preclude SARS-CoV-2 infection and should not be used as the sole basis for patient management decisions. This test has been authorized by the FDA under an Emergency Use Authorization (EUA) for use by authorized laboratories. Fact sheet for Healthcare Providers: ConventionUpdate.co.nz Fact sheet for Patients: ConventionUpdate.co.nz Methodology: Isothermal Nucleic Acid Amplification CULTURE, URINE [[id number]]  (Abnormal)  (Susceptibility) Collected: 03/12/21 1825 Order Status: Completed Specimen: Cath Urine Updated: 03/15/21 9272 Special Requests: NO SPECIAL REQUESTS Culture result:    
  KLEBSIELLA PNEUMONIAE ** (EXTENDED SPECTRUM BETA LACTAMASE ) ** Susceptibility Klebsiella pneumoniae GLORIA Amikacin ($) Susceptible Ampicillin ($) Resistant Ampicillin/sulbactam ($) Susceptible Cefazolin ($) Resistant Cefepime ($$) Resistant Cefoxitin Susceptible Ceftazidime ($) Resistant   Ceftriaxone ($) Resistant Ciprofloxacin ($) Susceptible [1] Gentamicin ($) Intermediate Levofloxacin ($) Susceptible [1] Meropenem ($$) Susceptible Nitrofurantoin Resistant Piperacillin/Tazobac ($) Susceptible Tobramycin ($) Intermediate Trimeth/Sulfa Susceptible  
      
  [1]   **FDA INTERPRETATION REFLECTED, REFER TO CLSI FOR ALTERNATE INTERPRETATIONS. **  
   
  
  
  
 CULTURE, BLOOD [[id number]] Collected: 03/12/21 1420 Order Status: Completed Specimen: Blood Updated: 03/18/21 0740 Special Requests: NO SPECIAL REQUESTS Culture result: NO GROWTH 6 DAYS     
 CULTURE, BLOOD [[id number]] Collected: 03/12/21 1415 Order Status: Completed Specimen: Blood Updated: 03/18/21 0740 Special Requests: NO SPECIAL REQUESTS Culture result: NO GROWTH 6 DAYS Assessment:  
 
Active Problems: 
  UTI (urinary tract infection) (3/12/2021) Alteration consciousness (3/12/2021) Severe protein-calorie malnutrition ([address] (3/14/2021) Goals of care, counseling/discussion () Debility () Plan:  
 
Urinary tract infection: 3/15 UCx: MDR Klebsiella - TMP/SMX susceptible (pip/tazo 3/12-3/17) - Transition to Bactrim to complete 14 day course (end date: 25MAR) - ID consult. Appreciate assistance Dysphagia, Severe Calorie deficiency 
- NPO this AM with PEG placement this PM 
- Fluids PRN to maintain euvolemia - GI consulted. PEG placement today 
- Start tube feeds in AM 
 
Acute encephalopathy - resolved: Likely 2/2 UTI 
- MRI is low utility at this time. Can reconsider in outpatient setting once pacemaker type known and if patient has persistent encephalopathy H/O L-sided hemiplegia: At baseline without new deficits - Continue IV Keppra for seizure ppx until discharge. Will then transition to PO to be placed via NG 
 
RAQUEL - resolved: Likely prerenal in poor PO intake Anemia of chronic disease: Hgb transfusion goal >7.  No transfusions necessary while inpatient QTc prolongation: Avoiding QT prolongating medications. Will d/c Phenergan, Zofran. Continue sertraline for depression Hypokalemia - repleting to goal K>3.5 Palliative care consulted. Hospitalist team made aware that patient is now DNR/DNI. This was confirmed by me verbally with daughter that she wishes for him to be DNR/DNI. Patient's daughter, Nakita Herr, updated with plan. Signed By: Marco Paulino MD   
 March 18, 2021

## 2021-03-18 NOTE — ACP (ADVANCE CARE PLANNING)
Advanced Steps Advance Care Planning Conversation Matt (Physician Orders for Scope of Treatment) Date of conversation: 3/18/2021  [facility]          Length (minutes): 20 Participants: 
 
[x] Other Surrogate Decision Maker / Next of Kin Name: Andi Bergeron Relationship to Patient: Daughter Phone Number: [phone] Advanced Steps® ACP Facilitator: Aracelis Santiago. MSW Conversation Topics (If Patient does not have decision making capacity, Agent/Surrogate responds based on understanding of how patient would respond if capable) Understanding of Medical Condition/s AND Potential Complications: 
 
Healthcare Agent/Other Surrogate: Daughter stated that she understands patient's medical condition(s) and the potential complications. Cardiopulmonary Resuscitation \"What do you understand about CPR? \" Response: Daughter is well versed in her knowledge of CPR. Order Elected for CPR:  []  Attempt Resuscitation [x]  Do Not Attempt Resuscitation When NOT in Cardiopulmonary Arrest, Order Elected:   
 
[] Comfort Measures 
[x] Limited Additional Interventions [] Full Interventions Artificially Administered Nutrition, Order Elected: 
 
[] No Feeding Tube  
[] Feeding Tube for a defined trial period [x] Feeding Tube long-term if indicated The following was provided (check all that apply): 
  
[] Review of existing Advance Directive 
[] Assistance with Completion of New Advance Directive [x] Review of [place] POST Form Meeting Outcomes: 
 [] ACP discussion completed 
 [x] Matt form completed 
[x] Matt prepared for Provider review and signature 
 [x] Original placed on Chart, if in facility (form to be sent with patient at discharge) [x] Copy given to healthcare agent, via [employer].  
 [] Copy scanned to electronic medical record Follow-up plan:   
 [] Schedule follow-up conversation to continue planning 
 [] Referred individual to Provider for additional questions/concerns [x] Advised patient/agent/surrogate to review completed POST form and update if needed with changes in condition, patient preferences or care setting This writer received a phone call from patient's daughter Jenkins Alpers), as a follow up to the conversation this writer had with her a couple of days ago. Maddie Duff stated that she thought about the conversation regarding his goals of care and she has changed her outlook on DNR/DNI. She stated that she also had a conversation with the doctor and that she remembered conversations that patient had with her and other family members. She stated that he would not want CPR or to be \"put on a machine for breathing\". She asked if it were too late to change his order to a DNR and no life support. This writer stated that it was not too late. A POST form was completed with patient's daughter, via phone. She was re-educated on the risks and burdens of CPR and how CPR and intubation goes hand in hand. She is okay with a feeding tube, long term if indicated. The POST form was completed and signed by Palliative NP and by patient's daughter. Copies were placed on the chart to be scanned in to the EMR and one to be given to patient upon discharge. The RN and Dr. Leigh Ann Longoria were made aware of this change in code status. At this time, patient is a DNR/DNI with Limited Additional Interventions and is ok with a feeding tube long-term if indicated. Recommendations: The Palliative Care team will continue to offer support to patient and his family; while he remains hospitalized. [] This note routed to one or more involved healthcare providers Carroll Horne. [facility] Palliative Medicine Inpatient  [facility] Palliative COPE Line: [phone] ([phone])

## 2021-03-18 NOTE — PROCEDURES
EGD w PEG. NML EGD, 20FR PEG inserted. May use for meds now and begin TF per nutrition recs in AM. Please call w any questions.  
 
Desirae Tellez MD

## 2021-03-18 NOTE — PROGRESS NOTES
Bedside shift change report given to Esperanza (oncoming nurse) by Anupam Sloan LPN (offgoing nurse). Report included the following information SBAR, Kardex, ED Summary, OR Summary, Procedure Summary, Intake/Output and MAR.

## 2021-03-18 NOTE — ANESTHESIA PREPROCEDURE EVALUATION
Relevant Problems   No relevant active problems       Anesthetic History   No history of anesthetic complications            Review of Systems / Medical History  Patient summary reviewed, nursing notes reviewed and pertinent labs reviewed    Pulmonary  Within defined limits                 Neuro/Psych     seizures  CVA  TIA and psychiatric history     Cardiovascular    Hypertension              Exercise tolerance: >4 METS     GI/Hepatic/Renal  Within defined limits              Endo/Other        Arthritis     Other Findings              Physical Exam    Airway  Mallampati: II  TM Distance: 4 - 6 cm  Neck ROM: normal range of motion   Mouth opening: Normal     Cardiovascular  Regular rate and rhythm,  S1 and S2 normal,  no murmur, click, rub, or gallop  Rhythm: regular  Rate: normal         Dental  No notable dental hx       Pulmonary  Breath sounds clear to auscultation               Abdominal  GI exam deferred       Other Findings            Anesthetic Plan    ASA: 4  Anesthesia type: MAC          Induction: Intravenous  Anesthetic plan and risks discussed with: Patient and Family      DNR discussed.  Will be

## 2021-03-18 NOTE — PROGRESS NOTES
Bedside and Verbal shift change report given to [employer] Annette RN (oncoming nurse) by Yovani Mccauley RN  (offgoing nurse). Report included the following information SBAR, Kardex, Intake/Output and MAR.

## 2021-03-18 NOTE — PROGRESS NOTES
SLP Note: 
 
Pt NPO for PEG placement later this date. Will hold dysphagia management accordingly and continue to follow per POC. Dolores Valles M.S., CCC-SLP Speech-Language Pathologist

## 2021-03-18 NOTE — PROGRESS NOTES
Infectious Disease progress Note Reason:esbl klebsiella cystitis Current abx Prior abx Bactrim since 3/17/2021 Levofloxacin, vancomycin 3/12-3/13 Pip/tazo since 3/12-3/17 Lines:  
 
 
Assessment : 
 
76 yo AA male with  Past medical hx of pacemaker, CVA with left sided hemiparesis, HTN, chronic anemia , vascular disease s/p stents, seizure, depression who came to ed via EMS on 3/12/21 with lethargy. Clinical presentation c/w esbl klebsiella cystitis, bilateral pyelonephritis - partially treated Ct scan 3/17- Moderate bilateral perinephric stranding, perhaps slightly greater on the 
left 
 
mx complicated due to poor po intake and difficulty giving po meds. D/w primary team. Plans for peg placement noted. Recommendations: 1. recommend po bactrim 2. Await PEG placement 3. If unable to use bactrim due to worsening renal function/side effects, will need to place picc line for outpatient iv abx when patient is ready for discharge Above plan was discussed in details with patient, rn. Please call me if any further questions or concerns. Will continue to participate in the care of this patient. HPI: 
 
 
Patient denies abdominal pain. Unable to communicate effectively. Detailed ROS not feasible. Home Medication List  
 Details  
polyethylene glycol (MIRALAX) 17 gram packet Take 1 Packet by mouth daily as needed for Constipation. Qty: 15 Packet, Refills: 0  
  
L. acidophilus,casei,rhamnosus (Bio-K plus) 50 billion cell cpDR capsule Take 1 Cap by mouth daily for 14 days. Qty: 14 Cap, Refills: 0  
  
!! OTHER Incentive Spirometry- Use as directed Qty: 1 Each, Refills: 0  
  
!! OTHER Check CBC, CMP, Mg on 3/22/2021- results to PCP immediately, Diagnosis- UTI Qty: 1 Each, Refills: 0  
  
!! OTHER Graded Compression Stockings b/l LE- use as directed Qty: 1 Each, Refills: 0 Details  
allopurinoL (ZYLOPRIM) 100 mg tablet Take 100 mg by mouth daily.   
  
melatonin 3 mg tablet Take 3 mg by mouth At bedtime. sertraline (ZOLOFT) 50 mg tablet Take 50 mg by mouth daily. losartan (COZAAR) 25 mg tablet Take 25 mg by mouth daily. cilostazoL (PLETAL) 100 mg tablet Take 100 mg by mouth.  
  
cyanocobalamin (VITAMIN B12) 500 mcg tablet Take 500 mcg by mouth daily. acetaminophen (TYLENOL) 325 mg tablet Take 325 mg by mouth three (3) times daily as needed for Pain. aspirin delayed-release 81 mg tablet Take 1 Tab by mouth daily. Qty: 30 Tab, Refills: 0  
  
levETIRAcetam 1,000 mg tablet Take 1 Tab by mouth every twelve (12) hours. Qty: 60 Tab, Refills: 0  
  
thiamine (B-1) 100 mg tablet Take 1 Tab by mouth daily. Qty: 30 Tab, Refills: 0  
  
cholecalciferol, vitamin D3, (VITAMIN D3) 2,000 unit Tab Take  by mouth. ascorbic acid (VITAMIN C) 250 mg tablet Take  by mouth. Current Facility-Administered Medications Medication Dose Route Frequency  potassium chloride 10 mEq in 100 ml IVPB  10 mEq IntraVENous ONCE  
 trimethoprim-sulfamethoxazole (BACTRIM DS, SEPTRA DS) 160-800 mg per tablet 1 Tab  1 Tab Oral Q12H  ceFAZolin (ANCEF) 2g IVPB in 50 mL D5W  2 g IntraVENous ON CALL TO OR  
 levETIRAcetam in saline (iso-os) (KEPPRA) infusion 1,000 mg  1,000 mg IntraVENous Q12H  
 sodium chloride (NS) flush 5-10 mL  5-10 mL IntraVENous PRN  
 allopurinoL (ZYLOPRIM) tablet 100 mg  100 mg Oral DAILY  cholecalciferol (VITAMIN D3) (1000 Units /25 mcg) tablet 2,000 Units  2,000 Units Oral DAILY  [Held by provider] cilostazoL (PLETAL) tablet 100 mg  100 mg Oral DAILY  cyanocobalamin tablet 500 mcg  500 mcg Oral DAILY  sertraline (ZOLOFT) tablet 50 mg  50 mg Oral DAILY  thiamine HCL (B-1) tablet 100 mg  100 mg Oral DAILY  ascorbic acid (vitamin C) (VITAMIN C) tablet 250 mg  250 mg Oral DAILY  sodium chloride (NS) flush 5-40 mL  5-40 mL IntraVENous Q8H  
 sodium chloride (NS) flush 5-40 mL  5-40 mL IntraVENous PRN  
 acetaminophen (TYLENOL) tablet 650 mg  650 mg Oral Q6H PRN Or  
 acetaminophen (TYLENOL) suppository 650 mg  650 mg Rectal Q6H PRN  polyethylene glycol (MIRALAX) packet 17 g  17 g Oral DAILY PRN  
 dextrose 5 % - 0.45% NaCl infusion  50 mL/hr IntraVENous CONTINUOUS Allergies: Patient has no known allergies. Temp (24hrs), Av.8 °F (36.6 °C), Min:97.3 °F (36.3 °C), Max:98.2 °F (36.8 °C) Visit Vitals BP (!) 162/84 (BP 1 Location: Right upper arm, BP Patient Position: At rest) Pulse 69 Temp 98.1 °F (36.7 °C) Resp 18 Ht 6' (1.829 m) Wt 67.5 kg (148 lb 13 oz) SpO2 98% BMI 20.18 kg/m² ROS: detailed ROS not feasible Physical Exam: 
 
General: Well developed, well nourished male laying on the bed, alert, in no acute distress. General:   awake alert and oriented HEENT:  Normocephalic, atraumatic,  EOMI, no scleral icterus or pallor; no conjunctival hemmohage;  nasal and oral mucous are moist and without evidence of lesions. Neck supple, no bruits. Lymph Nodes:   no cervical, axillary or inguinal adenopathy Lungs:   non-labored, bilaterally clear to auscultation- no crackles wheezes rales or rhonchi Heart:  RRR, s1 and s2; no rubs or gallops, no edema, + pedal pulses Abdomen:  soft, non-distended, active bowel sounds, no hepatomegaly, no splenomegaly. Non-tender Genitourinary:  deferred Extremities:   no clubbing, cyanosis; no joint effusions or swelling;  muscle mass appropriate for age Neurologic:  No gross focal sensory abnormalities; 5/5 muscle strength to upper and lower extremities. Speech appropriate. Cranial nerves intact Skin:  No rash or ulcers noted Back:  no spinal or paraspinal muscle tenderness or rigidity, no CVA tenderness Psychiatric:  No suicidal or homicidal ideations, appropriate mood and affect Labs: Results:  
Chemistry Recent Labs  
  21 
0315 21 
0331 21 
0304 GLU 94 101* 96  140 141 K 3. 4* 3.5 3.1*  
 109 110 CO2 24 25 26 BUN 2* 3* 4*  
CREA 0.52* 0.56* 0.58* CA 8.3* 8.6 8.5 AGAP 9 6 5 BUCR 4* 5* 7* CBC w/Diff Recent Labs  
  03/18/21 
0315 03/17/21 
0331 03/16/21 
0304 WBC 6.7 5.5 5.2  
RBC 3.98* 4.06* 3.51* HGB 9.0* 9.2* 8.0*  
HCT 28.5* 29.1* 25.2*  
 319 250 GRANS 71 66  --   
LYMPH 19* 21  --   
EOS 1 2  --   
  
Microbiology No results for input(s): CULT in the last 72 hours. RADIOLOGY: 
 
All available imaging studies/reports in [facility] for this admission were reviewed Disclaimer: Sections of this note are dictated utilizing voice recognition software, which may have resulted in some phonetic based errors in grammar and contents. Even though attempts were made to correct all the mistakes, some may have been missed, and remained in the body of the document. If questions arise, please contact our department. Dr. Anju Vallejo, Infectious Disease Specialist 
[phone] March 18, 2021 
10:06 AM

## 2021-03-19 NOTE — OP NOTES
[address] [place] [address]  
OPERATIVE REPORT Name:  Cesar Goodwin 
MR#:   [id number] :  1945 ACCOUNT #:  [de-identified] DATE OF SERVICE:  2021 PREOPERATIVE DIAGNOSIS:  Dysphagia. POSTOPERATIVE DIAGNOSIS:  Normal upper endoscopy with successful 20-Wolof PEG tube placement. PROCEDURE PERFORMED:  Upper endoscopy with PEG tube placement. SURGEON:  Krishna Farias MD 
 
ASSISTANT:  None. ANESTHESIA:  Monitored anesthesia care. COMPLICATIONS:  None. SPECIMENS REMOVED:  None. IMPLANTS:  20-Wolof PEG tube. ESTIMATED BLOOD LOSS:  None. SCOPE:  Olympus upper endoscope. OPERATIVE REPORT:  After informed consent was obtained, risks, benefits, and alternatives were discussed with his power of , he was brought to the endoscopy suite. The scope was inserted into the oropharynx and advanced to distal duodenum. Duodenum and duodenal bulb were normal.  Stomach was unremarkable. Retroflex exam did not reveal any gastric varices. Esophagus was normal.  Then, transillumination and palpation helped locate a safe and adequate location on the anterior abdominal wall for PEG tube placement. The skin was prepped and draped in usual sterile fashion. 1% lidocaine was used to anesthetize the site and a small skin incision was made. Trocar was inserted through that site into the gastric lumen. A guidewire was placed and pulled out through the oropharynx. A PEG tube was pulled into position at 2.5 cm on the anterior abdominal wall. The patient tolerated the procedure well without acute complications. IMPRESSION:  Normal upper endoscopy and successful 20-Wolof percutaneous endoscopic gastrostomy tube placement. PLAN:  May use tube for medications now. Begin tube feedings in the morning. Stuart Storm MD 
 
 
[id number] 
D:  2021 15:44 
T:  2021 0:38 JOB #:  [id number]

## 2021-03-19 NOTE — PROGRESS NOTES
Nutrition Note Pt s/p PEG placement yesterday. Tube feeds started this morning. Tolerating at rate of 10 mL/hr with slow advancement due to risk for refeeding. Electrolytes checked today; K and Mg low; being replaced. IVF: D5 1/2NS at 50 mL/hr (60 gm dextrose, 204 kcal per day). BM today. Progressing towards nutrition goals Nutrition Recommendations/Plan:  
- Continue tube feeds of Jevity 1.5 at 10 mL/hr, advancing as pt toelrates by 10 mL q 8 hours to goal rate of 55 mL/hr with 150 mL q 4 hour water flushes  
        (goal regimen to provide 1980 kcal, 84 gm protein, 1003 mL free water, 100% RDIs) - Monitor and replace electrolytes as needed - MD addressing 
- IVF per MD 
 
 
Electronically signed by Castro Gilbert RD on 3/19/2021 at 3:46 PM 
 
Contact: [phone]

## 2021-03-19 NOTE — PROGRESS NOTES
Problem: Anxiety Goal: *Alleviation of anxiety Outcome: Progressing Towards Goal 
Goal: *Alleviation of anxiety (Palliative Care) Outcome: Progressing Towards Goal 
  
Problem: Patient Education: Go to Patient Education Activity Goal: Patient/Family Education Outcome: Progressing Towards Goal 
  
Problem: Patient Education: Go to Patient Education Activity Goal: Patient/Family Education Outcome: Progressing Towards Goal 
  
Problem: Patient Education: Go to Patient Education Activity Goal: Patient/Family Education Outcome: Progressing Towards Goal 
  
Problem: Nutrition Deficit Goal: *Optimize nutritional status Outcome: Progressing Towards Goal 
  
Problem: Falls - Risk of 
Goal: *Absence of Falls Description: Document Danmarni Andres Fall Risk and appropriate interventions in the flowsheet. Outcome: Progressing Towards Goal 
Note: Fall Risk Interventions: 
  
 
Mentation Interventions: Adequate sleep, hydration, pain control, Bed/chair exit alarm, Door open when patient unattended Medication Interventions: Bed/chair exit alarm Elimination Interventions: Bed/chair exit alarm, Call light in reach Problem: Patient Education: Go to Patient Education Activity Goal: Patient/Family Education Outcome: Progressing Towards Goal 
  
Problem: Pressure Injury - Risk of 
Goal: *Prevention of pressure injury Description: Document Alton Scale and appropriate interventions in the flowsheet. Outcome: Progressing Towards Goal 
Note: Pressure Injury Interventions: 
Sensory Interventions: Assess changes in LOC Moisture Interventions: Absorbent underpads Activity Interventions: Pressure redistribution bed/mattress(bed type) Mobility Interventions: Pressure redistribution bed/mattress (bed type) Nutrition Interventions: Document food/fluid/supplement intake Friction and Shear Interventions: Minimize layers Problem: Patient Education: Go to Patient Education Activity Goal: Patient/Family Education Outcome: Progressing Towards Goal 
  
Problem: Risk for Spread of Infection Goal: Prevent transmission of infectious organism to others Description: Prevent the transmission of infectious organisms to other patients, staff members, and visitors. Outcome: Progressing Towards Goal 
  
Problem: Patient Education:  Go to Education Activity Goal: Patient/Family Education Outcome: Progressing Towards Goal

## 2021-03-19 NOTE — PROGRESS NOTES
Infectious Disease progress Note Reason:esbl klebsiella cystitis Current abx Prior abx Bactrim since 3/17/2021 Levofloxacin, vancomycin 3/12-3/13 Pip/tazo since 3/12-3/17 Lines:  
 
 
Assessment : 
 
76 yo AA male with  Past medical hx of pacemaker, CVA with left sided hemiparesis, HTN, chronic anemia , vascular disease s/p stents, seizure, depression who came to ed via EMS on 3/12/21 with lethargy. Clinical presentation c/w esbl klebsiella cystitis, bilateral pyelonephritis - partially treated Ct scan 3/17- Moderate bilateral perinephric stranding, perhaps slightly greater on the 
left 
 
mx complicated due to poor po intake and difficulty giving po meds. D/w primary team. S/p peg placement on 3/18/21 Recommendations: 1. recommend po bactrim for 6 more days 2. If unable to use bactrim due to worsening renal function/side effects, will need to place picc line for outpatient iv abx when patient is ready for discharge Above plan was discussed in details with patient, rn. Please call me if any further questions or concerns. Will continue to participate in the care of this patient. HPI: 
 
 
Patient denies abdominal pain. Unable to communicate effectively. Detailed ROS not feasible. Home Medication List  
 Details  
polyethylene glycol (MIRALAX) 17 gram packet Take 1 Packet by mouth daily as needed for Constipation. Qty: 15 Packet, Refills: 0  
  
L. acidophilus,casei,rhamnosus (Bio-K plus) 50 billion cell cpDR capsule Take 1 Cap by mouth daily for 14 days. Qty: 14 Cap, Refills: 0  
  
!! OTHER Incentive Spirometry- Use as directed Qty: 1 Each, Refills: 0  
  
!! OTHER Check CBC, CMP, Mg on 3/22/2021- results to PCP immediately, Diagnosis- UTI Qty: 1 Each, Refills: 0  
  
!! OTHER Graded Compression Stockings b/l LE- use as directed Qty: 1 Each, Refills: 0 Details  
allopurinoL (ZYLOPRIM) 100 mg tablet Take 100 mg by mouth daily.   
  
melatonin 3 mg tablet Take 3 mg by mouth At bedtime. sertraline (ZOLOFT) 50 mg tablet Take 50 mg by mouth daily. losartan (COZAAR) 25 mg tablet Take 25 mg by mouth daily. cilostazoL (PLETAL) 100 mg tablet Take 100 mg by mouth.  
  
cyanocobalamin (VITAMIN B12) 500 mcg tablet Take 500 mcg by mouth daily. acetaminophen (TYLENOL) 325 mg tablet Take 325 mg by mouth three (3) times daily as needed for Pain. aspirin delayed-release 81 mg tablet Take 1 Tab by mouth daily. Qty: 30 Tab, Refills: 0  
  
levETIRAcetam 1,000 mg tablet Take 1 Tab by mouth every twelve (12) hours. Qty: 60 Tab, Refills: 0  
  
thiamine (B-1) 100 mg tablet Take 1 Tab by mouth daily. Qty: 30 Tab, Refills: 0  
  
cholecalciferol, vitamin D3, (VITAMIN D3) 2,000 unit Tab Take  by mouth. ascorbic acid (VITAMIN C) 250 mg tablet Take  by mouth. Current Facility-Administered Medications Medication Dose Route Frequency  trimethoprim-sulfamethoxazole (BACTRIM DS, SEPTRA DS) 160-800 mg per tablet 1 Tab  1 Tab Oral Q12H  levETIRAcetam in saline (iso-os) (KEPPRA) infusion 1,000 mg  1,000 mg IntraVENous Q12H  
 sodium chloride (NS) flush 5-10 mL  5-10 mL IntraVENous PRN  
 allopurinoL (ZYLOPRIM) tablet 100 mg  100 mg Oral DAILY  cholecalciferol (VITAMIN D3) (1000 Units /25 mcg) tablet 2,000 Units  2,000 Units Oral DAILY  [Held by provider] cilostazoL (PLETAL) tablet 100 mg  100 mg Oral DAILY  cyanocobalamin tablet 500 mcg  500 mcg Oral DAILY  sertraline (ZOLOFT) tablet 50 mg  50 mg Oral DAILY  thiamine HCL (B-1) tablet 100 mg  100 mg Oral DAILY  ascorbic acid (vitamin C) (VITAMIN C) tablet 250 mg  250 mg Oral DAILY  sodium chloride (NS) flush 5-40 mL  5-40 mL IntraVENous Q8H  
 sodium chloride (NS) flush 5-40 mL  5-40 mL IntraVENous PRN  
 acetaminophen (TYLENOL) tablet 650 mg  650 mg Oral Q6H PRN Or  
 acetaminophen (TYLENOL) suppository 650 mg  650 mg Rectal Q6H PRN  polyethylene glycol (MIRALAX) packet 17 g  17 g Oral DAILY PRN  
 dextrose 5 % - 0.45% NaCl infusion  50 mL/hr IntraVENous CONTINUOUS Allergies: Patient has no known allergies. Temp (24hrs), Av.8 °F (36.6 °C), Min:97.4 °F (36.3 °C), Max:98.4 °F (36.9 °C) Visit Vitals BP (!) 182/97 (BP 1 Location: Left upper arm, BP Patient Position: At rest) Pulse 70 Temp 97.4 °F (36.3 °C) Resp 18 Ht 6' (1.829 m) Wt 67.5 kg (148 lb 13 oz) SpO2 97% BMI 20.18 kg/m² ROS: detailed ROS not feasible Physical Exam: 
 
General: Well developed, well nourished male laying on the bed, alert, in no acute distress. General:   awake alert and oriented HEENT:  Normocephalic, atraumatic,  EOMI, no scleral icterus or pallor; no conjunctival hemmohage;  nasal and oral mucous are moist and without evidence of lesions. Neck supple, no bruits. Lymph Nodes:   no cervical, axillary or inguinal adenopathy Lungs:   non-labored, bilaterally clear to auscultation- no crackles wheezes rales or rhonchi Heart:  RRR, s1 and s2; no rubs or gallops, no edema, + pedal pulses Abdomen:  soft, non-distended, active bowel sounds, no hepatomegaly, no splenomegaly. Non-tender Genitourinary:  deferred Extremities:   no clubbing, cyanosis; no joint effusions or swelling;  muscle mass appropriate for age Neurologic:  No gross focal sensory abnormalities; 5/5 muscle strength to upper and lower extremities. Speech appropriate. Cranial nerves intact Skin:  No rash or ulcers noted Back:  no spinal or paraspinal muscle tenderness or rigidity, no CVA tenderness Psychiatric:  No suicidal or homicidal ideations, appropriate mood and affect Labs: Results:  
Chemistry Recent Labs  
  21 
7007 GLU 94 101*  140  
K 3.4* 3.5  109 CO2 24 25 BUN 2* 3*  
CREA 0.52* 0.56* CA 8.3* 8.6 AGAP 9 6 BUCR 4* 5* CBC w/Diff Recent Labs  
  2117/21 
0331 WBC 6.7 5.5 RBC 3.98* 4.06* HGB 9.0* 9.2* HCT 28.5* 29.1*  
 319 GRANS 71 66 LYMPH 19* 21 EOS 1 2 Microbiology No results for input(s): CULT in the last 72 hours. RADIOLOGY: 
 
All available imaging studies/reports in [facility] for this admission were reviewed Disclaimer: Sections of this note are dictated utilizing voice recognition software, which may have resulted in some phonetic based errors in grammar and contents. Even though attempts were made to correct all the mistakes, some may have been missed, and remained in the body of the document. If questions arise, please contact our department. Dr. Paul Arias, Infectious Disease Specialist 
[phone] March 19, 2021 
10:06 AM

## 2021-03-19 NOTE — PROGRESS NOTES
Problem: Self Care Deficits Care Plan (Adult) Goal: *Acute Goals and Plan of Care (Insert Text) Description: Occupational Therapy Goals Initiated 3/13/2021 within 7 day(s). 1.  Patient will tolerate L palm protector X a minimum of 6 hours/day 2. Patient's care givers with demonstrate independence with palm protector program as evidenced by consistent follow through 3. Patient will participate in 50% of grooming tasks Outcome: Progressing Towards Goal 
 OCCUPATIONAL THERAPY TREATMENT Patient: Shira Sinclair (36 y.o. male) Date: 3/19/2021 Diagnosis: UTI (urinary tract infection) [N39.0] Alteration consciousness [R40.4] <principal problem not specified> Procedure(s) (LRB): PERCUTANEOUS ENDOSCOPIC GASTROSTOMY TUBE INSERTION (N/A) 1 Day Post-Op Precautions: Fall, Skin Chart, occupational therapy assessment, plan of care, and goals were reviewed. ASSESSMENT: 
Pt sleeping upon entry; opens eyes 1x to voice/tactile stimuli. Performed LUE TherEx w/prolonged stretch to minimize contractures. Donned L palm protector. Pt tolerates 1.5 hrs. No redness noted @ MCPs or thenar eminence. No family/caregivers present for palm protector instruction. Progression toward goals: 
[]          Improving appropriately and progressing toward goals [x]          Improving slowly and progressing toward goals 
[]          Not making progress toward goals and plan of care will be adjusted PLAN: 
Patient continues to benefit from skilled intervention to address the above impairments. Continue treatment per established plan of care. Discharge Recommendations:  return home w/24 hr assist 
Further Equipment Recommendations for Discharge:  N/A  
 
SUBJECTIVE:  
Patient nonverbal. 
 
OBJECTIVE DATA SUMMARY:  
Cognitive/Behavioral Status: 
Neurologic State: Sleeping Orientation Level: Oriented to person Cognition: Decreased attention/concentration, No command following Safety/Judgement: Fall prevention Functional Mobility and Transfers for ADLs: 
UE Therapeutic Exercises: PROM LUE elbow extension w/prolonged stretch PROM LUE wrist extension w/prolonged stretch Pain: 
Pain level pre-treatment: 0/10 Pain level post-treatment: 0/10 Activity Tolerance:   
Poor Please refer to the flowsheet for vital signs taken during this treatment. After treatment:  
[]  Patient left in no apparent distress sitting up in chair 
[x]  Patient left in no apparent distress in bed 
[x]  Call bell left within reach [x]  Nursing notified 
[]  Caregiver present 
[]  Bed alarm activated COMMUNICATION/EDUCATION:  
[] Role of Occupational Therapy in the acute care setting 
[] Home safety education was provided and the patient/caregiver indicated understanding. [] Patient/family have participated as able in working towards goals and plan of care. [] Patient/family agree to work toward stated goals and plan of care. [] Patient understands intent and goals of therapy, but is neutral about his/her participation. [x] Patient is unable to participate in goal setting and plan of care 2/2 pt sleeping. Thank you for this referral. 
GENARO Roque Time Calculation: 12 mins

## 2021-03-19 NOTE — PROGRESS NOTES
Pt's daughter Daniel Thompson was instructed on how to administer pt tube feeding. How to check residual and properly measure the amount. Keeping the pt's HOB elevated 25 degrees or greater. Using the plunger, graduate and syringe. Measuring H20 amount (flush). Pt's daughter was also instructed on how to change peg dressing, cleaning the peg site and examining the skin for redness, drainage. Daughter verbalized and returned demonstration.

## 2021-03-19 NOTE — PROGRESS NOTES
Progress Note Patient: Arden Jacobo MRN: [id number]  SSN: [id number] YOB: 1945  Age: 77 y.o. Sex: male Admit Date: 3/12/2021 LOS: 7 days Subjective:  
 
69yo AA M with h/o pacemaker, CVA with L sided hemiparesis, HTN, chronic anemia, PVD s/p stents, seizure, depression who presented to the hospital for lethargy, encephalopathy being managed for UTI Overall, patient reports that he is doing well today. He denies having any new abdominal pain, urinary changes, CP, SOB. He wishes to go home when possible. States that he is having minimal pain in his abdomen other than minor soreness around the site of the PEG placement. Objective:  
 
Vitals:  
 03/19/21 1028 03/19/21 1123 03/19/21 1238 03/19/21 1512 BP: (!) 175/94 (!) 170/88 131/79 (!) 151/82 Pulse:  71  (!) 53 Resp:  20  18 Temp:  97.9 °F (36.6 °C)  97.9 °F (36.6 °C) SpO2:  95%  97% Weight:      
Height:      
  
 
Intake and Output: 
Current Shift: 03/19 0701 - 03/19 1900 In: 675 [I.V.:525] Out: - Last three shifts: 03/17 1901 - 03/19 0700 In: 930 [I.V.:850] Out: 500 [Urine:500] Physical Exam:  
GENERAL: alert, cooperative, no distress, appears stated age LUNG: clear to auscultation bilaterally HEART: regular rate and rhythm, S1, S2 normal, no murmur, click, rub or gallop ABDOMEN: soft, non-tender. Bowel sounds normal. No masses,  no organomegaly NEUROLOGIC: AOx3. Cranial nerves 2-12 and sensation grossly intact. , positive findings: 2/5 muscular weakness in LUE and LLE with contracture of L hand and L ankle Lab/Data Review: 
BMP:  
Lab Results Component Value Date/Time   03/19/2021 02:51 PM  
 K 3.2 (L) 03/19/2021 02:51 PM  
  03/19/2021 02:51 PM  
 CO2 26 03/19/2021 02:51 PM  
 AGAP 6 03/19/2021 02:51 PM  
 GLU 97 03/19/2021 02:51 PM  
 BUN 2 (L) 03/19/2021 02:51 PM  
 CREA 0.56 (L) 03/19/2021 02:51 PM  
 GFRAA >60 03/19/2021 02:51 PM  
 GFRNA >60 03/19/2021 02:51 PM  
 
CBC:  
Lab Results Component Value Date/Time WBC 6.0 03/19/2021 02:51 PM  
 HGB 8.6 (L) 03/19/2021 02:51 PM  
 HCT 26.7 (L) 03/19/2021 02:51 PM  
  03/19/2021 02:51 PM  
 
Results Procedure Component Value Units Date/Time COVID-19 RAPID TEST [[id number]] Collected: 03/17/21 2043 Order Status: Completed Specimen: Nasopharyngeal Updated: 03/17/21 2209 Specimen source Nasopharyngeal     
  COVID-19 rapid test Not detected Comment: Rapid Abbott ID Now Rapid NAAT:  The specimen is NEGATIVE for SARS-CoV-2, the novel coronavirus associated with COVID-19. Negative results should be treated as presumptive and, if inconsistent with clinical signs and symptoms or necessary for patient management, should be tested with an alternative molecular assay. Negative results do not preclude SARS-CoV-2 infection and should not be used as the sole basis for patient management decisions. This test has been authorized by the FDA under an Emergency Use Authorization (EUA) for use by authorized laboratories. Fact sheet for Healthcare Providers: ConventionUpdate.co.nz Fact sheet for Patients: ConventionUpdate.co.nz Methodology: Isothermal Nucleic Acid Amplification CULTURE, URINE [[id number]]  (Abnormal)  (Susceptibility) Collected: 03/12/21 1825 Order Status: Completed Specimen: Cath Urine Updated: 03/15/21 8949 Special Requests: NO SPECIAL REQUESTS Culture result:    
  KLEBSIELLA PNEUMONIAE ** (EXTENDED SPECTRUM BETA LACTAMASE ) ** Susceptibility Klebsiella pneumoniae GLORIA Amikacin ($) Susceptible Ampicillin ($) Resistant Ampicillin/sulbactam ($) Susceptible Cefazolin ($) Resistant Cefepime ($$) Resistant Cefoxitin Susceptible Ceftazidime ($) Resistant Ceftriaxone ($) Resistant Ciprofloxacin ($) Susceptible [1] Gentamicin ($) Intermediate   Levofloxacin ($) Susceptible [1] Meropenem ($$) Susceptible Nitrofurantoin Resistant Piperacillin/Tazobac ($) Susceptible Tobramycin ($) Intermediate Trimeth/Sulfa Susceptible  
      
  [1]   **FDA INTERPRETATION REFLECTED, REFER TO CLSI FOR ALTERNATE INTERPRETATIONS. **  
   
  
  
  
 CULTURE, BLOOD [[id number]] Collected: 03/12/21 1420 Order Status: Completed Specimen: Blood Updated: 03/18/21 0740 Special Requests: NO SPECIAL REQUESTS Culture result: NO GROWTH 6 DAYS     
 CULTURE, BLOOD [[id number]] Collected: 03/12/21 1415 Order Status: Completed Specimen: Blood Updated: 03/18/21 0740 Special Requests: NO SPECIAL REQUESTS Culture result: NO GROWTH 6 DAYS Assessment:  
 
Active Problems: 
  UTI (urinary tract infection) (3/12/2021) Alteration consciousness (3/12/2021) Severe protein-calorie malnutrition ([address] (3/14/2021) Goals of care, counseling/discussion () Debility () Plan:  
 
Complicated Urinary tract infection: 3/15 UCx: MDR Klebsiella - TMP/SMX susceptible (pip/tazo 3/12-3/17). Imaging showing incompletely resolved pyelonephritis - Transition to Bactrim to complete 14 day course (end date: 25MAR) - ID consult. Appreciate assistance Dysphagia, Severe Calorie deficiency 
- PEG tube placed 3/18. Appreciate GI assistance - Tube feeds progress to goal as tolerated Acute encephalopathy - resolved: Likely 2/2 UTI 
- Can reconsider in outpatient setting once pacemaker type known and if patient has persistent encephalopathy H/O L-sided hemiplegia: At baseline without new deficits - Continue IV Keppra for seizure ppx until discharge. Will then transition to PO to be placed via NG 
 
RAQUEL - resolved: Likely prerenal in poor PO intake Anemia of chronic disease: Hgb transfusion goal >7. No transfusions necessary while inpatient QTc prolongation: Avoiding QT prolongating medications. Will d/c Phenergan, Zofran.  Continue sertraline for depression Hypokalemia - repleting to goal K>3.5 DNR/DNI Patient's daughter, Elton Smith, updated with plan. She came to the hospital on 3/19 to receive teaching for tube feeds, but supplies were not available for discharge today. Plan for discharge tomorrow AM after receiving tube feed supplies from nutrition since [facility] cannot come to house until later next week. Signed By: Alfonzo Ortiz MD   
 March 19, 2021

## 2021-03-19 NOTE — DISCHARGE INSTRUCTIONS
DISCHARGE SUMMARY from Nurse    PATIENT INSTRUCTIONS:    After general anesthesia or intravenous sedation, for 24 hours or while taking prescription Narcotics:  · Limit your activities  · Do not drive and operate hazardous machinery  · Do not make important personal or business decisions  · Do  not drink alcoholic beverages  · If you have not urinated within 8 hours after discharge, please contact your surgeon on call. Report the following to your surgeon:  · Excessive pain, swelling, redness or odor of or around the surgical area  · Temperature over 100.5  · Nausea and vomiting lasting longer than 4 hours or if unable to take medications  · Any signs of decreased circulation or nerve impairment to extremity: change in color, persistent  numbness, tingling, coldness or increase pain  · Any questions    What to do at Home:  Recommended activity: Activity as tolerated    If you experience any of the following symptoms chest pain, shortness of breath, nausea/vomiting, or fever, please follow up with your PCP. *  Please give a list of your current medications to your Primary Care Provider. *  Please update this list whenever your medications are discontinued, doses are      changed, or new medications (including over-the-counter products) are added. *  Please carry medication information at all times in case of emergency situations. These are general instructions for a healthy lifestyle:    No smoking/ No tobacco products/ Avoid exposure to second hand smoke  Surgeon General's Warning:  Quitting smoking now greatly reduces serious risk to your health.     Obesity, smoking, and sedentary lifestyle greatly increases your risk for illness    A healthy diet, regular physical exercise & weight monitoring are important for maintaining a healthy lifestyle    You may be retaining fluid if you have a history of heart failure or if you experience any of the following symptoms:  Weight gain of 3 pounds or more overnight or 5 pounds in a week, increased swelling in our hands or feet or shortness of breath while lying flat in bed. Please call your doctor as soon as you notice any of these symptoms; do not wait until your next office visit. Patient armband removed and shredded      The discharge information has been reviewed with the patient. The patient verbalized understanding. Discharge medications reviewed with the patient and appropriate educational materials and side effects teaching were provided.   ___________________________________________________________________________________________________________________________________

## 2021-03-19 NOTE — PROGRESS NOTES
Palliative Medicine [facility]: [phone] ([phone]) [facility]: [phone] [facility]: [phone] Patient Name: Shereen Dinh YOB: 1945 Date of Progress note: 3/19/21 Reason for Consult: goals of care Requesting Provider: Dr Isabelel Mcguire 
Primary Care Physician: Other, MD Chasidy 
  
 SUMMARY:  
Shereen Dinh is a 77y.o. year old with a past history of CVA, with left sided hemiparesis, htn, chronic anemia, vascular disease s/p stents, seizure, depression, who was admitted on 3/12/2021 from home  with a diagnosis of increased lethargy found to have an UTI/ Current medical issues leading to Palliative Medicine involvement include: 77year old male who is wheelchair / bed bound and very functionally debilitated requiring assistance with all ADL's who presents with UTI. Palliative medicine is consulted for goals of care conversations. 3/19/21: 
Pt tolerating feedings. Plan is for discharge today to SNF.  
 
3/18/21: 
Pt in for PEG placement today. PALLIATIVE DIAGNOSES:  
1. Goals of care 2. UTI 3. Hx of CVA 4. Debility PLAN:  
3/19/21: 
Supportive visit to the patient. He is difficult to understand but does engage in conversation and answering questions. States that he is ok with what we are doing for him and that he is ok with the PEG tube. It is now infusing and he appears more comfortable. He asked that we shift his position and then smiled. AT this time patient is a DNR/DNI ok with long term feeding tube. 3/18/21: 
Pt's daughter Chel Zavala called and said that she has thought long and hard about the conversation with our team and would like now to move her father Shereen Dinh to DNR/DNI status. We reviewed the POST with her and she is in agreement for no CPR, limited interventions, but ok with long term feeding tube. He is getting his PEG tube placed today. At this time patient is a DNR/DNI ok with PEG tube 1.    Goals of care Mr Aleta Hutchins seen along with Mr Cristine Shabazz, MSW. Patient is alert somewhat difficult to understand his speech pattern. He knows he is at [facility] , but otherwise confused thought it was 12. There is no AMD on file and patient is currently able to complete. We called his daughter Gisell Denton ( [phone]). Daughter lives with patient and cares for him. He requires assistance with all ADL's . He has 2 children, Florencio and her half sister Calvin. Yulia Ku is somewhat estranged from patient and family. Florencio is point of contact. Goals of care were discussed with Florencio who shared with us she understands the benefits and burdens well and really would agree these efforts would not serve him well. However, her father's wishes have always been for all efforts to sustain life including CPR and life support. She feels at this point she should honor these requests but is amenable to further discussions with her father and family. We have recommended against these efforts as they will not reserve any chronic debility, in fact will likely make worse. Goals of care full code with full interventions. 1. UTI managed by attending on Zosyn 2. Hx of CVA  Left sided hemiplegia 3. Debility wheel chair bound / bed bound. Dependent for all ADL's daughter cares for him PPS 40 indicating overall poor functional status. 4. Initial consult note routed to primary continuity provider 5. Communicated plan of care with: Palliative IDT, merrill Matias TREATMENT PREFERENCES:  
Code Status: DNR Advance Care Planning: 
[] [facility] Interdisciplinary Team has updated the ACP Navigator with [address] and Patient Capacity Primary Decision [facility] Agent):   Primary Decision MakerJuchristian Linda - Daughter - [phone] Secondary Decision Maker: Carlene Baez - Other Relative - [phone] Medical Interventions: Full interventions Other: As far as possible, the palliative care team has discussed with patient / health care proxy about goals of care / treatment preferences for patient. HISTORY:  
 
History obtained from: chart and daughter Chelsey Maddox CHIEF COMPLAINT: UTI  
 
HPI/SUBJECTIVE: The patient is:  
[] Verbal and participatory [x] Non-participatory due to: confusion Please see summary 3/16/2021 Alert being fed cream of wheat by CNA. Denies pain/ Difficult to understand his speech He has underlying confusion. Goals of care discussed with his daughter Clinical Pain Assessment (nonverbal scale for nonverbal patients): Clinical Pain Assessment Severity: 0 Activity (Movement): Lying quietly, normal position Duration: for how long has pt been experiencing pain (e.g., 2 days, 1 month, years) Frequency: how often pain is an issue (e.g., several times per day, once every few days, constant) FUNCTIONAL ASSESSMENT:  
 
Palliative Performance Scale (PPS): PPS: 40 ECOG 
ECOG Status : Completely disabled PSYCHOSOCIAL/SPIRITUAL SCREENING:  
  
Any spiritual / Oriental orthodox concerns: unable to assess for patient  
[] Yes /  [] No 
 
Caregiver Burnout: 
[] Yes /  [] No /  [x] No Caregiver Present Anticipatory grief assessment:  Unable to assess for patient  
[] Normal  / [] Maladaptive REVIEW OF SYSTEMS:  
 
Positive and pertinent negative findings in ROS are noted above in HPI. The following systems were [x] reviewed / [x] unable to be reviewed as noted in HPI Other findings are noted below. Systems: constitutional, ears/nose/mouth/throat, respiratory, gastrointestinal, genitourinary, musculoskeletal, integumentary, neurologic, psychiatric, endocrine. Positive findings noted below. Modified ESAS Completed by: provider Fatigue: 6 Pain: 0 Dyspnea: 0 Stool Occurrence(s): 1 PHYSICAL EXAM:  
 
Wt Readings from Last 3 Encounters:  
03/18/21 67.5 kg (148 lb 13 oz) 02/25/21 68 kg (150 lb) 08/11/20 68 kg (150 lb) Blood pressure 131/79, pulse 71, temperature 97.9 °F (36.6 °C), resp. rate 20, height 6' (1.829 m), weight 67.5 kg (148 lb 13 oz), SpO2 95 %. Pain: 
Pain Scale 1: Visual 
Pain Intensity 1: 0 Pain Location 1: Knee Pain Orientation 1: Right Pain Intervention(s) 1: Position Last bowel movement: none recorded Constitutional: 77 weak, chronically ill appearing male in bed in NAD  
ENMT: moist MM Cardiovascular: RRR Respiratory: respirations not labored Gastrointestinal: soft non tender Skin: warm and dry Neurologic: alert oriented x 2 HISTORY:  
 
Active Problems: 
  UTI (urinary tract infection) (3/12/2021) Alteration consciousness (3/12/2021) Severe protein-calorie malnutrition ([address] (3/14/2021) Goals of care, counseling/discussion () Debility () Past Medical History:  
Diagnosis Date  Alcohol use disorder  Benign hypertensive heart disease with systolic congestive heart failure, NYHA class 2 ([address]  Cardiac pacemaker in situ  Cerebrovascular accident (CVA) due to occlusion of right carotid artery ([address] 8/14/2017 Acute Ischemic Stroke (acute infarctions involving right parieto-occipital area and occipitotemporal area and anterior and midportion of right corona radiata) with residual left hemiparesis, dysphagia and cognitive-communication deficits  Cognitive communication deficit 8/14/2017  Depression On Trazodone  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) 8/14/2017  Dysphagia as late effect of cerebrovascular accident (CVA) 8/14/2017  Erectile dysfunction On Sildenafil citrate  Hemiparesis affecting left side as late effect of cerebrovascular accident (CVA) ([address] 8/14/2017  Hyperlipidemia with target LDL less than 70 8/15/2017 Lipid profile (8/15/2017) showed TG 99, , HDL 81, LDL 84  Hypertension  Ischemic cardiomyopathy 8/15/2017 EF 40%  Occlusion of right internal carotid artery 8/15/2017  On chronic clopidogrel therapy  Osteoarthritis On Etodolac and Oxycodone-Acetaminophen  Other ill-defined conditions(799.89) PVD  Peripheral artery disease ([facility]  Seizure, late effect of stroke ([facility] 8/15/2017 On Levetiracetam  
 Tobacco use disorder Past Surgical History:  
Procedure Laterality Date  HX ANGIOPLASTY  12/05/2012 S/P Balloon angioplasty and stent of left common iliac artery (12/5/2012 - Dr. Ziyad Villalba) History reviewed. No pertinent family history. History reviewed, no pertinent family history. Social History Tobacco Use  Smoking status: Never Smoker  Smokeless tobacco: Never Used Substance Use Topics  Alcohol use: No  
  Frequency: Never No Known Allergies Current Facility-Administered Medications Medication Dose Route Frequency  losartan (COZAAR) tablet 25 mg  25 mg Oral DAILY  potassium bicarb-citric acid (EFFER-K) tablet 40 mEq  40 mEq Oral NOW  magnesium sulfate 1 g/100 ml IVPB (premix or compounded)  1 g IntraVENous ONCE  
 trimethoprim-sulfamethoxazole (BACTRIM DS, SEPTRA DS) 160-800 mg per tablet 1 Tab  1 Tab Oral Q12H  levETIRAcetam in saline (iso-os) (KEPPRA) infusion 1,000 mg  1,000 mg IntraVENous Q12H  
 sodium chloride (NS) flush 5-10 mL  5-10 mL IntraVENous PRN  
 allopurinoL (ZYLOPRIM) tablet 100 mg  100 mg Oral DAILY  cholecalciferol (VITAMIN D3) (1000 Units /25 mcg) tablet 2,000 Units  2,000 Units Oral DAILY  [Held by provider] cilostazoL (PLETAL) tablet 100 mg  100 mg Oral DAILY  cyanocobalamin tablet 500 mcg  500 mcg Oral DAILY  sertraline (ZOLOFT) tablet 50 mg  50 mg Oral DAILY  thiamine HCL (B-1) tablet 100 mg  100 mg Oral DAILY  ascorbic acid (vitamin C) (VITAMIN C) tablet 250 mg  250 mg Oral DAILY  sodium chloride (NS) flush 5-40 mL  5-40 mL IntraVENous Q8H  
 sodium chloride (NS) flush 5-40 mL  5-40 mL IntraVENous PRN  
 acetaminophen (TYLENOL) tablet 650 mg  650 mg Oral Q6H PRN Or  
 acetaminophen (TYLENOL) suppository 650 mg  650 mg Rectal Q6H PRN  polyethylene glycol (MIRALAX) packet 17 g  17 g Oral DAILY PRN  
 dextrose 5 % - 0.45% NaCl infusion  50 mL/hr IntraVENous CONTINUOUS  
 
 
 LAB AND IMAGING FINDINGS:  
 
Lab Results Component Value Date/Time WBC 7.3 03/19/2021 11:33 AM  
 HGB 9.1 (L) 03/19/2021 11:33 AM  
 PLATELET 597 33/28/5202 11:33 AM  
 
Lab Results Component Value Date/Time Sodium 142 03/19/2021 11:33 AM  
 Potassium 3.1 (L) 03/19/2021 11:33 AM  
 Chloride 108 03/19/2021 11:33 AM  
 CO2 25 03/19/2021 11:33 AM  
 BUN 2 (L) 03/19/2021 11:33 AM  
 Creatinine 0.61 03/19/2021 11:33 AM  
 Calcium 8.3 (L) 03/19/2021 11:33 AM  
 Magnesium 1.5 (L) 03/19/2021 11:33 AM  
 Phosphorus 2.9 03/19/2021 11:33 AM  
  
Lab Results Component Value Date/Time Alk. phosphatase 89 03/12/2021 01:20 PM  
 Protein, total 7.9 03/12/2021 01:20 PM  
 Albumin 3.1 (L) 03/12/2021 01:20 PM  
 Globulin 4.8 (H) 03/12/2021 01:20 PM  
 
Lab Results Component Value Date/Time INR 1.3 (H) 03/18/2021 03:15 AM  
 Prothrombin time 15.6 (H) 03/18/2021 03:15 AM  
 aPTT 33.6 09/11/2015 08:55 AM  
  
Lab Results Component Value Date/Time Iron 54 09/22/2017 01:47 PM  
 TIBC 181 (L) 09/22/2017 01:47 PM  
 Iron % saturation 30 09/22/2017 01:47 PM  
 Ferritin 945 (H) 09/22/2017 01:47 PM  
  
No results found for: PH, PCO2, PO2 No components found for: [place] Lab Results Component Value Date/Time  03/12/2021 01:20 PM  
 CK - MB <1.0 03/12/2021 01:20 PM  
  
 
   
 
Total time: 15 minutes Counseling / coordination time, spent as noted above:  
> 50% counseling / coordination: yes with daughter Edmundo Garsia Prolonged service was provided for  []30 min   []75 min in face to face time in the presence of the patient, spent as noted above. Time Start:  
Time End:

## 2021-03-19 NOTE — PROGRESS NOTES
Patient discharging today, CM faxed home health orders to [facility] [phone] for discharge. Tete Dudley, RN Case Management [phone]

## 2021-03-19 NOTE — PROGRESS NOTES
Pt was discharged today but [facility] will not be able to deliver his TF till next week - will cancel discharge till able to arrange TF at home till [facility] sees pt. Updated daughter who is in agreement with current Rx Plan.

## 2021-03-19 NOTE — PROGRESS NOTES
Problem: Anxiety Goal: *Alleviation of anxiety Outcome: Progressing Towards Goal 
Goal: *Alleviation of anxiety (Palliative Care) Outcome: Progressing Towards Goal 
  
Problem: Patient Education: Go to Patient Education Activity Goal: Patient/Family Education Outcome: Progressing Towards Goal 
  
Problem: Patient Education: Go to Patient Education Activity Goal: Patient/Family Education Outcome: Progressing Towards Goal 
  
Problem: Patient Education: Go to Patient Education Activity Goal: Patient/Family Education Outcome: Progressing Towards Goal 
  
Problem: Nutrition Deficit Goal: *Optimize nutritional status Outcome: Progressing Towards Goal 
  
Problem: Patient Education: Go to Patient Education Activity Goal: Patient/Family Education Outcome: Progressing Towards Goal 
  
Problem: Falls - Risk of 
Goal: *Absence of Falls Description: Document Danne Lobe Fall Risk and appropriate interventions in the flowsheet. Outcome: Progressing Towards Goal 
Note: Fall Risk Interventions: 
  
 
Mentation Interventions: Adequate sleep, hydration, pain control Medication Interventions: Bed/chair exit alarm Elimination Interventions: Bed/chair exit alarm Problem: Pressure Injury - Risk of 
Goal: *Prevention of pressure injury Description: Document Alton Scale and appropriate interventions in the flowsheet. Outcome: Progressing Towards Goal 
Note: Pressure Injury Interventions: 
Sensory Interventions: Assess changes in LOC, Avoid rigorous massage over bony prominences, Check visual cues for pain, Keep linens dry and wrinkle-free, Minimize linen layers, Pad between skin to skin, Discuss PT/OT consult with provider Moisture Interventions: Absorbent underpads, Check for incontinence Q2 hours and as needed, Maintain skin hydration (lotion/cream), Minimize layers, Moisture barrier, Internal/External urinary devices Activity Interventions: Increase time out of bed, Chair cushion, PT/OT evaluation Mobility Interventions: HOB 30 degrees or less Nutrition Interventions: Offer support with meals,snacks and hydration Friction and Shear Interventions: Foam dressings/transparent film/skin sealants

## 2021-03-19 NOTE — PROGRESS NOTES
CM called and spoke to patient's daughter Linda Chisholm [phone], and she said she would be transporting patient home tomorrow. CM explained that [facility] would be getting home health company for the patient, and paperwork has been faxed. CM refaxed updated Home Health order with Tube Feeding orders. Dietician to supply patient with enough Tube feeding for at least 7 days till [facility] can get home health company to see patient. Markel Newman, RN Case Management [phone]

## 2021-03-20 NOTE — PROGRESS NOTES
Bedside shift change report given to Shauna Cuenca (oncoming nurse) by Tommy Fenton RN (offgoing nurse). Report included the following information SBAR, Kardex and MAR.

## 2021-03-20 NOTE — PROGRESS NOTES
shift change report given to Nicol (oncoming nurse) by Elsy Funk RN (offgoing nurse). Report included the following information MAR, KARDEX, SBAR.

## 2021-03-20 NOTE — PROGRESS NOTES
Problem: Anxiety Goal: *Alleviation of anxiety Outcome: Progressing Towards Goal 
Goal: *Alleviation of anxiety (Palliative Care) Outcome: Progressing Towards Goal 
  
Problem: Patient Education: Go to Patient Education Activity Goal: Patient/Family Education Outcome: Progressing Towards Goal 
  
Problem: Patient Education: Go to Patient Education Activity Goal: Patient/Family Education Outcome: Progressing Towards Goal 
  
Problem: Patient Education: Go to Patient Education Activity Goal: Patient/Family Education Outcome: Progressing Towards Goal

## 2021-03-20 NOTE — PROGRESS NOTES
Spoke with Jacob Shay, [address] [place], [place] [address]michele Cisneros per Kobe Carmona, dietician, request to confirm if [facility] for pump or bolus feeds. Jacob Shay stated \"I was unable to speak with anyone at the [place] because the orders were given late. \"  Updated Kobe Carmona. JOSSELYN ValenzuelaN, RN Pager # [phone] Care Manager

## 2021-03-20 NOTE — PROGRESS NOTES
Progress Note Patient: Tashi Hernandez MRN: [id number]  SSN: [id number] YOB: 1945  Age: 77 y.o. Sex: male Admit Date: 3/12/2021 LOS: 8 days Subjective:  
 
69yo AA M with h/o pacemaker, CVA with L sided hemiparesis, HTN, chronic anemia, PVD s/p stents, seizure, depression who presented to the hospital for lethargy, encephalopathy being managed for UTI Overall, patient reports that he is doing well today. He denies having any new abdominal pain, urinary changes, CP, SOB. He wishes to go home when possible. States that he is having minimal pain in his abdomen other than minor soreness around the site of the PEG placement. Objective:  
 
Vitals:  
 03/20/21 0021 03/20/21 9894 03/20/21 1350 03/20/21 1227 BP: 132/77 (!) 148/70 136/76 (!) 152/68 Pulse: 78 83 82 99 Resp: 17 18 17 16 Temp: 98.1 °F (36.7 °C) 98.2 °F (36.8 °C) 97.8 °F (36.6 °C) 98.1 °F (36.7 °C) SpO2: 98% 97% 99% 99% Weight:      
Height:      
  
 
Intake and Output: 
Current Shift: 03/20 0701 - 03/20 1900 In: 543.3 [I.V.:543.3] Out: 1 [Urine:1] Last three shifts: 03/18 1901 - 03/20 0700 In: 2445.8 [I.V.:1915.8] Out: 1 [Urine:1] Physical Exam:  
GENERAL: alert, cooperative, no distress, appears stated age LUNG: clear to auscultation bilaterally HEART: regular rate and rhythm, S1, S2 normal, no murmur, click, rub or gallop ABDOMEN: soft, non-tender. Bowel sounds normal. No masses,  no organomegaly NEUROLOGIC: AOx3. Cranial nerves 2-12 and sensation grossly intact. , positive findings: 2/5 muscular weakness in LUE and LLE with contracture of L hand and L ankle Lab/Data Review: 
BMP:  
Lab Results Component Value Date/Time   03/20/2021 03:31 AM  
 K 3.4 (L) 03/20/2021 03:31 AM  
  03/20/2021 03:31 AM  
 CO2 28 03/20/2021 03:31 AM  
 AGAP 5 03/20/2021 03:31 AM  
 GLU 94 03/20/2021 03:31 AM  
 BUN 3 (L) 03/20/2021 03:31 AM  
 CREA 0.67 03/20/2021 03:31 AM  
 GFRAA >60 03/20/2021 03:31 AM  
 GFRNA >60 03/20/2021 03:31 AM  
 
CBC:  
Lab Results Component Value Date/Time WBC 5.7 03/20/2021 03:31 AM  
 HGB 8.4 (L) 03/20/2021 03:31 AM  
 HCT 26.7 (L) 03/20/2021 03:31 AM  
  03/20/2021 03:31 AM  
 
Results Procedure Component Value Units Date/Time COVID-19 RAPID TEST [[id number]] Collected: 03/17/21 2043 Order Status: Completed Specimen: Nasopharyngeal Updated: 03/17/21 2209 Specimen source Nasopharyngeal     
  COVID-19 rapid test Not detected Comment: Rapid Abbott ID Now Rapid NAAT:  The specimen is NEGATIVE for SARS-CoV-2, the novel coronavirus associated with COVID-19. Negative results should be treated as presumptive and, if inconsistent with clinical signs and symptoms or necessary for patient management, should be tested with an alternative molecular assay. Negative results do not preclude SARS-CoV-2 infection and should not be used as the sole basis for patient management decisions. This test has been authorized by the FDA under an Emergency Use Authorization (EUA) for use by authorized laboratories. Fact sheet for Healthcare Providers: ConventionUpdate.co.nz Fact sheet for Patients: ConventionUpdate.co.nz Methodology: Isothermal Nucleic Acid Amplification CULTURE, URINE [[id number]]  (Abnormal)  (Susceptibility) Collected: 03/12/21 1825 Order Status: Completed Specimen: Cath Urine Updated: 03/15/21 6170 Special Requests: NO SPECIAL REQUESTS Culture result:    
  KLEBSIELLA PNEUMONIAE ** (EXTENDED SPECTRUM BETA LACTAMASE ) ** Susceptibility Klebsiella pneumoniae GLORIA Amikacin ($) Susceptible Ampicillin ($) Resistant Ampicillin/sulbactam ($) Susceptible Cefazolin ($) Resistant Cefepime ($$) Resistant Cefoxitin Susceptible Ceftazidime ($) Resistant Ceftriaxone ($) Resistant Ciprofloxacin ($) Susceptible [1] Gentamicin ($) Intermediate Levofloxacin ($) Susceptible [1] Meropenem ($$) Susceptible Nitrofurantoin Resistant Piperacillin/Tazobac ($) Susceptible Tobramycin ($) Intermediate Trimeth/Sulfa Susceptible  
      
  [1]   **FDA INTERPRETATION REFLECTED, REFER TO CLSI FOR ALTERNATE INTERPRETATIONS. **  
   
  
  
  
 CULTURE, BLOOD [[id number]] Collected: 03/12/21 1420 Order Status: Completed Specimen: Blood Updated: 03/18/21 0740 Special Requests: NO SPECIAL REQUESTS Culture result: NO GROWTH 6 DAYS     
 CULTURE, BLOOD [[id number]] Collected: 03/12/21 1415 Order Status: Completed Specimen: Blood Updated: 03/18/21 0740 Special Requests: NO SPECIAL REQUESTS Culture result: NO GROWTH 6 DAYS Assessment:  
 
Active Problems: 
  UTI (urinary tract infection) (3/12/2021) Alteration consciousness (3/12/2021) Severe protein-calorie malnutrition ([facility] [address] (3/14/2021) Goals of care, counseling/discussion () Debility () Plan:  
 
Complicated Urinary tract infection: 3/15 UCx: MDR Klebsiella - TMP/SMX susceptible (pip/tazo 3/12-3/17). Imaging showing incompletely resolved pyelonephritis - Transition to Bactrim to complete 14 day course (end date: 25MAR) - ID consult. Appreciate assistance Dysphagia, Severe Calorie deficiency 
- PEG tube placed 3/18. Appreciate GI assistance - Tube feeds progress to goal as toleratedper nutrition tube feeding changed to bolus Acute encephalopathy - resolved: Likely 2/2 UTI 
- Can reconsider in outpatient setting once pacemaker type known and if patient has persistent encephalopathy H/O L-sided hemiplegia: At baseline without new deficits - Continue IV Keppra for seizure ppx until discharge. Will then transition to PO to be placed via NG 
 
RAQUEL - resolved: Likely prerenal in poor PO intake Anemia of chronic disease: Hgb transfusion goal >7. No transfusions necessary while inpatient QTc prolongation: Avoiding QT prolongating medications. Will d/c Phenergan, Zofran. Continue sertraline for depression Hypokalemia - repleting to goal K>3.5 DNR/DNI Patient's daughter, Anaid Hinds, updated with plan. She came to the hospital on 3/19 to receive teaching for tube feeds, but supplies were not available for discharge today. Plan for discharge after home health and all supplies regarding history of feeding have been arranged. Signed By: Dianna Palma MD   
 March 20, 2021

## 2021-03-20 NOTE — PROGRESS NOTES
Nutrition Note Pt tolerating continuous feeds of Jevity 1.5 at 40 mL/hr. Received notice from Dr Ross Tello about plan for pt discharge to home and noted that [address] likely unable to send tube feeding supplies for next 7 days. Pt discussed with  Doug Davila about plan for discharge and if [address] is able to supply tube feeding pump versus need to change pt to bolus feeds. Doug Davila reached out to  [facility] regarding questions; per report, [facility] was unable to verify official discharge plan with [address] due to orders given late. Discussed report with Dr Ross Tello; per MD, will hold discharge until discharge planning finalized with [address] Discussed modifying feeds, changing to bolus given that plan is for pt to transfer home follow discharge. Discussed plan with nursing regarding changing feeds from continuous to bolus. Labs checked; noted K improving, but remain low 3.4;  Phos low normal, 2.5, which is decrease from yesterday. Mg WNL, 1.7; improved from yesterday. Labs discussed with MD; agreed with recommendation for K, Phos, and Mg replacement and to monitor labs for next 2 days. K phos replacement ordered; discussed medication interaction with bactrim medication with Pharmacist Kishor Saini; per Pharmacist, okay to order K phos replacement. Pt receiving IVF: D5 1/2NS at 50 mL/hr (60 gm dextrose, 204 kcal per day). BM 3/20 loose. Pt progressing towards nutrition goals. Nutrition Recommendations/Plan: 
- Stop continuous tube feeds of Jevity 1.5 at now. Start bolus feeds at 3 PM.  
- bolus feed of Jevity 1.5, 237 mL (1 carton) x 4 times daily and 355 mL (1.5 carton) x 1 time daily with water flushes of 175 mL after each bolus feed 
      (goal EN regimen provides: 1953 kcal, 83 gm protein, 990 mL free water, 100% RDIs) - Bolus feed schedule as pt tolerates:  Give 237 mL (1 carton) at 6 AM, 9 AM, 12 PM, & 3 PM.     Give 355 mL (1.5 carton) at 6 PM. 
- Monitor and replace electrolytes as needed. 20 mmol K phosphate, 1 gm Mg sulfate ordered for replacement 
- IVF per MD  
 
 
 
 
Electronically signed by Everardo Reynolds RD on 3/20/2021 at 1:32 PM 
 
Contact: [phone]

## 2021-03-21 NOTE — PROGRESS NOTES
Bedside shift change report given to Melinda Lyons (oncoming nurse) by Jose Carlos Zazueta RN (offgoing nurse). Report included the following information SBAR, Kardex and MAR.

## 2021-03-21 NOTE — PROGRESS NOTES
Problem: Anxiety Goal: *Alleviation of anxiety Outcome: Progressing Towards Goal 
Goal: *Alleviation of anxiety (Palliative Care) Outcome: Progressing Towards Goal 
  
Problem: Patient Education: Go to Patient Education Activity Goal: Patient/Family Education Outcome: Progressing Towards Goal

## 2021-03-21 NOTE — PROGRESS NOTES
Progress Note Patient: Shereen Dinh MRN: [id number]  SSN: [id number] YOB: 1945  Age: 77 y.o. Sex: male Admit Date: 3/12/2021 LOS: 9 days Subjective:  
 
69yo AA M with h/o pacemaker, CVA with L sided hemiparesis, HTN, chronic anemia, PVD s/p stents, seizure, depression who presented to the hospital for lethargy, encephalopathy being managed for UTI Overall, patient reports that he is doing well today. He denies having any new abdominal pain, urinary changes, CP, SOB. He wishes to go home when possible. States that he is having minimal pain in his abdomen other than minor soreness around the site of the PEG placement. Objective:  
 
Vitals:  
 03/21/21 0005 03/21/21 0319 03/21/21 7859 03/21/21 1122 BP: 134/77 (!) 144/75 (!) 138/92 (!) 160/71 Pulse: 100 78 82 72 Resp: 18 18 16 16 Temp: 98.2 °F (36.8 °C) 97.9 °F (36.6 °C) 97.7 °F (36.5 °C) 98.1 °F (36.7 °C) SpO2: 95% 98% 94% 96% Weight:      
Height:      
  
 
Intake and Output: 
Current Shift: 03/21 0701 - 03/21 1900 In: 237 Out: 700 [Urine:700] Last three shifts: 03/19 1901 - 03/21 0700 In: 1284.2 [I.V.:1284.2] Out: 2 [Urine:2] Physical Exam:  
GENERAL: alert, cooperative, no distress, appears stated age LUNG: clear to auscultation bilaterally HEART: regular rate and rhythm, S1, S2 normal, no murmur, click, rub or gallop ABDOMEN: soft, non-tender. Bowel sounds normal. No masses,  no organomegaly NEUROLOGIC: AOx3. Cranial nerves 2-12 and sensation grossly intact. , positive findings: 2/5 muscular weakness in LUE and LLE with contracture of L hand and L ankle Lab/Data Review: 
BMP:  
No results found for: NA, K, CL, CO2, AGAP, GLU, BUN, CREA, GFRAA, GFRNA 
CBC:  
No results found for: WBC, HGB, HGBEXT, HCT, HCTEXT, PLT, PLTEXT, HGBEXT, HCTEXT, PLTEXT Results Procedure Component Value Units Date/Time COVID-19 RAPID TEST [[id number]] Collected: 03/17/21 2043  Order Status: Completed Specimen: Nasopharyngeal Updated: 03/17/21 2209 Specimen source Nasopharyngeal     
  COVID-19 rapid test Not detected Comment: Rapid Abbott ID Now Rapid NAAT:  The specimen is NEGATIVE for SARS-CoV-2, the novel coronavirus associated with COVID-19. Negative results should be treated as presumptive and, if inconsistent with clinical signs and symptoms or necessary for patient management, should be tested with an alternative molecular assay. Negative results do not preclude SARS-CoV-2 infection and should not be used as the sole basis for patient management decisions. This test has been authorized by the FDA under an Emergency Use Authorization (EUA) for use by authorized laboratories. Fact sheet for Healthcare Providers: ConventionCrowdStardate.co.nz Fact sheet for Patients: FLENS.co.nz Methodology: Isothermal Nucleic Acid Amplification CULTURE, URINE [[id number]]  (Abnormal)  (Susceptibility) Collected: 03/12/21 9325 Order Status: Completed Specimen: Cath Urine Updated: 03/15/21 7803 Special Requests: NO SPECIAL REQUESTS Culture result:    
  KLEBSIELLA PNEUMONIAE ** (EXTENDED SPECTRUM BETA LACTAMASE ) ** Susceptibility Klebsiella pneumoniae GLORIA Amikacin ($) Susceptible Ampicillin ($) Resistant Ampicillin/sulbactam ($) Susceptible Cefazolin ($) Resistant Cefepime ($$) Resistant Cefoxitin Susceptible Ceftazidime ($) Resistant Ceftriaxone ($) Resistant Ciprofloxacin ($) Susceptible [1] Gentamicin ($) Intermediate Levofloxacin ($) Susceptible [1] Meropenem ($$) Susceptible Nitrofurantoin Resistant Piperacillin/Tazobac ($) Susceptible Tobramycin ($) Intermediate Trimeth/Sulfa Susceptible  
      
  [1]   **FDA INTERPRETATION REFLECTED, REFER TO CLSI FOR ALTERNATE INTERPRETATIONS. **  
   
  
  
  
 CULTURE, BLOOD [[id number]] Collected: 03/12/21 1420 Order Status: Completed Specimen: Blood Updated: 03/18/21 0740 Special Requests: NO SPECIAL REQUESTS Culture result: NO GROWTH 6 DAYS     
 CULTURE, BLOOD [[id number]] Collected: 03/12/21 1415 Order Status: Completed Specimen: Blood Updated: 03/18/21 0740 Special Requests: NO SPECIAL REQUESTS Culture result: NO GROWTH 6 DAYS Assessment:  
 
Active Problems: 
  UTI (urinary tract infection) (3/12/2021) Alteration consciousness (3/12/2021) Severe protein-calorie malnutrition ([address] (3/14/2021) Goals of care, counseling/discussion () Debility () Plan:  
 
Complicated Urinary tract infection: 3/15 UCx: MDR Klebsiella - TMP/SMX susceptible (pip/tazo 3/12-3/17). Imaging showing incompletely resolved pyelonephritis - Transition to Bactrim to complete 14 day course (end date: 25MAR) - ID consult. Appreciate assistance Dysphagia, Severe Calorie deficiency 
- PEG tube placed 3/18. Appreciate GI assistance - Tube feeds progress to goal as toleratedper nutrition tube feeding changed to bolus Acute encephalopathy - resolved: Likely 2/2 UTI 
- Can reconsider in outpatient setting once pacemaker type known and if patient has persistent encephalopathy H/O L-sided hemiplegia: At baseline without new deficits - Continue IV Keppra for seizure ppx until discharge. Will then transition to PO to be placed via NG 
 
RAQUEL - resolved: Likely prerenal in poor PO intake Anemia of chronic disease: Hgb transfusion goal >7. No transfusions necessary while inpatient QTc prolongation: Avoiding QT prolongating medications. Will d/c Phenergan, Zofran. Continue sertraline for depression Hypokalemia - repleting to goal K>3.5 DNR/DNI Patient's daughter, Fallon Garcia, updated with plan. She came to the hospital on 3/19 to receive teaching for tube feeds, but supplies were not available for discharge today.  
 
Plan for discharge after home health and all supplies regarding history of feeding have been arranged. Signed By: Azra Donovan MD   
 March 21, 2021

## 2021-03-21 NOTE — ROUTINE PROCESS
Bedside shift change report given to [address] (oncoming nurse) by  Cassandra Beatty RN (offgoing nurse). Report included the following information SBAR, Kardex, ED Summary and Intake/Output.

## 2021-03-22 NOTE — PROGRESS NOTES
Problem: Anxiety Goal: *Alleviation of anxiety Outcome: Resolved/Met Goal: *Alleviation of anxiety (Palliative Care) Outcome: Resolved/Met Problem: Patient Education: Go to Patient Education Activity Goal: Patient/Family Education Outcome: Resolved/Met Problem: Patient Education: Go to Patient Education Activity Goal: Patient/Family Education Outcome: Resolved/Met Problem: Patient Education: Go to Patient Education Activity Goal: Patient/Family Education Outcome: Resolved/Met Problem: Nutrition Deficit Goal: *Optimize nutritional status Outcome: Resolved/Met Problem: Falls - Risk of 
Goal: *Absence of Falls Description: Document Marcell Bhardwaj Fall Risk and appropriate interventions in the flowsheet. Outcome: Resolved/Met Note: Fall Risk Interventions: 
  
 
Mentation Interventions: Bed/chair exit alarm Medication Interventions: Bed/chair exit alarm Elimination Interventions: Bed/chair exit alarm Problem: Patient Education: Go to Patient Education Activity Goal: Patient/Family Education Outcome: Resolved/Met Problem: Pressure Injury - Risk of 
Goal: *Prevention of pressure injury Description: Document Alton Scale and appropriate interventions in the flowsheet. Outcome: Resolved/Met Note: Pressure Injury Interventions: 
Sensory Interventions: Assess changes in LOC Moisture Interventions: Limit adult briefs Activity Interventions: Increase time out of bed Mobility Interventions: HOB 30 degrees or less Nutrition Interventions: Document food/fluid/supplement intake Friction and Shear Interventions: HOB 30 degrees or less Problem: Patient Education: Go to Patient Education Activity Goal: Patient/Family Education Outcome: Resolved/Met Problem: Risk for Spread of Infection Goal: Prevent transmission of infectious organism to others Description: Prevent the transmission of infectious organisms to other patients, staff members, and visitors. Outcome: Resolved/Met Problem: Patient Education:  Go to Education Activity Goal: Patient/Family Education Outcome: Resolved/Met

## 2021-03-22 NOTE — PROGRESS NOTES
CM called [facility] and spoke to Darnell Toscano twice, unable to leave a message twice with Verónica Castellon with transportation. CM called and left a message with Emanuel Shafer. CM called and spoke to [facility], who said someone is looking into transportation for patient to go home, CM let her know patient is ready for discharge. Miss Barton to call CM back. Melissa Bartholomew, RN Case Management [phone]

## 2021-03-22 NOTE — PROGRESS NOTES
Nutrition Note Per discussion with RN, pt tolerating bolus feeds at goal regimen. K, Phos, Mg WNL today. Pt currently NPO; although, SLP last recommended Full Honey Thick Liquid diet on 3/17/21; discussed with Dr Gabe Romeo; received verbal order to resume po diet. BM 3/22, 3/21. Noted plan for possible discharge today. Per , pt likely to need 7 days worth supply of tube feeding formula; this writer addressed and provided nursing with supply for when pt is discharged. Receiving IVF:  D5 1/2NS at 50 mL/hr (60 gm dextrose, 204 kcal per day). Nutrition goals are being met. Nutrition Recommendations/Plan: 
- Continue bolus feeds of Jevity 1.5, 237 mL (1 carton) x 4 times daily and 355 mL (1.5 carton) x 1 time daily with water flushes of 175 mL after each bolus feed 
      (goal EN regimen provides: 1953 kcal, 83 gm protein, 990 mL free water, 100% RDIs) - Bolus feed schedule as pt tolerates:  Give 237 mL (1 carton) at 6 AM, 9 AM, 12 PM, & 3 PM.     Give 355 mL (1.5 carton) at 6 PM. 
- Resume po diet, Full Liquid Honey Thick. Monitor po intake of meals.  
- Recommend discontinuing IVF now that pt tolerates tube feeding at goal rate. Electronically signed by Candi Stuart RD on 3/22/2021 at 12:54 PM 
 
Contact: [phone]

## 2021-03-22 NOTE — DISCHARGE SUMMARY
Discharge Summary Patient: Shira Browne MRN: [id number]  CSN: [id number] YOB: 1945  Age: 77 y.o. Sex: male DOA: 3/12/2021 LOS:  LOS: 10 days   Discharge Date: 03/22/21 Admission Diagnoses: UTI (urinary tract infection) [N39.0] Alteration consciousness [R40.4] Discharge Diagnoses:   
Problem List as of 3/22/2021 Date Reviewed: 9/21/2017 Codes Class Noted - Resolved Goals of care, counseling/discussion ICD-10-CM: Z71.89 ICD-9-CM: V65.49  Unknown - Present Debility ICD-10-CM: R53.81 ICD-9-CM: 799.3  Unknown - Present Severe protein-calorie malnutrition ([facility] [address] ICD-10-CM: Z64 ICD-9-CM: 739  3/14/2021 - Present UTI (urinary tract infection) ICD-10-CM: N39.0 ICD-9-CM: 599.0  3/12/2021 - Present Alteration consciousness ICD-10-CM: R40.4 ICD-9-CM: 780.09  3/12/2021 - Present Acute lower GI bleeding ICD-10-CM: K92.2 ICD-9-CM: 578.9  9/22/2017 - Present Lower GI bleed ICD-10-CM: K92.2 ICD-9-CM: 578.9  9/21/2017 - Present History of CVA (cerebrovascular accident) ICD-10-CM: Z86.73 
ICD-9-CM: V12.54  9/21/2017 - Present Peripheral artery disease (HCC) (Chronic) ICD-10-CM: I73.9 ICD-9-CM: 443.9  Unknown - Present On chronic clopidogrel therapy (Chronic) ICD-10-CM: Z79.01 
ICD-9-CM: V58.61  Unknown - Present Alcohol use disorder (Chronic) ICD-10-CM: [id number] ICD-9-CM: V49.89  Unknown - Present Tobacco use disorder (Chronic) ICD-10-CM: L69.685 ICD-9-CM: 305.1  Unknown - Present Benign hypertensive heart disease with systolic congestive heart failure, NYHA class 2 (HCC) (Chronic) ICD-10-CM: I11.0, I50.20 ICD-9-CM: 402.11, 428.20, 428.0  Unknown - Present Erectile dysfunction (Chronic) ICD-10-CM: N52.9 ICD-9-CM: 607.84  Unknown - Present Overview Signed 8/18/2017  1:38 PM by Paul Rios MD  
  On Sildenafil citrate Depression (Chronic) ICD-10-CM: F32.9 ICD-9-CM: 639 Unknown - Present Overview Signed 8/18/2017  1:37 PM by Abbi Serrano MD  
  On Trazodone Osteoarthritis (Chronic) ICD-10-CM: M19.90 ICD-9-CM: 715.90  Unknown - Present Overview Signed 8/18/2017  1:37 PM by Abbi Serrano MD  
  On Etodolac and Oxycodone-Acetaminophen Ischemic cardiomyopathy (Chronic) ICD-10-CM: I25.5 ICD-9-CM: 414.8  8/15/2017 - Present Overview Signed 8/18/2017  1:31 PM by Abbi Serrano MD  
  EF 40% Hyperlipidemia with target LDL less than 70 (Chronic) ICD-10-CM: E78.5 ICD-9-CM: 272.4  8/15/2017 - Present Overview Signed 8/18/2017  1:32 PM by Abbi Serrano MD  
  Lipid profile (8/15/2017) showed TG 99, , HDL 81, LDL 84 Seizure, late effect of stroke [facility]) ICD-10-CM: I69.398, R56.9 ICD-9-CM: 438.89, 780.39  8/15/2017 - Present Overview Signed 8/18/2017  1:39 PM by Abbi Serrano MD  
  On Levetiracetam 
  
  
   
 Occlusion of right internal carotid artery ICD-10-CM: I65.21 ICD-9-CM: 433.10  8/15/2017 - Present Cerebrovascular accident (CVA) due to occlusion of right carotid artery ([facility] [address] ICD-10-CM: Q91.691 ICD-9-CM: 433.11  8/14/2017 - Present Overview Addendum 8/18/2017  1:40 PM by Abbi Serrano MD  
  Acute Ischemic Stroke (acute infarctions involving right parieto-occipital area and occipitotemporal area and anterior and midportion of right corona radiata) with residual left hemiparesis, dysphagia, dysarthria and cognitive-communication deficits Cardiac pacemaker in situ (Chronic) ICD-10-CM: Z95.0 ICD-9-CM: V45.01  Unknown - Present Impaired mobility and ADLs ICD-10-CM: Z74.09, Z78.9 ICD-9-CM: V49.89  8/14/2017 - Present Hemiparesis affecting left side as late effect of [facility]vascular accident [facility] ICD-10-CM: E78.974 ICD-9-CM: 438.20  8/14/2017 - Present Dysphagia as late effect of cerebrovascular accident (CVA) ICD-10-CM: N97.542 ICD-9-CM: 634.38 8/14/2017 - Present Cognitive communication deficit ICD-10-CM: R41.841 ICD-9-CM: 799.52  8/14/2017 - Present Hypokalemia ICD-10-CM: E87.6 ICD-9-CM: 276.8  8/14/2017 - Present Overview Signed 8/18/2017  1:33 PM by MD KASSANDRA Augustin (8/14/2017, on admission) = 2.8 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 
Visit Vitals BP (!) 142/83 (BP 1 Location: Left upper arm, BP Patient Position: At rest) Pulse (!) 53 Temp 98.2 °F (36.8 °C) Resp 18 Ht 6' (1.829 m) Wt 67.5 kg (148 lb 13 oz) SpO2 94% BMI 20.18 kg/m² GENERAL: alert, cooperative, no distress, appears stated age, cachectic LUNG: clear to auscultation bilaterally HEART: regular rate and rhythm, S1, S2 normal, no murmur, click, rub or gallop ABDOMEN: soft, non-tender. Bowel sounds normal. No masses,  no organomegaly EXTREMITIES:  no edema, redness or tenderness in the calves or thighs, no ulcers, gangrene or trophic changes NEUROLOGIC:AOx3. Cranial nerves 2-4, 6-12 and sensation grossly intact. R sided facial movements minimal, positive findings: 2/5 muscular weakness in LUE and LLE with contracture of L hand and L ankle Hospital Course:  
69yo M with h/o CVA with residual L sided hemiparesis c/b seizure disorder, HTN, PVD s/p stents, depression, and pacemaker presented to the hospital with encephalopathy and lethargy with treatment for complicated UTI with MDRO Klebsiella pneumoniae. MDRO Klebsiella pneumoniae: He was initially treated on broad-spectrum antibiotics (vancomycin, piperacillin/tazobactam, and levofloxacin) before being narrowed to piperacillin/tazobactam due to urine cultures and concern for QTc prolongation.  He is planned to receive a total of 14 days of antibiotics with cessation date planned for 25MAR after transitioning to Bactrim in the outpatient setting. Dysphagia/Severe calorie deficiency: He had worsening dysphagia while inpatient compared to baseline. Inpatient SLP evaluation determined that he required a honey-thickened full liquid diet to prevent aspiration. However, he continued to have poor PO intake with already poor baseline nutritional status. His daughter elected for PEG tube placement with tube feeds. PEG tube was placed on 3/18, and tube feeds were initiated on 3/19 and well-tolerated. His daughter received teaching from nursing staff prior to discharge. He remained inpatient until 3/22 due to inability for home health to deliver feeding supplies. Encephalopathy: He presented with altered mental status that improved with treatment of UTI. MRI was unable to be completed due to uncertainty of pacemaker MRI compatibility. With resolution of encephalopathy, MRI was deferred for any future episodes of altered mental status. Plan for discharge to home health was discussed with daughter who agreed this was his best option. Potential for long-term care was discussed, but patient's daughter indicated that funding/coverage was not available for this option. He will be discharge to home in care of daughter with home health assistance. Nutrition Recommendations - Bolus feed schedule as pt tolerates:  Give 237 mL (1 carton) at 6 AM, 9 AM, 12 PM, & 3 PM.     Give 355 mL (1.5 carton) at 6 PM. Flush with water flushes 175 mL after each bolus feed Consults:  
Speech Language Pathology Gastroenterology Nutrition Lab/Data Review: 
Labs: Results:  
   
Chemistry Recent Labs  
  03/22/21 
0316 03/21/21 
1335 03/20/21 
0331 GLU 79 103* 94  141 141  
K 3.7 3.4* 3.4*  
 108 108 CO2 29 29 28 BUN 9 5* 3*  
CREA 0.56* 0.57* 0.67 CA 7.9* 7.9* 8.1* AGAP 4 4 5 BUCR 16 9* 4*  
  
CBC w/Diff Recent Labs  
  03/20/21 
0331 03/19/21 
1451 WBC 5.7 6.0  
RBC 3.72* 3.77* HGB 8.4* 8.6* HCT 26.7* 26.7*  
 366 GRANS  -- 79 LYMPH  --  20* EOS  --  2 Cardiac Enzymes No results for input(s): CPK, CKND1, SHUBHAM in the last 72 hours. No lab exists for component: [place] Letters Coagulation No results for input(s): PTP, INR, APTT, INREXT, INREXT in the last 72 hours. Lipid Panel Lab Results Component Value Date/Time Cholesterol, total 185 08/15/2017 03:08 AM  
 HDL Cholesterol 81 (H) 08/15/2017 03:08 AM  
 LDL, calculated 84.2 08/15/2017 03:08 AM  
 VLDL, calculated 19.8 08/15/2017 03:08 AM  
 Triglyceride 99 08/15/2017 03:08 AM  
 CHOL/HDL Ratio 2.3 08/15/2017 03:08 AM  
  
BNP No results for input(s): BNPP in the last 72 hours. Liver Enzymes No results for input(s): TP, ALB, TBIL, AP in the last 72 hours. No lab exists for component: SGOT, GPT, DBIL Thyroid Studies Lab Results Component Value Date/Time TSH 1.62 08/14/2017 08:45 AM  
    
 
No results found for: SDES Lab Results Component Value Date/Time Culture result: (A) 03/12/2021 06:25 PM  
  KLEBSIELLA PNEUMONIAE ** (EXTENDED SPECTRUM BETA LACTAMASE ) **  
 Culture result: NO GROWTH 6 DAYS 03/12/2021 02:20 PM  
 Culture result: NO GROWTH 6 DAYS 03/12/2021 02:15 PM  
 Culture result: NO GROWTH 1 DAY 08/24/2017 11:15 AM  
  
 
Imaging:  
 
Ct Head Wo Cont Result Date: 3/12/2021 No acute intracranial abnormality. CT is known to be relatively insensitive to acute ischemia in the first 24-48 hours and MRI may be useful if clinically indicated. Stable chronic right cerebral infarcts and chronic microvascular disease. Ct Abd Pelv Wo Cont Result Date: 3/17/2021 1. No hydronephrosis or nephrolithiasis. 2. Moderate bilateral perinephric stranding, perhaps slightly greater on the left. Recommend clinical correlation for possibility of pyelonephritis. 3. Mildly distended gallbladder. Question mild pericholecystic haziness versus streak artifact. If clinically warranted, ultrasound may be helpful for further evaluation.  4. Bilateral pleural effusions with overlying compressive atelectasis. Superimposed infiltrate not entirely excluded at the right lower lobe as these opacities appear slightly more patchy. 5. Fluid levels in the colon, may indicate diarrhea state. 6. Mild body wall edema. Xr Chest [facility] Result Date: 3/12/2021 1. Small atelectasis/scarring in the right lung base. Mild hypoinflation. Thank you for enabling us to participate in the care of this patient. Discharge Medications:    
Current Discharge Medication List  
  
START taking these medications Details  
trimethoprim-sulfamethoxazole (BACTRIM DS, SEPTRA DS) 160-800 mg per tablet Take 1 Tab by mouth every twelve (12) hours for 6 days. Qty: 12 Tab, Refills: 0  
  
L. acidophilus,casei,rhamnosus (Bio-K plus) 50 billion cell cpDR capsule Take 1 Cap by mouth daily for 14 days. Qty: 14 Cap, Refills: 0  
  
polyethylene glycol (MIRALAX) 17 gram packet Take 1 Packet by mouth daily as needed for Constipation. Qty: 15 Packet, Refills: 0  
  
!! OTHER Incentive Spirometry- Use as directed Qty: 1 Each, Refills: 0  
  
!! OTHER Check CBC, CMP, Mg on 3/22/2021- results to PCP immediately, Diagnosis- UTI Qty: 1 Each, Refills: 0  
  
!! OTHER Graded Compression Stockings b/l LE- use as directed Qty: 1 Each, Refills: 0  
  
 !! - Potential duplicate medications found. Please discuss with provider. CONTINUE these medications which have NOT CHANGED Details  
allopurinoL (ZYLOPRIM) 100 mg tablet Take 100 mg by mouth daily. melatonin 3 mg tablet Take 3 mg by mouth At bedtime. sertraline (ZOLOFT) 50 mg tablet Take 50 mg by mouth daily. losartan (COZAAR) 25 mg tablet Take 25 mg by mouth daily. cilostazoL (PLETAL) 100 mg tablet Take 100 mg by mouth.  
  
cyanocobalamin (VITAMIN B12) 500 mcg tablet Take 500 mcg by mouth daily. acetaminophen (TYLENOL) 325 mg tablet Take 325 mg by mouth three (3) times daily as needed for Pain.   
  
aspirin delayed-release 81 mg tablet Take 1 Tab by mouth daily. Qty: 30 Tab, Refills: 0  
  
levETIRAcetam 1,000 mg tablet Take 1 Tab by mouth every twelve (12) hours. Qty: 60 Tab, Refills: 0  
  
thiamine (B-1) 100 mg tablet Take 1 Tab by mouth daily. Qty: 30 Tab, Refills: 0  
  
cholecalciferol, vitamin D3, (VITAMIN D3) 2,000 unit Tab Take  by mouth. ascorbic acid (VITAMIN C) 250 mg tablet Take  by mouth. STOP taking these medications  
  
 metoprolol succinate (TOPROL-XL) 50 mg XL tablet Comments:  
Reason for Stopping:   
   
 diclofenac (VOLTAREN) 1 % gel Comments:  
Reason for Stopping:   
   
 gabapentin (NEURONTIN) 300 mg capsule Comments:  
Reason for Stopping:   
   
 etodolac (LODINE) 400 mg tablet Comments:  
Reason for Stopping:   
   
 etodolac (LODINE) 400 mg tablet Comments:  
Reason for Stopping:   
   
 metoprolol tartrate (LOPRESSOR) 50 mg tablet Comments:  
Reason for Stopping:   
   
  
 
 
Activity: activity as tolerated Diet: Nectar thick liquids Wound Care: None needed Follow-up: with PCP, Other, Phys, MD in 7-10days Minutes spent on discharge: >30 minutes spent coordinating this discharge (review instructions/follow-up, prescriptions, preparing report for sign off)

## 2021-03-22 NOTE — PROGRESS NOTES
CM called [facility] and spoke to Ms. Shaankm Xiong, they are working on [address] orders with Peg tube feedings and supplies, updated her that patient will need transportation home today when discharged. CM faxed [address] orders for PT, OT, ST, and updated Peg tube bolus feeding orders with request for feeding and supplies, and that transportation is needed today for time of discharge to the [place], RN Case Management [phone]

## 2021-03-22 NOTE — PROGRESS NOTES
CM called and spoke to patient's daughter Anoop Rich [phone], and updated her with [facility], patient coming home with 7 day supply of Jevity 1.5, and [place]. Patient's daughter agreeable to discharge plan today. Jannette Kinsey, RN Case Management [phone]

## 2021-03-22 NOTE — ROUTINE PROCESS
Bedside shift change report given to Des Anders (oncoming nurse) by  Katey Plaza (offgoing nurse). Report included the following information SBAR, Kardex and Intake/Output.

## 2021-03-22 NOTE — PROGRESS NOTES
Went over discharge instructions with pt's daughter. Tube feeding sent home with patient and transport.

## 2021-03-22 NOTE — PROGRESS NOTES
Miss Ashnati Moreno from the [facility] called CM back, and asked when patient was ready for discharge, CM let her know Senthil Akins RN said patient is ready for discharge. Lance Panda, RN Case Management [phone]

## 2021-03-22 NOTE — PROGRESS NOTES
Miss Juan Antonio Britton from [facility] called to confirm to   that patient already has Skilled nursing, and ST, and a Home Health aide with [employer]. Miss Juan Antonio Britton currently sending order to doctors office for home health for patient for PT/OT, and Peg Tube feeding and supplies. Ania Ledezma, RN Case Management [phone]

## 2021-03-22 NOTE — PROGRESS NOTES
CM called and spoke to [place] at the [address] [place] [address], they received the home health orders on Friday for this patient, and said they will work on it. CM will fax updated order for Peg tube bolus feeding to them. [facility] updated that patient's daughter Josue Winchester was taught Bolus Peg tube feedings last Friday at patient's bedside. Sherren Rily, RN Case Management [phone]

## 2021-03-22 NOTE — PROGRESS NOTES
Hari Santana from [facility] called CM. [employer] will be the patient home health company, not sure of start of service. Miss Jignesh Rosado is setting up [employer] transportation, nurses station phone number given for phone call for transportation contact. [facility] uses [place] transport [phone]) Henrique Ramirez RN Case Management [phone]

## 2021-03-22 NOTE — PROGRESS NOTES
Discharge order noted for today. Pt has been accepted to [facility]  Home Health agency through the [address] [place] [address] Rd with patient's daughter Seven Watters  and she is  agreeable to the transition plan today. Transport has been arranged through [facility]. Patient's home health  orders have been forwarded to [facility]. Updated bedside RN, Yue Campuzano,  to the transition plan. Discharge information has been documented on the AVS.  
 
 
Evie Pinto RN Case Management [phone]

## 2021-03-22 NOTE — PROGRESS NOTES
DRAGAN called and spoke to [facility], and notified her that [facility] is working on home health and peg tube feedings, and patient will need to go home with at least a 7 day supply of peg tube feeding. Izaiah Quintanilla RN Case Management [phone]

## 2021-08-07 PROBLEM — I70.229 CRITICAL LOWER LIMB ISCHEMIA (HCC): Status: ACTIVE | Noted: 2021-01-01

## 2021-08-07 NOTE — ED TRIAGE NOTES
Patient c/o pain bilateral foot seen at the [facility] due for possible bilateral foot amputation within the next 30 days. Tonight daughter has given Tramadol, Neurontin, Tylenol, and Melatonin. Patient is currently resting with eyes closed on the stretcher.

## 2021-08-07 NOTE — PROGRESS NOTES
Pt admitted earlier this morning for pain bilateral feet, hx of severe PVD. Vascular and podiatry have been consulted. Xrays bilateral feet and HERNAN ordered. Per podiatry, left 1st MTPJ ulcer appears close to bone, MRI ordered to r/o OM. Nutrition has already been consulted and made recommendations for TF via PEG. Pt is able to start TF today as there are no plans for surgical intervention. Xray right foot  IMPRESSION     No acute findings. Severe hallux valgus noted. Xray left foot  IMPRESSION     Healing fracture of the third metatarsal.  Demineralization  Osteoarthritis  Poor definition of the tibiotalar and talocalcaneal joints-suspect projectional  Artifact. CT Results (most recent):  Results from Hospital Encounter encounter on 08/06/21    CTA ABD ART W RUNOFF W WO CONT    Narrative  EXAM: CT Scan Of Abdomen And Pelvis With CT Angiography Abdominal Aorta And  Bilateral Runoff    TECHNIQUE: Axial CT images were acquired with CT angiographic evaluation using a  multi detector CT. 3D CT angiographic reconstructions were performed and  analyzed for the abdominal aorta, iliac vessels and lower extremity arteries. CT Scan Of The Abdomen:    Images through the lung bases show no pleural effusions or pulmonary parenchymal  nodules. There is some right basal atelectasis or scarring. The liver, spleen, pancreas, adrenal glands and gallbladder are unremarkable    There is no adenopathy, mass lesions or fluid collections. A gastrostomy is in the stomach The small bowel is unremarkable. Diverticulosis  is evident in the colon    CT Scan Of The Pelvis: There are no abnormal mass lesions or fluid collections    CT Angiographic Evaluation Of The Aorta, Iliacs And Peripheral Runoff: The abdominal aorta is shows calcified and noncalcified plaque. No aortic  aneurysm or occlusion is evident. LEFT :    The left common iliac is patent with stent noted.  The majority of the external  iliac appears adequate. There is a tight stenosis of the left common iliac just  above the inguinal ligament. The common femoral is also narrowed at the level of the femoral bypass. The profunda is diseased but patent  Superficial femoral is occluded  Popliteal reconstitutes from muscular branches. Severely diseased. Portions are  obscured by artifact from the knee replacement. The anterior tibial is patent proximally creating out in the mid calf. No  significant peroneal flow is apparent. Posterior tibial is patent to the plantar artery    RIGHT:  The right common iliac is diseased but patent to the level of the bifurcation. The right external iliac is occluded  A high-grade stenosis is evident of the right internal iliac origin. Right common femoral is occluded  A femoral-femoral bypass of the left anastomosis with the femoral bifurcation. The superficial femoral is occluded. Profunda is diseased but patent  Diseased popliteal reconstitutes in the popliteal fossa. Severely narrowed and  not in continuity with the trifurcation  The anterior tibial is patent to the dorsalis pedis. Peroneal is patent to the syndesmosis. The posterior tibial is occluded. The celiac axis is moderately narrowed at its origin. The superior mesenteric artery is moderately narrowed proximally. The inferior mesenteric artery is narrowed or occluded at its origin with  reconstitution. Renals are patent bilaterally    Impression  1. No unexpected finding within the abdomen or pelvis    2. Aorta and common iliac inflow is adequate    3. Right pelvis:  Occluded iliacs and common femoral    4. Left Iliacs;  left external and proximal common femoral showed tandem severe  stenoses. These are above the origin of the femoral-femoral bypass    5. Right Runoff:  Patent femoral-femoral bypass. Occluded SFA. Popliteal reconstitutes proximally and occluded distally. Two-vessel runoff-right anterior tibial and peroneal    6.  Left Runoff: Changes and stenoses are present above the femoral-femoral  bypass origin. Performed a diseased but patent. SFA is occluded. Popliteal  incompletely visualized is an artifact. Severely diseased. Single vessel runoff,  posterior tibial.    All CT scans at this facility are performed using dose optimization technique as  appropriate to the performed exam, to include automated exposure control,  adjustment of the mA and/or kV according to patient's size (Including  appropriate matching for site-specific examinations), or use of iterative  reconstruction technique. Assessment:  1. Left foot ulcer w/ severe PAD w/ claudication, chronic HAV and hammer toes  2. Occluded right iliacs and common femoral  3. Left external and proximal common femoral severe stenosis above fem-fem bypass  4. Occluded SFA, bilateral  5. Hyponatremia, mild  6. Elevated LFTs  7. Hyperglycemia  8. Hypertension  9. Ischemic cardiomyopathy  10. Hyperlipidemia  11. Seizure disorder  12. Hx CVA w/ chronic LUE, LLE contraction, dysarthria and dysphagia  13. Dementia  14. Healing fracture 3rd right metatarsal   15. OA  16. Pain bilateral feet due to #1  17. Debility, bed-bound status   18. Wound POA left foot      Plan:  1. Podiatry following. Xray results as above. Follow HERNAN results. MRI left foot pending. Wound care consulted. 2. Vascular following, appreciate assistance. 3. Nutrition following- recs for TF via PEG. Can hold IVFs at this time. 4. PT, OT eval and treat  5. Monitor vital signs, weight, I/Os per unit protocol  6. Fall, aspiration and seizure precautions  7.  DNR/DNI              MARGARITO Cox  [facility]  pager [phone]

## 2021-08-07 NOTE — ED PROVIDER NOTES
The patient is a 78-year-old male with a history of multiple medical problems, including peripheral vascular disease and a prior CVA, who presents to the ED today with rest pain in his bilateral feet. He is complaining of pain despite taking tramadol, Neurontin, Tylenol and melatonin this evening. I have unable to obtain more of a history from the patient due to a communication deficit secondary to his prior stroke. [facility]:    76yo male, Hx of severe PVD, AO/iliac dz/occlussion, s/p fem-fem and iliac   stent, CVA with left side chronic contraction, dementia. PVL performed today   reveals significantly reduce circulation. Contacted daughter this AM. Had a   lengthy discussion with her regarding the current status and risks of   intervention, may not be able to restore flow or if it would releieve pain. She   states she would choose to amputate rather than pursue heroics to restore flow. She states that he cannot sleep due to pain. She cannot sleep as a result of his   pain/moaning. Per surgeon, desires repeat labs/CTA. May need vascular work to   maintain sufficient flow for the amputation.            Past Medical History:   Diagnosis Date    Alcohol use disorder     Benign hypertensive heart disease with systolic congestive heart failure, NYHA class 2 ([facility])     Cardiac pacemaker in situ     Cerebrovascular accident (CVA) due to occlusion of right carotid artery ([address] 8/14/2017    Acute Ischemic Stroke (acute infarctions involving right parieto-occipital area and occipitotemporal area and anterior and midportion of right corona radiata) with residual left hemiparesis, dysphagia and cognitive-communication deficits    Cognitive communication deficit 8/14/2017    Depression     On Trazodone    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) 8/14/2017    Dysphagia as late effect of cerebrovascular accident (CVA) 8/14/2017    Erectile dysfunction     On Sildenafil citrate    Hemiparesis affecting left side as late effect of cerebrovascular accident (CVA) ([facility] [address] 8/14/2017    Hyperlipidemia with target LDL less than 70 8/15/2017    Lipid profile (8/15/2017) showed TG 99, , HDL 81, LDL 84    Hypertension     Ischemic cardiomyopathy 8/15/2017    EF 40%    Occlusion of right internal carotid artery 8/15/2017    On chronic clopidogrel therapy     Osteoarthritis     On Etodolac and Oxycodone-Acetaminophen    Other ill-defined conditions(799.89)     PVD    Peripheral artery disease (HCC)     Seizure, late effect of stroke ([facility] [address] 8/15/2017    On Levetiracetam    Tobacco use disorder        Past Surgical History:   Procedure Laterality Date    HX ANGIOPLASTY  12/05/2012    S/P Balloon angioplasty and stent of left common iliac artery (12/5/2012 - Dr. Lynne Gusman)         No family history on file. Social History     Socioeconomic History    Marital status: SINGLE     Spouse name: Not on file    Number of children: Not on file    Years of education: Not on file    Highest education level: Not on file   Occupational History    Not on file   Tobacco Use    Smoking status: Never Smoker    Smokeless tobacco: Never Used   Substance and Sexual Activity    Alcohol use: No    Drug use: Not on file    Sexual activity: Not on file   Other Topics Concern    Not on file   Social History Narrative    Not on file     Social Determinants of Health     Financial Resource Strain:     Difficulty of Paying Living Expenses:    Food Insecurity:     Worried About Running Out of Food in the Last Year:     [address] in the Last Year:    Transportation Needs:     Lack of Transportation (Medical):      Lack of Transportation (Non-Medical):    Physical Activity:     Days of Exercise per Week:     Minutes of Exercise per Session:    Stress:     Feeling of Stress :    Social Connections:     Frequency of Communication with Friends and Family:     Frequency of Social Gatherings with Friends and Family:     Attends Jew Services:     Active Member of Clubs or Organizations:     Attends Club or Organization Meetings:     Marital Status:    Intimate Partner Violence:     Fear of Current or Ex-Partner:     Emotionally Abused:     Physically Abused:     Sexually Abused: ALLERGIES: Patient has no known allergies. Review of Systems   Unable to perform ROS: Dementia       Vitals:    08/06/21 2126   BP: 138/64   Pulse: 86   Resp: 22   Temp: 98.5 °F (36.9 °C)   SpO2: 99%            Physical Exam  Vitals and nursing note reviewed. Constitutional:       Appearance: He is normal weight. HENT:      Head: Normocephalic. Right Ear: External ear normal.      Left Ear: External ear normal.      Nose: Nose normal.      Mouth/Throat:      Mouth: Mucous membranes are dry. Pharynx: No posterior oropharyngeal erythema. Comments: Mucous membranes dry  Eyes:      Extraocular Movements: Extraocular movements intact. Conjunctiva/sclera: Conjunctivae normal.      Pupils: Pupils are equal, round, and reactive to light. Cardiovascular:      Rate and Rhythm: Normal rate and regular rhythm. Heart sounds: Normal heart sounds. Pulmonary:      Effort: Pulmonary effort is normal.      Breath sounds: Normal breath sounds. Abdominal:      General: Abdomen is flat. Bowel sounds are normal.      Palpations: Abdomen is soft. Musculoskeletal:      Cervical back: Normal range of motion and neck supple. Right lower leg: No edema. Left lower leg: No edema. Comments: Muscle wasting of the bilateral lower extremities   Skin:     Capillary Refill: Capillary refill takes 2 to 3 seconds. Comments: Cool skin   Neurological:      Mental Status: He is alert. Comments: The patient is alert and will mouth words. He has a right-sided facial droop. He has contractures of his right upper extremity.    Psychiatric:      Comments: Unable to assess Recent Results (from the past 12 hour(s))   CBC WITH AUTOMATED DIFF    Collection Time: 08/06/21 11:22 PM   Result Value Ref Range    WBC 6.8 4.6 - 13.2 K/uL    RBC 3.95 (L) 4.35 - 5.65 M/uL    HGB 9.3 (L) 13.0 - 16.0 g/dL    HCT 29.6 (L) 36.0 - 48.0 %    MCV 74.9 74.0 - 97.0 FL    MCH 23.5 (L) 24.0 - 34.0 PG    MCHC 31.4 31.0 - 37.0 g/dL    RDW 14.6 (H) 11.6 - 14.5 %    PLATELET 532 452 - 085 K/uL    MPV 10.0 9.2 - 11.8 FL    NEUTROPHILS 60 40 - 73 %    LYMPHOCYTES 25 21 - 52 %    MONOCYTES 13 (H) 3 - 10 %    EOSINOPHILS 1 0 - 5 %    BASOPHILS 0 0 - 2 %    ABS. NEUTROPHILS 4.1 1.8 - 8.0 K/UL    ABS. LYMPHOCYTES 1.7 0.9 - 3.6 K/UL    ABS. MONOCYTES 0.9 0.05 - 1.2 K/UL    ABS. EOSINOPHILS 0.1 0.0 - 0.4 K/UL    ABS. BASOPHILS 0.0 0.0 - 0.1 K/UL    DF AUTOMATED     METABOLIC PANEL, COMPREHENSIVE    Collection Time: 08/06/21 11:22 PM   Result Value Ref Range    Sodium 134 (L) 136 - 145 mmol/L    Potassium 4.6 3.5 - 5.5 mmol/L    Chloride 99 (L) 100 - 111 mmol/L    CO2 29 21 - 32 mmol/L    Anion gap 6 3.0 - 18 mmol/L    Glucose 117 (H) 74 - 99 mg/dL    BUN 37 (H) 7.0 - 18 MG/DL    Creatinine 0.99 0.6 - 1.3 MG/DL    BUN/Creatinine ratio 37 (H) 12 - 20      GFR est AA >60 >60 ml/min/1.73m2    GFR est non-AA >60 >60 ml/min/1.73m2    Calcium 8.5 8.5 - 10.1 MG/DL    Bilirubin, total 0.2 0.2 - 1.0 MG/DL    ALT (SGPT) 74 (H) 16 - 61 U/L    AST (SGOT) 62 (H) 10 - 38 U/L    Alk.  phosphatase 76 45 - 117 U/L    Protein, total 7.5 6.4 - 8.2 g/dL    Albumin 3.2 (L) 3.4 - 5.0 g/dL    Globulin 4.3 (H) 2.0 - 4.0 g/dL    A-G Ratio 0.7 (L) 0.8 - 1.7     PROTHROMBIN TIME + INR    Collection Time: 08/06/21 11:22 PM   Result Value Ref Range    Prothrombin time 14.2 11.5 - 15.2 sec    INR 1.1 0.8 - 1.2     PTT    Collection Time: 08/06/21 11:22 PM   Result Value Ref Range    aPTT 37.8 (H) 23.0 - 36.4 SEC   EKG, 12 LEAD, INITIAL    Collection Time: 08/06/21 11:24 PM   Result Value Ref Range    Ventricular Rate 88 BPM    Atrial Rate 97 BPM    QRS Duration 152 ms    Q-T Interval 420 ms    QTC Calculation (Bezet) 508 ms    Calculated R Axis -68 degrees    Calculated T Axis 104 degrees    Diagnosis       Ventricular-paced rhythm  Abnormal ECG  When compared with ECG of 13-MAR-2021 06:53,  Vent. rate has increased BY   4 BPM       CT FOOT LT WO CONT   Final Result      1. No acute findings or gross interval change. 2. 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 3. Osseous degenerative changes. 4. Decreased bone mineral density which limits examination. 5. No definite findings of cortical destruction or bony resorption. 6. Soft tissue irregularity along the great toe. It is this the site of the   ulcer? 7. Please see report for additional details. If further evaluation is required   recommend MRI as is a more sensitive and specific study for osteomyelitis. Thank you for this referral.      DUPLEX LOW EXT ARTERIES WITH HERNAN   Final Result      XR FOOT RT MIN 3 V   Final Result      No acute findings. Severe hallux valgus noted. XR FOOT LT MIN 3 V   Final Result      Healing fracture of the third metatarsal.   Demineralization   Osteoarthritis   Poor definition of the tibiotalar and talocalcaneal joints-suspect projectional   artifact. CTA ABD ART W RUNOFF W WO CONT   Final Result      1. No unexpected finding within the abdomen or pelvis      2. Aorta and common iliac inflow is adequate      3. Right pelvis:  Occluded iliacs and common femoral      4. Left Iliacs;  left external and proximal common femoral showed tandem severe   stenoses. These are above the origin of the femoral-femoral bypass      5. Right Runoff:  Patent femoral-femoral bypass. Occluded SFA. Popliteal reconstitutes proximally and occluded distally. Two-vessel runoff-right anterior tibial and peroneal      6. Left Runoff:  Changes and stenoses are present above the femoral-femoral   bypass origin. Performed a diseased but patent.  SFA is occluded. Popliteal   incompletely visualized is an artifact. Severely diseased. Single vessel runoff,   posterior tibial.      All CT scans at this facility are performed using dose optimization technique as   appropriate to the performed exam, to include automated exposure control,   adjustment of the mA and/or kV according to patient's size (Including   appropriate matching for site-specific examinations), or use of iterative   reconstruction technique. CTA of the abdomen with runoffs preliminary read: Femoral to femoral bypass noted with some plaque within the graft but largely patent. Right lower extremity:  SFA appears occluded to the level of the distal femur where there is reconstitution but there is occlusion of the popliteal artery segment as well, with reconstitution near the beginning of the three-vessel runoff arteries. Anterior tibial patent to the level of the dorsalis pedis, and the peroneal artery is patent to the level of the ankle. Posterior tibial artery appears patent to the level of the distal tibia. Left lower extremity: SFA appears occluded and reconstitutes at the level of the mid distal femur posterior tibial artery is patent to the level of the foot. The peroneal and anterior tibial arteries appear occluded in the mid to distal tibial level      MDM  Number of Diagnoses or Management Options  Critical lower limb ischemia ([facility] [address]  Diagnosis management comments: The patient is a 60-year-old male who had PVLs done at the [place] today that showed significantly decreased circulation to the bilateral lower extremities. He is having rest pain at this time. I will order pain medicine as well as a CTA with runoffs. I am holding off on heparin because he appears to have coffee-ground material in his PEG tube. There is a very long delay in getting the results of the patient's CTA results with runoffs. Once those results returned, I consulted the hospitalist for admission. Hospitalist was kind enough to consult vascular surgery and podiatry given the fact that it was a very busy morning in our ED. The patient has been accepted on hospitalist service.          Critical Care  Performed by: Logan Feng MD  Authorized by: Logan Feng MD     Critical care provider statement:     Critical care time (minutes):  40    Critical care time was exclusive of:  Separately billable procedures and treating other patients    Critical care was necessary to treat or prevent imminent or life-threatening deterioration of the following conditions:  Circulatory failure    Critical care was time spent personally by me on the following activities:  Development of treatment plan with patient or surrogate, discussions with consultants, evaluation of patient's response to treatment, examination of patient, obtaining history from patient or surrogate, ordering and performing treatments and interventions, ordering and review of laboratory studies, ordering and review of radiographic studies, pulse oximetry, re-evaluation of patient's condition and review of old charts    I assumed direction of critical care for this patient from another provider in my specialty: no

## 2021-08-07 NOTE — CONSULTS
Podiatry History and Physical    Subjective:         Date of Consultation:  August 7, 2021    Referring Physician: Mansi Fonseca NP    Patient is a 77 y.o. male who is being seen for painful bilateral feet with ulcer to left 1st MTPJ plantarmedial. Patient has dementia and unable to get full history. Per H & P note, patient is currently being treated at [facility] for bilateral LE PAD. Patient was suppose to have amputation next month. However, patient found to have severe pain and unable to sleep so daughter brought him to ER. Patient cannot get anymore tramadol till Monday. Patient appears to have claudication pain.      Patient Active Problem List    Diagnosis Date Noted    Critical lower limb ischemia ([address] 08/07/2021    Goals of care, counseling/discussion     Debility     Severe protein-calorie malnutrition ([address] 03/14/2021    UTI (urinary tract infection) 03/12/2021    Alteration consciousness 03/12/2021    Acute lower GI bleeding 09/22/2017    Lower GI bleed 09/21/2017    History of CVA (cerebrovascular accident) 09/21/2017    Peripheral artery disease ([address]     On chronic clopidogrel therapy     Alcohol use disorder     Tobacco use disorder     Benign hypertensive heart disease with systolic congestive heart failure, NYHA class 2 ([address]     Erectile dysfunction     Depression     Osteoarthritis     Ischemic cardiomyopathy 08/15/2017    Hyperlipidemia with target LDL less than 70 08/15/2017    Seizure, late effect of stroke ([address] 08/15/2017    Occlusion of right internal carotid artery 08/15/2017    Cerebrovascular accident (CVA) due to occlusion of right carotid artery ([address] 08/14/2017    Impaired mobility and ADLs 08/14/2017    Hemiparesis affecting left side as late effect of cerebrovascular accident (CVA) ([address] 08/14/2017    Dysphagia as late effect of cerebrovascular accident (CVA) 08/14/2017    Cognitive communication deficit 08/14/2017    Hypokalemia 08/14/2017    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) 08/14/2017    Cardiac pacemaker in situ      Past Medical History:   Diagnosis Date    Alcohol use disorder     Benign hypertensive heart disease with systolic congestive heart failure, NYHA class 2 (HCC)     Cardiac pacemaker in situ     Cerebrovascular accident (CVA) due to occlusion of right carotid artery ([facility] [address] 8/14/2017    Acute Ischemic Stroke (acute infarctions involving right parieto-occipital area and occipitotemporal area and anterior and midportion of right corona radiata) with residual left hemiparesis, dysphagia and cognitive-communication deficits    Cognitive communication deficit 8/14/2017    Depression     On Trazodone    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) 8/14/2017    Dysphagia as late effect of cerebrovascular accident (CVA) 8/14/2017    Erectile dysfunction     On Sildenafil citrate    Hemiparesis affecting left side as late effect of cerebrovascular accident (CVA) ([facility] [address] 8/14/2017    Hyperlipidemia with target LDL less than 70 8/15/2017    Lipid profile (8/15/2017) showed TG 99, , HDL 81, LDL 80    Hypertension     Ischemic cardiomyopathy 8/15/2017    EF 40%    Occlusion of right internal carotid artery 8/15/2017    On chronic clopidogrel therapy     Osteoarthritis     On Etodolac and Oxycodone-Acetaminophen    Other ill-defined conditions(799.89)     PVD    Peripheral artery disease (HCC)     Seizure, late effect of stroke ([facility] [address] 8/15/2017    On Levetiracetam    Tobacco use disorder       No family history on file.    Social History     Tobacco Use    Smoking status: Never Smoker    Smokeless tobacco: Never Used   Substance Use Topics    Alcohol use: No     Past Surgical History:   Procedure Laterality Date    HX ANGIOPLASTY  12/05/2012    S/P Balloon angioplasty and stent of left common iliac artery (12/5/2012 - Dr. Cid Current)      Prior to Admission medications    Medication Sig Start Date End Date Taking? Authorizing Provider   gabapentin (NEURONTIN) 300 mg capsule Take 600 mg by mouth two (2) times a day. Yes Provider, Historical   glycopyrrolate (ROBINUL) 1 mg tablet Take 1 mg by mouth three (3) times daily. Yes Provider, Historical   ferrous gluconate 324 mg (37.5 mg iron) tablet Take 324 mg by mouth every other day. Yes Provider, Historical   traMADoL (ULTRAM) 50 mg tablet Take 50 mg by mouth three (3) times daily as needed for Pain. Yes Provider, Historical   atorvastatin (Lipitor) 10 mg tablet Take 10 mg by mouth daily. Yes Provider, Historical   diclofenac (VOLTAREN) 1 % gel Apply 4 g to affected area four (4) times daily. Bilateral knees and left hip for arthritis   Yes Provider, Historical   polyethylene glycol (MIRALAX) 17 gram packet Take 1 Packet by mouth daily as needed for Constipation. 3/17/21  Yes Subhash Hernandez MD   melatonin 3 mg tablet Take 3 mg by mouth At bedtime. Yes Provider, Historical   losartan (COZAAR) 25 mg tablet Take 50 mg by mouth daily. Yes Provider, Historical   cilostazoL (PLETAL) 100 mg tablet Take 100 mg by mouth. Yes Provider, Historical   acetaminophen (TYLENOL) 325 mg tablet Take 325 mg by mouth three (3) times daily as needed for Pain. Yes Provider, Historical   aspirin delayed-release 81 mg tablet Take 1 Tab by mouth daily.  17  Yes Ann Saleem MD     No Known Allergies     Review of Systems:  Unable to perform due to patient's mental and speech status    Objective:     Patient Vitals for the past 8 hrs:   BP Pulse Resp SpO2 Height   21 1051     6' (1.829 m)   21 0630 (!) 159/83 82 14 99 %    21 0600 (!) 169/73 85 17 99 %    21 0530 (!) 155/63 75 12 98 %    21 0500 138/66 88 17 100 %    21 0430 (!) 149/71 78 13 100 %    21 0400 (!) 168/69 88 16 99 %      Temp (24hrs), Av.5 °F (36.9 °C), Min:98.5 °F (36.9 °C), Max:98.5 °F (36.9 °C)    Bilateral VIVIANE:     Vascular: non palpable pedal pulses, CFT delayed to distal digits, absent pedal hair growth noted. Claudication pain noted to both Le's. Musculoskeletal: Lateral deviation of hallux with prominent first metatarsal head medially bilateral. Toes 2-5 dorsally contracted bilateral. Left LE noted to have rigid contracture secondary to CVA. Dermatological: Skin noted to have mild atrophy with xerosis. Left submet 1 plantarmedial ulcer noted with dry scab, clinically probes deep to capsule/periosteum. No erythema or edema noted. Neurological: Unable to properly perform pedal neurological exam.         Data Review:   Recent Results (from the past 24 hour(s))   CBC WITH AUTOMATED DIFF    Collection Time: 08/06/21 11:22 PM   Result Value Ref Range    WBC 6.8 4.6 - 13.2 K/uL    RBC 3.95 (L) 4.35 - 5.65 M/uL    HGB 9.3 (L) 13.0 - 16.0 g/dL    HCT 29.6 (L) 36.0 - 48.0 %    MCV 74.9 74.0 - 97.0 FL    MCH 23.5 (L) 24.0 - 34.0 PG    MCHC 31.4 31.0 - 37.0 g/dL    RDW 14.6 (H) 11.6 - 14.5 %    PLATELET 971 761 - 676 K/uL    MPV 10.0 9.2 - 11.8 FL    NEUTROPHILS 60 40 - 73 %    LYMPHOCYTES 25 21 - 52 %    MONOCYTES 13 (H) 3 - 10 %    EOSINOPHILS 1 0 - 5 %    BASOPHILS 0 0 - 2 %    ABS. NEUTROPHILS 4.1 1.8 - 8.0 K/UL    ABS. LYMPHOCYTES 1.7 0.9 - 3.6 K/UL    ABS. MONOCYTES 0.9 0.05 - 1.2 K/UL    ABS. EOSINOPHILS 0.1 0.0 - 0.4 K/UL    ABS.  BASOPHILS 0.0 0.0 - 0.1 K/UL    DF AUTOMATED     METABOLIC PANEL, COMPREHENSIVE    Collection Time: 08/06/21 11:22 PM   Result Value Ref Range    Sodium 134 (L) 136 - 145 mmol/L    Potassium 4.6 3.5 - 5.5 mmol/L    Chloride 99 (L) 100 - 111 mmol/L    CO2 29 21 - 32 mmol/L    Anion gap 6 3.0 - 18 mmol/L    Glucose 117 (H) 74 - 99 mg/dL    BUN 37 (H) 7.0 - 18 MG/DL    Creatinine 0.99 0.6 - 1.3 MG/DL    BUN/Creatinine ratio 37 (H) 12 - 20      GFR est AA >60 >60 ml/min/1.73m2    GFR est non-AA >60 >60 ml/min/1.73m2    Calcium 8.5 8.5 - 10.1 MG/DL    Bilirubin, total 0.2 0.2 - 1.0 MG/DL    ALT (SGPT) 74 (H) 16 - 61 U/L    AST (SGOT) 62 (H) 10 - 38 U/L    Alk. phosphatase 76 45 - 117 U/L    Protein, total 7.5 6.4 - 8.2 g/dL    Albumin 3.2 (L) 3.4 - 5.0 g/dL    Globulin 4.3 (H) 2.0 - 4.0 g/dL    A-G Ratio 0.7 (L) 0.8 - 1.7     PROTHROMBIN TIME + INR    Collection Time: 08/06/21 11:22 PM   Result Value Ref Range    Prothrombin time 14.2 11.5 - 15.2 sec    INR 1.1 0.8 - 1.2     PTT    Collection Time: 08/06/21 11:22 PM   Result Value Ref Range    aPTT 37.8 (H) 23.0 - 36.4 SEC   EKG, 12 LEAD, INITIAL    Collection Time: 08/06/21 11:24 PM   Result Value Ref Range    Ventricular Rate 88 BPM    Atrial Rate 97 BPM    QRS Duration 152 ms    Q-T Interval 420 ms    QTC Calculation (Bezet) 508 ms    Calculated R Axis -68 degrees    Calculated T Axis 104 degrees    Diagnosis       atrial-sensed ventricular-paced complexes  Abnormal ECG  When compared with ECG of 13-MAR-2021 06:53,  Vent. rate has increased BY   4 BPM  Confirmed by Lazaro Biswas MD, ----- ([phone]) on 8/7/2021 8:28:30 AM           Impression:     Left foot ulcer with severe PAD with claudication, chronic HAV and hammer toes. Recommendation:     - x-ray of bilateral feet reviewed. Healing fracture of the third metatarsal. Demineralization Osteoarthritis. Poor definition of the tibiotalar and talocalcaneal joints-suspect projectional artifact.  - Ordered arterial duplex studies. - Left 1st MTPJ ulcer appears very close to bone, ordered MRI to r/o osteomyelitis. - If comes back positive patient will need at minimum partial 1st metatarsal head resection. In case of severe PAD, will need to discuss with vascular surgery about healing potential. Patient may need higher level amputation to prevent future healing complications. - Patient is already using heel cushioning sleeve bilateral. Patient unable to keep left foot straight due to contracture. Patient needs to offload left foot ulcer to allow healing. Please apply wedge foot protector on left foot.    - Medical management per primary team.     - Thank you for allowing me the opportunity to participate in  [facility].

## 2021-08-07 NOTE — PROGRESS NOTES
8/7/2021 4:33 pm Patient is unable to have the STAT MRI of the left foot until more information is provided on the patients pacemaker to determine if the pacemaker is MRI Conditional.      Thank you    Manuel MURRELL (R) MR

## 2021-08-07 NOTE — H&P
History & Physical    Patient: Ella Yañez MRN: [id number]  CSN: [id number]    YOB: 1945  Age: 77 y.o. Sex: male      DOA: 8/6/2021    Chief Complaint:   Chief Complaint   Patient presents with    Foot Pain     bilateral          HPI:     Ella Yañez is a 77 y.o.  male with hx of severe PVD, AO/iliac disease/occlusion s/p fem-fem and iliac stent, CVA w/ left side chronic contraction, HTN, pacemaker and dementia who presented to the ED last night c/o bilateral foot pain. He was seen at [facility] yesterday with surgery for possible bilateral foot amputation within the next month. He had PVL study which revealed significantly reduced circulation. Daughter stated pt cannot sleep due to pain and as a result of his pain/moaning she is not able to sleep either. She brought him to the ED due to pain and pt not having any more doses of Tramadol until Monday. In the ED, pt with stable vital signs, Hgb 9.3, Hct 29.6, sodium 134, BUN 37, creatinine 0.99, albumin 3.2, ALT 74, AST 62. EKG showed V paced rhythm. CTa abd art w/ runoff obtained, official read pending. Vascular consulted. He will be admitted for further management of care.       Past Medical History:   Diagnosis Date    Alcohol use disorder     Benign hypertensive heart disease with systolic congestive heart failure, NYHA class 2 (HCC)     Cardiac pacemaker in situ     Cerebrovascular accident (CVA) due to occlusion of right carotid artery ([facility] [address] 8/14/2017    Acute Ischemic Stroke (acute infarctions involving right parieto-occipital area and occipitotemporal area and anterior and midportion of right corona radiata) with residual left hemiparesis, dysphagia and cognitive-communication deficits    Cognitive communication deficit 8/14/2017    Depression     On Trazodone    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) 8/14/2017    Dysphagia as late effect of cerebrovascular accident (CVA) 8/14/2017    Erectile dysfunction     On Sildenafil citrate    Hemiparesis affecting left side as late effect of [facility]vascular accident ([facility]) [facility] 8/14/2017    Hyperlipidemia with target LDL less than 70 8/15/2017    Lipid profile (8/15/2017) showed TG 99, , HDL 81, LDL 84    Hypertension     Ischemic cardiomyopathy 8/15/2017    EF 40%    Occlusion of right internal carotid artery 8/15/2017    On chronic clopidogrel therapy     Osteoarthritis     On Etodolac and Oxycodone-Acetaminophen    Other ill-defined conditions(799.89)     PVD    Peripheral artery disease (HCC)     Seizure, late effect of stroke ([facility] [address] 8/15/2017    On Levetiracetam    Tobacco use disorder        Past Surgical History:   Procedure Laterality Date    HX ANGIOPLASTY  12/05/2012    S/P Balloon angioplasty and stent of left common iliac artery (12/5/2012 - Dr. Elias [place])       No family history on file. Social History     Socioeconomic History    Marital status: SINGLE     Spouse name: Not on file    Number of children: Not on file    Years of education: Not on file    Highest education level: Not on file   Tobacco Use    Smoking status: Never Smoker    Smokeless tobacco: Never Used   Substance and Sexual Activity    Alcohol use: No     Social Determinants of Health     Financial Resource Strain:     Difficulty of Paying Living Expenses:    Food Insecurity:     Worried About Running Out of Food in the Last Year:     [address] in the Last Year:    Transportation Needs:     Lack of Transportation (Medical):      Lack of Transportation (Non-Medical):    Physical Activity:     Days of Exercise per Week:     Minutes of Exercise per Session:    Stress:     Feeling of Stress :    Social Connections:     Frequency of Communication with Friends and Family:     Frequency of Social Gatherings with Friends and Family:     Attends Jewish Services:     Active Member of Clubs or Organizations:     Attends Club or Organization Meetings:     Marital Status:        Prior to Admission medications    Medication Sig Start Date End Date Taking? Authorizing Provider   polyethylene glycol (MIRALAX) 17 gram packet Take 1 Packet by mouth daily as needed for Constipation. 3/17/21   Marietta Hunter MD   OTHER Incentive Spirometry- Use as directed 3/17/21   Marietta Hunter MD   OTHER Check CBC, CMP, Mg on 3/22/2021- results to PCP immediately, Diagnosis- UTI 3/17/21   Marietta Hunter MD   OTHER Graded Compression Stockings b/l LE- use as directed 3/17/21   Marietta Hunter MD   allopurinoL (ZYLOPRIM) 100 mg tablet Take 100 mg by mouth daily. Provider, Historical   melatonin 3 mg tablet Take 3 mg by mouth At bedtime. Provider, Historical   sertraline (ZOLOFT) 50 mg tablet Take 50 mg by mouth daily. Provider, Historical   losartan (COZAAR) 25 mg tablet Take 25 mg by mouth daily. Provider, Historical   cilostazoL (PLETAL) 100 mg tablet Take 100 mg by mouth. Provider, Historical   cyanocobalamin (VITAMIN B12) 500 mcg tablet Take 500 mcg by mouth daily. Provider, Historical   acetaminophen (TYLENOL) 325 mg tablet Take 325 mg by mouth three (3) times daily as needed for Pain. Provider, Historical   aspirin delayed-release 81 mg tablet Take 1 Tab by mouth daily. 9/23/17   Andrei Snyder MD   levETIRAcetam 1,000 mg tablet Take 1 Tab by mouth every twelve (12) hours. 8/21/17   Torres Wallace MD   thiamine (B-1) 100 mg tablet Take 1 Tab by mouth daily. Patient taking differently: Take 250 mg by mouth daily. 8/21/17   Torres Wallace MD   cholecalciferol, vitamin D3, (VITAMIN D3) 2,000 unit Tab Take  by mouth. Provider, Historical   ascorbic acid (VITAMIN C) 250 mg tablet Take  by mouth.       Provider, Historical       No Known Allergies      Unable to obtain Review of Systems due to pt hx of dementia      Physical Exam:     Physical Exam:  Visit Vitals  BP (!) 159/83   Pulse 82   Temp 98.5 °F (36.9 °C)   Resp 14   SpO2 99%           Temp (24hrs), Av.5 °F (36.9 °C), Min:98.5 °F (36.9 °C), Max:98.5 °F (36.9 °C)    No intake/output data recorded. No intake/output data recorded. General:  Alert, cooperative, no distress, appears stated age. Thin, frail. Head: Normocephalic, without obvious abnormality, atraumatic. Eyes:  Conjunctivae/corneas clear. PERRL, EOMs intact. Nose: Nares normal. No drainage or sinus tenderness. Neck: Supple, symmetrical, trachea midline, no adenopathy, thyroid: no enlargement, no carotid bruit and no JVD. Lungs:   Clear to auscultation bilaterally. Heart:  Regular rate and rhythm, S1, S2 normal.     Abdomen: Soft, non-tender. Bowel sounds normal.    Extremities: Contracted LUE, contracted BLE. Pulses: Unable to palpate pulses however skin is warm to touch   Skin:  wound to left outer foot- dressing in place- exam limited due to contracture- pt also in hallway in ED   Neurologic: Awake and alert, answers simple questions appropriately. Labs Reviewed: All lab results for the last 24 hours reviewed. Recent Results (from the past 24 hour(s))   CBC WITH AUTOMATED DIFF    Collection Time: 21 11:22 PM   Result Value Ref Range    WBC 6.8 4.6 - 13.2 K/uL    RBC 3.95 (L) 4.35 - 5.65 M/uL    HGB 9.3 (L) 13.0 - 16.0 g/dL    HCT 29.6 (L) 36.0 - 48.0 %    MCV 74.9 74.0 - 97.0 FL    MCH 23.5 (L) 24.0 - 34.0 PG    MCHC 31.4 31.0 - 37.0 g/dL    RDW 14.6 (H) 11.6 - 14.5 %    PLATELET 981 056 - 263 K/uL    MPV 10.0 9.2 - 11.8 FL    NEUTROPHILS 60 40 - 73 %    LYMPHOCYTES 25 21 - 52 %    MONOCYTES 13 (H) 3 - 10 %    EOSINOPHILS 1 0 - 5 %    BASOPHILS 0 0 - 2 %    ABS. NEUTROPHILS 4.1 1.8 - 8.0 K/UL    ABS. LYMPHOCYTES 1.7 0.9 - 3.6 K/UL    ABS. MONOCYTES 0.9 0.05 - 1.2 K/UL    ABS. EOSINOPHILS 0.1 0.0 - 0.4 K/UL    ABS.  BASOPHILS 0.0 0.0 - 0.1 K/UL    DF AUTOMATED     METABOLIC PANEL, COMPREHENSIVE    Collection Time: 21 11:22 PM   Result Value Ref Range Sodium 134 (L) 136 - 145 mmol/L    Potassium 4.6 3.5 - 5.5 mmol/L    Chloride 99 (L) 100 - 111 mmol/L    CO2 29 21 - 32 mmol/L    Anion gap 6 3.0 - 18 mmol/L    Glucose 117 (H) 74 - 99 mg/dL    BUN 37 (H) 7.0 - 18 MG/DL    Creatinine 0.99 0.6 - 1.3 MG/DL    BUN/Creatinine ratio 37 (H) 12 - 20      GFR est AA >60 >60 ml/min/1.73m2    GFR est non-AA >60 >60 ml/min/1.73m2    Calcium 8.5 8.5 - 10.1 MG/DL    Bilirubin, total 0.2 0.2 - 1.0 MG/DL    ALT (SGPT) 74 (H) 16 - 61 U/L    AST (SGOT) 62 (H) 10 - 38 U/L    Alk. phosphatase 76 45 - 117 U/L    Protein, total 7.5 6.4 - 8.2 g/dL    Albumin 3.2 (L) 3.4 - 5.0 g/dL    Globulin 4.3 (H) 2.0 - 4.0 g/dL    A-G Ratio 0.7 (L) 0.8 - 1.7     PROTHROMBIN TIME + INR    Collection Time: 08/06/21 11:22 PM   Result Value Ref Range    Prothrombin time 14.2 11.5 - 15.2 sec    INR 1.1 0.8 - 1.2     PTT    Collection Time: 08/06/21 11:22 PM   Result Value Ref Range    aPTT 37.8 (H) 23.0 - 36.4 SEC   EKG, 12 LEAD, INITIAL    Collection Time: 08/06/21 11:24 PM   Result Value Ref Range    Ventricular Rate 88 BPM    Atrial Rate 97 BPM    QRS Duration 152 ms    Q-T Interval 420 ms    QTC Calculation (Bezet) 508 ms    Calculated R Axis -68 degrees    Calculated T Axis 104 degrees    Diagnosis       Ventricular-paced rhythm  Abnormal ECG  When compared with ECG of 13-MAR-2021 06:53,  Vent. rate has increased BY   4 BPM          CTa abd art w/ runoff official pending 8/7/2021      Procedures/imaging: see electronic medical records for all procedures/Xrays and details which were not copied into this note but were reviewed prior to creation of Plan      Assessment/Plan        Assessment  1. Severe PVD  2. Hyponatremia, mild  3. Elevated LFTs  4. Hyperglycemia  5. Hypertension  6. Ischemic cardiomyopathy  7. Hyperlipidemia  8. Seizure disorder  9. Hx CVA w/ chronic left side contraction, dysarthria and dysphagia  10. Dementia  11. Pain bilateral feet due to #1  12. Debility  13.  Wound POA to left foot          Plan  1. Consult vascular, appreciate assistance. Follow CTa abd art results. Recommends HERNAN w/ PVR and xrays bilateral feet. Also recommends podiatry consult. 2. Consult podiatry- spoke with Dr. Mariluz Lucero, [place]. [id number] [address] with current work up in place, will follow recommendations. Will keep NPO for now until further clarification. 3. Gentle IVFs  4. Monitor vital signs, weight, I/Os per unit protocol  5. Resume home meds, hold Pletal for now. 6. Consult nutrition for TF- currently NPO in case there are surgical interventions today  7. Consult wound care for wound to left foot  8. PT, OT eval and treat  9. Fall, aspiration and seizure precautions  10. Consult CM to assist w/ dc planning          Diet: NPO, PEG - NPO for possible surgical intervention  DVT/GI Prophylaxis: SCD's  Code Status: DNR/DNI      Contact: daughter Heraclio Butler [phone]- spoke with daughter at 8:01 am to update on plan of care and hospitalization. She confirmed DNR/DNI status as well as home med list.  All questions answered. Disclaimer: Sections of this note are dictated using utilizing voice recognition software. Minor typographical errors may be present. If questions arise, please do not hesitate to contact me or call our department.         Erickson Son, NP-C  [facility]  pager [phone]

## 2021-08-07 NOTE — CONSULTS
Vascular Surgery Consult      Patient: Stephan Pantoja MRN: [id number]  CSN: [id number]      YOB: 1945    Age: 77 y.o. Sex: male      DOA: 8/6/2021       HPI:     Stephan Pantoja is a 77 y.o. male who presented to the emergency room today with severe bilateral lower extremity pain. He was evaluated at the [facility] yesterday with severe peripheral vascular disease for which amputation was being planned for next month. The vascular studies are unavailable for my review at this time. Per the report, he does have evidence of severe PAD. Of note, he was prescribed tramadol for pain control but appears to have run out of same hence in severe pain. Of note as well, he does have a foot wound over the first MTPJ. Patient is demented with multiple medical problems including CVA with bilateral lower extremity contractures worse on the left.   Vascular surgery was consulted for wound healing potential.    Past Medical History:   Diagnosis Date    Alcohol use disorder     Benign hypertensive heart disease with systolic congestive heart failure, NYHA class 2 ([facility])     Cardiac pacemaker in situ     Cerebrovascular accident (CVA) due to occlusion of right carotid artery ([facility] [address] 8/14/2017    Acute Ischemic Stroke (acute infarctions involving right parieto-occipital area and occipitotemporal area and anterior and midportion of right corona radiata) with residual left hemiparesis, dysphagia and cognitive-communication deficits    Cognitive communication deficit 8/14/2017    Depression     On Trazodone    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) 8/14/2017    Dysphagia as late effect of cerebrovascular accident (CVA) 8/14/2017    Erectile dysfunction     On Sildenafil citrate    Hemiparesis affecting left side as late effect of cerebrovascular accident (CVA) ([facility] [address] 8/14/2017    Hyperlipidemia with target LDL less than 70 8/15/2017    Lipid profile (8/15/2017) showed TG 99, , HDL 81, LDL 80    Hypertension     Ischemic cardiomyopathy 8/15/2017    EF 40%    Occlusion of right internal carotid artery 8/15/2017    On chronic clopidogrel therapy     Osteoarthritis     On Etodolac and Oxycodone-Acetaminophen    Other ill-defined conditions(799.89)     PVD    Peripheral artery disease (HCC)     Seizure, late effect of stroke ([facility] [address] 8/15/2017    On Levetiracetam    Tobacco use disorder        Past Surgical History:   Procedure Laterality Date    HX ANGIOPLASTY  12/05/2012    S/P Balloon angioplasty and stent of left common iliac artery (12/5/2012 - Dr. Loye Cogan)       No family history on file. Social History     Socioeconomic History    Marital status: SINGLE     Spouse name: Not on file    Number of children: Not on file    Years of education: Not on file    Highest education level: Not on file   Tobacco Use    Smoking status: Never Smoker    Smokeless tobacco: Never Used   Substance and Sexual Activity    Alcohol use: No     Social Determinants of Health     Financial Resource Strain:     Difficulty of Paying Living Expenses:    Food Insecurity:     Worried About Running Out of Food in the Last Year:     [address] in the Last Year:    Transportation Needs:     Lack of Transportation (Medical):  Lack of Transportation (Non-Medical):    Physical Activity:     Days of Exercise per Week:     Minutes of Exercise per Session:    Stress:     Feeling of Stress :    Social Connections:     Frequency of Communication with Friends and Family:     Frequency of Social Gatherings with Friends and Family:     Attends [employer] Services:     Active Member of Clubs or Organizations:     Attends Club or Organization Meetings:     Marital Status:        Prior to Admission medications    Medication Sig Start Date End Date Taking? Authorizing Provider   gabapentin (NEURONTIN) 300 mg capsule Take 600 mg by mouth two (2) times a day.    Yes Provider, Historical   glycopyrrolate (ROBINUL) 1 mg tablet Take 1 mg by mouth three (3) times daily. Yes Provider, Historical   ferrous gluconate 324 mg (37.5 mg iron) tablet Take 324 mg by mouth every other day. Yes Provider, Historical   traMADoL (ULTRAM) 50 mg tablet Take 50 mg by mouth three (3) times daily as needed for Pain. Yes Provider, Historical   atorvastatin (Lipitor) 10 mg tablet Take 10 mg by mouth daily. Yes Provider, Historical   diclofenac (VOLTAREN) 1 % gel Apply 4 g to affected area four (4) times daily. Bilateral knees and left hip for arthritis   Yes Provider, Historical   polyethylene glycol (MIRALAX) 17 gram packet Take 1 Packet by mouth daily as needed for Constipation. 3/17/21  Yes Josue Sterling MD   melatonin 3 mg tablet Take 3 mg by mouth At bedtime. Yes Provider, Historical   losartan (COZAAR) 25 mg tablet Take 50 mg by mouth daily. Yes Provider, Historical   cilostazoL (PLETAL) 100 mg tablet Take 100 mg by mouth. Yes Provider, Historical   acetaminophen (TYLENOL) 325 mg tablet Take 325 mg by mouth three (3) times daily as needed for Pain. Yes Provider, Historical   aspirin delayed-release 81 mg tablet Take 1 Tab by mouth daily. 9/23/17  Yes Vicky Weston MD       No Known Allergies    Review of Systems  Review of Systems - History obtained from chart review      Physical Exam:      Visit Vitals  BP (!) 159/83   Pulse 82   Temp 98.5 °F (36.9 °C)   Resp 14   Ht 6' (1.829 m)   SpO2 99%   BMI 20.18 kg/m²       Physical Exam:  Physical Exam:   General:   no distress, appears stated age. Eyes:  Conjunctivae/corneas clear. PERRL, EOMs intact. Fundi benign       Nose: Nares normal. Septum midline. Mucosa normal. No drainage or sinus tenderness. Neck: Supple, symmetrical, trachea midline, no adenopathy, thyroid: no enlargement/tenderness/nodules, no carotid bruit and no JVD. Back:   Symmetric, no curvature. ROM normal. No CVA tenderness. Lungs:   Clear to auscultation bilaterally.    Heart: Regular rate and rhythm, S1, S2 normal, no murmur, click, rub or gallop. Abdomen:   Soft, non-tender. Bowel sounds normal. No masses,  No organomegaly. Extremities: Extremities contracted bilaterally in the lower limbs, left worse than right. , atraumatic, no cyanosis or edema. Pulses:  Nonpalpable pedal pulses. Skin: Skin color, texture, turgor normal. No rashes or lesions   Lymph nodes: Cervical, supraclavicular, and axillary nodes normal.   Neurologic: CNII-XII intact. sensation and reflexes throughout. Data Review:    CBC:   Lab Results   Component Value Date/Time    WBC 6.8 08/06/2021 11:22 PM    RBC 3.95 (L) 08/06/2021 11:22 PM    HGB 9.3 (L) 08/06/2021 11:22 PM    HCT 29.6 (L) 08/06/2021 11:22 PM    PLATELET 167 30/48/6315 11:22 PM      BMP:   Lab Results   Component Value Date/Time    Glucose 117 (H) 08/06/2021 11:22 PM    Sodium 134 (L) 08/06/2021 11:22 PM    Potassium 4.6 08/06/2021 11:22 PM    Chloride 99 (L) 08/06/2021 11:22 PM    CO2 29 08/06/2021 11:22 PM    BUN 37 (H) 08/06/2021 11:22 PM    Creatinine 0.99 08/06/2021 11:22 PM    Calcium 8.5 08/06/2021 11:22 PM     Coagulation:   Lab Results   Component Value Date/Time    Prothrombin time 14.2 08/06/2021 11:22 PM    INR 1.1 08/06/2021 11:22 PM    aPTT 37.8 (H) 08/06/2021 11:22 PM     Cardiac markers:   Lab Results   Component Value Date/Time     03/12/2021 01:20 PM    CK-MB Index  03/12/2021 01:20 PM     CALCULATION NOT PERFORMED WHEN RESULT IS BELOW LINEAR LIMIT     ABGs: No results found for: PH, PO2, PCO2, HCO3  Liver:   Lab Results   Component Value Date/Time    Bilirubin, direct 0.2 04/23/2011 04:20 AM    Alk.  phosphatase 76 08/06/2021 11:22 PM    Albumin 3.2 (L) 08/06/2021 11:22 PM    Protein, total 7.5 08/06/2021 11:22 PM    Lipase 165 02/25/2021 09:07 PM         Assessment/Plan     80-year-old gentleman with severe multiple medical problems presenting with left foot ulcer on a background of severe PAD with bilateral lower extremity contractures. Plan  recommend noninvasive vascular studies. Podiatry consult. X-rays of both feet. Local wound care. We will follow CTA. Thank you for allowing me to participate in the care of your patient.     Active Problems:    Critical lower limb ischemia ([facility] [address] [address] (8/7/2021)        Ra Barba MD  August 7, 2021

## 2021-08-07 NOTE — CONSULTS
Comprehensive Nutrition Assessment    Type and Reason for Visit: Initial, Consult    Nutrition Recommendations/Plan:  - Once medically appropriate and okay to start tube feedings per MD- provide bolus tube feeds of Jevity 1.5, 237 mL (1 container) 5x daily with 150 mL water flush after each bolus feeding via PEG. (goal regimen to provide: 1775 kcal, 76 gm protein, 900 mL free water, 100% RDIs). - IVF per MD.   - Nursing to update pt's weight as able. Nutrition Assessment:  Consult received for nutrition to manage tube feeding. Pt being kept NPO today for possible surgical interventions. Per daughters report to NP pt receiving bolus TF of Jevity 1.5 q 3 hours with 120 mL free water flush after bolus feeding. At this time, unsure of total number of boluses provided each day. Weight not obtained yet upon admission. Malnutrition Assessment:  Malnutrition Status:  Insufficient data      Nutrition History and Allergies: PMHx- severe PVD, CVA with left side chronic contraction, HTN, pacemaker, dementia & PEG Placement 3/18/21 due to inadequate intake. Presented to ED c/o bilateral foot pain- seen by surgery at [facility] with plan for possible bilateral foot amputation within the next month. Wt hx per chart 150# (8/11/20), 150# (2/25/21) & 148# (3/18/21). NKFA. Estimated Daily Nutrient Needs:  Energy (kcal): 1007-1807; Weight Used for Energy Requirements: Usual (68 kg)  Protein (g): 54-82; Weight Used for Protein Requirements: Usual (0.8-1.2)  Fluid (ml/day): 1590-8353; Method Used for Fluid Requirements: 1 ml/kcal      Nutrition Related Findings:  BM PTA- miralax prn.  NS at 50 mL/hr & ferrous sulfate every other day      Wounds:    Wound consult pending       Current Nutrition Therapies:  DIET NPO    Anthropometric Measures:  · Height:  6' (182.9 cm)  · Current Body Wt:   (unknown)   · Admission Body Wt:   (unknown)    · Usual Body Wt:  67.1 kg (148 lb) (3/18/21)     · Ideal Body Wt:  178 lbs:      · BMI Category:  Underweight (BMI less than 22) age over 72 (using UBW)       Nutrition Diagnosis:   · Inadequate oral intake related to cognitive or neurological impairment, swallowing difficulty as evidenced by NPO or clear liquid status due to medical condition (EN via PEG PTA)    Nutrition Interventions:   Food and/or Nutrient Delivery: Continue NPO, Start tube feeding, IV fluid delivery  Nutrition Education and Counseling: Education not indicated  Coordination of Nutrition Care: Continue to monitor while inpatient    Goals:  Enteral nutrition intake will meet >75% of patient estimated nutritional needs within the next 7 days.        Nutrition Monitoring and Evaluation:   Behavioral-Environmental Outcomes: None identified  Food/Nutrient Intake Outcomes: Enteral nutrition intake/tolerance, IVF intake  Physical Signs/Symptoms Outcomes: Biochemical data, Meal time behavior, Nutrition focused physical findings    Discharge Planning:    Enteral nutrition     Electronically signed by Roxann Rajput RD on 8/7/2021 at 10:55 AM    Contact: [phone]

## 2021-08-08 NOTE — PROGRESS NOTES
Progress Note  Date:2021       Room:[place] [address]  Patient Name:Emil Luis     YOB: 1945     Age:76 y.o. Subjective    Subjective:  Symptoms:  (Patient is demented and unable to keep any subjective symptoms). Diet:  Dietary issues: Tube feeds. Activity level: Activity impairment: Nonambulatory. Pain:  (None observed). Review of Systems   Reason unable to perform ROS: Patient demented. Objective         Vitals Last 24 Hours:  TEMPERATURE:  Temp  Av.4 °F (36.9 °C)  Min: 98.4 °F (36.9 °C)  Max: 98.4 °F (36.9 °C)  RESPIRATIONS RANGE: Resp  Av.1  Min: 15  Max: 24  PULSE OXIMETRY RANGE: SpO2  Av.2 %  Min: 92 %  Max: 100 %  PULSE RANGE: Pulse  Av.1  Min: 77  Max: 91  BLOOD PRESSURE RANGE: Systolic (38FZW), NNE:835 , Min:107 , VJH:614   ; Diastolic (13LRB), BRIGHT:43, Min:36, Max:90    I/O (24Hr): Intake/Output Summary (Last 24 hours) at 2021 1450  Last data filed at 2021 0445  Gross per 24 hour   Intake 237 ml   Output    Net 237 ml     Objective:  General Appearance:  Comfortable. Vital signs: (most recent): Blood pressure 132/65, pulse 78, temperature 98.4 °F (36.9 °C), resp. rate 15, height 6' (1.829 m), weight 150 lb (68 kg), SpO2 99 %. Vital signs are normal.  (1 episode of hypotension noted at bedside but resolved with cycling the monitor). HEENT: Normal HEENT exam.    Lungs:  Normal effort. Heart: Normal rate. S1 normal and S2 normal.    Chest: Symmetric chest wall expansion. Abdomen: Abdomen is soft. There is no abdominal tenderness. Extremities: (Severe contracture of bilateral lower extremity left worse than right)  Pulses: (Nonpalpable)    Neurological: Patient is alert. Skin:  Warm.   (Left first MTP wound which is stable.)    Labs/Imaging/Diagnostics    Labs:  CBC:  Recent Labs     21  0431 21  2322   WBC 7.3 6.8   RBC 4.55 3.95*   HGB 10.8* 9.3*   HCT 34.3* 29.6*   MCV 75.4 74.9   RDW 14.8* 14.6*    335 CHEMISTRIES:  Recent Labs     08/08/21 0431 08/06/21 2322   * 134*   K 4.7 4.6   CL 98* 99*   CO2 28 29   BUN 27* 37*   CA 10.1 8.5   PT/INR:  Recent Labs     08/06/21 2322   INR 1.1     APTT:  Recent Labs     08/08/21 0431 08/06/21 2322   APTT >180.0* 37.8*     LIVER PROFILE:  Recent Labs     08/08/21 0431 08/06/21 2322   AST 48* 62*   ALT 59 74*     Lab Results   Component Value Date/Time    ALT (SGPT) 59 08/08/2021 04:31 AM    AST (SGOT) 48 (H) 08/08/2021 04:31 AM    Alk. phosphatase 95 08/08/2021 04:31 AM    Bilirubin, direct 0.2 08/08/2021 04:31 AM    Bilirubin, total 0.6 08/08/2021 04:31 AM       Imaging Last 24 Hours:  CT FOOT LT WO CONT    Result Date: 8/8/2021  CT Lower extremity left foot without contrast Indications: Ulcer Technique: Contiguous 2.5 mm thickness axial images were obtained through the left foot. CT scans at this facility are performed using dose optimization technique as appropriate to a performed exam, to include automated exposure control, adjustment of the mA and/or kV according to patient size (including appropriate matching for site specific examinations), or use of iterative reconstruction technique. Comparison: Radiograph of prior day Findings: Decreased bone mineral density which limits evaluation. Chronic enthesopathy at the PT attachment. First MTP joint osteoarthritis with subluxation similar to reference radiograph. Adjacent soft tissue prominence along the medial aspect may reflect bunion and/or other etiology. The third metatarsal demonstrates a chronic fracture deformity with cortical buttressing. There appears to be malunion with chronic angulation. Findings similar to reference radiograph. Areas of milder degenerative changes noted throughout the foot without gross interval change. Calcaneal spurring noted. Pes planus noted. 1.  No acute findings or gross interval change. 2. 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  3. Osseous degenerative changes. 4. Decreased bone mineral density which limits examination. 5. No definite findings of cortical destruction or bony resorption. 6. Soft tissue irregularity along the great toe. It is this the site of the ulcer? 7. Please see report for additional details. If further evaluation is required recommend MRI as is a more sensitive and specific study for osteomyelitis. Thank you for this referral.    DUPLEX LOW EXT ARTERIES WITH HERNAN    Result Date: 8/7/2021  · No Doppler signal detected on the right in the superficial femoral (mid to distal), popliteal, posterior tibial, peroneal, and anterior tibial arteries. · On the left, absence of Doppler signal noted in the superificial femoral (prox), posterior tibial, and peroneal arteries. Critical arterial insufficiency right lower extremity with monophasic common femoral and occluded SFA popliteal and runoff segments. Poor visualization with patient movement Unable to visualize arteries left lower extremity secondary to patient movement, likely critical arterial sufficiency here as well     Assessment//Plan   Active Problems:    Critical lower limb ischemia ([address] (8/7/2021)      Assessment:  (45-year-old gentleman with severe multiple medical problems presenting with left foot ulcer on a background of severe PAD with bilateral lower extremity contractures. ). Plan:   Transfer to floor. Diet Plan: Tube feeds. (Local wound care. I reviewed the CT scan as well as the duplex results. Follow-up CT  Patient does not need any major vascular amputation at the present time. Of note, he was scheduled for the same following evaluation at the [facility].  The current course of the hospitalization appears to be for pain control. He is not septic and does not require any source control from my perspective. I discussed with the primary team as well as the podiatrist.).        Electronically signed by Javier Gamble MD on 8/8/2021 at 2:50 PM

## 2021-08-08 NOTE — PROGRESS NOTES
Progress Note    Patient: Phill Nova MRN: [id number]  SSN: [id number]    YOB: 1945  Age: 77 y.o. Sex: male      Admit Date: 8/6/2021  * No surgery found *     Procedure:       Subjective:     Patient seen for f/u left 1st MTPJ ulcer. Patient had arterial duplex yesterday which showed severe critical limb ischemia. Vascular surgery already on board. Objective:     Visit Vitals  BP (!) 107/36   Pulse 84   Temp 98.4 °F (36.9 °C)   Resp 24   Ht 6' (1.829 m)   Wt 68 kg (150 lb)   SpO2 100%   BMI 20.34 kg/m²        Physical Exam:  No new clinical changes since yesterday. Patient noted to have claudication pain to both lower extremities. Labs/Radiology Review: images and reports reviewed    Assessment:     Hospital Problems  Date Reviewed: 9/21/2017        Codes Class Noted POA    Critical lower limb ischemia [facility]) ICD-10-CM: W75.545  ICD-9-CM: 459.9  8/7/2021 Unknown              Plan/Recommendations/Medical Decision Making:     - Arterial duplex studies reviewed. -   Monophasic Doppler waveforms in the right proximal common femoral, distal common femoral and proximal superficial femoral artery. Doppler waveforms are absent in the mid superficial femoral, distal superficial femoral, proximal popliteal, distal popliteal, anterior tibial, posterior tibial and peroneal artery. The following arteries were not well visualized: middle superficial femoral, distal superficial femoral, popliteal, anterior tibial, posterior tibial and peroneal.     The following arteries were not well visualized: common femoral.   Difficult study due to patient positioning, and patient movement. Unable to Doppler the left SFA (mid to distal), and popliteal arteries due to patient movement. - Patient will need vascular evaluation. Patient is at high risk of higher level of amputation.   - Awaiting MRI. Patient had pacemaker, checking whether it's MRI competent. - Above plan discussed with Dr. Giles Hoang.

## 2021-08-08 NOTE — PROGRESS NOTES
Problem: Mobility Impaired (Adult and Pediatric)  Goal: *Acute Goals and Plan of Care (Insert Text)  Description: Pt appears to be at baseline. He is bed-bound at home, taken care of by his daughter and home aide. Pt is dependent for all Adl's and does not walk. Unsure what equipment is at home. Recommend d/c back home with 24/7 assist vs SNF. Outcome: Progressing Towards Goal     PHYSICAL THERAPY EVALUATION AND DISCHARGE    Patient: Nancy  (46 y.o. male)  Date: 8/8/2021  Primary Diagnosis: Critical lower limb ischemia (HCC) [I70.229]        Precautions:  Fall, Skin, Aspiration    ASSESSMENT :  Based on the objective data described below, the patient presents with deconditioning, flexor tone, decreased functional mobility tolerance, and confusion. Pt found supine on stretcher, in NAD, L UE/LE fixated in flexion contracture. Pt is able to state his name and that he is in [place], but his speech is very unclear at times. Sup-sit with totalA, pt assisted back supine. Pt unable to demonstrate all AROM of 4 limbs. PROM very limited on L UE/LE. Pillow placed between pts knees and totalA for scooting up towards [facility]. Pt left with call bell and all needs met. Recommend HH with 24/7 assist vs SNF. Pt appears to be at baseline mobility and therefore will not benefit from acute PT services. Patient does not require further skilled intervention at this level of care. PLAN :  Recommendations and Planned Interventions:   No formal PT needs identified at this time. Discharge Recommendations: Home Health with 24/7 assist vs SNF  Further Equipment Recommendations for Discharge: hospital bed, mechanical lift, and wheelchair     SUBJECTIVE:   Patient stated this leg hurts.     OBJECTIVE DATA SUMMARY:     Past Medical History:   Diagnosis Date    Alcohol use disorder     Benign hypertensive heart disease with systolic congestive heart failure, NYHA class 2 ([address]     Cardiac pacemaker in situ     Cerebrovascular accident (CVA) due to occlusion of right carotid artery ([facility] 8/14/2017    Acute Ischemic Stroke (acute infarctions involving right parieto-occipital area and occipitotemporal area and anterior and midportion of right corona radiata) with residual left hemiparesis, dysphagia and cognitive-communication deficits    Cognitive communication deficit 8/14/2017    Depression     On Trazodone    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) 8/14/2017    Dysphagia as late effect of cerebrovascular accident (CVA) 8/14/2017    Erectile dysfunction     On Sildenafil citrate    Hemiparesis affecting left side as late effect of cerebrovascular accident (CVA) ([facility] 8/14/2017    Hyperlipidemia with target LDL less than 70 8/15/2017    Lipid profile (8/15/2017) showed TG 99, , HDL 81, LDL 80    Hypertension     Ischemic cardiomyopathy 8/15/2017    EF 40%    Occlusion of right internal carotid artery 8/15/2017    On chronic clopidogrel therapy     Osteoarthritis     On Etodolac and Oxycodone-Acetaminophen    Other ill-defined conditions(799.89)     PVD    Peripheral artery disease (HCC)     Seizure, late effect of stroke ([facility] 8/15/2017    On Levetiracetam    Tobacco use disorder      Past Surgical History:   Procedure Laterality Date    HX ANGIOPLASTY  12/05/2012    S/P Balloon angioplasty and stent of left common iliac artery (12/5/2012 - Dr. Abad Tanner)     Barriers to Learning/Limitations: yes;  cognitive  Compensate with: N/A  Home Situation:   Home Situation  Living Alone: No  Support Systems: Family member(s)  [facility] [place]  Critical Behavior:  Neurologic State: Alert;Confused  Orientation Level: Oriented to person  Cognition: Impaired decision making  Safety/Judgement: Fall prevention  Psychosocial  Patient Behaviors: Calm; Cooperative    Strength:    Strength: Generally decreased, functional  Tone & Sensation:   Tone: Abnormal (L UE/LE flexor contractures)    Range Of Motion:  AROM: Generally decreased, functional    PROM: Generally decreased, functional    Functional Mobility:  Bed Mobility:     Supine to Sit: Total assistance;Assist x1  Sit to Supine: Total assistance;Assist x1  Scooting: Total assistance    Balance:   Sitting: Impaired    Pain:  Pain level pre-treatment: 0/10   Pain level post-treatment: 0/10  Pain Intervention(s): Medication (see MAR); Rest, Repositioning   Response to intervention: Nurse notified, See doc flow    Activity Tolerance:   Pt unable to tolerate much mobility 2/2 confusion and deconditioning  Please refer to the flowsheet for vital signs taken during this treatment. After treatment:   []         Patient left in no apparent distress sitting up in chair  [x]         Patient left in no apparent distress in bed  [x]         Call bell left within reach  [x]         Nursing notified  []         Caregiver present  []         Bed alarm activated  []         SCDs applied    COMMUNICATION/EDUCATION:   [x]         Role of Physical Therapy in the acute care setting. [x]         Fall prevention education was provided and the patient/caregiver indicated understanding. [x]         Patient/family have participated as able in goal setting and plan of care. []         Patient/family agree to work toward stated goals and plan of care. []         Patient understands intent and goals of therapy, but is neutral about his/her participation. []         Patient is unable to participate in goal setting/plan of care: ongoing with therapy staff.  []         Other:     Thank you for this referral.  David Jay   Time Calculation: 16 mins      Eval Complexity: History: MEDIUM  Complexity : 1-2 comorbidities / personal factors will impact the outcome/ POC Exam:MEDIUM Complexity : 3 Standardized tests and measures addressing body structure, function, activity limitation and / or participation in recreation  Presentation: MEDIUM Complexity : Evolving with changing characteristics  Clinical Decision Making:Medium Complexity    Overall Complexity:MEDIUM

## 2021-08-08 NOTE — PROGRESS NOTES
Discussed with vascular surgeon and they are not planning to do any acute intervention as this patient peripheral arterial disease issue has been chronic and has been scheduled with the [facility] system for further treatment. Patient is not septic and does not need any urgent intervention at this point. Discussed with the podiatrist: MRI cannot be done so he wants us to do a CT scan of left foot which I have ordered. He will make further determination of treatment based on CT scan report and may manage this patient as an outpatient.

## 2021-08-08 NOTE — PROGRESS NOTES
Pt not seen for skilled OT due to:     [ ]  Nausea/vomiting  [ ]  Eating  [ ]  Pain  [ ]  Pt lethargic  x[]  Off Unit - vascular  Other:    Will follow up with pt as time permits.     Thank you for this referral.  Gregorio Schilling MS OTR/L

## 2021-08-08 NOTE — PROGRESS NOTES
Progress Note    Patient: Wallace Pulido MRN: [id number]  CSN: [id number]    YOB: 1945  Age: 77 y.o. Sex: male    DOA: 8/6/2021 LOS:  LOS: 1 day               Subjective:     Pt resting in bed. Heparin gtt infusing. No overnight events noted. Spoke with daughter this afternoon to update on plan of care. Chief Complaint:   Chief Complaint   Patient presents with    Foot Pain     bilateral       Assessment/Plan       Assessment  1. Left foot ulcer w/ severe PAD w/ claudication, chronic HAV and hammer toes  2. Occluded right iliac and common femoral, occluded SFA bilateral  3. Hyponatremia, mild  4. Elevated LFTs, improving  5. Hyperglycemia  6. Hypertension  7. Ischemic cardiomyopathy  8. Pacemaker  9. Hyperlipidemia  10. Seizure disorder  11. Hx CVA w/ chronic LUE, LLE contraction, dysarthria and dysphagia  12. Dementia  13. Healing frature right 3rd metatarsal  14. OA  15. Debility, bed-bound status        Plan  1. Podiatry following. Awaiting MRI left foot r/o OM. HERNAN 8/7 critical arterial insufficiency RLE w/ monophasic common femoral and occluded SFA popliteal and runoff segments, unable to visualize LLE but likely critical arterial insufficiency. Per podiatry, if MRI shows OM will need at minimum partial 1st metatarsal head resection, in case of severe PAD will need to discuss w/ vascular about healing potential, may need higher level amputation to prevent future healing complications. Wound care. 2. Vascular following. CTa abd as indicated below. Heparin gtt. Monitor CBC, ptt per protocol. Follow recommendations. 3. Monitor vital signs, weight, I/Os per unit protocol  4. PT, OT following  5. Fall precautions  6. CM following to assist w/ dc planning      Diet: NPO, PEG w/ bolus TF and water flushes  Code status: DNR/DNI    Contact: daughter Mary Jane Jarvis [phone]- called at 12:02 pm updated on plan of care. All questions answered.     Case discussed with:  []Patient  [x]Family [x]Nursing  []Case Management  DVT Prophylaxis:  []Lovenox  []Hep SQ  []SCDs  []Coumadin   [x]On Heparin gtt      FLORENCE ErwinC  [facility]  pager [phone]      Objective:     Physical Exam:  Visit Vitals  BP (!) 116/43   Pulse 83   Temp 98.4 °F (36.9 °C)   Resp 23   Ht 6' (1.829 m)   Wt 68 kg (150 lb)   SpO2 98%   BMI 20.34 kg/m²        General:         Alert, cooperative, no acute distress    HEENT: NC, Atraumatic. PERRLA, anicteric sclerae. Lungs: CTA Bilaterally. No Wheezing/Rhonchi/Rales. Heart:  Regular  rhythm,  No murmur, No Rubs, No Gallops. Pacemaker left chest wall. Abdomen: Soft, Non distended, Non tender. +Bowel sounds. PEG tube intact. Extremities: Unable to palpate pulses BLE. Wound to left outer foot, dressing CDI. Neurologic:  Awake and alert. Contracted LUE and LLE. Intake and Output:  Current Shift:  No intake/output data recorded. Last three shifts:  08/06 1901 - 08/08 0700  In: 237   Out: -     Labs: Results:       Chemistry Recent Labs     08/08/21 0431 08/06/21  2322   GLU 88 117*   * 134*   K 4.7 4.6   CL 98* 99*   CO2 28 29   BUN 27* 37*   CREA 0.96 0.99   CA 10.1 8.5   AGAP 8 6   BUCR 28* 37*   AP 95 76   TP 8.7* 7.5   ALB 3.6 3.2*   GLOB 5.1* 4.3*   AGRAT 0.7* 0.7*      CBC w/Diff Recent Labs     08/08/21 0431 08/06/21  2322   WBC 7.3 6.8   RBC 4.55 3.95*   HGB 10.8* 9.3*   HCT 34.3* 29.6*    335   GRANS 63 60   LYMPH 25 25   EOS 1 1      Cardiac Enzymes No results for input(s): CPK, CKND1, SHUBHAM in the last 72 hours.     No lab exists for component: CKRMB, TROIP   Coagulation Recent Labs     08/08/21 0431 08/06/21  2322   PTP  --  14.2   INR  --  1.1   APTT >180.0* 37.8*       Lipid Panel Lab Results   Component Value Date/Time    Cholesterol, total 185 08/15/2017 03:08 AM    HDL Cholesterol 81 (H) 08/15/2017 03:08 AM    LDL, calculated 84.2 08/15/2017 03:08 AM    VLDL, calculated 19.8 08/15/2017 03:08 AM    Triglyceride 99 08/15/2017 03:08 AM    CHOL/HDL Ratio 2.3 08/15/2017 03:08 AM      BNP No results for input(s): BNPP in the last 72 hours. Liver Enzymes Recent Labs     08/08/21  0431   TP 8.7*   ALB 3.6   AP 95      Thyroid Studies Lab Results   Component Value Date/Time    TSH 1.62 08/14/2017 08:45 AM          Procedures/imaging: see electronic medical records for all procedures/Xrays and details which were not copied into this note but were reviewed prior to creation of Plan    Medications:   Current Facility-Administered Medications   Medication Dose Route Frequency    sodium chloride (NS) flush 5-40 mL  5-40 mL IntraVENous Q8H    sodium chloride (NS) flush 5-40 mL  5-40 mL IntraVENous PRN    atorvastatin (LIPITOR) tablet 10 mg  10 mg Per G Tube DAILY    [Held by provider] cilostazoL (PLETAL) tablet 100 mg  100 mg Per G Tube ACB&D    diclofenac (VOLTAREN) 1 % topical gel 4 g  4 g Topical QID    ferrous sulfate 300 mg (60 mg iron)/5 mL oral syrup 300 mg  300 mg Per G Tube EVERY OTHER DAY    gabapentin (NEURONTIN) capsule 600 mg  600 mg Oral BID    glycopyrrolate (ROBINUL) tablet 1 mg  1 mg Per G Tube TID    losartan (COZAAR) tablet 50 mg  50 mg Per G Tube DAILY    melatonin tablet 3 mg  3 mg Per G Tube QHS    traMADoL (ULTRAM) tablet 50 mg  50 mg Per G Tube TID PRN    acetaminophen (TYLENOL) tablet 650 mg  650 mg Per G Tube Q6H PRN    Or    acetaminophen (TYLENOL) suppository 650 mg  650 mg Rectal Q6H PRN    polyethylene glycol (MIRALAX) packet 17 g  17 g Per G Tube DAILY PRN    promethazine (PHENERGAN) tablet 12.5 mg  12.5 mg Per G Tube Q6H PRN    [Held by provider] 0.9% sodium chloride infusion  50 mL/hr IntraVENous CONTINUOUS    cloNIDine HCL (CATAPRES) tablet 0.1 mg  0.1 mg Oral BID    heparin 25,000 units in D5W 250 ml infusion  18-36 Units/kg/hr IntraVENous TITRATE     Current Outpatient Medications   Medication Sig    gabapentin (NEURONTIN) 300 mg capsule Take 600 mg by mouth two (2) times a day.     glycopyrrolate (ROBINUL) 1 mg tablet Take 1 mg by mouth three (3) times daily.  ferrous gluconate 324 mg (37.5 mg iron) tablet Take 324 mg by mouth every other day.  traMADoL (ULTRAM) 50 mg tablet Take 50 mg by mouth three (3) times daily as needed for Pain.  atorvastatin (Lipitor) 10 mg tablet Take 10 mg by mouth daily.  diclofenac (VOLTAREN) 1 % gel Apply 4 g to affected area four (4) times daily. Bilateral knees and left hip for arthritis    polyethylene glycol (MIRALAX) 17 gram packet Take 1 Packet by mouth daily as needed for Constipation.  melatonin 3 mg tablet Take 3 mg by mouth At bedtime.  losartan (COZAAR) 25 mg tablet Take 50 mg by mouth daily.  cilostazoL (PLETAL) 100 mg tablet Take 100 mg by mouth.  acetaminophen (TYLENOL) 325 mg tablet Take 325 mg by mouth three (3) times daily as needed for Pain.  aspirin delayed-release 81 mg tablet Take 1 Tab by mouth daily.

## 2021-08-08 NOTE — PROGRESS NOTES
8/8/2021  2:52 PM    Received report from Bhavana BALDERRAMA on behalf of Juan Luis, [place]. TRANSFER - IN REPORT:    Verbal report received from MARKY BALDERRAMA(name) on Ella Yañez  being received from ED(unit) for routine progression of care      Report consisted of patients Situation, Background, Assessment and   Recommendations(SBAR). Information from the following report(s) SBAR, Kardex, ED Summary, Procedure Summary, Intake/Output, MAR, Accordion, Recent Results and Med Rec Status was reviewed with the receiving nurse. Opportunity for questions and clarification was provided. Assessment completed upon patients arrival to unit and care assumed.

## 2021-08-09 NOTE — PROGRESS NOTES
OCCUPATIONAL THERAPY EVALUATION/DISCHARGE    Patient: Estephania Brooks (67 y.o. male)  Date: 8/9/2021  Primary Diagnosis: Critical lower limb ischemia (HCC) [I70.229]        Precautions:   Fall, Skin, Contact  PLOF: Pt was dependent with basic self care tasks and functional mobility PTA. He resides at a nursing facility. ASSESSMENT AND RECOMMENDATIONS:  Based on the objective data described below, the patient presents at his max assist to dependent PLOF with basic self care tasks and functional mobility. Patient resting in a flexor synergy pattern with upper and lower extremities, L>R. He is able to use his RUE functionally but not consistently on command. Patient vocalizes throughout session but not all words are intelligible. Patient repositioned with pillows between his knees and HOB upright at end of session. Skilled occupational therapy is not indicated at this time. Discharge Recommendations: None  Further Equipment Recommendations for Discharge: N/A      SUBJECTIVE:   Patient stated Grab my knees, grab my knees.     OBJECTIVE DATA SUMMARY:     Past Medical History:   Diagnosis Date    Alcohol use disorder     Benign hypertensive heart disease with systolic congestive heart failure, NYHA class 2 ([facility])     Cardiac pacemaker in situ     Cerebrovascular accident (CVA) due to occlusion of right carotid artery ([facility] [address] 8/14/2017    Acute Ischemic Stroke (acute infarctions involving right parieto-occipital area and occipitotemporal area and anterior and midportion of right corona radiata) with residual left hemiparesis, dysphagia and cognitive-communication deficits    Cognitive communication deficit 8/14/2017    Depression     On Trazodone    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) 8/14/2017    Dysphagia as late effect of cerebrovascular accident (CVA) 8/14/2017    Erectile dysfunction     On Sildenafil citrate    Hemiparesis affecting left side as late effect of cerebrovascular accident (CVA) ([facility] [address] 8/14/2017    Hyperlipidemia with target LDL less than 70 8/15/2017    Lipid profile (8/15/2017) showed TG 99, , HDL 81, LDL 84    Hypertension     Ischemic cardiomyopathy 8/15/2017    EF 40%    Occlusion of right internal carotid artery 8/15/2017    On chronic clopidogrel therapy     Osteoarthritis     On Etodolac and Oxycodone-Acetaminophen    Other ill-defined conditions(799.89)     PVD    Peripheral artery disease (HCC)     Seizure, late effect of stroke ([facility] [address] 8/15/2017    On Levetiracetam    Tobacco use disorder      Past Surgical History:   Procedure Laterality Date    HX ANGIOPLASTY  12/05/2012    S/P Balloon angioplasty and stent of left common iliac artery (12/5/2012 - Dr. Peggy Lau)     Barriers to Learning/Limitations: yes;  altered mental status (i.e. Confusion)  Compensate with: visual, verbal, tactile, kinesthetic cues/model    Home Situation:   Home Situation  Home Environment: Skilled nursing facility  One/Two Story Residence: One story  Living Alone: No  Support Systems: Family member(s)  Patient Expects to be Discharged to[de-identified] [place]  Current DME Used/Available at Home: None  [x]     Right hand dominant   []     Left hand dominant    Cognitive/Behavioral Status:  Neurologic State: Alert  Orientation Level: Oriented to person;Disoriented to place; Disoriented to situation;Disoriented to time  Cognition: Decreased command following  Safety/Judgement: Fall prevention    Skin: Intact on UEs  Edema: None noted in UEs    Vision/Perceptual:    Acuity:  (unable to accurately assess 2/2 confusion)      Coordination: BUE  Fine Motor Skills-Upper: Left Impaired;Right Impaired    Gross Motor Skills-Upper: Right Intact; Left Impaired    Balance:  Sitting: Impaired    Strength: BUE  Strength: Generally decreased, functional (3/5 throughout RUE; 2-/5 in LUE)     Tone & Sensation: BUE  Tone: Abnormal (hypertonicity)    Range of Motion: BUE  AROM: Generally decreased, functional (L contracted into flexor synergy)  PROM: Generally decreased, functional    Functional Mobility and Transfers for ADLs:  Bed Mobility:  Supine to Sit: Total assistance  Sit to Supine: Total assistance    Transfers: Toilet Transfer :  (NT; total assist)    ADL Assessment:  Feeding: Total assistance    Oral Facial Hygiene/Grooming: Total assistance    Bathing: Total assistance    Upper Body Dressing: Total assistance    Lower Body Dressing: Total assistance    Toileting: Total assistance    ADL Intervention:  Patient with bowel/bladder accident in bed. He was able to roll with max assist for perineal and buttock hygiene/bathing and pad replacement. Max assist also given for doffing and donning a clean hospital gown while supine in bed. Cognitive Retraining  Safety/Judgement: Fall prevention    Pain: Non verbal pain scale  Pain level pre-treatment: 0/10   Pain level post-treatment: 0/10   Pain Intervention(s): NA  Response to intervention: NA    Activity Tolerance:   Fair  Please refer to the flowsheet for vital signs taken during this treatment. After treatment:   []  Patient left in no apparent distress sitting up in chair  [x]  Patient left in no apparent distress in bed  [x]  Call bell left within reach  [x]  Nursing notified  []  Caregiver present  []  Bed alarm activated    COMMUNICATION/EDUCATION:   [x]      Role of Occupational Therapy in the acute care setting  [x]      Home safety education was provided and the patient/caregiver indicated understanding. [x]      Patient/family have participated as able and agree with findings and recommendations. []      Patient is unable to participate in plan of care at this time. Thank you for this referral.  Toña Lei MS OTR/L   Time Calculation: 23 mins      Eval Complexity: History: MEDIUM Complexity : Expanded review of history including physical, cognitive and psychosocial  history ;    Examination: HIGH Complexity : 5 or more performance deficits relating to physical, cognitive , or psychosocial skils that result in activity limitations and / or participation restrictions; Decision Making:MEDIUM Complexity : Patient may present with comorbidities that affect occupational performnce.  Miniml to moderate modification of tasks or assistance (eg, physical or verbal ) with assesment(s) is necessary to enable patient to complete evaluation

## 2021-08-09 NOTE — PROGRESS NOTES
Discharge order noted for today. Orders reviewed. Faxed clinicals and home health request form to the [place]. Per daughter, a home nurse will be starting this week for dressing changes.  at the [facility] Angela [phone]) is off until Wednesday so unable to verify this. Daughter will transport patient home today. No further needs identified at this time.  remains available if needed.   Vianca Gastelum RN - Outcomes Manager  [phone]

## 2021-08-09 NOTE — DISCHARGE SUMMARY
Discharge Summary    Patient: Doe Dial MRN: [id number]  CSN: [id number]    YOB: 1945  Age: 77 y.o. Sex: male    DOA: 8/6/2021 LOS:  LOS: 2 days   Discharge Date:8/9/2021      Admission Diagnoses: Critical lower limb ischemia ([facility] [address] [I70.229]    Discharge Diagnoses:      1. Left foot ulcer w/ severe PAD w/ claudication, chronic HAV and hammer toes  2. Occluded right iliac and common femoral, occluded SFA bilateral  3. Hyponatremia, mild  4. Elevated LFTs, improving  5. Hyperglycemia  6. Hypertension  7. Ischemic cardiomyopathy  8. Pacemaker  9. Hyperlipidemia  10. Seizure disorder  11. Hx CVA w/ chronic LUE, LLE contraction, dysarthria and dysphagia  12. Dementia  13. Healing frature right 3rd metatarsal  14. OA  15. Debility, bed-bound status       Discharge Condition: Stable    PHYSICAL EXAM  Visit Vitals  BP (!) 150/69   Pulse 69   Temp 97.2 °F (36.2 °C)   Resp 15   Ht 6' (1.829 m)   Wt 68 kg (150 lb)   SpO2 100%   BMI 20.34 kg/m²          General:         Alert, cooperative, no acute distress    HEENT:           NC, Atraumatic. PERRLA, anicteric sclerae. Lungs:            CTA Bilaterally. No Wheezing/Rhonchi/Rales. Heart:              Regular  rhythm,  No murmur, No Rubs, No Gallops. Pacemaker left chest wall. Abdomen:      Soft, Non distended, Non tender.  +Bowel sounds. PEG tube intact. Extremities:   Unable to palpate pulses BLE. Wound to left outer foot, dressing CDI. Neurologic:     Awake and alert. Contracted LUE and LLE. Hospital Course: Prior HPI, Karely Torres is a 77 y.o.  male with hx of severe PVD, AO/iliac disease/occlusion s/p fem-fem and iliac stent, CVA w/ left side chronic contraction, HTN, pacemaker and dementia who presented to the ED last night c/o bilateral foot pain. He was seen at [facility] yesterday with surgery for possible bilateral foot amputation within the next month. He had PVL study which revealed significantly reduced circulation. Daughter stated pt cannot sleep due to pain and as a result of his pain/moaning she is not able to sleep either. She brought him to the ED due to pain and pt not having any more doses of Tramadol until Monday.       In the ED, pt with stable vital signs, Hgb 9.3, Hct 29.6, sodium 134, BUN 37, creatinine 0.99, albumin 3.2, ALT 74, AST 62. EKG showed V paced rhythm. CTa abd art w/ runoff obtained, official read pending. Vascular consulted. He will be admitted for further management of care. \"    Discussed with vascular patient presentation and medical history. Recommended HERNAN, x-rays bilateral feet as well as podiatry consult. Podiatry saw patient, reviewed x-rays-x-ray of left foot show healing fracture of third metatarsal, demineralization, osteoarthritis. X-ray of right foot no acute findings, severe hallux valgus noted. HERNAN critical arterial insufficiency RLE with monophasic common femoral and occluded SFA popliteal and runoff segments, unable to visualize arteries LLE secondary to patient movement, likely critical arterial insufficiency here as well. CTa abd art showed occluded right iliac and common femoral, occluded SFA bilaterally. Podiatry recommended MRI, however unable to complete as not able to further classify details of pacemaker for MRI, thus CT ordered. CT left foot showed soft tissue irregularity along great toe, chronic third metatarsal fracture deformity with malunion. Per vascular, patient does not need major vascular amputation at the present time, recommends to continue follow-up at the [place]. Patient is not septic and does not require any source control from vascular perspective. Discussed with podiatry this a.m. concerning results of CT-also recommends to follow-up with vascular for higher level of amputation -does not recommend partial first metatarsal head resection due to severe PAD and risk of healing complications.   At this time there are no indications for surgical intervention during this hospital stay thus patient will be discharged home with daughter, home health has been ordered. He is to follow-up with PCP at the [place] and with vascular at the [place] in 1 week. Spoke with daughter Jean Perez [phone] at 10:39 am to discuss discharge- [facility]. She already has a hospital bed, home health aide and stated he was supposed to have a wound care nurse coming this week for initial assessment. Pt's daughter verbalized understanding. All questions answered. Also updated CM to update on plan of care. Consults: Vascular, Podiatry    Significant Diagnostic Studies: labs:   Recent Results (from the past 24 hour(s))   METABOLIC PANEL, BASIC    Collection Time: 08/09/21  4:55 AM   Result Value Ref Range    Sodium 132 (L) 136 - 145 mmol/L    Potassium 4.2 3.5 - 5.5 mmol/L    Chloride 98 (L) 100 - 111 mmol/L    CO2 27 21 - 32 mmol/L    Anion gap 7 3.0 - 18 mmol/L    Glucose 90 74 - 99 mg/dL    BUN 27 (H) 7.0 - 18 MG/DL    Creatinine 0.83 0.6 - 1.3 MG/DL    BUN/Creatinine ratio 33 (H) 12 - 20      GFR est AA >60 >60 ml/min/1.73m2    GFR est non-AA >60 >60 ml/min/1.73m2    Calcium 9.6 8.5 - 10.1 MG/DL     Results for Marjorie Fraser (MRN [id number]) as of 8/9/2021 10:27   Ref.  Range 8/8/2021 04:31   WBC Latest Ref Range: 4.6 - 13.2 K/uL 7.3   RBC Latest Ref Range: 4.35 - 5.65 M/uL 4.55   HGB Latest Ref Range: 13.0 - 16.0 g/dL 10.8 (L)   HCT Latest Ref Range: 36.0 - 48.0 % 34.3 (L)   MCV Latest Ref Range: 74.0 - 97.0 FL 75.4   MCH Latest Ref Range: 24.0 - 34.0 PG 23.7 (L)   MCHC Latest Ref Range: 31.0 - 37.0 g/dL 31.5   RDW Latest Ref Range: 11.6 - 14.5 % 14.8 (H)   PLATELET Latest Ref Range: 135 - 420 K/uL 368   MPV Latest Ref Range: 9.2 - 11.8 FL 9.6   NEUTROPHILS Latest Ref Range: 40 - 73 % 63   LYMPHOCYTES Latest Ref Range: 21 - 52 % 25   MONOCYTES Latest Ref Range: 3 - 10 % 10   EOSINOPHILS Latest Ref Range: 0 - 5 % 1 BASOPHILS Latest Ref Range: 0 - 2 % 0   DF Latest Units:   AUTOMATED   ABS. NEUTROPHILS Latest Ref Range: 1.8 - 8.0 K/UL 4.7   ABS. LYMPHOCYTES Latest Ref Range: 0.9 - 3.6 K/UL 1.9   ABS. MONOCYTES Latest Ref Range: 0.05 - 1.2 K/UL 0.7   ABS. EOSINOPHILS Latest Ref Range: 0.0 - 0.4 K/UL 0.1   ABS. BASOPHILS Latest Ref Range: 0.0 - 0.1 K/UL 0.0       CT Results (most recent):  Results from Hospital Encounter encounter on 08/06/21    CT FOOT LT WO CONT    Narrative  CT Lower extremity left foot without contrast    Indications: Ulcer    Technique: Contiguous 2.5 mm thickness axial images were obtained through the  left foot. CT scans at this facility are performed using dose optimization technique as  appropriate to a performed exam, to include automated exposure control,  adjustment of the mA and/or kV according to patient size (including appropriate  matching for site specific examinations), or use of iterative reconstruction  technique. Comparison: Radiograph of prior day    Findings:    Decreased bone mineral density which limits evaluation. Chronic enthesopathy at the PT attachment. First MTP joint osteoarthritis with subluxation similar to reference radiograph. Adjacent soft tissue prominence along the medial aspect may reflect bunion  and/or other etiology. The third metatarsal demonstrates a chronic fracture deformity with cortical  buttressing. There appears to be malunion with chronic angulation. Findings  similar to reference radiograph. Areas of milder degenerative changes noted throughout the foot without gross  interval change. Calcaneal spurring noted. Pes planus noted. Impression  1. No acute findings or gross interval change. 2. 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 3. Osseous degenerative changes. 4. Decreased bone mineral density which limits examination. 5. No definite findings of cortical destruction or bony resorption.   6. Soft tissue irregularity along the great toe. It is this the site of the  ulcer? 7. Please see report for additional details. If further evaluation is required  recommend MRI as is a more sensitive and specific study for osteomyelitis. Thank you for this referral.    8/7/2021  HERNAN Duplex BLE  Interpretation Summary       · No Doppler signal detected on the right in the superficial femoral (mid to distal), popliteal, posterior tibial, peroneal, and anterior tibial arteries. · On the left, absence of Doppler signal noted in the superificial femoral (prox), posterior tibial, and peroneal arteries. Critical arterial insufficiency right lower extremity with monophasic common femoral and occluded SFA popliteal and runoff segments. Poor visualization with patient movement  Unable to visualize arteries left lower extremity secondary to patient movement, likely critical arterial sufficiency here as well        Xray right foot   IMPRESSION     No acute findings. Severe hallux valgus noted. Xray left foot   IMPRESSION     Healing fracture of the third metatarsal.  Demineralization  Osteoarthritis  Poor definition of the tibiotalar and talocalcaneal joints-suspect projectional  artifact. Discharge Medications:     Current Discharge Medication List      CONTINUE these medications which have CHANGED    Details   cilostazoL (PLETAL) 100 mg tablet Take 1 Tablet by mouth Before breakfast and dinner. Qty: 30 Tablet, Refills: 0      traMADoL (ULTRAM) 50 mg tablet Take 1 Tablet by mouth three (3) times daily as needed for Pain for up to 3 days. Max Daily Amount: 150 mg.  Qty: 9 Tablet, Refills: 0    Associated Diagnoses: Critical lower limb ischemia ([address] [address]         CONTINUE these medications which have NOT CHANGED    Details   gabapentin (NEURONTIN) 300 mg capsule Take 600 mg by mouth two (2) times a day. glycopyrrolate (ROBINUL) 1 mg tablet Take 1 mg by mouth three (3) times daily.       ferrous gluconate 324 mg (37.5 mg iron) tablet Take 324 mg by mouth every other day. atorvastatin (Lipitor) 10 mg tablet Take 10 mg by mouth daily. diclofenac (VOLTAREN) 1 % gel Apply 4 g to affected area four (4) times daily. Bilateral knees and left hip for arthritis      polyethylene glycol (MIRALAX) 17 gram packet Take 1 Packet by mouth daily as needed for Constipation. Qty: 15 Packet, Refills: 0      melatonin 3 mg tablet Take 3 mg by mouth At bedtime. losartan (COZAAR) 25 mg tablet Take 50 mg by mouth daily. acetaminophen (TYLENOL) 325 mg tablet Take 325 mg by mouth three (3) times daily as needed for Pain. aspirin delayed-release 81 mg tablet Take 1 Tab by mouth daily. Qty: 30 Tab, Refills: 0             Activity: activity as tolerated- pt is bedbound. PT, OT, skilled nurse home health. Diet: Resume previous diet. PEG bolus feeds with Jevity 1.5 one can five times daily with 150 ml free water after bolus feeds. Flush with free water 100 ml after meds. Wound Care: As directed    Follow-up: with [facility], [facility] with [facility] at [facility] in 1-2 weeks to discuss further amputation.     Minutes spent on discharge: >30 minutes spent coordinating this discharge (review instructions/follow-up, prescriptions, preparing report for sign off)        Marisabel Lawrenec NP-C  [address]  pager [phone]

## 2021-08-09 NOTE — ROUTINE PROCESS
Bedside and Verbal shift change report given to Heath Brown RN (oncoming nurse) by Stefanie Montgomery RN (offgoing nurse). Report included the following information     2200 - Bedside and Verbal shift change report given to Kennedy Romo RN (oncoming nurse) by Heath Brown RN (offgoing nurse). Report included the following information {SBAR REPORTS AYEC:[id number]}.

## 2021-08-09 NOTE — PROGRESS NOTES
Nutrition Assessment     Type and Reason for Visit: Positive nutrition screen, Consult, Reassess    Nutrition Recommendations/Plan:  - Continue tube feeding of Jevity 1.5, 237 mL (1 container) 5x daily, decrease water flushes to 50 mL after each bolus feeding via PEG.  - Suggested bolus feeding schedule: 5am, 9am, 1pm, 5pm & 9pm.      Nutrition Assessment:  Per vascular pt does not need any major vascular amputation at the present time. Remains NPO & tolerating bolus feedings at goal, 237 mL 5x daily (programmed on & receiving via pump) via PEG tube. Hyponatremia noted with plan to decrease water flushes today. Wt obtained today using bed scale, 138# (12#, 8% wt loss x 6 months). NFPE completed- meeting criteria for malnutrition. Malnutrition Assessment:  Malnutrition Status: Severe malnutrition  Context: Chronic illness  Findings of clinical characteristics of malnutrition:   Energy Intake:  Unable to assess  Weight Loss:   (-12#, 8% wt loss x 6 months)     Body Fat Loss:  7 - Severe body fat loss, Buccal region, Orbital, Triceps   Muscle Mass Loss:  7 - Severe muscle mass loss, Temples (temporalis), Clavicles (pectoralis &deltoids), Hand (interosseous), Thigh (quadriceps)  Fluid Accumulation:  Unable to assess,     Strength:  Not performed       Estimated Daily Nutrient Needs:  Energy (kcal):  7444-6480  Protein (g):  54-82       Fluid (ml/day):  5592-8580    Nutrition Related Findings:  Loose BM 8/9. Ferrous sulfate. Current Nutrition Therapies:  ADULT TUBE FEEDING PEG; Standard with Fiber; Delivery Method: Bolus; Bolus Frequency: 5 Times Daily; Bolus Volume (mL): 237; Bolus Method of Infusion: Syringe Push; Water Flush Volume (mL): 150;  Water Flush Frequency: After bolus  DIET NPO Other (Specify); nothing by mouth- can have PEG TF     Current Tube Feeding Regimen: Jevity 1.5, 237 mL (1 container) 5x daily  Current/Goal Tube Feeding Regimen Provides: 1775 kcal, 76 gm protein, 900 mL free water, 100% RDIs  Current Water Flush Order: 150 mL after each bolus     Anthropometric Measures:  · Height:  6' (182.9 cm)  · Current Body Wt:  62.6 kg (138 lb)  · BMI: 18.7    Nutrition Diagnosis:   · Inadequate oral intake related to cognitive or neurological impairment, swallowing difficulty as evidenced by NPO or clear liquid status due to medical condition (EN via PEG PTA)    · Severe malnutrition, In context of chronic illness related to catabolic illness as evidenced by severe muscle loss, severe loss of subcutaneous fat    Nutrition Intervention:  Food and/or Nutrient Delivery: Continue NPO, Mineral supplement, Modify tube feeding  Nutrition Education and Counseling: Education not indicated  Coordination of Nutrition Care: Continue to monitor while inpatient (pt discussed with RN)    Goals:  Enteral nutrition intake will meet >75% of patient estimated nutritional needs within the next 7 days.        Nutrition Monitoring and Evaluation:   Behavioral-Environmental Outcomes: None identified  Food/Nutrient Intake Outcomes: Enteral nutrition intake/tolerance, Vitamin/mineral intake  Physical Signs/Symptoms Outcomes: Biochemical data, Chewing or swallowing, Nutrition focused physical findings    Discharge Planning:    Enteral nutrition     Electronically signed by Nela Negron RD on 8/9/2021 at 1:43 PM    Contact Number: [phone]

## 2021-08-09 NOTE — PROGRESS NOTES
Problem: Nutrition Deficit  Goal: *Optimize nutritional status  Outcome: Progressing Towards Goal     Problem: Patient Education: Go to Patient Education Activity  Goal: Patient/Family Education  Outcome: Progressing Towards Goal     Problem: Falls - Risk of  Goal: *Absence of Falls  Description: Document Lily Simonccasin Fall Risk and appropriate interventions in the flowsheet. Outcome: Progressing Towards Goal  Note: Fall Risk Interventions:       Mentation Interventions: Adequate sleep, hydration, pain control, Eyeglasses and hearing aids, Familiar objects from home, Family/sitter at bedside, Reorient patient, Room close to nurse's station, Toileting rounds    Medication Interventions: Bed/chair exit alarm    Elimination Interventions:  Toileting schedule/hourly rounds              Problem: Patient Education: Go to Patient Education Activity  Goal: Patient/Family Education  Outcome: Progressing Towards Goal

## 2021-08-09 NOTE — ROUTINE PROCESS
End of Shift Note     Bedside and verbal shift change report given to Va Hooker RN (On coming nurse) by Brittney Goff RN (Off going nurse).   Report included the following information:      --Procedure Summary     --MAR,     --Recent Results     --Med Rec Status

## 2021-08-09 NOTE — WOUND CARE
Physical Exam   Room 504: pt being followed by podiatry services.   Luli ARCOSN, RN, [facility], [address]

## 2021-08-09 NOTE — PROGRESS NOTES
Problem: Nutrition Deficit  Goal: *Optimize nutritional status  Outcome: Resolved/Met     Problem: Patient Education: Go to Patient Education Activity  Goal: Patient/Family Education  Outcome: Resolved/Met     Problem: Falls - Risk of  Goal: *Absence of Falls  Description: Document Karly Fall Risk and appropriate interventions in the flowsheet.   Outcome: Resolved/Met  Note: Fall Risk Interventions:       Mentation Interventions: Adequate sleep, hydration, pain control, Door open when patient unattended, More frequent rounding, Toileting rounds, Update white board    Medication Interventions: Patient to call before getting OOB, Teach patient to arise slowly, Bed/chair exit alarm    Elimination Interventions: Call light in reach, Patient to call for help with toileting needs, Toileting schedule/hourly rounds, Bed/chair exit alarm

## 2021-08-09 NOTE — PROGRESS NOTES
Progress Note    Patient: Phill Nova MRN: [id number]  SSN: [id number]    YOB: 1945  Age: 77 y.o. Sex: male      Admit Date: 8/6/2021  * No surgery found *     Procedure:       Subjective:     Patient seen resting quiet and comfortably and no apparent distress. Patient had CT scan of left foot which was negative for acute bony changes. Reports claudication pain to both LE's. Denies any other new pedal complaint. Denies N/V/C/F. Objective:     Visit Vitals  /60   Pulse 83   Temp 98 °F (36.7 °C)   Resp 16   Ht 6' (1.829 m)   Wt 68 kg (150 lb)   SpO2 96%   BMI 20.34 kg/m²        Physical Exam:  Left foot ulcer covered with dry gauze. No new wound noted. No clinical signs of infection present. Non palpable pedal pulses consistent with severe PAD. Labs/Radiology Review: images and reports reviewed    Assessment:     Hospital Problems  Date Reviewed: 9/21/2017        Codes Class Noted POA    Critical lower limb ischemia [facility]) ICD-10-CM: G79.604  ICD-9-CM: 459.9  8/7/2021 Unknown              Plan/Recommendations/Medical Decision Making:     - CT scan of left foot reviewed. No acute findings or gross interval change. 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 Osseous degenerative changes. Decreased bone mineral density which limits examination. No definite findings of cortical destruction or bony resorption. Soft tissue irregularity along the great toe. It is this the site of the ulcer. No acute findings or gross interval change. Decreased bone mineral density which limits examination. No definite findings of cortical destruction or bony resorption.   - Continue with local wound care. Please dispense wedge protector to offload pressure ulcer. Since no acute findings noted, no surgical intervention needed at this time. - Above plan discussed with Rand Apley, NP and vascular surgeon.   - Patient is stable to discharge from foot standpoint.  Pain control per primary team.   - Patient should follow up with his doctor at [facility] outpatient for follow up. If not possible, I can gladly see him in office in 1 week. - Thank you for opportunity to participate in  Sarita Nida concepcion.

## 2021-08-09 NOTE — PROGRESS NOTES
Received a call [facility] nursing station asking to please make an appointment for their patient. Patient is seeing [place] doctors and must make their own appointment. I spoke with patient's daughter Sandra Neumann) at [phone] who confirmed her father goes to the [place] to see his primary care physician. She spoke with the [place] and was informed that her father must schedule his own doctor's appointment.

## 2021-08-09 NOTE — PROGRESS NOTES
Problem: Nutrition Deficit  Goal: *Optimize nutritional status  8/8/2021 2031 by Arielle Farfan  Outcome: Progressing Towards Goal  8/8/2021 2031 by Arielle Farfan  Outcome: Progressing Towards Goal     Problem: Patient Education: Go to Patient Education Activity  Goal: Patient/Family Education  8/8/2021 2031 by Arielle Farfan  Outcome: Progressing Towards Goal  8/8/2021 2031 by Arielle Farfan  Outcome: Progressing Towards Goal     Problem: Falls - Risk of  Goal: *Absence of Falls  Description: Document Cornell Rojas Fall Risk and appropriate interventions in the flowsheet. 8/8/2021 2031 by Arielle Farfan  Outcome: Progressing Towards Goal  Note: Fall Risk Interventions:       Mentation Interventions: Adequate sleep, hydration, pain control, Eyeglasses and hearing aids, Familiar objects from home, Family/sitter at bedside, Reorient patient, Room close to nurse's station, Toileting rounds    Medication Interventions: Bed/chair exit alarm    Elimination Interventions: Toileting schedule/hourly rounds           8/8/2021 2031 by Arielle Farfan  Outcome: Progressing Towards Goal  Note: Fall Risk Interventions:       Mentation Interventions: Adequate sleep, hydration, pain control, Eyeglasses and hearing aids, Familiar objects from home, Family/sitter at bedside, Reorient patient, Room close to nurse's station, Toileting rounds    Medication Interventions: Bed/chair exit alarm    Elimination Interventions:  Toileting schedule/hourly rounds              Problem: Patient Education: Go to Patient Education Activity  Goal: Patient/Family Education  8/8/2021 2031 by Arielle Farfan  Outcome: Progressing Towards Goal  8/8/2021 2031 by Arielle Farfan  Outcome: Progressing Towards Goal

## 2021-08-09 NOTE — DISCHARGE INSTRUCTIONS
Patient Education        Learning About Peripheral Arterial Disease (PAD)  What is peripheral arterial disease? Peripheral arterial disease (PAD) is narrowing or blockage of arteries that causes poor blood flow to your arms and legs. PAD is most common in the legs. The most common cause of PAD is the buildup of plaque on the inside of arteries. Over time, plaque builds up in the walls of the arteries, including those that supply blood to your legs. If you have PAD, you're likely to have plaque in other arteries in your body. This raises your risk of a heart attack and stroke. Medicines and lifestyle changes may lower your risk of heart attack and stroke. They may also help if you have symptoms. In some cases, surgery or other treatment is needed. Peripheral arterial disease is also called peripheral vascular disease. What are the symptoms? Many people who have PAD don't have symptoms. If you have symptoms, they may include a tight, aching, or squeezing pain in your calf, thigh, or buttock. This pain is called intermittent claudication. It usually happens after you have walked a certain distance. The pain goes away if you stop walking. As PAD gets worse, you may have pain in your foot or toe when you aren't walking. Other symptoms may include weak or tired legs. You might have trouble walking or balancing. If PAD gets worse, you may have other symptoms caused by poor blood flow to your legs and feet. These symptoms aren't common. They may include cold or numb feet or toes, sores that are slow to heal, or leg or foot pain when you're at rest.  How can you prevent PAD? · Quit smoking. Quitting smoking is one of the best things you can do to help prevent PAD. If you need help quitting, talk to your doctor about stop-smoking programs and medicines. These can increase your chances of quitting for good. · Stay at a healthy weight.   · Manage other health problems, including diabetes, high blood pressure, and high cholesterol. · Be physically active. Try to do moderate activity at least 2½ hours a week. Or try to do vigorous activity at least 1¼ hours a week. You may want to walk or try other activities, such as running, swimming, cycling, or playing tennis or team sports. · Eat a variety of heart-healthy foods. ? Eat fruits, vegetables, whole grains, beans, and other high-fiber foods. ? Eat lean proteins, such as seafood, lean meats, beans, nuts, and soy products. ? Eat healthy fats, such as canola and olive oil. ? Choose foods that are low in saturated fat and avoid trans fat. ? Limit sodium and alcohol. ? Limit drinks and foods with added sugar. How is PAD treated? Treatment for PAD focuses on relieving symptoms and lowering your risk of heart attack and stroke. Making healthy lifestyle changes can help you lower this risk. · If you smoke, quit. Quitting is the best thing you can do when you have PAD. Medicines and counseling can help you quit for good. · Get regular exercise (if your doctor says it's safe). Try walking, swimming, or biking for at least 30 minutes on most, if not all, days of the week. If you have leg symptoms when you exercise, your doctor might recommend a specialized exercise program that may relieve symptoms. The goal is to be able to walk farther without pain. · Eat heart-healthy foods, such as vegetables, fruits, nuts, beans, fish, and whole grains. Limit foods that have a lot of salt, fat, and sugar. · Stay at a healthy weight. Lose weight if you need to. You may need medicines to help lower your risk of heart attack and stroke. These include medicine to prevent blood clots, improve cholesterol, or lower blood pressure. You also may take a medicine that can help ease pain while you are walking. People who have severe PAD may have bypass surgery or other procedures (such as angioplasty) to restore proper blood flow to the legs.   Follow-up care is a key part of your treatment and safety. Be sure to make and go to all appointments, and call your doctor if you are having problems. It's also a good idea to know your test results and keep a list of the medicines you take. Where can you learn more? Go to http://www.gray.com/  Enter D177 in the search box to learn more about \"Learning About Peripheral Arterial Disease (PAD). \"  Current as of: August 31, 2020               Content Version: 12.8  © 2006-2021 Healthwise, [employer]. Care instructions adapted under license by [employer] (which disclaims liability or warranty for this information). If you have questions about a medical condition or this instruction, always ask your healthcare professional. [address] any warranty or liability for your use of this information.

## 2021-08-09 NOTE — PROGRESS NOTES
Reason for Admission:  Critical lower limb ischemia ([facility] [address] [I70.229]                 RUR Score:    20%            Plan for utilizing home health:    Yes, clinicals and Home Health request form faxed to the [place]. Likelihood of Readmission:   Moderate                         Do you (patient/family) have any concerns for transition/discharge?  no    Transition of Care Plan:       Initial assessment completed with daughterSawyer. Cognitive status of patient: not assessed. Face sheet information confirmed:  yes. The patient designates daughterSawyer to participate in his discharge plan and to receive any needed information. This patient lives in a apartment with daughter. Patient is not able to navigate steps as needed. Prior to hospitalization, patient was considered to be independent with ADLs/IADLS : no . If not independent,  patient needs assist with : dressing, bathing, food preparation, cooking and toileting    Patient has a current ACP document on file: yes      Healthcare Decision Maker:   Primary Decision MakerMaciej Lissa - Daughter - [phone]    Secondary Decision Maker: Carlene Baez - Other Relative - [phone]    Click here to complete [employer] including selection of the Healthcare Decision Maker Relationship (ie \"Primary\")    The daughter will be available to transport patient home upon discharge. The patient already has Dandre Shahid, W/C, shower chair and hospital bed available in the home. Patient is currently active with home health. If active, agency name is through the [place]. Patient has not stayed in a skilled nursing facility or rehab. This patient is on dialysis :no    Currently, the discharge plan is Home with Home Health. The patient states that he can obtain his medications from the pharmacy, and take his medications as directed. Patient's current insurance is Medicare.     Per daughter, [facility] Carrillo is sending a home health nurse for wound care to start this week. Attempted to contact the [place] but no answer. Faxed clinicals and home health order form to [phone]. Care Management Interventions  PCP Verified by CM: Yes  Mode of Transport at Discharge: Self  Transition of Care Consult (CM Consult): Discharge Planning, [address]: No  Reason Outside [place]: Managed care specific requirement  Current Support Network:  Other (lives with his daughter)  Confirm Follow Up Transport: Family  Discharge Location  Discharge Placement: Home with home health        Angelito Escoto RN - Outcomes Manager  [phone]

## 2021-08-10 NOTE — PROGRESS NOTES
Care Transitions Initial Call    Call within 2 business days of discharge: Yes     Patient: Omero Lloyd Patient : 1945 MRN: [id number]    Last Discharge [address]       Complaint Diagnosis Description Type Department Provider    21 Foot Pain Critical lower limb ischemia [facility]) ED to Hosp-Admission (Discharged) (ADMIT) Daron Marcos MD; Palma Macias. .. Care Transitions Nurse ( CTN) attempted to contact patient via telephone call regarding hospital discharge and Transition of Care follow up. A female answered and indicated that patient is not available and that she is his caretaker. CTN asked if I were to call back would I speak with her and she indicated yes. No message left or patient PHI shared during phone call. Per Chart review, patient is noted to have full capacity. No POA paperwork noted to be on file at this time. CTN will attempt to review EMR again at a later time or date in consideration for further follow up. Please review capacity/PHI form as needed for additional verification as needed.

## 2021-08-10 NOTE — ROUTINE PROCESS
I have reviewed discharge instructions and medications with the patient's caregiver. The patient verbalized understanding. Patient armband removed and shredded     No questions or concerns verbalized.

## 2021-08-25 PROBLEM — N17.9 AKI (ACUTE KIDNEY INJURY) (HCC): Status: ACTIVE | Noted: 2021-01-01

## 2021-08-25 PROBLEM — N12 PYELONEPHRITIS: Status: ACTIVE | Noted: 2021-01-01

## 2021-08-25 PROBLEM — A41.9 SEPSIS (HCC): Status: ACTIVE | Noted: 2021-01-01

## 2021-08-25 NOTE — ED PROVIDER NOTES
EMERGENCY DEPARTMENT HISTORY AND PHYSICAL EXAM      Date: 8/25/2021  Patient Name: Danitza Adams    History of Presenting Illness     No chief complaint on file. History Provided By: Patient and Patient's Daughter    HPI: Danitza Adams, 77 y.o. male PMHx significant for htn, PVD, CVA, PAD, hyperlipidemia, ED, depression, seizure presents ambulatory to the ED. Pt provides limited history. Pt reports \"I hurt all over\". Hx dementia and CVA. Spoke with pt's daughter, who is pt's caregiver. She reports pt has decreased energy and speaking over the past few days. She reports noticing darker urine than normal in pt's eugene bag. She reports pt wears diapers with normal BM. She has not noticed BRB in stool or darker stool than usual. She denies vomiting. She denies recent trauma or injury. She reports tmax of 101 this am. Daughter also reports chronic bed sores to left foot and right knee. There are no other complaints, changes, or physical findings at this time. PCP: Other, MD Chasidy    No current facility-administered medications on file prior to encounter. Current Outpatient Medications on File Prior to Encounter   Medication Sig Dispense Refill    cilostazoL (PLETAL) 100 mg tablet Take 1 Tablet by mouth Before breakfast and dinner. 30 Tablet 0    gabapentin (NEURONTIN) 300 mg capsule Take 600 mg by mouth two (2) times a day.  glycopyrrolate (ROBINUL) 1 mg tablet Take 1 mg by mouth three (3) times daily.  ferrous gluconate 324 mg (37.5 mg iron) tablet Take 324 mg by mouth every other day.  atorvastatin (Lipitor) 10 mg tablet Take 10 mg by mouth daily.  diclofenac (VOLTAREN) 1 % gel Apply 4 g to affected area four (4) times daily. Bilateral knees and left hip for arthritis      polyethylene glycol (MIRALAX) 17 gram packet Take 1 Packet by mouth daily as needed for Constipation. 15 Packet 0    melatonin 3 mg tablet Take 3 mg by mouth At bedtime.       losartan (COZAAR) 25 mg tablet Take 50 mg by mouth daily.  acetaminophen (TYLENOL) 325 mg tablet Take 325 mg by mouth three (3) times daily as needed for Pain.  aspirin delayed-release 81 mg tablet Take 1 Tab by mouth daily.  30 Tab 0       Past History     Past Medical History:  Past Medical History:   Diagnosis Date    Alcohol use disorder     Benign hypertensive heart disease with systolic congestive heart failure, NYHA class 2 (HCC)     Cardiac pacemaker in situ     Cerebrovascular accident (CVA) due to occlusion of right carotid artery ([address] 8/14/2017    Acute Ischemic Stroke (acute infarctions involving right parieto-occipital area and occipitotemporal area and anterior and midportion of right corona radiata) with residual left hemiparesis, dysphagia and cognitive-communication deficits    Cognitive communication deficit 8/14/2017    Depression     On Trazodone    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) 8/14/2017    Dysphagia as late effect of cerebrovascular accident (CVA) 8/14/2017    Erectile dysfunction     On Sildenafil citrate    Hemiparesis affecting left side as late effect of cerebrovascular accident (CVA) ([address] 8/14/2017    Hyperlipidemia with target LDL less than 70 8/15/2017    Lipid profile (8/15/2017) showed TG 99, , HDL 81, LDL 80    Hypertension     Ischemic cardiomyopathy 8/15/2017    EF 40%    Occlusion of right internal carotid artery 8/15/2017    On chronic clopidogrel therapy     Osteoarthritis     On Etodolac and Oxycodone-Acetaminophen    Other ill-defined conditions(799.89)     PVD    Peripheral artery disease (HCC)     Seizure, late effect of stroke ([address] 8/15/2017    On Levetiracetam    Tobacco use disorder        Past Surgical History:  Past Surgical History:   Procedure Laterality Date    HX ANGIOPLASTY  12/05/2012    S/P Balloon angioplasty and stent of left common iliac artery (12/5/2012 - Dr. Mani Up)       Family History:  No family history on file.    Social History:  Social History     Tobacco Use    Smoking status: Never Smoker    Smokeless tobacco: Never Used   Substance Use Topics    Alcohol use: No    Drug use: Not on file       Allergies:  No Known Allergies      Review of Systems   Review of Systems   Constitutional: Negative for chills and fever. HENT: Negative for facial swelling. Eyes: Negative for photophobia and visual disturbance. Respiratory: Negative for shortness of breath. Cardiovascular: Negative for chest pain. Gastrointestinal: Negative for abdominal pain, nausea and vomiting. Genitourinary: Negative for flank pain. Dark urine   Skin: Negative for color change, pallor, rash and wound. Neurological: Negative for dizziness, weakness, light-headedness and headaches. All other systems reviewed and are negative. Physical Exam   Physical Exam  Vitals and nursing note reviewed. Exam conducted with a chaperone present. Constitutional:       General: He is not in acute distress. Appearance: He is well-developed. Comments: Pt in NAD   HENT:      Head: Normocephalic and atraumatic. Eyes:      Conjunctiva/sclera: Conjunctivae normal.   Cardiovascular:      Rate and Rhythm: Normal rate and regular rhythm. Heart sounds: Normal heart sounds. Pulmonary:      Effort: Pulmonary effort is normal. No respiratory distress. Breath sounds: Normal breath sounds. Comments: Lungs CTA  Not working to breathe  Abdominal:      General: Bowel sounds are normal. There is no distension. Palpations: Abdomen is soft. Comments: Abdomen soft, nontender  Nondistended  No guarding or rigidity   Genitourinary:     Comments: Light brown stool in rectum  No BRB on exam  Musculoskeletal:         General: Normal range of motion. Comments: Legs contracted b/l  Left foot: Superficial ulcer with no signs of secondary infection.    Right knee: Superficial ulcer with no signs of secondary infection Skin:     General: Skin is warm. Findings: No rash. Neurological:      Mental Status: He is alert and oriented to person, place, and time. Psychiatric:         Behavior: Behavior normal.         Diagnostic Study Results     Labs -     Recent Results (from the past 12 hour(s))   POC LACTIC ACID    Collection Time: 08/25/21 12:47 PM   Result Value Ref Range    Lactic Acid (POC) 1.94 0.40 - 6.47 mmol/L   METABOLIC PANEL, COMPREHENSIVE    Collection Time: 08/25/21 12:47 PM   Result Value Ref Range    Sodium 129 (L) 136 - 145 mmol/L    Potassium 5.3 3.5 - 5.5 mmol/L    Chloride 94 (L) 100 - 111 mmol/L    CO2 26 21 - 32 mmol/L    Anion gap 9 3.0 - 18 mmol/L    Glucose 284 (H) 74 - 99 mg/dL    BUN 59 (H) 7.0 - 18 MG/DL    Creatinine 1.37 (H) 0.6 - 1.3 MG/DL    BUN/Creatinine ratio 43 (H) 12 - 20      GFR est AA >60 >60 ml/min/1.73m2    GFR est non-AA 51 (L) >60 ml/min/1.73m2    Calcium 8.9 8.5 - 10.1 MG/DL    Bilirubin, total 0.3 0.2 - 1.0 MG/DL    ALT (SGPT) 275 (H) 16 - 61 U/L    AST (SGOT) 254 (H) 10 - 38 U/L    Alk. phosphatase 350 (H) 45 - 117 U/L    Protein, total 7.8 6.4 - 8.2 g/dL    Albumin 1.8 (L) 3.4 - 5.0 g/dL    Globulin 6.0 (H) 2.0 - 4.0 g/dL    A-G Ratio 0.3 (L) 0.8 - 1.7     CBC WITH AUTOMATED DIFF    Collection Time: 08/25/21 12:47 PM   Result Value Ref Range    WBC 17.2 (H) 4.6 - 13.2 K/uL    RBC 3.41 (L) 4.35 - 5.65 M/uL    HGB 7.8 (L) 13.0 - 16.0 g/dL    HCT 24.5 (L) 36.0 - 48.0 %    MCV 71.8 (L) 78.0 - 100.0 FL    MCH 22.9 (L) 24.0 - 34.0 PG    MCHC 31.8 31.0 - 37.0 g/dL    RDW 14.7 (H) 11.6 - 14.5 %    PLATELET 941 (H) 276 - 420 K/uL    MPV 10.3 9.2 - 11.8 FL    NEUTROPHILS 83 (H) 40 - 73 %    LYMPHOCYTES 7 (L) 21 - 52 %    MONOCYTES 7 3 - 10 %    EOSINOPHILS 1 0 - 5 %    BASOPHILS 0 0 - 2 %    ABS. NEUTROPHILS 14.3 (H) 1.8 - 8.0 K/UL    ABS. LYMPHOCYTES 1.3 0.9 - 3.6 K/UL    ABS. MONOCYTES 1.2 0.05 - 1.2 K/UL    ABS. EOSINOPHILS 0.1 0.0 - 0.4 K/UL    ABS.  BASOPHILS 0.0 0.0 - 0.1 K/UL    DF AUTOMATED     EKG, 12 LEAD, INITIAL    Collection Time: 08/25/21  1:36 PM   Result Value Ref Range    Ventricular Rate 101 BPM    Atrial Rate 92 BPM    QRS Duration 146 ms    Q-T Interval 398 ms    QTC Calculation (Bezet) 516 ms    Calculated R Axis -67 degrees    Calculated T Axis 122 degrees    Diagnosis       Ventricular-paced rhythm  Abnormal ECG  When compared with ECG of 06-AUG-2021 23:24,  Vent. rate has increased BY  13 BPM     TYPE & SCREEN    Collection Time: 08/25/21  3:41 PM   Result Value Ref Range    Crossmatch Expiration 08/28/2021,2359     ABO/Rh(D) A POSITIVE     Antibody screen NEG    OCCULT BLOOD, STOOL    Collection Time: 08/25/21  5:05 PM   Result Value Ref Range    Occult blood, stool Negative NEG     URINALYSIS W/ RFLX MICROSCOPIC    Collection Time: 08/25/21  5:27 PM   Result Value Ref Range    Color YELLOW      Appearance CLEAR      Specific gravity 1.016 1.005 - 1.030      pH (UA) 7.5 5.0 - 8.0      Protein TRACE (A) NEG mg/dL    Glucose Negative NEG mg/dL    Ketone Negative NEG mg/dL    Bilirubin Negative NEG      Blood TRACE (A) NEG      Urobilinogen 1.0 0.2 - 1.0 EU/dL    Nitrites Positive (A) NEG      Leukocyte Esterase LARGE (A) NEG     URINE MICROSCOPIC ONLY    Collection Time: 08/25/21  5:27 PM   Result Value Ref Range    WBC 11 to 15 0 - 4 /hpf    RBC Negative 0 - 5 /hpf    Epithelial cells Negative 0 - 5 /lpf    Bacteria 1+ (A) NEG /hpf       Radiologic Studies -   XR CHEST PORT   Final Result      1. Mildly increased interstitial markings bilaterally, which may be due to   infectious process. No confluent airspace opacity. 2.  Right basilar region with small area of atelectasis/scarring. Possible right   lower lung collapse secondary to atelectasis versus artifact from patient   positioning. Attention on follow-up.       CT ABD PELV W CONT    (Results Pending)     CT Results  (Last 48 hours)    None        CXR Results  (Last 48 hours)               08/25/21 1333  XR CHEST PORT Final result    Impression:      1. Mildly increased interstitial markings bilaterally, which may be due to   infectious process. No confluent airspace opacity. 2.  Right basilar region with small area of atelectasis/scarring. Possible right   lower lung collapse secondary to atelectasis versus artifact from patient   positioning. Attention on follow-up. Narrative:  EXAM: XR CHEST PORT       INDICATION: 77 years Male. meets SIRS criteria. ADDITIONAL HISTORY: History of ischemic cardiomyopathy, PAD, HLD, HTN, CVA with   sequela of seizure, alcohol and tobacco use disorder. TECHNIQUE: Frontal view of the chest.       COMPARISON: 3/12/2021 plus multiple priors. FINDINGS:       The patient is tilted and mildly rotated to the right which limits evaluation. Left-sided chest subclavian approach dual-chamber pacemaker with intact leads   projecting at the right atrium and ventricle. The cardiac silhouette is within normal limits in size. No confluent airspace   opacity is appreciated. Mildly increased interstitial markings bilaterally. Right basilar region with horizontal linear opacity paralleling the diaphragm,   likely atelectasis versus scarring. There is asymmetric hypoinflation, right   greater than left, and right-sided rib crowding with disappearance of the right   interlobar artery, which may be secondary to collapsed lower lung secondary to   atelectasis versus artifact from patient positioning. No definite evidence of pleural effusion or pneumothorax. No acute osseous abnormality appreciated. Medical Decision Making   I am the first provider for this patient. I reviewed the vital signs, available nursing notes, past medical history, past surgical history, family history and social history. Vital Signs-Reviewed the patient's vital signs.   Patient Vitals for the past 12 hrs:   Temp Pulse Resp BP SpO2   08/25/21 1800 (!) 101.2 °F (38.4 °C) 98  127/60 97 %   08/25/21 1724  98 18 (!) 140/63 97 %   08/25/21 1512 99.3 °F (37.4 °C) 89 18 138/74 98 %   08/25/21 1353 98.8 °F (37.1 °C) 87 18 (!) 142/52 100 %   08/25/21 1315  93 18 (!) 98/51 97 %   08/25/21 1233 (!) 101.6 °F (38.7 °C) 86 16 (!) 96/51 96 %         EKG interpretation: (Preliminary)  Rhythm: ventricular pacing; and regular . Rate (approx.): 101; Axis: normal; WV interval: normal; QRS interval: normal ; ST/T wave: normal; Other findings: normal.    No acute ischemic changes. Records Reviewed: Nursing Notes, Old Medical Records, Previous Radiology Studies and Previous Laboratory Studies    Provider Notes (Medical Decision Making):   DDx: Sepsis, Foot infection, UTI, PNA, Pyelo, RAQUEL, Electrolyte abnormality    76 yo M who presents with decreased energy, dark urine and fever x 1 day. On exam, no abdominal tenderness. Hx bedsores and left foot ulcer with severe PAD. No signs of infection on exam. UA nitrite positive. Hx ESBL. Treated with zosyn based on previous urine culture. Patient febrile with leukocytosis and tachycardia. Sepsis bundle initiated and fluids ordered. Patient admitted for further management. ED Course:   Initial assessment performed. The patients presenting problems have been discussed, and they are in agreement with the care plan formulated and outlined with them. I have encouraged them to ask questions as they arise throughout their visit. 1856: Spoke with Dr Diego Parker, consult hospitalist. Discussed pt's presentation, PMHx, UA findings and abx ordered. He agreed to admit pt to inpatient hospitalist services. Disposition:  Admit    PLAN:  1. Current Discharge Medication List        2. Follow-up Information    None       Return to ED if worse     Diagnosis     Clinical Impression:   1. Sepsis without acute organ dysfunction, due to unspecified organism ([facility]    2. RAQUEL (acute kidney injury) ([facility]    3.  Pyelonephritis        Attestations:    Marilee Fofana, PA    Please note that this dictation was completed with [employer], the computer voice recognition software. Quite often unanticipated grammatical, syntax, homophones, and other interpretive errors are inadvertently transcribed by the computer software. Please disregard these errors. Please excuse any errors that have escaped final proofreading. Thank you.

## 2021-08-25 NOTE — H&P
History & Physical      Patient: Deep Gr MRN: [id number]  CSN: [id number]    YOB: 1945  Age: 77 y.o. Sex: male      DOA: 8/25/2021    Chief Complaint: No chief complaint on file. HPI:     Deep Gr is a 77 y.o. male with PMHx of ESBL UTI, severe BLE PAD with chronically occluded right iliac and common femoral arteries, bilateral SFA occlusions, chronic left foot ulcer, stroke with residual left hemiparesis, cognitive/aphasia deficits and dysphagia with PEG tube in place, dementia, seizure disorder, ischemic cardiomyopathy with EF 40% by echo in 2017, HTN, HLD, and BLE contractures who presented to the ED with reported complaints of pain all over. Patient was reportedly able to tell the ED provider that he hurt all over. When I saw him a little later, he was essentially nonverbal and only able to grunt nonspecific sounds. Discussed with his daughter over the phone who reported that at baseline he does not talk much, but that he does talk with slurred speech and you can understand some things that he says. In addition she reports that he is usually able to help when she moved him from the bed to the wheelchair and so forth. He is exclusively n.p.o. and PEG tube fed. His daughter reports that over the past couple days he has become increasingly weak and less responsive. She also reports that he had fever to 101 this morning and that she's noticed darker urine in his chronic Zhang bag. Of note, Mr. Say Napoles had a recent admission to the hospital from 8/6-8/9 where an arterial ultrasound showed severe bilateral PAD with occluded right iliac, right common femoral, and bilateral SFA arteries. Case was discussed with podiatry and vascular surgery who felt no acute interventions were required and that he could be followed as an outpatient. He was discharged home with home health.     In the ED, really had fever to 101.6, was initially hypotensive with BP in the 90s/50s that later improved, and had other vitals that were reassuring. Labs were notable for lactic acid WNL, WBC 17.2 with a left shift, Hgb 7.8 with MCV 71.8, platelets 377, sodium 129, glucose 284, BUN 59, creatinine 1.37 (baseline 0.8), albumin 1.8, , , and alk phos 350. UA had trace blood, positive nitrites, large leuk esterase, pyuria, and 1+ bacteria. CXR showed mildly increased interstitial markings bilaterally, possibly due to infectious process with no confluent airspace opacity, with possible right lower lung collapse secondary to atelectasis versus artifact from patient positioning. CT abdomen pelvis was done and the results are pending. Mr. Shanice Rosenthal was then started on IVF and IV ABX. He is now admitted for sepsis likely secondary to cystitis/pyelonephritis.       Past Medical History:   Diagnosis Date    Alcohol use disorder     Benign hypertensive heart disease with systolic congestive heart failure, NYHA class 2 (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) 8/14/2017    Dysphagia as late effect of cerebrovascular accident (CVA) 8/14/2017    Erectile dysfunction     On Sildenafil citrate    Hemiparesis affecting left side as late effect of cerebrovascular accident (CVA) ([facility] [address] 8/14/2017    Hyperlipidemia with target LDL less than 70 8/15/2017    Lipid profile (8/15/2017) showed TG 99, , HDL 81, LDL 80    Hypertension     Ischemic cardiomyopathy 8/15/2017    EF 40%    Occlusion of right internal carotid artery 8/15/2017    On chronic clopidogrel therapy     Osteoarthritis     On Etodolac and Oxycodone-Acetaminophen    Other ill-defined conditions(799.89)     PVD    Peripheral artery disease (HCC)     Seizure, late effect of stroke ([facility] [address] 8/15/2017    On Levetiracetam    Tobacco use disorder        Past Surgical History:   Procedure Laterality Date    HX ANGIOPLASTY  12/05/2012    S/P Balloon angioplasty and stent of left common iliac artery (12/5/2012 - Dr. Cid Current)       No family history on file. Social History     Socioeconomic History    Marital status: SINGLE     Spouse name: Not on file    Number of children: Not on file    Years of education: Not on file    Highest education level: Not on file   Tobacco Use    Smoking status: Never Smoker    Smokeless tobacco: Never Used   Substance and Sexual Activity    Alcohol use: No     Social Determinants of Health     Financial Resource Strain:     Difficulty of Paying Living Expenses:    Food Insecurity:     Worried About Running Out of Food in the Last Year:     [address] in the Last Year:    Transportation Needs:     Lack of Transportation (Medical):  Lack of Transportation (Non-Medical):    Physical Activity:     Days of Exercise per Week:     Minutes of Exercise per Session:    Stress:     Feeling of Stress :    Social Connections:     Frequency of Communication with Friends and Family:     Frequency of Social Gatherings with Friends and Family:     Attends Hindu Services:     Active Member of Clubs or Organizations:     Attends Club or Organization Meetings:     Marital Status:        Prior to Admission medications    Medication Sig Start Date End Date Taking? Authorizing Provider   cilostazoL (PLETAL) 100 mg tablet Take 1 Tablet by mouth Before breakfast and dinner. 8/9/21   Saeed Macias NP   gabapentin (NEURONTIN) 300 mg capsule Take 600 mg by mouth two (2) times a day.     Provider, Historical   glycopyrrolate (ROBINUL) 1 mg tablet Take 1 mg by mouth three (3) times daily. Provider, Historical   ferrous gluconate 324 mg (37.5 mg iron) tablet Take 324 mg by mouth every other day. Provider, Historical   atorvastatin (Lipitor) 10 mg tablet Take 10 mg by mouth daily. Provider, Historical   diclofenac (VOLTAREN) 1 % gel Apply 4 g to affected area four (4) times daily. Bilateral knees and left hip for arthritis    Provider, Historical   polyethylene glycol (MIRALAX) 17 gram packet Take 1 Packet by mouth daily as needed for Constipation. 3/17/21   Xi Tracy MD   melatonin 3 mg tablet Take 3 mg by mouth At bedtime. Provider, Historical   losartan (COZAAR) 25 mg tablet Take 50 mg by mouth daily. Provider, Historical   acetaminophen (TYLENOL) 325 mg tablet Take 325 mg by mouth three (3) times daily as needed for Pain. Provider, Historical   aspirin delayed-release 81 mg tablet Take 1 Tab by mouth daily. 17   Los Luna MD       No Known Allergies      Review of Systems  Unable to obtain ROS as pt is non-verbal and has baseline dementia. Physical Exam:     Physical Exam:  Visit Vitals  /60   Pulse 98   Temp (!) 101.2 °F (38.4 °C)   Resp 18   Ht 6' 0.01\" (1.829 m) Comment: as of 21   Wt 62.6 kg (138 lb 0.1 oz) Comment: as 21   SpO2 97%   BMI 18.71 kg/m²           Temp (24hrs), Av.2 °F (37.9 °C), Min:98.8 °F (37.1 °C), Max:101.6 °F (38.7 °C)    No intake/output data recorded.  07 - 1900  In: 3851 [I.V.:2428]  Out: -     General:   Lethargic, nonverbal, grunts nonspecific noises              Head: Normocephalic, without obvious abnormality, atraumatic. Eyes:  Conjunctivae/corneas clear. PERRL, EOMs intact. Nose: Nares normal. No drainage or sinus tenderness. Neck: Supple, symmetrical, trachea midline, no JVD. Lungs:   Clear to auscultation bilaterally. Heart:  Regular rate and rhythm, S1, S2 normal.     Abdomen: Soft, non-tender. Bowel sounds normal.  PEG tube in place. Extremities: BLE contractures, L knee ulcer with clean bandages and L medial ankle with ulcer with bandages in place    Pulses: 2+ and symmetric all extremities.    Skin:  No rashes or lesions   Neurologic:  Non-verbal, grunts nonspecific noises       Labs Reviewed:    BMP:   Lab Results   Component Value Date/Time     (L) 08/25/2021 12:47 PM    K 5.3 08/25/2021 12:47 PM    CL 94 (L) 08/25/2021 12:47 PM    CO2 26 08/25/2021 12:47 PM    AGAP 9 08/25/2021 12:47 PM     (H) 08/25/2021 12:47 PM    BUN 59 (H) 08/25/2021 12:47 PM    CREA 1.37 (H) 08/25/2021 12:47 PM    GFRAA >60 08/25/2021 12:47 PM    GFRNA 51 (L) 08/25/2021 12:47 PM     CMP:   Lab Results   Component Value Date/Time     (L) 08/25/2021 12:47 PM    K 5.3 08/25/2021 12:47 PM    CL 94 (L) 08/25/2021 12:47 PM    CO2 26 08/25/2021 12:47 PM    AGAP 9 08/25/2021 12:47 PM     (H) 08/25/2021 12:47 PM    BUN 59 (H) 08/25/2021 12:47 PM    CREA 1.37 (H) 08/25/2021 12:47 PM    GFRAA >60 08/25/2021 12:47 PM    GFRNA 51 (L) 08/25/2021 12:47 PM    CA 8.9 08/25/2021 12:47 PM    ALB 1.8 (L) 08/25/2021 12:47 PM    TP 7.8 08/25/2021 12:47 PM    GLOB 6.0 (H) 08/25/2021 12:47 PM    AGRAT 0.3 (L) 08/25/2021 12:47 PM     (H) 08/25/2021 12:47 PM     CBC:   Lab Results   Component Value Date/Time    WBC 17.2 (H) 08/25/2021 12:47 PM    HGB 7.8 (L) 08/25/2021 12:47 PM    HCT 24.5 (L) 08/25/2021 12:47 PM     (H) 08/25/2021 12:47 PM     All Cardiac Markers in the last 24 hours: No results found for: CPK, CK, CKMMB, CKMB, RCK3, CKMBT, CKNDX, CKND1, SHUBHAM, TROPT, TROIQ, VERONIKA, TROPT, TNIPOC, BNP, BNPP  Recent Glucose Results:   Lab Results   Component Value Date/Time     (H) 08/25/2021 12:47 PM     COAGS: No results found for: APTT, PTP, INR, INREXT, INREXT      Procedures/imaging: see electronic medical records for all procedures/Xrays and details which were not copied into this note but were reviewed prior to creation of Plan      Assessment & Plan     Phill Nova is a 77 y.o. male with PMHx of ESBL UTI, severe BLE PAD with chronically occluded right iliac and common femoral arteries, bilateral SFA occlusions, chronic left foot and left knee ulcers, stroke with residual left hemiparesis, cognitive/aphasia deficits and dysphagia with PEG tube in place, dementia, seizure disorder, ischemic cardiomyopathy with EF 40% by echo in 2017, HTN, HLD, and BLE contractures who is now admitted for sepsis likely secondary to cystitis/pyelonephritis. 1. SepsisPOA, with fever, HR greater than 90, RAQUEL, hyperbilirubinemia, transaminitis, and acute metabolic encephalopathy  2. Acute cystitis/pyelonephritis  3. Acute metabolic encephalopathy  4. RAQUEL  5. Hyperbilirubinemia  6. Transaminitis  7. Hx ESBL UTI  8. Severe BLE PAD with chronically occluded right iliac and common femoral arteries and bilateral SFA occlusions  9. Chronic left foot and left knee ulcers  10. Hx stroke with residual left hemiparesis, dysarthria, cognitive deficits and dysphagia with PEG tube in place  11. Seizure disorder  12. Ischemic cardiomyopathywith EF 40% by echo in 2017  13. HTN  14. HLD  15. BLE contractures  16. Dementia    ID consult in a.m. Follow-up CT abdomen pelvis  Continue IV ABXZosyn and vancomycin  Follow-up urine culture and blood culture  Start gentle IVFNS at 50 mL/h  Continue home medsaspirin, statin, cilostazol, iron, losartan, melatonin, Percocet as needed  Follow-up CBC, CMP, mag  Aspiration precautions  Wound care  PT, OT, nutrition consults      DVT prophylaxis: Lovenox  Diet: N.p.o., PEG tube feeds per nutrition    Contact: Teagan Massey (daughter)       [phone]   Code Status: DNR    Disposition: Admit to telemetry; >2 nights       Discussed with patient's daughter over the phone about hospital admission and my plan of care, who understood and agreed with my plan of care.       Jamila Cox DO   8/25/2021       Dragon medical dictation software was used for portions of this report. Unintended errors may occur.

## 2021-08-25 NOTE — Clinical Note
Status[de-identified] INPATIENT [101]   Type of Bed: Medical [8]   Cardiac Monitoring Required?: Yes   Inpatient Hospitalization Certified Necessary for the Following Reasons: 3.  Patient receiving treatment that can only be provided in an inpatient setting (further clarification in H&P documentation)   Admitting Diagnosis: Pyelonephritis [158894]   Admitting Diagnosis: RAQUEL (acute kidney injury) [facility]) [[id number]]   Admitting Diagnosis: Sepsis [facility] [[id number]]   Admitting Physician: Meir Anne [[id number]]   Attending Physician: Meir Anne [[id number]]   Estimated Length of Stay: 3-4 Midnights   Discharge Plan[de-identified] Home with Office Follow-up

## 2021-08-26 NOTE — WOUND CARE
Physical Exam   Room Hallway B in ED: Focused wound assess    Right forearm trauma wound 4x4cm blisters on edges POA. Right lateral heel deep tissue pressure injury 2x2cm POA. Left heel deep tissue pressure injury 2x2cm POA. Left medial foot arterial ulcer with likely gangrene [id number] POA. Left medial lower leg stage 2 pressure injury from legs crossing 5x3cm POA. Right hip deep tissue pressure injury 1x1.5cm POA. Right anterior thigh purple discoloration trauma wound 8x4cm POA. Right medial foot hallux area scarring with scales & dark periwound tissue POA. Sacrum deep tissue pressure injury 3x2cm POA. Right anterior knee unstageable pressure injury 9x3cm POA. Topical treatment orders per nursing unit skin care protocol. Unit nursing staff to apply heel prevention boots, use while in bed. Velcro should be on the loosest section. Unit nursing staff to turn & reposition q2hrs, left & right sides, on back only for meals or treatments. Lift team consult: turn & reposition q2hrs, left & right sides, on back only for meals or treatments. Wound Care Orders for right arm, heels, left lower leg, right hip, right thigh, sacrum, right knee: Unit staff nurses to cleanse wound with MicroKlenz antimicrobial wound spray from par room, then apply Optifoam Gentle Silicone dressing, change every 3days & prn soilage. Discussed with Dr. Raz Hurley via phone re: odor from right knee & extensive gangrene from left medial foot. Will turn over care to nursing staff at this time.   Justin ARCOSN, RN, [facility], [address]

## 2021-08-26 NOTE — PROGRESS NOTES
Patient is alert to self and only appears to be in pain when repositioned. Incontinence care completed and condom catheter applied. Admission assessment completed and IV fluids started. Bed in lowest locked position, call light within reach, side rails x3. Problem: Risk for Spread of Infection  Goal: Prevent transmission of infectious organism to others  Description: Prevent the transmission of infectious organisms to other patients, staff members, and visitors. Outcome: Progressing Towards Goal     Problem: Patient Education:  Go to Education Activity  Goal: Patient/Family Education  Outcome: Progressing Towards Goal     Problem: Patient Education: Go to Patient Education Activity  Goal: Patient/Family Education  Outcome: Progressing Towards Goal     Problem: Nutrition Deficit  Goal: *Optimize nutritional status  Outcome: Progressing Towards Goal     Problem: Falls - Risk of  Goal: *Absence of Falls  Description: Document Joanna Hill Fall Risk and appropriate interventions in the flowsheet.   Outcome: Progressing Towards Goal  Note: Fall Risk Interventions:  Mobility Interventions: Bed/chair exit alarm, Utilize walker, cane, or other assistive device    Mentation Interventions: Bed/chair exit alarm, Adequate sleep, hydration, pain control, Door open when patient unattended, Room close to nurse's station    Medication Interventions: Bed/chair exit alarm    Elimination Interventions: Bed/chair exit alarm, Toileting schedule/hourly rounds    History of Falls Interventions: Bed/chair exit alarm, Door open when patient unattended, Room close to nurse's station         Problem: Patient Education: Go to Patient Education Activity  Goal: Patient/Family Education  Outcome: Progressing Towards Goal     Problem: Impaired Skin Integrity/Pressure Injury Treatment  Goal: *Improvement of Existing Pressure Injury  Outcome: Progressing Towards Goal  Goal: *Prevention of pressure injury  Description: Document Alton Scale and appropriate interventions in the flowsheet. Outcome: Progressing Towards Goal  Note: Pressure Injury Interventions:  Sensory Interventions: Assess changes in LOC, Avoid rigorous massage over bony prominences, Float heels, Keep linens dry and wrinkle-free, Maintain/enhance activity level, Minimize linen layers, Monitor skin under medical devices, Pressure redistribution bed/mattress (bed type), Turn and reposition approx. every two hours (pillows and wedges if needed)    Moisture Interventions: Internal/External urinary devices, Check for incontinence Q2 hours and as needed, Absorbent underpads, Apply protective barrier, creams and emollients    Activity Interventions: Pressure redistribution bed/mattress(bed type)    Mobility Interventions: Float heels, Turn and reposition approx. every two hours(pillow and wedges)    Nutrition Interventions: Document food/fluid/supplement intake    Friction and Shear Interventions: Apply protective barrier, creams and emollients, Foam dressings/transparent film/skin sealants, Lift sheet, Lift team/patient mobility team, Transferring/repositioning devices                Problem: Patient Education: Go to Patient Education Activity  Goal: Patient/Family Education  Outcome: Progressing Towards Goal     Problem: Pain  Goal: *Control of Pain  Outcome: Progressing Towards Goal     Problem: Patient Education: Go to Patient Education Activity  Goal: Patient/Family Education  Outcome: Progressing Towards Goal     Problem: Impaired Skin Integrity/Pressure Injury Treatment  Goal: *Improvement of Existing Pressure Injury  Outcome: Progressing Towards Goal  Goal: *Prevention of pressure injury  Description: Document Alton Scale and appropriate interventions in the flowsheet.   Outcome: Progressing Towards Goal  Note: Pressure Injury Interventions:  Sensory Interventions: Assess changes in LOC, Avoid rigorous massage over bony prominences, Float heels, Keep linens dry and wrinkle-free, Maintain/enhance activity level, Minimize linen layers, Monitor skin under medical devices, Pressure redistribution bed/mattress (bed type), Turn and reposition approx. every two hours (pillows and wedges if needed)    Moisture Interventions: Internal/External urinary devices, Check for incontinence Q2 hours and as needed, Absorbent underpads, Apply protective barrier, creams and emollients    Activity Interventions: Pressure redistribution bed/mattress(bed type)    Mobility Interventions: Float heels, Turn and reposition approx.  every two hours(pillow and wedges)    Nutrition Interventions: Document food/fluid/supplement intake    Friction and Shear Interventions: Apply protective barrier, creams and emollients, Foam dressings/transparent film/skin sealants, Lift sheet, Lift team/patient mobility team, Transferring/repositioning devices                Problem: Patient Education: Go to Patient Education Activity  Goal: Patient/Family Education  Outcome: Progressing Towards Goal

## 2021-08-26 NOTE — CONSULTS
Vascular Surgery Consult      Patient: Chris Wyatt MRN: [id number]  CSN: [id number]      YOB: 1945    Age: 77 y.o. Sex: male      DOA: 8/25/2021       HPI:     Chris Wyatt is a 77 y.o. male who has a PMHx of ESBL UTI, severe BLE PAD with chronically occluded right iliac and common femoral arteries, bilateral SFA occlusions, chronic left foot ulcer, stroke with residual left hemiparesis, cognitive/aphasia deficits and dysphagia with PEG tube in place, dementia, seizure disorder, ischemic cardiomyopathy with EF 40% by echo in 2017, HTN, HLD, and BLE contractures who presented to the ED with reported complaints of pain all over. His daughter reports that over the past couple days he has become increasingly weak and less responsive. She also reports that he had fever to 101 this morning and that she's noticed darker urine in his chronic Zhang bag. In the ED on arrival yesterday he had a fever to 101.6, was initially hypotensive with BP in the 90s/50s that later improved, and had other vitals that were reassuring. Labs were notable for lactic acid WNL, WBC 17.2 with a left shift, Hgb 7.8. Of note, Mr. Florencio Rocha had a recent admission to the hospital from 8/6-8/9 where an arterial ultrasound showed severe bilateral PAD with occluded right iliac, right common femoral, LEFT iliac disease, patent fem-fem bypass, and occluded bilateral SFAs. At that time the case was discussed with podiatry and vascular surgery who felt no acute interventions were required and that he could be followed as an outpatient. He was discharged home with home health.     Past Medical History:   Diagnosis Date    Alcohol use disorder     Benign hypertensive heart disease with systolic congestive heart failure, NYHA class 2 ([facility])     Cardiac pacemaker in situ     Cerebrovascular accident (CVA) due to occlusion of right carotid artery ([facility] [address] 8/14/2017    Acute Ischemic Stroke (acute infarctions involving right parieto-occipital area and occipitotemporal area and anterior and midportion of right corona radiata) with residual left hemiparesis, dysphagia and cognitive-communication deficits    Cognitive communication deficit 8/14/2017    Depression     On Trazodone    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) 8/14/2017    Dysphagia as late effect of cerebrovascular accident (CVA) 8/14/2017    Erectile dysfunction     On Sildenafil citrate    Hemiparesis affecting left side as late effect of cerebrovascular accident (CVA) ([facility] [address] 8/14/2017    Hyperlipidemia with target LDL less than 70 8/15/2017    Lipid profile (8/15/2017) showed TG 99, , HDL 81, LDL 80    Hypertension     Ischemic cardiomyopathy 8/15/2017    EF 40%    Occlusion of right internal carotid artery 8/15/2017    On chronic clopidogrel therapy     Osteoarthritis     On Etodolac and Oxycodone-Acetaminophen    Other ill-defined conditions(799.89)     PVD    Peripheral artery disease (HCC)     Seizure, late effect of stroke ([facility] [address] 8/15/2017    On Levetiracetam    Tobacco use disorder        Past Surgical History:   Procedure Laterality Date    HX ANGIOPLASTY  12/05/2012    S/P Balloon angioplasty and stent of left common iliac artery (12/5/2012 - Dr. Devin Carpio)       No family history on file. Social History     Socioeconomic History    Marital status: SINGLE     Spouse name: Not on file    Number of children: Not on file    Years of education: Not on file    Highest education level: Not on file   Tobacco Use    Smoking status: Never Smoker    Smokeless tobacco: Never Used   Substance and Sexual Activity    Alcohol use: No     Social Determinants of Health     Financial Resource Strain:     Difficulty of Paying Living Expenses:    Food Insecurity:     Worried About Running Out of Food in the Last Year:     [address] in the Last Year:    Transportation Needs:     Lack of Transportation (Medical):      Lack of Transportation (Non-Medical):    Physical Activity:     Days of Exercise per Week:     Minutes of Exercise per Session:    Stress:     Feeling of Stress :    Social Connections:     Frequency of Communication with Friends and Family:     Frequency of Social Gatherings with Friends and Family:     Attends [employer] Services:     Active Member of Clubs or Organizations:     Attends Club or Organization Meetings:     Marital Status:        Prior to Admission medications    Medication Sig Start Date End Date Taking? Authorizing Provider   oxyCODONE-acetaminophen (Percocet) 5-325 mg per tablet Take 1 Tablet by mouth every eight (8) hours as needed for Pain. Yes Provider, Historical   cilostazoL (PLETAL) 100 mg tablet Take 1 Tablet by mouth Before breakfast and dinner. 8/9/21  Yes Michela Arnold NP   gabapentin (NEURONTIN) 300 mg capsule Take 600 mg by mouth two (2) times a day. Yes Provider, Historical   ferrous gluconate 324 mg (37.5 mg iron) tablet Take 324 mg by mouth every other day. Yes Provider, Historical   atorvastatin (Lipitor) 10 mg tablet Take 10 mg by mouth daily. Yes Provider, Historical   diclofenac (VOLTAREN) 1 % gel Apply 4 g to affected area four (4) times daily. Bilateral knees and left hip for arthritis   Yes Provider, Historical   polyethylene glycol (MIRALAX) 17 gram packet Take 1 Packet by mouth daily as needed for Constipation. 3/17/21  Yes Juan Diego Camacho MD   melatonin 3 mg tablet Take 3 mg by mouth At bedtime. Yes Provider, Historical   losartan (COZAAR) 25 mg tablet Take 50 mg by mouth daily. Yes Provider, Historical   acetaminophen (TYLENOL) 325 mg tablet Take 325 mg by mouth three (3) times daily as needed for Pain. Yes Provider, Historical   aspirin delayed-release 81 mg tablet Take 1 Tab by mouth daily. 9/23/17  Yes Farooq Menjivar MD   naloxone (Narcan) 4 mg/actuation nasal spray 1 [place] by IntraNASal route once as needed for Overdose. Use 1 spray intranasally, then discard. Repeat with new spray every 2 min as needed for opioid overdose symptoms, alternating nostrils. Provider, Historical       No Known Allergies    Review of Systems  Review of Systems - unable to obtain due to mental status. Physical Exam:      Visit Vitals  BP (!) 126/53   Pulse 84   Temp 99 °F (37.2 °C)   Resp 18   Ht 6' (1.829 m)   Wt 138 lb 0.1 oz (62.6 kg)   SpO2 97%   BMI 18.72 kg/m²       Physical Exam:  Physical Exam  Vitals reviewed. Constitutional:       Appearance: He is underweight. He is ill-appearing. He is not diaphoretic. Cardiovascular:      Pulses:           Dorsalis pedis pulses are 0 on the right side and 0 on the left side. Posterior tibial pulses are 0 on the right side and 0 on the left side. Comments: No detectable pedal signals in the LEFT foot. Unable to perform a full vascular exam, patient in crowded hallway in a fetal position with contractures. Pulmonary:      Effort: Pulmonary effort is normal. No respiratory distress. Musculoskeletal:      Right lower leg: No edema. Left lower leg: No edema. Skin:     General: Skin is warm. Comments: Large area of necrosis to the dorsum of the LEFT foot associated with a foul odor. Neurological:      Mental Status: He is lethargic and disoriented. Motor: Weakness present.       Comments: Unable to assess motor function           Data Review:    CBC:   Lab Results   Component Value Date/Time    WBC 14.8 (H) 08/26/2021 05:20 AM    RBC 3.08 (L) 08/26/2021 05:20 AM    HGB 7.0 (L) 08/26/2021 05:20 AM    HCT 22.8 (L) 08/26/2021 05:20 AM    PLATELET 379 (H) 40/50/0829 05:20 AM      BMP:   Lab Results   Component Value Date/Time    Glucose 132 (H) 08/26/2021 05:20 AM    Sodium 132 (L) 08/26/2021 05:20 AM    Potassium 5.4 08/26/2021 05:20 AM    Chloride 102 08/26/2021 05:20 AM    CO2 24 08/26/2021 05:20 AM    BUN 41 (H) 08/26/2021 05:20 AM    Creatinine 1.04 08/26/2021 05:20 AM    Calcium 8.4 (L) 08/26/2021 05:20 AM EXAM: CT Scan Of Abdomen And Pelvis With CT Angiography Abdominal Aorta And Bilateral Runoff 8/7/21    IMPRESSION     1. No unexpected finding within the abdomen or pelvis     2. Aorta and common iliac inflow is adequate     3. Right pelvis:  Occluded iliacs and common femoral     4. Left Iliacs;  left external and proximal common femoral showed tandem severe  stenoses. These are above the origin of the femoral-femoral bypass     5. Right Runoff:  Patent femoral-femoral bypass. Occluded SFA. Popliteal reconstitutes proximally and occluded distally. Two-vessel runoff-right anterior tibial and peroneal     6. Left Runoff:  Changes and stenoses are present above the femoral-femoral  bypass origin. Performed a diseased but patent. SFA is occluded. Popliteal  incompletely visualized is an artifact. Severely diseased. Single vessel runoff,  posterior tibial.    Assessment/Plan     76 yo male bed and wheelchair bound who now presents with gangrene of the LEFT foot in the presence of hypotension, fever, and elevated white count. He has known severe PAD. He is currently on IV abx with some improvement in fevers and white count. He has a normal lactate. He is quite anemic 7.0/22. 8. From a vascular standpoint he will require an AKA, but not emergently. His sepsis is more likely from UTI than his leg. He would need blood transfusion and preferably resuscitation prior to moving forward with an AKA. He is obviously unable to give consent and will need to speak with his daughter. Given his quality of life and inevitable above knee amputation hospice would certainly be an appropriate discussion to have with family. Hx and clinic findings will be discussed with Dr Abad Patel.     Principal Problem:    Pyelonephritis (8/25/2021)    Active Problems:    Sepsis ([facility] [address] (8/25/2021)      RAQUEL (acute kidney injury) ([facility] [address] (8/25/2021)        Ana Harper PA-C  August 26, 2021

## 2021-08-26 NOTE — PROGRESS NOTES
Problem: Self Care Deficits Care Plan (Adult)  Goal: *Acute Goals and Plan of Care (Insert Text)  Outcome: Resolved/Not Met     OCCUPATIONAL THERAPY EVALUATION/DISCHARGE    Patient: Avery Lockwood (35 y.o. male)  Date: 8/26/2021  Primary Diagnosis: Pyelonephritis [N12]  RAQUEL (acute kidney injury) ([facility] [address] [N17.9]  Sepsis ([facility] [address] [A41.9]  Precautions: Fall, Aspiration  PLOF: Patient a poor historian. Per chart review, patient was dependent with self-care and \"daughter (caregiver) assisted patient from bed <> wheelchair\". ASSESSMENT AND RECOMMENDATIONS:  Patient laying in stretcher, alert upon ED visit. Patient vocal making sounds throughout evaluation but not all words are intelligible. Patient unable to state name, place time or situation but did reply daughter when asked who lives with him. Patient did not reply when asked daughter's name. Patient observed with decreased BUE strength/AROM. LUE flexed and contracted but wrist/hand tolerable to PROM. RUE generally decreased, functional. Patient max - total assist for grooming task using RUE and would require total assist based on clinical judgement for bed mobility. Patient baseline is dependent for all care and tasks, chart confirms this. Patient at this time with no skilled OT needs. OT to d/c from caseload. Skilled occupational therapy is not indicated at this time.   Discharge Recommendations: Home Health with continued 24/7 Assistance vs. LTC  Further Equipment Recommendations for Discharge: hospital bed, mechanical lift, and wheelchair       SUBJECTIVE:   Patient stated my daughter    OBJECTIVE DATA SUMMARY:     Past Medical History:   Diagnosis Date    Alcohol use disorder     Benign hypertensive heart disease with systolic congestive heart failure, NYHA class 2 (HCC)     Cardiac pacemaker in situ     Cerebrovascular accident (CVA) due to occlusion of right carotid artery ([facility] [address] 8/14/2017    Acute Ischemic Stroke (acute infarctions involving right parieto-occipital area and occipitotemporal area and anterior and midportion of right corona radiata) with residual left hemiparesis, dysphagia and cognitive-communication deficits    Cognitive communication deficit 8/14/2017    Depression     On Trazodone    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) 8/14/2017    Dysphagia as late effect of cerebrovascular accident (CVA) 8/14/2017    Erectile dysfunction     On Sildenafil citrate    Hemiparesis affecting left side as late effect of cerebrovascular accident (CVA) ([facility] [address] 8/14/2017    Hyperlipidemia with target LDL less than 70 8/15/2017    Lipid profile (8/15/2017) showed TG 99, , HDL 81, LDL 84    Hypertension     Ischemic cardiomyopathy 8/15/2017    EF 40%    Occlusion of right internal carotid artery 8/15/2017    On chronic clopidogrel therapy     Osteoarthritis     On Etodolac and Oxycodone-Acetaminophen    Other ill-defined conditions(799.89)     PVD    Peripheral artery disease (HCC)     Seizure, late effect of stroke ([facility] [address] 8/15/2017    On Levetiracetam    Tobacco use disorder      Past Surgical History:   Procedure Laterality Date    HX ANGIOPLASTY  12/05/2012    S/P Balloon angioplasty and stent of left common iliac artery (12/5/2012 - Dr. Olga Villasenor)     Barriers to Learning/Limitations: yes;  cognitive  Compensate with: visual, verbal, tactile, kinesthetic cues/model    Home Situation:   Home Situation  Support Systems: Child(keegan) (daughter (caregiver))  []     Right hand dominant   []     Left hand dominant    Cognitive/Behavioral Status:  Neurologic State: Alert;Confused  Orientation Level: Unable to verbalize (Patient vocalizes but words arent intelliglbe)  Cognition: No command following  Safety/Judgement: Fall prevention;Decreased insight into deficits; Decreased awareness of need for safety;Decreased awareness of need for assistance;Decreased awareness of environment    Skin: Intact  Edema: None noted    Vision/Perceptual: Tracking: Requires cues, head turns, or add eye shifts to track      Coordination: BUE  Fine Motor Skills-Upper: Left Impaired;Right Intact    Gross Motor Skills-Upper: Left Impaired;Right Intact        Strength: BUE  Strength:  (LUE 2-/5, RUE 3/5)     Tone & Sensation: BUE  Tone: Abnormal (LUE)  Sensation:  (unable to formally assess)    Range of Motion: BUE  AROM: Grossly decreased, non-functional (L shoulder; L elbow/hand and RUE generally dcrsd fxl)  PROM: Generally decreased, functional (LUE)    Functional Mobility and Transfers for ADLs:  Bed Mobility:  Rolling:  (pt would require total assistance)      ADL Assessment:  Feeding:  (Per chart review, PEG)    Oral Facial Hygiene/Grooming: Maximum assistance; Total assistance    Bathing: Maximum assistance; Total assistance    Upper Body Dressing: Maximum assistance; Total assistance    Lower Body Dressing: Total assistance    Toileting: Total assistance    ADL Intervention:  Grooming  Grooming Assistance: Maximum assistance; Total assistance(dependent)  Brushing/Combing Hair: Maximum assistance; Total assistance (dependent) (hand over hand using RUE)      Cognitive Retraining  Safety/Judgement: Fall prevention;Decreased insight into deficits; Decreased awareness of need for safety;Decreased awareness of need for assistance;Decreased awareness of environment      Pain:  Patient reported pain all over but did not point specifically to locations or rate numerically. Pain Intervention(s): Medication (see MAR); Response to intervention: Nurse notified, See doc flow    Activity Tolerance:   Poor    Please refer to the flowsheet for vital signs taken during this treatment.   After treatment:   []  Patient left in no apparent distress sitting up in chair  [x]  Patient left in no apparent distress in bed  [x]  Call bell left within reach  [x]  Nursing notified  []  Caregiver present  []  Bed alarm activated    COMMUNICATION/EDUCATION:   [x]      Role of Occupational Therapy in the acute care setting  []      Home safety education was provided and the patient/caregiver indicated understanding. []      Patient/family have participated as able and agree with findings and recommendations. []      Patient is unable to participate in plan of care at this time. Thank you for this referral.  Marce Mercedes OTR/L  Time Calculation: 8 mins      Eval Complexity: History: MEDIUM Complexity : Expanded review of history including physical, cognitive and psychosocial  history ; Examination: HIGH Complexity : 5 or more performance deficits relating to physical, cognitive , or psychosocial skils that result in activity limitations and / or participation restrictions; Decision Making:HIGH Complexity : Patient presents with comorbidities that affect occupational performance.  Signifigant modification of tasks or assistance (eg, physical or verbal) with assessment (s) is necessary to enable patient to complete evaluation

## 2021-08-26 NOTE — PROGRESS NOTES
Admit Date: 8/25/2021  Date of Service: 8/26/2021    Reason for follow-up:       Assessment:         Sepsis due to UTI: Fever one 1.6, hypotension, WBC 17.2 with left shift   - 8/25 blood cx: NGTD x2   - 8/25 urine cx; pending  Urinary tract infection without hematuria, with early pyelonephritis: UA with large leukocyte esterase, 11-15 WBCs 1+ bacteria. CT of the abdomen and pelvis with mild perinephric stranding which is reported to be unchanged compared to prior imaging studies  Acute metabolic encephalopathy  Hyponatremia: Improving  RAQUEL due to sepsis: Improving with IV fluids  Transaminitis  Severe bilateral peripheral artery disease with occluded right iliac, right femoral and bilateral SFA: Recently evaluated by podiatry and vascular surgery. Supportive management advised  Chronic lower extremity ulcerations  Prior CVA with residual left-sided hemiparesis, dysarthria, dysphagia:  PEG tube in place  Ischemic cardiomyopathy  History of hypertension currently hypotensive  Dyslipidemia  Bilateral lower extremity contractures  Dementia  Plan:   Trend CBC, CMP   -As he is for hemoglobin less than 7  Continue with IV fluid hydration  Follow-up blood cultures x2 sets from 8/25/2021; currently no growth to date  Follow-up urine cultures  Continue vancomycin and Zosyn for now. Given history of ESBL in the past we will also add ciprofloxacin for which his prior organisms have been sensitive.     Continue aspirin, Lipitor, Pletal, iron  Hold Cozaar until blood pressures improve  Resume tube feeds  Aspiration precautions  Discussed with wound care team  Vasc surgery consulted      Current Antibtiocs:   Vancomycin  Zosyn    Lines:   Peripheral    Case discussed with:  [x]Patient  []Family  [x]Nursing  []Case Management  DVT Prophylaxis:  [x]Lovenox  []Hep SQ  []SCDs  []Coumadin   []On Heparin gtt    I have independently examined the patient and reviewed all lab studies and imgaing as well as review of nursing notes and physican notes from the past 24 hours. Katarzyna Ortiz D.O. Pager [phone]      No Known Allergies        Subjective:      Pt seen and examined. Minimally responsive. Opens eyes, but does not answer qiestions. Moans when moved and examined.      Objective:        Visit Vitals  BP (!) 126/53   Pulse 84   Temp 99 °F (37.2 °C)   Resp 18   Ht 6' 0.01\" (1.829 m) Comment: as of 21   Wt 62.6 kg (138 lb 0.1 oz) Comment: as 21   SpO2 97%   BMI 18.71 kg/m²     Temp (24hrs), Av.6 °F (37.6 °C), Min:98.1 °F (36.7 °C), Max:101.6 °F (38.7 °C)        General:   confused, non-verbal, chronically ill appearing   Skin:    dry and warm; wounds on right heel, right thigh and knee, left heel   HEENT:  No scleral icterus or pallor; oral mucosa moist, lips moist   Lymph Nodes:   not assessed today   Lungs:   non, labored; bilaterally clear to aspiration- no crackles wheezes rales or rhonchi   Heart:  RRR, s1 and s2; no murmurs rubs or gallops; no edema, + pedal pulses   Abdomen:  soft, non-distended, active bowel sounds, non-tender;+ PEG   Genitourinary:  deferred   Extremities:   decreased muscle tone; b/l LE contractures, no joint effusions   Neurologic:  Non-verbal, does not answer questions; global weakness L>R   Psychiatric:   calm       Labs: Results:   Chemistry Recent Labs     21  0520 21  1247   * 284*   * 129*   K 5.4 5.3    94*   CO2 24 26   BUN 41* 59*   CREA 1.04 1.37*   CA 8.4* 8.9   AGAP 6 9   BUCR 39* 43*   * 350*   TP 6.3* 7.8   ALB 1.8* 1.8*   GLOB 4.5* 6.0*   AGRAT 0.4* 0.3*      CBC w/Diff Recent Labs     21  0521  1247   WBC 14.8* 17.2*   RBC 3.08* 3.41*   HGB 7.0* 7.8*   HCT 22.8* 24.5*   * 489*   GRANS 82* 83*   LYMPH 7* 7*   EOS 1 1        No results found for: SDES Lab Results   Component Value Date/Time    Culture result: NO GROWTH AFTER 16 HOURS 2021 01:21 PM    Culture result: NO GROWTH AFTER 17 HOURS 2021 12:40 PM    Culture result: (A) 03/12/2021 06:25 PM     KLEBSIELLA PNEUMONIAE ** (EXTENDED SPECTRUM BETA LACTAMASE ) **    Culture result: NO GROWTH 6 DAYS 03/12/2021 02:20 PM    Culture result: NO GROWTH 6 DAYS 03/12/2021 02:15 PM        Results     Procedure Component Value Units Date/Time    CULTURE, URINE [[id number]] Collected: 08/25/21 1727    Order Status: Completed Specimen: Urine from Clean catch Updated: 08/25/21 2302    CULTURE, BLOOD [[id number]] Collected: 08/25/21 1321    Order Status: Completed Specimen: Blood Updated: 08/26/21 0706     Special Requests: NO SPECIAL REQUESTS        Culture result: NO GROWTH AFTER 16 HOURS       CULTURE, BLOOD [[id number]] Collected: 08/25/21 1240    Order Status: Completed Specimen: Blood Updated: 08/26/21 0706     Special Requests: NO SPECIAL REQUESTS        Culture result: NO GROWTH AFTER 17 HOURS             Imaging:     CT ABD PELV W CONT    Result Date: 8/25/2021  1. No acute findings in the kidneys. Mild bilateral perinephric stranding is similar to prior exams. No renal edema, no hydronephrosis. 2. Urinary bladder is distended, no bladder wall thickening, no bladder stones. 3. Gastrostomy tube in the stomach. Large and small bowel are decompressed. XR CHEST PORT    Result Date: 8/25/2021  1. Mildly increased interstitial markings bilaterally, which may be due to infectious process. No confluent airspace opacity. 2.  Right basilar region with small area of atelectasis/scarring. Possible right lower lung collapse secondary to atelectasis versus artifact from patient positioning. Attention on follow-up.

## 2021-08-26 NOTE — ED NOTES
TRANSFER - ED to INPATIENT REPORT:    Verbal report given to sagrario) on Martine Yeboah  being transferred to 4th floor(unit) for routine progression of care       Report consisted of patients Situation, Background, Assessment and   Recommendations(SBAR). SBAR report made available to receiving floor on this patient being transferred to [address] [place] [address]  for routine progression of care       Admitting diagnosis Pyelonephritis [N12]  RAQUEL (acute kidney injury) ([facility] [N17.9]  Sepsis ([facility] [A41.9]    Information from the following report(s) ED Summary was made available to receiving floor. Lines:   Peripheral IV 08/25/21 Anterior;Distal;Left Forearm (Active)        HCG Status for ALL Females under 55 y/o: NO     Medication list unable to confirm    Opportunity for questions and clarification was provided.       Patient is disoriented   Patient is  incontinent and non-ambulatory     Valuables NONE     Patient transported with:   Transport      =Monitored (most recent)  Vitals w/ MEWS Score (last day)     Date/Time MEWS Score Pulse Resp Temp BP Level of Consciousness SpO2    08/26/21 1500  1  87  18  99.5 °F (37.5 °C)  122/62  Alert (0)  98 %    08/26/21 0639    84    99 °F (37.2 °C)  (!) 126/53  Alert (0)  97 %    08/26/21 0151    96    98.9 °F (37.2 °C)  (!) 150/55  Alert (0)  97 %    08/25/21 2133    81    98.1 °F (36.7 °C)  (!) 148/59  Responds to Voice (1)  97 %    08/25/21 1800    98    (!) 101.2 °F (38.4 °C)  127/60    97 %    08/25/21 17:24:13    98  18    (!) 140/63    97 %    08/25/21 15:12:28    89  18  99.3 °F (37.4 °C)  138/74    98 %    08/25/21 13:53:28    87  18  98.8 °F (37.1 °C)  (!) 142/52    100 %    08/25/21 13:15:28    93  18    (!) 98/51    97 %    08/25/21 12:33:02  4  86  16  (!) 101.6 °F (38.7 °C)  (!) 96/51  Responds to Voice (1)  96 %              Septic Patients:     Lactic Acid  Lab Results   Component Value Date    [facility] 1.94 08/25/2021    LACPO 1.28 03/12/2021    (Most recent on top)  Repeat drawn: NO Time:      ALL LACTIC ACIDS GREATER THAN 2 MUST BE REPEATED POC WITHIN 4 HOURS OR PRIOR TO ADMISSION               Total Fluid Bolus initiated and documented on MAR:     All ordered antibiotics initiated within first 3 hours of TIME ZERO?

## 2021-08-26 NOTE — PROGRESS NOTES
conducted an initial consultation and Spiritual Assessment for Walden Prema, who is a 77 y. o.,male. Patients Primary Language is: [place]. According to the patients EMR Buddhist Affiliation is: [facility].     The reason the Patient came to the hospital is:   Patient Active Problem List    Diagnosis Date Noted    Pyelonephritis 08/25/2021    Sepsis ([address] 08/25/2021    RAQUEL (acute kidney injury) ([address] 08/25/2021    Critical lower limb ischemia ([address] 08/07/2021    Goals of care, counseling/discussion     Debility     Severe protein-calorie malnutrition ([address] 03/14/2021    UTI (urinary tract infection) 03/12/2021    Alteration consciousness 03/12/2021    Acute lower GI bleeding 09/22/2017    Lower GI bleed 09/21/2017    History of CVA (cerebrovascular accident) 09/21/2017    Peripheral artery disease ([address]     On chronic clopidogrel therapy     Alcohol use disorder     Tobacco use disorder     Benign hypertensive heart disease with systolic congestive heart failure, NYHA class 2 ([address]     Erectile dysfunction     Depression     Osteoarthritis     Ischemic cardiomyopathy 08/15/2017    Hyperlipidemia with target LDL less than 70 08/15/2017    Seizure, late effect of stroke ([address] 08/15/2017    Occlusion of right internal carotid artery 08/15/2017    Cerebrovascular accident (CVA) due to occlusion of right carotid artery ([address] 08/14/2017    Impaired mobility and ADLs 08/14/2017    Hemiparesis affecting left side as late effect of cerebrovascular accident (CVA) ([address] 08/14/2017    Dysphagia as late effect of cerebrovascular accident (CVA) 08/14/2017    Cognitive communication deficit 08/14/2017    Hypokalemia 08/14/2017    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 Chart reviewed.       Plan:  Chaplains will continue to follow and will provide pastoral care on an as needed/requested basis.  recommends bedside caregivers page  on duty if patient shows signs of acute spiritual or emotional distress.     [address]  [phone])

## 2021-08-26 NOTE — PROGRESS NOTES
PT order received and chart reviewed. Patient seen in ED and received in sidelying fetal position. He has BLE contractures and unable to follow commands to actively move LE's. Patient at his baseline and is dependent for all ADL's. Skilled PT not indicated and will sign off. Thank you.   Debbie Carvajal PT, DPT

## 2021-08-26 NOTE — CONSULTS
Comprehensive Nutrition Assessment    Type and Reason for Visit: Initial, NPO/clear liquid, Consult, Wound, Positive nutrition screen    Nutrition Recommendations/Plan:   -Once medically appropriate and okay to start tube feedings per MD- provide bolus tube feeds of Jevity 1.5, 237 mL (1 container) 6x daily with 90 mL water flush before and after each bolus feeding via PEG. (goal regimen to provide: 2130 kcal, 91 gm protein, 1080 mL free water, 100% RDIs). - IVF per MD.     Nutrition Assessment:  77 yr old MALE admitted d/t  sepsis likely secondary to cystitis/pyelonephritis. Pt currently NPO w/ PEG tube in place. Wound care present during nutrition assessment and pt was mumbling during visit, unable to verbalize. Pts daughter on the phone state pt was getting nutrition support through PEG tube receiving Jevity 1.5 w/ bolus feeds 6 cans/day and receiving 60 mL water flushes before and after each feed and meds. Will provide TF recs for nutrition support. Malnutrition Assessment:  Malnutrition Status:  Insufficient data      Nutrition History and Allergies: PMHx: ESBL UTI, severe BLE PAD with chronically occluded right iliac and common femoral arteries, bilateral SFA occlusions, chronic left foot ulcer, stroke with residual left hemiparesis, cognitive/aphasia deficits and dysphagia with PEG tube in place, dementia, seizure disorder, ischemic cardiomyopathy with EF 40% by echo in 2017, HTN, HLD, and BLE contractures who presented to the ED with reported complaints of pain all over. NKFA. Estimated Daily Nutrient Needs:  Energy (kcal): 9911-4221 (1.4-1.5); Weight Used for Energy Requirements: Current  Protein (g): 63-94 (1-1.5); Weight Used for Protein Requirements: Current  Fluid (ml/day): 4140-8291; Method Used for Fluid Requirements: 1 ml/kcal      Nutrition Related Findings:  Last BM-unknown; Sodium 132L;Right forearm trauma wound 4x4cm blisters on edges POA;  Right lateral heel deep tissue pressure injury 2x2cm POA; Left heel deep tissue pressure injury 2x2cm POA; Left medial foot arterial ulcer with likely gangrene [id number] POA; Left medial lower leg stage 2 pressure injury from legs crossing 5x3cm POA; Right hip deep tissue pressure injury 1x1.5cm POA; Right anterior thigh purple discoloration trauma wound 8x4cm POA; Right medial foot hallux area scarring with scales & dark periwound tissue POA; Sacrum deep tissue pressure injury 3x2cm POA; Right anterior knee unstageable pressure injury 9x3cm POA      Wounds:    None, Pressure injury, Multiple, Deep tissue injury       Current Nutrition Therapies:  Current Tube Feeding (TF) Orders:   · Feeding Route: PEG  · Formula: Standard with fiber  · Schedule: Bolus    · Regimen: goal regimen to provide: 2130 kcal, 91 gm protein, 1080 mL free water, 100% RDIs  · Additives/Modulars:    · Water Flushes: 90 mL before and after each bolus  · Current TF & Flush Orders Provides: bolus tube feeds of Jevity 1.5, 237 mL (1 container) 6x daily with 90 mL water flush before and after each bolus feeding via PEG. · Goal TF & Flush Orders Provides: bolus tube feeds of Jevity 1.5, 237 mL (1 container) 6x daily with 90 mL water flush before and after each bolus feeding via PEG.       Anthropometric Measures:  · Height:  6' (182.9 cm)  · Current Body Wt:  62.6 kg (138 lb)   · Admission Body Wt:  138 lb    · Usual Body Wt:  67.1 kg (148 lb)     · Ideal Body Wt:  178 lbs:  77.5 %     · BMI Category:  Underweight (BMI less than 22) age over 72       Nutrition Diagnosis:   · Inadequate oral intake related to cognitive or neurological impairment as evidenced by NPO or clear liquid status due to medical condition      Nutrition Interventions:   Food and/or Nutrient Delivery: Continue NPO, Start tube feeding  Nutrition Education and Counseling: No recommendations at this time  Coordination of Nutrition Care: Continue to monitor while inpatient    Goals:  Enteral nutrition intake will meet >75% of patient estimated nutritional needs within the next 7 days. Nutrition Monitoring and Evaluation:   Behavioral-Environmental Outcomes: None identified  Food/Nutrient Intake Outcomes: Enteral nutrition intake/tolerance  Physical Signs/Symptoms Outcomes: None identified    Discharge Planning:     Too soon to determine     Electronically signed by Farhana Paniagua RD on 8/26/2021 at 10:59 AM    Contact: [phone]

## 2021-08-26 NOTE — PROGRESS NOTES
Bedside shift change report given to Joe Barrera RN (oncoming nurse) by Paul Hanson RN (offgoing nurse). Report included the following information SBAR, Kardex, MAR and Recent Results.

## 2021-08-27 NOTE — PROGRESS NOTES
Physician Progress Note      Sydnee Watkins  CSN #:                  [id number]  :                       1945  ADMIT DATE:       2021 12:03 PM  [address] [place] DATE:  RESPONDING  PROVIDER #:        Hermes Mcguire DO          QUERY TEXT:    Dear hospitalist,    Patient admitted with sepsis. Per wound care note on 2021, noted to also have additional pressure ulcers not mentioned in provider notes. If possible, please document in progress notes and discharge summary the location, present on admission status and stage of the pressure ulcer: The medical record reflects the following:  Risk Factors: requires assistance to move from bed to chair, PAD    Clinical Indicators:  \"Right forearm trauma wound 4x4cm blisters on edges POA. Right lateral heel deep tissue pressure injury 2x2cm POA. Left heel deep tissue pressure injury 2x2cm POA. Left medial foot arterial ulcer with likely gangrene [id number] POA. Left medial lower leg stage 2 pressure injury from legs crossing 5x3cm POA. Right hip deep tissue pressure injury 1x1.5cm POA. Right anterior thigh purple discoloration trauma wound 8x4cm POA. Right medial foot hallux area scarring with scales & dark periwound tissue POA. Sacrum deep tissue pressure injury 3x2cm POA. Right anterior knee unstageable pressure injury 9x3cm POA. \"  per Anila TA, RN, [address], [address], wound care note, 2021. \"Chronic left foot and left knee ulcers\"  per Dr. Angle Acuña, H&P. Treatment: \"Unit nursing staff to apply heel prevention boots, use while in bed. Velcro should be on the loosest section. Unit nursing staff to turn & reposition q2hrs, left & right sides, on back only for meals or treatments. Lift team consult: turn & reposition q2hrs, left & right sides, on back only for meals or treatments.   Wound Care Orders for right arm, heels, left lower leg, right hip, right thigh, sacrum, right knee: Unit staff nurses to cleanse wound with MicroKlenz antimicrobial wound spray from par room, then apply Optifoam Gentle Silicone dressing, change every 3days & prn soilage. \"  per Татьяна TA, RN, [facility] [place], [address], wound care note, 8/26/2021. Stage 1:  Non-blanchable erythema of intact skin  Stage 2:  Abrasion, Blister, Partial-thickness skin loss, with exposed dermis  Stage 3:  Full-thickness skin loss with damage or necrosis of subcutaneous tissue  Stage 4:  Full-thickness skin & soft tissue loss through to underlying muscle, tendon or bone  Unstageable: Obscured full-thickness skin & tissue loss    thank you,  DAYANA Castillo RN, [facility]  Office:  [phone]  Options provided:  -- Pressure Injury of right anterior knee, Right anterior thigh purple discoloration trauma wound, Right hip deep tissue pressure injury, Left medial lower leg stage 2 pressure injury, Right lateral heel deep tissue pressure injury,  Sacrum deep tissue pressure injury, all present on admission  -- Other - I will add my own diagnosis  -- Disagree - Not applicable / Not valid  -- Disagree - Clinically unable to determine / Unknown  -- Refer to Clinical Documentation Reviewer    PROVIDER RESPONSE TEXT:    This patient has Pressure Injury of right anterior knee, Right anterior thigh purple discoloration trauma wound, Right hip deep tissue pressure injury, Left medial lower leg stage 2 pressure injury, Right lateral heel deep tissue pressure injury, Sacrum deep tissue pressure injury, all present on admission. Query created by: Karthik Irby on 8/26/2021 1:24 PM      QUERY TEXT:    Dear hospitalist,    Pt admitted with UTI. Pt noted to have chronic indwelling urinary catheter. If possible, please document in the progress notes and discharge summary if you are evaluating and/or treating any of the following:     The medical record reflects the following:  Risk Factors: chronic Zhang catheter, sepsis    Clinical Indicators:  \"She also reports that he had fever to 101 this morning and that she's noticed darker urine in his chronic Zhang bag. \"  per Dr. Khushi Venegas, H&P. \"Urinary tract infection without hematuria, with early pyelonephritis: UA with large leukocyte esterase, 11-15 WBCs 1+ bacteria. CT of the abdomen and pelvis with mild perinephric stranding which is reported to be unchanged compared to prior imaging studies \"  per Dr. Alberto Monroe, progress note, 8/26/2021. Treatment: vancomycin 1,250mg IV, ciprofloxacin 200mg IV, Zosyn 3.375g IV    Thank you,  DAYANA Castillo RN, [facility]  Office:  [phone]  Options provided:  -- UTI due to chronic indwelling urinary catheter  -- UTI not due to indwelling urinary catheter  -- Other - I will add my own diagnosis  -- Disagree - Not applicable / Not valid  -- Disagree - Clinically unable to determine / Unknown  -- Refer to Clinical Documentation Reviewer    PROVIDER RESPONSE TEXT:    UTI is due to the chronic indwelling urinary catheter. Query created by:  Jeri Schwarz on 8/26/2021 1:32 PM      Electronically signed by:  Elham Salgado DO 8/27/2021 8:47 AM

## 2021-08-27 NOTE — PROGRESS NOTES
Admit Date: 8/25/2021  Date of Service: 8/27/2021    Reason for follow-up:       Assessment:         Sepsis due to UTI: Fever 101.6, hypotension, WBC 17.2 with left shift   - 8/25 blood cx: NGTD x2   - 8/25 urine cx; pending  Urinary tract infection without hematuria, with early pyelonephritis: UA with large leukocyte esterase, 11-15 WBCs 1+ bacteria. CT of the abdomen and pelvis with mild perinephric stranding which is reported to be unchanged compared to prior imaging studies  Acute metabolic encephalopathy  Hyponatremia: Improving  RAQUEL due to sepsis: Improving with IV fluids  Transaminitis  Severe bilateral peripheral artery disease with occluded right iliac, right femoral and bilateral SFA: Recently evaluated by podiatry and vascular surgery. Supportive management advised  Chronic lower extremity ulcerations  Prior CVA with residual left-sided hemiparesis, dysarthria, dysphagia:  PEG tube in place  Ischemic cardiomyopathy  History of hypertension currently hypotensive  Dyslipidemia  Bilateral lower extremity contractures  Dementia  Plan:   Trend CBC, CMP  Transfuse 1 unit PRBC  Continue with IV fluid hydration  Follow-up blood cultures x2 sets from 8/25/2021; currently no growth to date  Follow-up urine cultures  Continue vancomycin and Zosyn for now. Continue Cipro. Continue aspirin, Lipitor, Pletal, iron  Resume tube feeds  Aspiration precautions  Discussed with wound care team  Appreciate Vasc sx consult      Current Antibtiocs:   Vancomycin  Zosyn  Cirpo    Lines:   Peripheral    Case discussed with:  [x]Patient  []Family  [x]Nursing  []Case Management  DVT Prophylaxis:  [x]Lovenox  []Hep SQ  []SCDs  []Coumadin   []On Heparin gtt    I have independently examined the patient and reviewed all lab studies and imgaing as well as review of nursing notes and physican notes from the past 24 hours. Jd Maldonado D.O. Pager [phone]      No Known Allergies        Subjective:      Pt seen and examined. More responsive today. Opens eyes, mumble answers to questions. Objective:        Visit Vitals  /65   Pulse 83   Temp 100.3 °F (37.9 °C)   Resp 20   Ht 6' (1.829 m)   Wt 68.1 kg (150 lb 1.6 oz)   SpO2 97%   BMI 20.36 kg/m²     Temp (24hrs), Av.9 °F (37.7 °C), Min:98.7 °F (37.1 °C), Max:101.5 °F (38.6 °C)        General:   confused, more alertl, chronically ill appearing   Skin:    dry and warm; wounds on right heel, right thigh and knee, left heel   HEENT:  No scleral icterus or pallor; oral mucosa moist, lips moist   Lymph Nodes:   not assessed today   Lungs:   non, labored; bilaterally clear to aspiration- no crackles wheezes rales or rhonchi   Heart:  RRR, s1 and s2; no murmurs rubs or gallops; no edema, + pedal pulses   Abdomen:  soft, non-distended, active bowel sounds, non-tender;+ PEG   Genitourinary:  deferred   Extremities:   decreased muscle tone; b/l LE contractures, no joint effusions   Neurologic:  Non-verbal, does not answer questions; global weakness L>R   Psychiatric:   calm       Labs: Results:   Chemistry Recent Labs     21  0521  1247   * 132* 284*    132* 129*   K 4.3 5.4 5.3    102 94*   CO2 22 24 26   BUN 35* 41* 59*   CREA 1.02 1.04 1.37*   CA 8.7 8.4* 8.9   AGAP 8 6 9   BUCR 34* 39* 43*   * 294* 350*   TP 7.1 6.3* 7.8   ALB 1.7* 1.8* 1.8*   GLOB 5.4* 4.5* 6.0*   AGRAT 0.3* 0.4* 0.3*      CBC w/Diff Recent Labs     21  0521  1247   WBC 13.0 14.8* 17.2*   RBC 2.89* 3.08* 3.41*   HGB 6.5* 7.0* 7.8*   HCT 21.7* 22.8* 24.5*   * 470* 489*   GRANS 83* 82* 83*   LYMPH 8* 7* 7*   EOS 1 1 1        No results found for: SDES Lab Results   Component Value Date/Time    Culture result:  2021 05:27 PM     >2 ORGANISMS - CONTAMINATED SPECIMEN.  SUGGEST RECOLLECTION    Culture result: NO GROWTH 2 DAYS 2021 01:21 PM    Culture result: NO GROWTH 2 DAYS 2021 12:40 PM    Culture result: (A) 03/12/2021 06:25 PM     KLEBSIELLA PNEUMONIAE ** (EXTENDED SPECTRUM BETA LACTAMASE ) **    Culture result: NO GROWTH 6 DAYS 03/12/2021 02:20 PM        Results     Procedure Component Value Units Date/Time    CULTURE, URINE [[id number]] Collected: 08/25/21 1727    Order Status: Completed Specimen: Urine from Clean catch Updated: 08/26/21 1359     Special Requests: NO SPECIAL REQUESTS        [place] Count --        [id number]  COLONIES/mL       Culture result:       >2 ORGANISMS - CONTAMINATED SPECIMEN. SUGGEST RECOLLECTION          CULTURE, BLOOD [[id number]] Collected: 08/25/21 1321    Order Status: Completed Specimen: Blood Updated: 08/27/21 0643     Special Requests: NO SPECIAL REQUESTS        Culture result: NO GROWTH 2 DAYS       CULTURE, BLOOD [[id number]] Collected: 08/25/21 1240    Order Status: Completed Specimen: Blood Updated: 08/27/21 0643     Special Requests: NO SPECIAL REQUESTS        Culture result: NO GROWTH 2 DAYS             Imaging:     CT ABD PELV W CONT    Result Date: 8/25/2021  1. No acute findings in the kidneys. Mild bilateral perinephric stranding is similar to prior exams. No renal edema, no hydronephrosis. 2. Urinary bladder is distended, no bladder wall thickening, no bladder stones. 3. Gastrostomy tube in the stomach. Large and small bowel are decompressed. XR CHEST PORT    Result Date: 8/25/2021  1. Mildly increased interstitial markings bilaterally, which may be due to infectious process. No confluent airspace opacity. 2.  Right basilar region with small area of atelectasis/scarring. Possible right lower lung collapse secondary to atelectasis versus artifact from patient positioning. Attention on follow-up.

## 2021-08-27 NOTE — ROUTINE PROCESS
Bedside and Verbal shift change report given to Lang Villagran RN (oncoming nurse) by Gino Jameson RN (offgoing nurse). Report included the following information SBAR, Kardex, MAR and Recent Results.     SITUATION:  Code Status: DNR  Reason for Admission: Pyelonephritis [N12]  RAQUEL (acute kidney injury) ([facility] [N17.9]  Sepsis ([facility] [A41.9]  Hospital day: 2  Problem List:       Hospital Problems  Date Reviewed: 9/21/2017        Codes Class Noted POA    * (Principal) Pyelonephritis ICD-10-CM: N12  ICD-9-CM: 590.80  8/25/2021 Unknown        Sepsis ([facility] ICD-10-CM: A41.9  ICD-9-CM: 038.9, 995.91  8/25/2021 Unknown        RAQUEL (acute kidney injury) ([facility] ICD-10-CM: N17.9  ICD-9-CM: 584.9  8/25/2021 Unknown              BACKGROUND:   Past Medical History:   Past Medical History:   Diagnosis Date    Alcohol use disorder     Benign hypertensive heart disease with systolic congestive heart failure, NYHA class 2 ([facility]     Cardiac pacemaker in situ     Cerebrovascular accident (CVA) due to occlusion of right carotid artery ([facility] 8/14/2017    Acute Ischemic Stroke (acute infarctions involving right parieto-occipital area and occipitotemporal area and anterior and midportion of right corona radiata) with residual left hemiparesis, dysphagia and cognitive-communication deficits    Cognitive communication deficit 8/14/2017    Depression     On Trazodone    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) 8/14/2017    Dysphagia as late effect of cerebrovascular accident (CVA) 8/14/2017    Erectile dysfunction     On Sildenafil citrate    Hemiparesis affecting left side as late effect of cerebrovascular accident (CVA) ([facility] 8/14/2017    Hyperlipidemia with target LDL less than 70 8/15/2017    Lipid profile (8/15/2017) showed TG 99, , HDL 81, LDL 80    Hypertension     Ischemic cardiomyopathy 8/15/2017    EF 40%    Occlusion of right internal carotid artery 8/15/2017    On chronic clopidogrel therapy     Osteoarthritis     On Etodolac and Oxycodone-Acetaminophen    Other ill-defined conditions(799.89)     PVD    Peripheral artery disease (HCC)     Seizure, late effect of stroke ([facility] [facility] 8/15/2017    On Levetiracetam    Tobacco use disorder       Patient taking anticoagulants yes    Patient has a defibrillator: no    History of shots YES for example, flu, pneumonia, tetanus   Isolation History YES for example, MRSA, CDiff    ASSESSMENT:  Changes in Assessment Throughout Shift: NONE  Significant Changes in 24 hours (for example, RR/code, fall)  Patient has Central Line: no  Patient has Zhang Cath: no   Mobility Issues  PT  IV Patency  OR Checklist  Pending Tests    Last Vitals:  Vitals w/ MEWS Score (last day)     Date/Time MEWS Score Pulse Resp Temp BP Level of Consciousness SpO2    08/27/21 0754  1  82  20  (!) 100.9 °F (38.3 °C)  (!) 140/61  Alert (0)  94 %    08/27/21 0422  1  79  17  98.7 °F (37.1 °C)  (!) 142/56  Alert (0)  96 %    08/27/21 0057  1  91  18  99 °F (37.2 °C)  120/65  Alert (0)  96 %    08/26/21 1957  1  71  18  99.2 °F (37.3 °C)  (!) 122/55  Alert (0)  97 %    08/26/21 1601  1  72  18  99.3 °F (37.4 °C)  108/64  Alert (0)  96 %    08/26/21 1500  1  87  18  99.5 °F (37.5 °C)  122/62  Alert (0)  98 %    08/26/21 0639    84    99 °F (37.2 °C)  (!) 126/53  Alert (0)  97 %    08/26/21 0151    96    98.9 °F (37.2 °C)  (!) 150/55  Alert (0)  97 %            PAIN    Pain Assessment    Pain Intensity 1: 0 (08/26/21 2338)              Patient Stated Pain Goal: 0  Intervention effective: N/A  Time of last intervention: N/A Reassessment Completed: yes   Other actions taken for pain: Distraction    Last 3 Weights:  Last 3 Recorded Weights in this Encounter    08/25/21 1326 08/27/21 0123   Weight: 62.6 kg (138 lb 0.1 oz) 68.1 kg (150 lb 1.6 oz)   Weight change: 5.485 kg (12 lb 1.5 oz)    INTAKE/OUPUT    Current Shift: No intake/output data recorded.     Last three shifts: 08/25 1901 - 08/27 0700  In: 9847 [I.V.:2250]  Out: 900 [Urine:900]    RECOMMENDATIONS AND DISCHARGE PLANNING  Patient needs and requests: Assistance with ADL's, Nutrition    Pending tests/procedures: labs     Discharge plan for patient: SNF    Discharge planning Needs or Barriers: None    Estimated Discharge Date: 8/29/2021 Posted on Whiteboard in Patients Room: yes       \"HEALS\" SAFETY CHECK  A safety check occurred in the patient's room between off going nurse and oncoming nurse listed above. The safety check included the below items:    H  High Alert Medications Verify all high alert medication drips (heparin, PCA, etc.)  E  Equipment Suction is set up for ALL patients (with jose francisco)  Red plugs utilized for all equipment (IV pumps, etc.)  WOWs wiped down at end of shift. Room stocked with oxygen, suction, and other unit-specific supplies  A  Alarms Bed alarm is set for fall risk patients  Ensure chair alarm is in place and activated if patient is up in a chair  L  Lines Check IV for any infiltration  Zhang bag is empty if patient has a Zhang   Tubing and IV bags are labeled  S  Safety  Room is clean, patient is clean, and equipment is clean. Hallways are clear from equipment besides carts. Fall bracelet on for fall risk patients  Ensure room is clear and free of clutter  Suction is set up for ALL patients (with jose francisco)  Hallways are clear from equipment besides carts.    Isolation precautions followed, supplies available outside room, sign posted    Deanna Hannon RN

## 2021-08-27 NOTE — PROGRESS NOTES
Problem: Risk for Spread of Infection  Goal: Prevent transmission of infectious organism to others  Description: Prevent the transmission of infectious organisms to other patients, staff members, and visitors. Outcome: Progressing Towards Goal     Problem: Patient Education:  Go to Education Activity  Goal: Patient/Family Education  Outcome: Progressing Towards Goal     Problem: Patient Education: Go to Patient Education Activity  Goal: Patient/Family Education  Outcome: Progressing Towards Goal     Problem: Nutrition Deficit  Goal: *Optimize nutritional status  Outcome: Progressing Towards Goal     Problem: Falls - Risk of  Goal: *Absence of Falls  Description: Document Ramon Wylie Fall Risk and appropriate interventions in the flowsheet.   Outcome: Progressing Towards Goal  Note: Fall Risk Interventions:  Mobility Interventions: Bed/chair exit alarm, Patient to call before getting OOB, Utilize walker, cane, or other assistive device    Mentation Interventions: Bed/chair exit alarm, Door open when patient unattended, More frequent rounding, Reorient patient, Room close to nurse's station, Toileting rounds, Update white board    Medication Interventions: Bed/chair exit alarm, Patient to call before getting OOB, Teach patient to arise slowly    Elimination Interventions: Bed/chair exit alarm, Call light in reach, Elevated toilet seat, Patient to call for help with toileting needs, Stay With Me (per policy), Toilet paper/wipes in reach, Toileting schedule/hourly rounds, Urinal in reach    History of Falls Interventions: Bed/chair exit alarm, Door open when patient unattended, Room close to nurse's station         Problem: Patient Education: Go to Patient Education Activity  Goal: Patient/Family Education  Outcome: Progressing Towards Goal     Problem: Impaired Skin Integrity/Pressure Injury Treatment  Goal: *Improvement of Existing Pressure Injury  Outcome: Progressing Towards Goal  Goal: *Prevention of pressure injury  Description: Document Alton Scale and appropriate interventions in the flowsheet. Outcome: Progressing Towards Goal  Note: Pressure Injury Interventions:  Sensory Interventions: Assess changes in LOC, Check visual cues for pain, Float heels, Keep linens dry and wrinkle-free, Minimize linen layers, Pressure redistribution bed/mattress (bed type), Suspension boots, Turn and reposition approx. every two hours (pillows and wedges if needed)    Moisture Interventions: Absorbent underpads, Apply protective barrier, creams and emollients, Check for incontinence Q2 hours and as needed, Internal/External urinary devices, Limit adult briefs, Minimize layers, Moisture barrier, Offer toileting Q_hr    Activity Interventions: Pressure redistribution bed/mattress(bed type)    Mobility Interventions: Float heels, HOB 30 degrees or less, Pressure redistribution bed/mattress (bed type), Suspension boots, Turn and reposition approx. every two hours(pillow and wedges)    Nutrition Interventions: Document food/fluid/supplement intake    Friction and Shear Interventions: Foam dressings/transparent film/skin sealants, HOB 30 degrees or less, Lift sheet, Minimize layers                Problem: Patient Education: Go to Patient Education Activity  Goal: Patient/Family Education  Outcome: Progressing Towards Goal     Problem: Pain  Goal: *Control of Pain  Outcome: Progressing Towards Goal     Problem: Patient Education: Go to Patient Education Activity  Goal: Patient/Family Education  Outcome: Progressing Towards Goal     Problem: Impaired Skin Integrity/Pressure Injury Treatment  Goal: *Improvement of Existing Pressure Injury  Outcome: Progressing Towards Goal  Goal: *Prevention of pressure injury  Description: Document Alton Scale and appropriate interventions in the flowsheet.   Outcome: Progressing Towards Goal  Note: Pressure Injury Interventions:  Sensory Interventions: Assess changes in LOC, Check visual cues for pain, Float heels, Keep linens dry and wrinkle-free, Minimize linen layers, Pressure redistribution bed/mattress (bed type), Suspension boots, Turn and reposition approx. every two hours (pillows and wedges if needed)    Moisture Interventions: Absorbent underpads, Apply protective barrier, creams and emollients, Check for incontinence Q2 hours and as needed, Internal/External urinary devices, Limit adult briefs, Minimize layers, Moisture barrier, Offer toileting Q_hr    Activity Interventions: Pressure redistribution bed/mattress(bed type)    Mobility Interventions: Float heels, HOB 30 degrees or less, Pressure redistribution bed/mattress (bed type), Suspension boots, Turn and reposition approx.  every two hours(pillow and wedges)    Nutrition Interventions: Document food/fluid/supplement intake    Friction and Shear Interventions: Foam dressings/transparent film/skin sealants, HOB 30 degrees or less, Lift sheet, Minimize layers                Problem: Patient Education: Go to Patient Education Activity  Goal: Patient/Family Education  Outcome: Progressing Towards Goal

## 2021-08-28 NOTE — PROGRESS NOTES
Problem: Risk for Spread of Infection  Goal: Prevent transmission of infectious organism to others  Description: Prevent the transmission of infectious organisms to other patients, staff members, and visitors. Outcome: Progressing Towards Goal     Problem: Falls - Risk of  Goal: *Absence of Falls  Description: Document Betty Huston Fall Risk and appropriate interventions in the flowsheet.   Outcome: Progressing Towards Goal  Note: Fall Risk Interventions:  Mobility Interventions: Bed/chair exit alarm    Mentation Interventions: Adequate sleep, hydration, pain control, Bed/chair exit alarm    Medication Interventions: Bed/chair exit alarm    Elimination Interventions: Bed/chair exit alarm, Call light in reach    History of Falls Interventions: Bed/chair exit alarm

## 2021-08-28 NOTE — ROUTINE PROCESS
Bedside and Verbal shift change report given to Judie Polo RN (oncoming nurse) by Aarti Augustine RN (offgoing nurse). Report included the following information SBAR, Kardex, MAR and Recent Results.

## 2021-08-28 NOTE — PROGRESS NOTES
Pharmacy Dosing Services: Vancomycin    Indication: Skin Soft Tissue Infection (L foot ulcer)    Day of therapy: 4    Other Antimicrobials (Include dose, start day & day of therapy):  Piperacillin/Tazobactam (Zosyn), ciprofloxacin    Loading dose (date given): 1500 mg  Current Maintenance dose: 1250 mg IV every 24 hours    Goal Vancomycin Level: Vancomycin Trough: 15 - 20 mcg/mL (most infections)    Vancomycin Level (if drawn):   Recent Labs     21  1452 21  0520   VANCR  --  6.5   VANCT 9.0*  --        Significant Cultures:   Results       Procedure Component Value Units Date/Time    CULTURE, URINE [[id number]] Collected: 21 1727    Order Status: Completed Specimen: Urine from Clean catch Updated: 21 1359     Special Requests: NO SPECIAL REQUESTS        [place] Count --        [id number]  COLONIES/mL       Culture result:       >2 ORGANISMS - CONTAMINATED SPECIMEN. SUGGEST RECOLLECTION          CULTURE, BLOOD [[id number]] Collected: 21 1321    Order Status: Completed Specimen: Blood Updated: 2115     Special Requests: NO SPECIAL REQUESTS        Culture result: NO GROWTH 3 DAYS       CULTURE, BLOOD [[id number]] Collected: 21 1240    Order Status: Completed Specimen: Blood Updated: 21     Special Requests: NO SPECIAL REQUESTS        Culture result: NO GROWTH 3 DAYS               Weight 68.1 kg (150 lb 1.6 oz)  Recent Labs     21  0442 21  0523 21  0520   CREA 0.84 1.02 1.04   BUN 25* 35* 41*   WBC 11.2 13.0 14.8*     Temp (72hrs), Av.5 °F (37.5 °C), Min:98.1 °F (36.7 °C), Max:101.5 °F (38.6 °C)    Estimated Creatinine Clearance Estimated Creatinine Clearance: 73.2 mL/min (based on SCr of 0.84 mg/dL).   Estimated Creatinine Clearance (using IBW): 83.4 mL/min       CAPD, Hemodialysis or Renal Replacement Therapy: N/A    Renal function stable? (unstable defined as SCr increase of 0.5 mg/dL or > 50% increase from baseline, whichever is greater) (Y/N): Y Regimen assessment:   - Patient was on vancomycin 1250 mg IV q 24h. Trough returned at 9 ~24 hours post dose. Will increase dose to 1250 mg IV q 18h. Next trough to be scheduled at a later date. - patient renal function has steadily improved  Maintenance dose: 1250 mg IV every 18 hours  Next scheduled level: TBD       Pharmacy will follow daily and adjust medications as appropriate for renal function and/or serum levels.     Thank you,  Avery Ricks, PHARMD

## 2021-08-28 NOTE — PROGRESS NOTES
Admit Date: 8/25/2021  Date of Service: 8/28/2021    Reason for follow-up:       Assessment:         Sepsis due to UTI: Fever 101.6, hypotension, WBC 17.2 with left shift   - 8/25 blood cx: NGTD x2   - 8/25 urine cx; pending  Urinary tract infection without hematuria, with early pyelonephritis: UA with large leukocyte esterase, 11-15 WBCs 1+ bacteria. CT of the abdomen and pelvis with mild perinephric stranding which is reported to be unchanged compared to prior imaging studies  Acute metabolic encephalopathy  Hyponatremia: Improving  RAQUEL due to sepsis: Improving with IV fluids  Transaminitis  Severe bilateral peripheral artery disease with occluded right iliac, right femoral and bilateral SFA: Recently evaluated by podiatry and vascular surgery. Supportive management advised  Chronic lower extremity ulcerations  Prior CVA with residual left-sided hemiparesis, dysarthria, dysphagia:  PEG tube in place  Ischemic cardiomyopathy  History of hypertension currently hypotensive  Dyslipidemia  Bilateral lower extremity contractures  Dementia  Plan:   Trend CBC, CMP   - Transfuse for Hb <7  Continue with IV fluid hydration  Follow-up blood cultures x2 sets from 8/25/2021; currently no growth to date  Follow-up urine cultures  Continue vancomycin and Zosyn for now. Continue Cipro. Continue aspirin, Lipitor, Pletal, iron  Continue tube feeds  Aspiration precautions  Discussed with wound care team  Appreciate Vasc sx consult- will need eventual AKA when current issues improved      Current Antibtiocs:   Vancomycin  Zosyn  Cirpo    Lines:   Peripheral    Case discussed with:  [x]Patient  []Family  [x]Nursing  []Case Management  DVT Prophylaxis:  [x]Lovenox  []Hep SQ  []SCDs  []Coumadin   []On Heparin gtt    I have independently examined the patient and reviewed all lab studies and imgaing as well as review of nursing notes and physican notes from the past 24 hours. Diana Ventura D.O.   Pager [phone]      No Known Allergies        Subjective:      Pt seen and examined. More awake today. Opens eyes, mumble answers to questions.  Much more verbal today but remains confused     Objective:        Visit Vitals  BP (!) 162/72 (BP 1 Location: Right upper arm, BP Patient Position: At rest)   Pulse 70   Temp 99.6 °F (37.6 °C)   Resp 18   Ht 6' (1.829 m)   Wt 68.1 kg (150 lb 1.6 oz)   SpO2 95%   BMI 20.36 kg/m²     Temp (24hrs), Av.6 °F (37.6 °C), Min:98.5 °F (36.9 °C), Max:101.5 °F (38.6 °C)        General:   confused, more alert and talkative, chronically ill appearing   Skin:    dry and warm; wounds on right heel, right thigh and knee, left heel   HEENT:  No scleral icterus or pallor; oral mucosa moist, lips moist   Lymph Nodes:   not assessed today   Lungs:   non, labored; bilaterally clear to aspiration- no crackles wheezes rales or rhonchi   Heart:  RRR, s1 and s2; no murmurs rubs or gallops; no edema, + pedal pulses   Abdomen:  soft, non-distended, active bowel sounds, non-tender;+ PEG   Genitourinary:  deferred   Extremities:   decreased muscle tone; b/l LE contractures, no joint effusions   Neurologic:  Non-verbal, does not answer questions; global weakness L>R   Psychiatric:   calm       Labs: Results:   Chemistry Recent Labs     21  0520   * 133* 132*    137 132*   K 4.1 4.3 5.4    107 102   CO2 21 22 24   BUN 25* 35* 41*   CREA 0.84 1.02 1.04   CA 8.5 8.7 8.4*   AGAP 8 8 6   BUCR 30* 34* 39*   * 254* 294*   TP 7.0 7.1 6.3*   ALB 1.7* 1.7* 1.8*   GLOB 5.3* 5.4* 4.5*   AGRAT 0.3* 0.3* 0.4*      CBC w/Diff Recent Labs     21  0523 21  0520   WBC 11.2 13.0 14.8*   RBC 3.06* 2.89* 3.08*   HGB 7.4* 6.5* 7.0*   HCT 23.2* 21.7* 22.8*   * 472* 470*   GRANS 82* 83* 82*   LYMPH 9* 8* 7*   EOS 1 1 1        No results found for: SDES Lab Results   Component Value Date/Time    Culture result:  2021 05:27 PM     >2 ORGANISMS - CONTAMINATED SPECIMEN. SUGGEST RECOLLECTION    Culture result: NO GROWTH 3 DAYS 08/25/2021 01:21 PM    Culture result: NO GROWTH 3 DAYS 08/25/2021 12:40 PM    Culture result: (A) 03/12/2021 06:25 PM     KLEBSIELLA PNEUMONIAE ** (EXTENDED SPECTRUM BETA LACTAMASE ) **    Culture result: NO GROWTH 6 DAYS 03/12/2021 02:20 PM        Results     Procedure Component Value Units Date/Time    CULTURE, URINE [[id number]] Collected: 08/25/21 1727    Order Status: Completed Specimen: Urine from Clean catch Updated: 08/26/21 1359     Special Requests: NO SPECIAL REQUESTS        [place] Count --        [id number]  COLONIES/mL       Culture result:       >2 ORGANISMS - CONTAMINATED SPECIMEN. SUGGEST RECOLLECTION          CULTURE, BLOOD [[id number]] Collected: 08/25/21 1321    Order Status: Completed Specimen: Blood Updated: 08/28/21 0615     Special Requests: NO SPECIAL REQUESTS        Culture result: NO GROWTH 3 DAYS       CULTURE, BLOOD [[id number]] Collected: 08/25/21 1240    Order Status: Completed Specimen: Blood Updated: 08/28/21 0615     Special Requests: NO SPECIAL REQUESTS        Culture result: NO GROWTH 3 DAYS             Imaging:     CT ABD PELV W CONT    Result Date: 8/25/2021  1. No acute findings in the kidneys. Mild bilateral perinephric stranding is similar to prior exams. No renal edema, no hydronephrosis. 2. Urinary bladder is distended, no bladder wall thickening, no bladder stones. 3. Gastrostomy tube in the stomach. Large and small bowel are decompressed. XR CHEST PORT    Result Date: 8/25/2021  1. Mildly increased interstitial markings bilaterally, which may be due to infectious process. No confluent airspace opacity. 2.  Right basilar region with small area of atelectasis/scarring. Possible right lower lung collapse secondary to atelectasis versus artifact from patient positioning. Attention on follow-up.

## 2021-08-29 NOTE — PROGRESS NOTES
Admit Date: 8/25/2021  Date of Service: 8/29/2021    Reason for follow-up:       Assessment:         Sepsis due to UTI: Fever 101.6, hypotension, WBC 17.2 with left shift on admission   - 8/25 blood cx: NGTD x2   - 8/25 urine cx: >2 organisms  Urinary tract infection without hematuria, with early pyelonephritis: UA with large leukocyte esterase, 11-15 WBCs 1+ bacteria. CT of the abdomen and pelvis with mild perinephric stranding which is reported to be unchanged compared to prior imaging studies  Acute metabolic encephalopathy  Acute on chronic anemia:  S/p I unit PRB on 8/28  Hyponatremia: Improving  RAQUEL due to sepsis:resolved with IV fluids  Transaminitis  Severe bilateral peripheral artery disease with occluded right iliac, right femoral and bilateral SFA: Recently evaluated by podiatry and vascular surgery. Supportive management advised  Chronic lower extremity ulcerations  Prior CVA with residual left-sided hemiparesis, dysarthria, dysphagia:  PEG tube in place  Ischemic cardiomyopathy  History of hypertension currently hypotensive  Dyslipidemia  Bilateral lower extremity contractures  Dementia  Plan:   Trend CBC, CMP   - transfuse for Hb <7  Continue with IV fluid hydration  Repeat blood cx x 2 set and urine cx today  Continue vancomycin  D/c zosyn and cipro  Start meropenem  Start norvasc for elevated BP    Continue aspirin, Lipitor, Pletal, iron  Continue tube feeds  Aspiration precautions  Discussed with wound care team  Appreciate Vasc sx consult      Current Antibtiocs:   Vancomycin  Zosyn  Cirpo    Lines:   Peripheral    Case discussed with:  [x]Patient  []Family  [x]Nursing  []Case Management  DVT Prophylaxis:  [x]Lovenox  []Hep SQ  []SCDs  []Coumadin   []On Heparin gtt    I have independently examined the patient and reviewed all lab studies and imgaing as well as review of nursing notes and physican notes from the past 24 hours. Andre Galloway D.O.   Pager [phone]      No Known Allergies        Subjective:      Pt seen and examined. Doing about the same. Had fever to 102 overnight. Opens eyes, mumble answers to questions. Objective:        Visit Vitals  BP (!) 146/72 (BP 1 Location: Right upper arm, BP Patient Position: At rest)   Pulse (!) 57   Temp 100.3 °F (37.9 °C)   Resp 18   Ht 6' (1.829 m)   Wt 68.1 kg (150 lb 1.6 oz)   SpO2 96%   BMI 20.36 kg/m²     Temp (24hrs), Av.9 °F (37.7 °C), Min:99.1 °F (37.3 °C), Max:102 °F (38.9 °C)        General:   confused, more alertl, chronically ill appearing   Skin:    dry and warm; wounds on right heel, right thigh and knee, left heel   HEENT:  No scleral icterus or pallor; oral mucosa moist, lips moist   Lymph Nodes:   not assessed today   Lungs:   non, labored; bilaterally clear to aspiration- no crackles wheezes rales or rhonchi   Heart:  RRR, s1 and s2; no murmurs rubs or gallops; no edema, + pedal pulses   Abdomen:  soft, non-distended, active bowel sounds, non-tender;+ PEG   Genitourinary:  deferred   Extremities:   decreased muscle tone; b/l LE contractures, no joint effusions   Neurologic:  Non-verbal, does not answer questions; global weakness L>R   Psychiatric:   calm       Labs: Results:   Chemistry Recent Labs     21  0523   * 108* 133*    140 137   K 4.2 4.1 4.3    111 107   CO2 19* 21 22   BUN 23* 25* 35*   CREA 0.84 0.84 1.02   CA 9.1 8.5 8.7   AGAP 10 8 8   BUCR 27* 30* 34*   AP  --  242* 254*   TP  --  7.0 7.1   ALB  --  1.7* 1.7*   GLOB  --  5.3* 5.4*   AGRAT  --  0.3* 0.3*      CBC w/Diff Recent Labs     21  0523   WBC 13.7* 11.2 13.0   RBC 3.37* 3.06* 2.89*   HGB 8.0* 7.4* 6.5*   HCT 25.5* 23.2* 21.7*   * 472* 472*   GRANS  --  82* 83*   LYMPH  --  9* 8*   EOS  --  1 1        No results found for: SDES Lab Results   Component Value Date/Time    Culture result:  2021 05:27 PM     >2 ORGANISMS - CONTAMINATED SPECIMEN. SUGGEST RECOLLECTION    Culture result: NO GROWTH 4 DAYS 08/25/2021 01:21 PM    Culture result: NO GROWTH 4 DAYS 08/25/2021 12:40 PM    Culture result: (A) 03/12/2021 06:25 PM     KLEBSIELLA PNEUMONIAE ** (EXTENDED SPECTRUM BETA LACTAMASE ) **    Culture result: NO GROWTH 6 DAYS 03/12/2021 02:20 PM        Results     Procedure Component Value Units Date/Time    CULTURE, BLOOD [[id number]] Collected: 08/29/21 1155    Order Status: Completed Specimen: Blood Updated: 08/29/21 1157    CULTURE, BLOOD [[id number]] Collected: 08/29/21 1145    Order Status: Completed Specimen: Blood Updated: 08/29/21 1157    CULTURE, URINE [[id number]]     Order Status: Sent Specimen: Cath Urine     CULTURE, URINE [[id number]] Collected: 08/25/21 1727    Order Status: Completed Specimen: Urine from Clean catch Updated: 08/26/21 1359     Special Requests: NO SPECIAL REQUESTS        [place] Count --        [id number]  COLONIES/mL       Culture result:       >2 ORGANISMS - CONTAMINATED SPECIMEN. SUGGEST RECOLLECTION          CULTURE, BLOOD [[id number]] Collected: 08/25/21 1321    Order Status: Completed Specimen: Blood Updated: 08/29/21 0607     Special Requests: NO SPECIAL REQUESTS        Culture result: NO GROWTH 4 DAYS       CULTURE, BLOOD [[id number]] Collected: 08/25/21 1240    Order Status: Completed Specimen: Blood Updated: 08/29/21 0607     Special Requests: NO SPECIAL REQUESTS        Culture result: NO GROWTH 4 DAYS             Imaging:     CT ABD PELV W CONT    Result Date: 8/25/2021  1. No acute findings in the kidneys. Mild bilateral perinephric stranding is similar to prior exams. No renal edema, no hydronephrosis. 2. Urinary bladder is distended, no bladder wall thickening, no bladder stones. 3. Gastrostomy tube in the stomach. Large and small bowel are decompressed. XR CHEST PORT    Result Date: 8/25/2021  1. Mildly increased interstitial markings bilaterally, which may be due to infectious process.  No confluent airspace opacity. 2.  Right basilar region with small area of atelectasis/scarring. Possible right lower lung collapse secondary to atelectasis versus artifact from patient positioning. Attention on follow-up.

## 2021-08-29 NOTE — PROGRESS NOTES
Problem: Risk for Spread of Infection  Goal: Prevent transmission of infectious organism to others  Description: Prevent the transmission of infectious organisms to other patients, staff members, and visitors. Outcome: Not Progressing Towards Goal     Problem: Patient Education:  Go to Education Activity  Goal: Patient/Family Education  Outcome: Not Progressing Towards Goal     Problem: Patient Education: Go to Patient Education Activity  Goal: Patient/Family Education  Outcome: Not Progressing Towards Goal     Problem: Nutrition Deficit  Goal: *Optimize nutritional status  Outcome: Not Progressing Towards Goal     Problem: Falls - Risk of  Goal: *Absence of Falls  Description: Document Michelle Hait Fall Risk and appropriate interventions in the flowsheet. Outcome: Not Progressing Towards Goal  Note: Fall Risk Interventions:  Mobility Interventions: Bed/chair exit alarm    Mentation Interventions: Adequate sleep, hydration, pain control, Bed/chair exit alarm, Door open when patient unattended, Room close to nurse's station    Medication Interventions: Bed/chair exit alarm    Elimination Interventions: Bed/chair exit alarm, Call light in reach, Toileting schedule/hourly rounds    History of Falls Interventions: Bed/chair exit alarm, Door open when patient unattended, Room close to nurse's station         Problem: Patient Education: Go to Patient Education Activity  Goal: Patient/Family Education  Outcome: Not Progressing Towards Goal     Problem: Impaired Skin Integrity/Pressure Injury Treatment  Goal: *Improvement of Existing Pressure Injury  Outcome: Not Progressing Towards Goal  Goal: *Prevention of pressure injury  Description: Document [place] Scale and appropriate interventions in the flowsheet.   Outcome: Not Progressing Towards Goal  Note: Pressure Injury Interventions:  Sensory Interventions: Assess changes in LOC    Moisture Interventions: Absorbent underpads, Check for incontinence Q2 hours and as needed, Limit adult briefs, Minimize layers    Activity Interventions: Pressure redistribution bed/mattress(bed type)    Mobility Interventions: Pressure redistribution bed/mattress (bed type), Turn and reposition approx. every two hours(pillow and wedges), Float heels    Nutrition Interventions: Document food/fluid/supplement intake    Friction and Shear Interventions: Foam dressings/transparent film/skin sealants, HOB 30 degrees or less, Minimize layers                Problem: Patient Education: Go to Patient Education Activity  Goal: Patient/Family Education  Outcome: Not Progressing Towards Goal     Problem: Pain  Goal: *Control of Pain  Outcome: Not Progressing Towards Goal     Problem: Patient Education: Go to Patient Education Activity  Goal: Patient/Family Education  Outcome: Not Progressing Towards Goal     Problem: Impaired Skin Integrity/Pressure Injury Treatment  Goal: *Improvement of Existing Pressure Injury  Outcome: Not Progressing Towards Goal  Goal: *Prevention of pressure injury  Description: Document [place] Scale and appropriate interventions in the flowsheet. Outcome: Not Progressing Towards Goal  Note: Pressure Injury Interventions:  Sensory Interventions: Assess changes in LOC    Moisture Interventions: Absorbent underpads, Check for incontinence Q2 hours and as needed, Limit adult briefs, Minimize layers    Activity Interventions: Pressure redistribution bed/mattress(bed type)    Mobility Interventions: Pressure redistribution bed/mattress (bed type), Turn and reposition approx.  every two hours(pillow and wedges), Float heels    Nutrition Interventions: Document food/fluid/supplement intake    Friction and Shear Interventions: Foam dressings/transparent film/skin sealants, HOB 30 degrees or less, Minimize layers                Problem: Patient Education: Go to Patient Education Activity  Goal: Patient/Family Education  Outcome: Not Progressing Towards Goal

## 2021-08-29 NOTE — PROGRESS NOTES
08/29/21 0018   Vitals   Temp (!) 102 °F (38.9 °C)   Temp Source Axillary   Pulse (Heart Rate) 71   Heart Rate Source Monitor   Resp Rate 18   O2 Sat (%) 96 %   Level of Consciousness Alert (0)   BP (!) 169/68   MAP (Calculated) 102   MEWS Score 3     Percocet administered via peg tube for fever and pain    Will reassess patient

## 2021-08-30 NOTE — CONSULTS
Interventional Radiology      Consulted for G-tube exchange degraded G-Tube needs to be exchanged. Will add to schedule for today/tomorrow as schedule allows. Consent obtained from daughter.     Liza Lane PA-C

## 2021-08-30 NOTE — PROGRESS NOTES
Admit Date: 8/25/2021     Procedure:  Procedure(s):  none      Subjective:     Patient resting. Objective:     Visit Vitals  BP (!) 159/72   Pulse 70   Temp 100.2 °F (37.9 °C)   Resp 20   Ht 6' (1.829 m)   Wt 68.1 kg (150 lb 1.6 oz)   SpO2 100%   BMI 20.36 kg/m²       Physical Exam:    GEN: Awake, resting  NECK: No cervical adenopathy,   LUNGS: Clear to auscultation bilaterally  HEART: Regular rate and rhythm, no murmurs  ABDOMEN: Soft, non-tender,Bowel sounds normal, No masses, No organomegaly  EXTREMITIES: contracted.   Pain with light palpation of left lower extremity  SKIN: Skin color, texture, turgor normal. No rashes or lesions    Labs:   Recent Results (from the past 12 hour(s))   GLUCOSE, POC    Collection Time: 08/30/21 12:03 AM   Result Value Ref Range    Glucose (POC) 155 (H) 70 - 110 mg/dL   CBC W/O DIFF    Collection Time: 08/30/21  1:34 AM   Result Value Ref Range    WBC 13.6 (H) 4.6 - 13.2 K/uL    RBC 3.42 (L) 4.35 - 5.65 M/uL    HGB 8.1 (L) 13.0 - 16.0 g/dL    HCT 26.0 (L) 36.0 - 48.0 %    MCV 76.0 (L) 78.0 - 100.0 FL    MCH 23.7 (L) 24.0 - 34.0 PG    MCHC 31.2 31.0 - 37.0 g/dL    RDW 16.5 (H) 11.6 - 14.5 %    PLATELET 878 (H) 051 - 420 K/uL    MPV 9.9 9.2 - 96.7 FL   METABOLIC PANEL, BASIC    Collection Time: 08/30/21  1:34 AM   Result Value Ref Range    Sodium 142 136 - 145 mmol/L    Potassium 4.1 3.5 - 5.5 mmol/L    Chloride 113 (H) 100 - 111 mmol/L    CO2 19 (L) 21 - 32 mmol/L    Anion gap 10 3.0 - 18 mmol/L    Glucose 199 (H) 74 - 99 mg/dL    BUN 23 (H) 7.0 - 18 MG/DL    Creatinine 0.85 0.6 - 1.3 MG/DL    BUN/Creatinine ratio 27 (H) 12 - 20      GFR est AA >60 >60 ml/min/1.73m2    GFR est non-AA >60 >60 ml/min/1.73m2    Calcium 8.9 8.5 - 10.1 MG/DL   VANCOMYCIN, RANDOM    Collection Time: 08/30/21  1:34 AM   Result Value Ref Range    Vancomycin, random 17.8 5.0 - 40.0 UG/ML   GLUCOSE, POC    Collection Time: 08/30/21  7:04 AM   Result Value Ref Range    Glucose (POC) 126 (H) 70 - 110 mg/dL Data Review images and reports reviewed    Assessment:     Patient Active Problem List    Diagnosis Date Noted    Pyelonephritis 2021    [facility]eps[facility]    RAQUEL (acute kidney injury) ([facility] 2021    Critical lower limb ischemia ([facility] 2021    Goals of care, counseling/discussion     Debility     Severe protein-calorie malnutrition ([facility] 2021    UTI (urinary tract infection) 2021    Alteration consciousness 2021    Acute lower GI bleeding 2017    Lower GI bleed 2017    History of CVA (cerebrovascular accident) 2017    Peripheral artery disease ([facility]     On chronic clopidogrel therapy     Alcohol use disorder     Tobacco use disorder     Benign hypertensive heart disease with systolic congestive heart failure, NYHA class 2 ([facility]     Erectile dysfunction     Depression     Osteoarthritis     Ischemic cardiomyopathy 08/15/2017    Hyperlipidemia with target LDL less than 70 08/15/2017    Seizure, late effect of stroke ([facility] 08/15/2017    Occlusion of right internal carotid artery 08/15/2017    Cerebrovascular accident (CVA) due to occlusion of right carotid artery ([facility] 2017    Impaired mobility and ADLs 2017    Hemiparesis affecting left side as late effect of cerebrovascular accident (CVA) ([facility] 2017    Dysphagia as late effect of cerebrovascular accident (CVA) 2017    Cognitive communication deficit 2017    Hypokalemia 2017    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) 2017    Cardiac pacemaker in situ           Plan/Recommendations/Medical Decision Makin. Pan for left AKA later this week, possible Wed  2. Antibiotics per ID  3. On ASA, lipitor  4.  Will need to discuss with family regarding consent      Marlene Duverney,   Vascular Surgery [place], [facility]

## 2021-08-30 NOTE — PROGRESS NOTES
Admit Date: 8/25/2021  Date of Service: 8/30/2021    Reason for follow-up:       Assessment:         Sepsis due to UTI: Fever 101.6, hypotension, WBC 17.2 with left shift on admission   - 8/25 blood cx: NGTD x2   - 8/25 urine cx: >2 organisms  Urinary tract infection without hematuria, with early pyelonephritis: UA with large leukocyte esterase, 11-15 WBCs 1+ bacteria. CT of the abdomen and pelvis with mild perinephric stranding which is reported to be unchanged compared to prior imaging studies  Acute metabolic encephalopathy  Acute on chronic anemia:  S/p I unit PRB on 8/28  Hyponatremia: Improving  RAQUEL due to sepsis:resolved with IV fluids  Transaminitis  Severe bilateral peripheral artery disease with occluded right iliac, right femoral and bilateral SFA: Recently evaluated by podiatry and vascular surgery.   Supportive management advised  Chronic lower extremity ulcerations  Prior CVA with residual left-sided hemiparesis, dysarthria, dysphagia:  PEG tube in place  Ischemic cardiomyopathy  History of hypertension currently hypotensive  Dyslipidemia  Bilateral lower extremity contractures  Dementia  Plan:   Trend CBC, CMP   - transfuse for Hb <7  Continue with IV fluid hydration  Repeat blood cx x 2 set and urine cx today  Continue vancomycin,  Meropenem  Add Eraxis given persistent fevers after several days of IV antibiotics   norvasc for elevated BP  Awaiting repeat chest x-ray results-ordered earlier this morning  Continue aspirin, Lipitor, Pletal, iron  Continue tube feeds-hold after midnight for planned G-tube exchange tomorrow  Aspiration precaution    Appreciate Vasc sx consult-possible AKA on Wednesday  Discussed with IR-plan for G-tube exchange tomorrow  Dr. Ron Solis and team to follow for ID in my absence    Current Antibtiocs:   Vancomycin  Zosyn  Cirpo    Lines:   Peripheral    Case discussed with:  [x]Patient  []Family  [x]Nursing  []Case Management  DVT Prophylaxis:  [x]Lovenox  []Hep SQ  []SCDs []Coumadin   []On Heparin gtt    I have independently examined the patient and reviewed all lab studies and imgaing as well as review of nursing notes and physican notes from the past 24 hours. Lewis Echeverria D.O. Pager [phone]      No Known Allergies        Subjective:      Pt seen and examined. Doing about the same. Had fever to 101 overnight. Opens eyes, minimally responsive.      Objective:        Visit Vitals  /67   Pulse 70   Temp (!) 101.4 °F (38.6 °C)   Resp 16   Ht 6' (1.829 m)   Wt 68.1 kg (150 lb 1.6 oz)   SpO2 96%   BMI 20.36 kg/m²     Temp (24hrs), Av.6 °F (38.1 °C), Min:100 °F (37.8 °C), Max:101.4 °F (38.6 °C)        General:   confused, occasionally mumbles l, chronically ill appearing   Skin:    dry and warm; wounds on right heel, right thigh and knee, left heel   HEENT:  No scleral icterus or pallor; oral mucosa moist, lips moist   Lymph Nodes:   not assessed today   Lungs:   non, labored; bilaterally clear to aspiration- no crackles wheezes rales or rhonchi   Heart:  RRR, s1 and s2; no murmurs rubs or gallops; no edema, + pedal pulses   Abdomen:  soft, non-distended, active bowel sounds, non-tender;+ PEG   Genitourinary:  deferred   Extremities:   decreased muscle tone; b/l LE contractures, no joint effusions   Neurologic:  Non-verbal, does not answer questions; global weakness L>R   Psychiatric:   calm       Labs: Results:   Chemistry Recent Labs     21  0134 21  2153 21  0120 21  0442 21  0442   * 200* 118*   < > 108*    142 139   < > 140   K 4.1 4.2 4.2   < > 4.1   * 113* 110   < > 111   CO2 19* 20* 19*   < > 21   BUN 23* 24* 23*   < > 25*   CREA 0.85 0.83 0.84   < > 0.84   CA 8.9 8.4* 9.1   < > 8.5   AGAP 10 9 10   < > 8   BUCR 27* 29* 27*   < > 30*   AP  --   --   --   --  242*   TP  --   --   --   --  7.0   ALB  --   --   --   --  1.7*   GLOB  --   --   --   --  5.3*   AGRAT  --   --   --   --  0.3*    < > = values in this interval not displayed. CBC w/Diff Recent Labs     08/30/21  0134 08/29/21  0120 08/28/21  0442   WBC 13.6* 13.7* 11.2   RBC 3.42* 3.37* 3.06*   HGB 8.1* 8.0* 7.4*   HCT 26.0* 25.5* 23.2*   * 550* 472*   GRANS  --   --  82*   LYMPH  --   --  9*   EOS  --   --  1        No results found for: SDES Lab Results   Component Value Date/Time    Culture result: NO GROWTH AFTER 17 HOURS 08/29/2021 11:55 AM    Culture result: NO GROWTH AFTER 17 HOURS 08/29/2021 11:45 AM    Culture result:  08/25/2021 05:27 PM     >2 ORGANISMS - CONTAMINATED SPECIMEN. SUGGEST RECOLLECTION    Culture result: NO GROWTH 5 DAYS 08/25/2021 01:21 PM    Culture result: NO GROWTH 5 DAYS 08/25/2021 12:40 PM        Results     Procedure Component Value Units Date/Time    CULTURE, URINE [[id number]] Collected: 08/30/21 1020    Order Status: Completed Specimen: Cath Urine Updated: 08/30/21 1105    CULTURE, BLOOD [[id number]] Collected: 08/29/21 1155    Order Status: Completed Specimen: Blood Updated: 08/30/21 0817     Special Requests: NO SPECIAL REQUESTS        Culture result: NO GROWTH AFTER 17 HOURS       CULTURE, BLOOD [[id number]] Collected: 08/29/21 1145    Order Status: Completed Specimen: Blood Updated: 08/30/21 0817     Special Requests: NO SPECIAL REQUESTS        Culture result: NO GROWTH AFTER 17 HOURS       CULTURE, URINE [[id number]] Collected: 08/29/21 0945    Order Status: Canceled Specimen: Cath Urine     CULTURE, URINE [[id number]] Collected: 08/25/21 1727    Order Status: Completed Specimen: Urine from Clean catch Updated: 08/26/21 1359     Special Requests: NO SPECIAL REQUESTS        [place] Count --        [id number]  COLONIES/mL       Culture result:       >2 ORGANISMS - CONTAMINATED SPECIMEN.  SUGGEST RECOLLECTION          CULTURE, BLOOD [[id number]] Collected: 08/25/21 1321    Order Status: Completed Specimen: Blood Updated: 08/30/21 0713     Special Requests: NO SPECIAL REQUESTS        Culture result: NO GROWTH 5 DAYS       CULTURE, BLOOD [[id number]] Collected: 08/25/21 1240    Order Status: Completed Specimen: Blood Updated: 08/30/21 0713     Special Requests: NO SPECIAL REQUESTS        Culture result: NO GROWTH 5 DAYS             Imaging:     CT ABD PELV W CONT    Result Date: 8/25/2021  1. No acute findings in the kidneys. Mild bilateral perinephric stranding is similar to prior exams. No renal edema, no hydronephrosis. 2. Urinary bladder is distended, no bladder wall thickening, no bladder stones. 3. Gastrostomy tube in the stomach. Large and small bowel are decompressed. XR CHEST PORT    Result Date: 8/25/2021  1. Mildly increased interstitial markings bilaterally, which may be due to infectious process. No confluent airspace opacity. 2.  Right basilar region with small area of atelectasis/scarring. Possible right lower lung collapse secondary to atelectasis versus artifact from patient positioning. Attention on follow-up.

## 2021-08-30 NOTE — PROGRESS NOTES
Notified by CNA that patient had an elevated MEWS due to fever. Hospitalist notified. PRN Tylenol administered for comfort. Will continue to monitor.

## 2021-08-30 NOTE — PROGRESS NOTES
Bedside shift change report given to MARKY Montano (oncoming nurse) by Noemi Messina RN (offgoing nurse). Report included the following information SBAR, Kardex, MAR and Recent Results.

## 2021-08-30 NOTE — PROGRESS NOTES
Pt with 7 beat run of Vtach, asymptomatic, no acute distress, notified Jesus Powell, will continue to monitor pt.

## 2021-08-30 NOTE — PROGRESS NOTES
Problem: Risk for Spread of Infection  Goal: Prevent transmission of infectious organism to others  Description: Prevent the transmission of infectious organisms to other patients, staff members, and visitors. Outcome: Progressing Towards Goal     Problem: Patient Education:  Go to Education Activity  Goal: Patient/Family Education  Outcome: Progressing Towards Goal     Problem: Patient Education: Go to Patient Education Activity  Goal: Patient/Family Education  Outcome: Progressing Towards Goal     Problem: Nutrition Deficit  Goal: *Optimize nutritional status  Outcome: Progressing Towards Goal     Problem: Falls - Risk of  Goal: *Absence of Falls  Description: Document Josephine Agustin Fall Risk and appropriate interventions in the flowsheet. Outcome: Progressing Towards Goal  Note: Fall Risk Interventions:  Mobility Interventions: Bed/chair exit alarm    Mentation Interventions: Adequate sleep, hydration, pain control, Bed/chair exit alarm, Door open when patient unattended, Room close to nurse's station    Medication Interventions: Bed/chair exit alarm    Elimination Interventions: Bed/chair exit alarm, Toileting schedule/hourly rounds    History of Falls Interventions: Bed/chair exit alarm, Door open when patient unattended         Problem: Patient Education: Go to Patient Education Activity  Goal: Patient/Family Education  Outcome: Progressing Towards Goal     Problem: Impaired Skin Integrity/Pressure Injury Treatment  Goal: *Improvement of Existing Pressure Injury  Outcome: Progressing Towards Goal  Goal: *Prevention of pressure injury  Description: Document Alton Scale and appropriate interventions in the flowsheet.   Outcome: Progressing Towards Goal  Note: Pressure Injury Interventions:  Sensory Interventions: Assess changes in LOC, Avoid rigorous massage over bony prominences, Check visual cues for pain, Float heels, Keep linens dry and wrinkle-free, Maintain/enhance activity level, Minimize linen layers, Monitor skin under medical devices, Pad between skin to skin, Turn and reposition approx. every two hours (pillows and wedges if needed)    Moisture Interventions: Absorbent underpads, Apply protective barrier, creams and emollients, Check for incontinence Q2 hours and as needed    Activity Interventions: Increase time out of bed, PT/OT evaluation    Mobility Interventions: Turn and reposition approx. every two hours(pillow and wedges), PT/OT evaluation, Pressure redistribution bed/mattress (bed type), Float heels    Nutrition Interventions: Document food/fluid/supplement intake, Offer support with meals,snacks and hydration    Friction and Shear Interventions: Apply protective barrier, creams and emollients, Foam dressings/transparent film/skin sealants, Lift sheet, Transferring/repositioning devices                Problem: Patient Education: Go to Patient Education Activity  Goal: Patient/Family Education  Outcome: Progressing Towards Goal     Problem: Pain  Goal: *Control of Pain  Outcome: Progressing Towards Goal     Problem: Patient Education: Go to Patient Education Activity  Goal: Patient/Family Education  Outcome: Progressing Towards Goal     Problem: Impaired Skin Integrity/Pressure Injury Treatment  Goal: *Improvement of Existing Pressure Injury  Outcome: Progressing Towards Goal  Goal: *Prevention of pressure injury  Description: Document Alton Scale and appropriate interventions in the flowsheet. Outcome: Progressing Towards Goal  Note: Pressure Injury Interventions:  Sensory Interventions: Assess changes in LOC, Avoid rigorous massage over bony prominences, Check visual cues for pain, Float heels, Keep linens dry and wrinkle-free, Maintain/enhance activity level, Minimize linen layers, Monitor skin under medical devices, Pad between skin to skin, Turn and reposition approx.  every two hours (pillows and wedges if needed)    Moisture Interventions: Absorbent underpads, Apply protective barrier, creams and emollients, Check for incontinence Q2 hours and as needed    Activity Interventions: Increase time out of bed, PT/OT evaluation    Mobility Interventions: Turn and reposition approx.  every two hours(pillow and wedges), PT/OT evaluation, Pressure redistribution bed/mattress (bed type), Float heels    Nutrition Interventions: Document food/fluid/supplement intake, Offer support with meals,snacks and hydration    Friction and Shear Interventions: Apply protective barrier, creams and emollients, Foam dressings/transparent film/skin sealants, Lift sheet, Transferring/repositioning devices                Problem: Patient Education: Go to Patient Education Activity  Goal: Patient/Family Education  Outcome: Progressing Towards Goal

## 2021-08-30 NOTE — PROGRESS NOTES
RN called me that patient had 7 beats of Vtach in tele.   Pt asymptomatic and vitals stable  Will get K and Mag levels  Consider echo and BB if recurrent episodes

## 2021-08-31 NOTE — PROGRESS NOTES
Problem: Risk for Spread of Infection  Goal: Prevent transmission of infectious organism to others  Description: Prevent the transmission of infectious organisms to other patients, staff members, and visitors. Outcome: Progressing Towards Goal     Problem: Patient Education:  Go to Education Activity  Goal: Patient/Family Education  Outcome: Progressing Towards Goal     Problem: Patient Education: Go to Patient Education Activity  Goal: Patient/Family Education  Outcome: Progressing Towards Goal     Problem: Nutrition Deficit  Goal: *Optimize nutritional status  Outcome: Progressing Towards Goal     Problem: Falls - Risk of  Goal: *Absence of Falls  Description: Document Lily Carpio Fall Risk and appropriate interventions in the flowsheet. Outcome: Progressing Towards Goal  Note: Fall Risk Interventions:  Mobility Interventions: Bed/chair exit alarm    Mentation Interventions: Bed/chair exit alarm, Door open when patient unattended    Medication Interventions: Bed/chair exit alarm    Elimination Interventions: Bed/chair exit alarm, Call light in reach    History of Falls Interventions: Bed/chair exit alarm         Problem: Patient Education: Go to Patient Education Activity  Goal: Patient/Family Education  Outcome: Progressing Towards Goal     Problem: Impaired Skin Integrity/Pressure Injury Treatment  Goal: *Improvement of Existing Pressure Injury  Outcome: Progressing Towards Goal  Goal: *Prevention of pressure injury  Description: Document Alton Scale and appropriate interventions in the flowsheet. Outcome: Progressing Towards Goal  Note: Pressure Injury Interventions:  Sensory Interventions: Assess changes in LOC, Keep linens dry and wrinkle-free, Minimize linen layers, Monitor skin under medical devices, Turn and reposition approx.  every two hours (pillows and wedges if needed), Use 30-degree side-lying position    Moisture Interventions: Absorbent underpads, Apply protective barrier, creams and emollients    Activity Interventions: Pressure redistribution bed/mattress(bed type)    Mobility Interventions: Turn and reposition approx. every two hours(pillow and wedges), HOB 30 degrees or less    Nutrition Interventions: Document food/fluid/supplement intake    Friction and Shear Interventions: Apply protective barrier, creams and emollients, HOB 30 degrees or less, Lift sheet                Problem: Patient Education: Go to Patient Education Activity  Goal: Patient/Family Education  Outcome: Progressing Towards Goal     Problem: Pain  Goal: *Control of Pain  Outcome: Progressing Towards Goal     Problem: Patient Education: Go to Patient Education Activity  Goal: Patient/Family Education  Outcome: Progressing Towards Goal     Problem: Impaired Skin Integrity/Pressure Injury Treatment  Goal: *Improvement of Existing Pressure Injury  Outcome: Progressing Towards Goal  Goal: *Prevention of pressure injury  Description: Document Alton Scale and appropriate interventions in the flowsheet. Outcome: Progressing Towards Goal  Note: Pressure Injury Interventions:  Sensory Interventions: Assess changes in LOC, Keep linens dry and wrinkle-free, Minimize linen layers, Monitor skin under medical devices, Turn and reposition approx. every two hours (pillows and wedges if needed), Use 30-degree side-lying position    Moisture Interventions: Absorbent underpads, Apply protective barrier, creams and emollients    Activity Interventions: Pressure redistribution bed/mattress(bed type)    Mobility Interventions: Turn and reposition approx.  every two hours(pillow and wedges), HOB 30 degrees or less    Nutrition Interventions: Document food/fluid/supplement intake    Friction and Shear Interventions: Apply protective barrier, creams and emollients, HOB 30 degrees or less, Lift sheet                Problem: Patient Education: Go to Patient Education Activity  Goal: Patient/Family Education  Outcome: Progressing Towards Goal Problem: Pressure Injury - Risk of  Goal: *Prevention of pressure injury  Description: Document Alton Scale and appropriate interventions in the flowsheet. Outcome: Progressing Towards Goal  Note: Pressure Injury Interventions:  Sensory Interventions: Assess changes in LOC, Keep linens dry and wrinkle-free, Minimize linen layers, Monitor skin under medical devices, Turn and reposition approx. every two hours (pillows and wedges if needed), Use 30-degree side-lying position    Moisture Interventions: Absorbent underpads, Apply protective barrier, creams and emollients    Activity Interventions: Pressure redistribution bed/mattress(bed type)    Mobility Interventions: Turn and reposition approx.  every two hours(pillow and wedges), HOB 30 degrees or less    Nutrition Interventions: Document food/fluid/supplement intake    Friction and Shear Interventions: Apply protective barrier, creams and emollients, HOB 30 degrees or less, Lift sheet                Problem: Patient Education: Go to Patient Education Activity  Goal: Patient/Family Education  Outcome: Progressing Towards Goal

## 2021-08-31 NOTE — PROGRESS NOTES
Nutrition Assessment     Type and Reason for Visit: Reassess, Consult, Wound, Positive nutrition screen      Nutrition Recommendations/Plan:  - Resume bolus tube feeds of Jevity 1.5, 237 mL (1 container) 6x daily with 90 mL water flush before and after each bolus feeding via PEG.           (goal regimen to provide: 2130 kcal, 91 gm protein, 1080 mL free water, 100% RDIs). - Bolus feed schedule as pt tolerates:  6am, 9am, 12pm, 3pm, 6pm, and 9pm  - Add supplement: Morgan BID for promotion of wound healing  - Consider discontinuing IVF since pt receiving tube feeds with water flushes. - addressed by MD       Nutrition Assessment:  Pt s/p G tube exchange today via IR, due to previous feeding tube being black per RN report. Has been tolerating bolus tube feeds. Feeds held for procedure; okay to resume per RN report from IR. RAQUEL resolved per MD note on 8/30         Addendum 6:04 PM:  Spoke with MD earlier today regarding IVF and patient's tube feeds. MD agreed with recommendation and addressed the discontinuation of the IVF. Malnutrition Assessment:  Malnutrition Status: Insufficient data     Estimated Daily Nutrient Needs:  Energy (kcal):  6567-7921  Protein (g):  78-94       Fluid (ml/day):  9276-2198    Nutrition Related Findings:  BM 8/30 loose. no edema. meds: ferrous sulfate. IVF, NS at 100 mL/hr      Current Nutrition Therapies:  DIET NPO  ADULT TUBE FEEDING PEG; Standard with Fiber; Delivery Method: Bolus; Bolus Frequency: 6 Times Daily; Bolus Volume (mL): 237; Bolus Method of Infusion: Syringe Push; Water Flush Volume (mL): 90;  Water Flush Frequency: Before and after each bolus    Anthropometric Measures:  · Height:  6' (182.9 cm)  · Current Body Wt:  68.1 kg (150 lb 2.1 oz)  · BMI: 20.4    Nutrition Diagnosis:   · Inadequate oral intake related to cognitive or neurological impairment as evidenced by NPO or clear liquid status due to medical condition, nutrition support-enteral nutrition    · Increased nutrient needs related to increased demand for energy/nutrients (for promotion of wound healing) as evidenced by wounds      Nutrition Intervention:  Food and/or Nutrient Delivery: Continue NPO, IV fluid delivery, Continue tube feeding, Mineral supplement (start protein supplement for wound healing)  Nutrition Education and Counseling: Education not indicated  Coordination of Nutrition Care: Continue to monitor while inpatient    Goals:  Enteral nutrition intake will meet >75% of patient estimated nutritional needs within the next 7 days.        Nutrition Monitoring and Evaluation:   Behavioral-Environmental Outcomes: None identified  Food/Nutrient Intake Outcomes: IVF intake, Supplement intake, Enteral nutrition intake/tolerance, Vitamin/mineral intake  Physical Signs/Symptoms Outcomes: None identified    Discharge Planning:    Enteral nutrition     Electronically signed by Hillary Mills RD on 8/31/2021 at 1:04 PM    Contact Number: [phone]

## 2021-08-31 NOTE — PROCEDURES
RADIOLOGY POST PROCEDURE NOTE     August 31, 2021       8:18 AM     Preoperative Diagnosis:   Dysphgia malfunctioning G-Tube    Postoperative Diagnosis:  Same. : January Green PA-C    Assistant:  None. Type of Anesthesia: not needed    Procedure/Description:  G-Tube exchange    Findings:   Successful G-Tube exchange   Stat KUB ordered for placement. Estimated blood Loss:  None    Specimen Removed:   no    Blood transfusions:  None. Implants:  None.     Complications: None    Condition: Stable    Discharge Plan:  continue present therapy    January Green PA-C

## 2021-08-31 NOTE — PROGRESS NOTES
Admit Date: 8/25/2021  Date of Service: 8/31/2021      Subjective:      tcurrent 98. 7. t max 101.2. S/p Successful G-Tube exchange, KUB shows in correct position. Pt seen and examined at bedside, oriented x 1. States \"water. \" not constipated. nsg to resume bolus TF in about 1 hour. Nursing states G-tube that was removed was dark inside. Assessment:   Sepsis due to UTI: Fever 101.6, hypotension, WBC 17.2 with left shift on admission   - 8/25 blood cx: NGTD x2   - 8/25 urine cx: >2 organisms  Urinary tract infection without hematuria, with early pyelonephritis: UA with large leukocyte esterase, 11-15 WBCs 1+ bacteria. CT of the abdomen and pelvis with mild perinephric stranding which is reported to be unchanged compared to prior imaging studies  Acute metabolic encephalopathy  Acute on chronic anemia:  S/p I unit PRB on 8/28  Hyponatremia: Improving  RAQUEL due to sepsis:resolved with IV fluids  Transaminitis  Severe bilateral peripheral artery disease with occluded right iliac, right femoral and bilateral SFA: Recently evaluated by podiatry and vascular surgery. Supportive management advised  Chronic lower extremity ulcerations  Prior CVA with residual left-sided hemiparesis, dysarthria, dysphagia:  PEG tube in place  Ischemic cardiomyopathy  History of hypertension currently hypotensive  Dyslipidemia  Bilateral lower extremity contractures  Dementia  Plan:   Trend CBC, CMP   - transfuse for Hb <7  Continue with IV fluid hydration  Repeat blood cx x 2 set and urine cx today  Continue vancomycin,  Meropenem  on Eraxis given persistent fevers after several days of IV antibiotics   norvasc for elevated BP  Awaiting repeat chest x-ray results-ordered earlier this morning  Continue aspirin, Lipitor, Pletal, iron  Continue tube feeds-hold after midnight for planned G-tube exchange tomorrow  Aspiration precaution      Discussed on IDT.     Case discussed with:  [x]Patient  []Family  [x]Nursing  [x]Case Management  DVT Prophylaxis:  [x]Lovenox  []Hep SQ  []SCDs  []Coumadin   []On Heparin gtt    I have independently examined the patient and reviewed all lab studies and imgaing as well as review of nursing notes and physican notes from the past 24 hours. Objective:        Visit Vitals  /72 (BP 1 Location: Left upper arm, BP Patient Position: At rest;Lying)   Pulse 72   Temp 98.7 °F (37.1 °C)   Resp 16   Ht 6' (1.829 m)   Wt 68.1 kg (150 lb 1.6 oz)   SpO2 98%   BMI 20.36 kg/m²     Temp (24hrs), Av.6 °F (37.6 °C), Min:97.8 °F (36.6 °C), Max:101.4 °F (38.6 °C)        General:   Not oriented. Brief eye contact. States \"water. \"   Skin:    dry and warm; wounds on right heel, right thigh and knee, left heel   HEENT:  No scleral icterus or pallor; oral mucosa moist, lips moist   Lymph Nodes:   not assessed today   Lungs:   non, labored; bilaterally clear to aspiration- no crackles wheezes rales or rhonchi   Heart:  RRR, s1 and s2; no murmurs rubs or gallops; no edema, + pedal pulses   Abdomen:  soft, non-distended, active bowel sounds, non-tender;+ PEG, site clean.    Genitourinary:  deferred   Extremities:   decreased muscle tone; b/l LE contractures, no joint effusions   Neurologic:  Non-verbal, does not answer questions; global weakness L>R   Psychiatric:   calm       Labs: Results:   Chemistry Recent Labs     21  01321  2153   GLU 98 199* 200*    142 142   K 4.2 4.1 4.2   * 113* 113*   CO2 23 19* 20*   BUN 22* 23* 24*   CREA 0.67 0.85 0.83   CA 8.6 8.9 8.4*   AGAP 9 10 9   BUCR 33* 27* 29*      CBC w/Diff Recent Labs     21  0258 21  0134 21  0120   WBC 12.7 13.6* 13.7*   RBC 3.20* 3.42* 3.37*   HGB 7.6* 8.1* 8.0*   HCT 24.5* 26.0* 25.5*   * 582* 550*        No results found for: ALEXY Lab Results   Component Value Date/Time    Culture result: NO GROWTH 2 DAYS 2021 11:55 AM    Culture result: NO GROWTH 2 DAYS 2021 11:45 AM Culture result:  08/25/2021 05:27 PM     >2 ORGANISMS - CONTAMINATED SPECIMEN. SUGGEST RECOLLECTION    Culture result: NO GROWTH 6 DAYS 08/25/2021 01:21 PM    Culture result: NO GROWTH 6 DAYS 08/25/2021 12:40 PM        Results     Procedure Component Value Units Date/Time    CULTURE, URINE [[id number]] Collected: 08/30/21 1020    Order Status: Completed Specimen: Cath Urine Updated: 08/30/21 2315    CULTURE, BLOOD [[id number]] Collected: 08/29/21 1155    Order Status: Completed Specimen: Blood Updated: 08/31/21 0758     Special Requests: NO SPECIAL REQUESTS        Culture result: NO GROWTH 2 DAYS       CULTURE, BLOOD [[id number]] Collected: 08/29/21 1145    Order Status: Completed Specimen: Blood Updated: 08/31/21 0758     Special Requests: NO SPECIAL REQUESTS        Culture result: NO GROWTH 2 DAYS       CULTURE, URINE [[id number]] Collected: 08/29/21 0945    Order Status: Canceled Specimen: Cath Urine     CULTURE, URINE [[id number]] Collected: 08/25/21 1727    Order Status: Completed Specimen: Urine from Clean catch Updated: 08/26/21 1359     Special Requests: NO SPECIAL REQUESTS        Ruston Count --        42213  COLONIES/mL       Culture result:       >2 ORGANISMS - CONTAMINATED SPECIMEN. SUGGEST RECOLLECTION          CULTURE, BLOOD [[id number]] Collected: 08/25/21 1321    Order Status: Completed Specimen: Blood Updated: 08/31/21 4636     Special Requests: NO SPECIAL REQUESTS        Culture result: NO GROWTH 6 DAYS       CULTURE, BLOOD [[id number]] Collected: 08/25/21 1240    Order Status: Completed Specimen: Blood Updated: 08/31/21 6678     Special Requests: NO SPECIAL REQUESTS        Culture result: NO GROWTH 6 DAYS             Imaging:     XR ABD (KUB)    Result Date: 8/31/2021  Appropriate position of the G-tube. Nonspecific bowel gas pattern.

## 2021-08-31 NOTE — ROUTINE PROCESS
Bedside and Verbal shift change report given to Rebeka Varma (oncoming nurse) by Diego Patel RN   (offgoing nurse). Report included the following information SBAR, Kardex, Intake/Output and MAR.

## 2021-08-31 NOTE — CONSULTS
Vascular Surgery Consult      Patient: Miguel Chavira MRN: [id number]  CSN: [id number]      YOB: 1945    Age: 77 y.o. Sex: male      DOA: 8/25/2021       HPI:     Miguel Chavira is a 77 y.o. male who has a PMHx of ESBL UTI, severe BLE PAD with chronically occluded right iliac and common femoral arteries, bilateral SFA occlusions, chronic left foot ulcer, stroke with residual left hemiparesis, cognitive/aphasia deficits and dysphagia with PEG tube in place, dementia, seizure disorder, ischemic cardiomyopathy with EF 40% by echo in 2017, HTN, HLD, and BLE contractures who presented to the ED with reported complaints of pain all over. His daughter reports that over the past couple days he has become increasingly weak and less responsive. She also reports that he had fever to 101 this morning and that she's noticed darker urine in his chronic Zhang bag. In the ED on arrival  he had a fever to 101.6, was initially hypotensive with BP in the 90s/50s that later improved, and had other vitals that were reassuring. Labs were notable for lactic acid WNL, WBC 17.2 with a left shift, Hgb 7.8. Of note, Mr. Sangeetha Aguirre had a recent admission to the hospital from 8/6-8/9 where an arterial ultrasound showed severe bilateral PAD with occluded right iliac, right common femoral, LEFT iliac disease, patent fem-fem bypass, and occluded bilateral SFAs. At that time the case was discussed with podiatry and vascular surgery who felt no acute interventions were required and that he could be followed as an outpatient. He was discharged home with home health.     Past Medical History:   Diagnosis Date    Alcohol use disorder     Benign hypertensive heart disease with systolic congestive heart failure, NYHA class 2 (HCC)     Cardiac pacemaker in situ     Cerebrovascular accident (CVA) due to occlusion of right carotid artery ([facility] [address] 8/14/2017    Acute Ischemic Stroke (acute infarctions involving right parieto-occipital area and occipitotemporal area and anterior and midportion of right corona radiata) with residual left hemiparesis, dysphagia and cognitive-communication deficits    Cognitive communication deficit 8/14/2017    Depression     On Trazodone    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) 8/14/2017    Dysphagia as late effect of cerebrovascular accident (CVA) 8/14/2017    Erectile dysfunction     On Sildenafil citrate    Hemiparesis affecting left side as late effect of cerebrovascular accident (CVA) ([facility] [address] 8/14/2017    Hyperlipidemia with target LDL less than 70 8/15/2017    Lipid profile (8/15/2017) showed TG 99, , HDL 81, LDL 84    Hypertension     Ischemic cardiomyopathy 8/15/2017    EF 40%    Occlusion of right internal carotid artery 8/15/2017    On chronic clopidogrel therapy     Osteoarthritis     On Etodolac and Oxycodone-Acetaminophen    Other ill-defined conditions(799.89)     PVD    Peripheral artery disease (HCC)     Seizure, late effect of stroke ([facility] [address] 8/15/2017    On Levetiracetam    Tobacco use disorder        Past Surgical History:   Procedure Laterality Date    HX ANGIOPLASTY  12/05/2012    S/P Balloon angioplasty and stent of left common iliac artery (12/5/2012 - Dr. Cid Current)       History reviewed. No pertinent family history.     Social History     Socioeconomic History    Marital status: SINGLE     Spouse name: Not on file    Number of children: Not on file    Years of education: Not on file    Highest education level: Not on file   Tobacco Use    Smoking status: Never Smoker    Smokeless tobacco: Never Used   Substance and Sexual Activity    Alcohol use: No     Social Determinants of Health     Financial Resource Strain:     Difficulty of Paying Living Expenses:    Food Insecurity:     Worried About Running Out of Food in the Last Year:     Ran Out of Food in the Last Year:    Transportation Needs:     Lack of Transportation (Medical):     Lack of Transportation (Non-Medical): Physical Activity:     Days of Exercise per Week:     Minutes of Exercise per Session:    Stress:     Feeling of Stress :    Social Connections:     Frequency of Communication with Friends and Family:     Frequency of Social Gatherings with Friends and Family:     Attends [employer] Services: Active Member of Clubs or Organizations:     Attends Club or Organization Meetings:     Marital Status:        Prior to Admission medications    Medication Sig Start Date End Date Taking? Authorizing Provider   oxyCODONE-acetaminophen (Percocet) 5-325 mg per tablet Take 1 Tablet by mouth every eight (8) hours as needed for Pain. Yes Provider, Historical   cilostazoL (PLETAL) 100 mg tablet Take 1 Tablet by mouth Before breakfast and dinner. 8/9/21  Yes Lissett Zeng NP   gabapentin (NEURONTIN) 300 mg capsule Take 600 mg by mouth two (2) times a day. Yes Provider, Historical   ferrous gluconate 324 mg (37.5 mg iron) tablet Take 324 mg by mouth every other day. Yes Provider, Historical   atorvastatin (Lipitor) 10 mg tablet Take 10 mg by mouth daily. Yes Provider, Historical   diclofenac (VOLTAREN) 1 % gel Apply 4 g to affected area four (4) times daily. Bilateral knees and left hip for arthritis   Yes Provider, Historical   polyethylene glycol (MIRALAX) 17 gram packet Take 1 Packet by mouth daily as needed for Constipation. 3/17/21  Yes Antonio Briggs MD   melatonin 3 mg tablet Take 3 mg by mouth At bedtime. Yes Provider, Historical   losartan (COZAAR) 25 mg tablet Take 50 mg by mouth daily. Yes Provider, Historical   acetaminophen (TYLENOL) 325 mg tablet Take 325 mg by mouth three (3) times daily as needed for Pain. Yes Provider, Historical   aspirin delayed-release 81 mg tablet Take 1 Tab by mouth daily. 9/23/17  Yes Daniel Little MD   naloxone (Narcan) 4 mg/actuation nasal spray 1 [place] by IntraNASal route once as needed for Overdose. Use 1 spray intranasally, then discard.  Repeat with new spray every 2 min as needed for opioid overdose symptoms, alternating nostrils. Provider, Historical       No Known Allergies    Review of Systems  Review of Systems - unable to obtain due to mental status. Physical Exam:      Visit Vitals  /72 (BP 1 Location: Left upper arm, BP Patient Position: At rest;Lying)   Pulse 72   Temp 98.7 °F (37.1 °C)   Resp 16   Ht 6' (1.829 m)   Wt 150 lb 1.6 oz (68.1 kg)   SpO2 98%   BMI 20.36 kg/m²       Physical Exam:  Physical Exam  Vitals reviewed. Constitutional:       Appearance: He is underweight. He is ill-appearing. He is not diaphoretic. Cardiovascular:      Pulses:           Dorsalis pedis pulses are 0 on the right side and 0 on the left side. Posterior tibial pulses are 0 on the right side and 0 on the left side. Comments: No detectable pedal signals in the LEFT foot. Pulmonary:      Effort: Pulmonary effort is normal. No respiratory distress. Musculoskeletal:      Right lower leg: No edema. Left lower leg: No edema. Skin:     General: Skin is warm. Comments: Large area of necrosis to the dorsum of the LEFT foot associated with a foul odor. Neurological:      Mental Status: He is lethargic and disoriented. Motor: Weakness present.       Comments: Unable to assess motor function               Data Review:    CBC:   Lab Results   Component Value Date/Time    WBC 12.7 08/31/2021 02:58 AM    RBC 3.20 (L) 08/31/2021 02:58 AM    HGB 7.6 (L) 08/31/2021 02:58 AM    HCT 24.5 (L) 08/31/2021 02:58 AM    PLATELET 374 (H) 76/74/1917 02:58 AM      BMP:   Lab Results   Component Value Date/Time    Glucose 98 08/31/2021 02:58 AM    Sodium 145 08/31/2021 02:58 AM    Potassium 4.2 08/31/2021 02:58 AM    Chloride 113 (H) 08/31/2021 02:58 AM    CO2 23 08/31/2021 02:58 AM    BUN 22 (H) 08/31/2021 02:58 AM    Creatinine 0.67 08/31/2021 02:58 AM    Calcium 8.6 08/31/2021 02:58 AM     EXAM: CT Scan Of Abdomen And Pelvis With CT Angiography Abdominal Aorta And Bilateral Runoff 8/7/21    IMPRESSION     1. No unexpected finding within the abdomen or pelvis     2. Aorta and common iliac inflow is adequate     3. Right pelvis:  Occluded iliacs and common femoral     4. Left Iliacs;  left external and proximal common femoral showed tandem severe  stenoses. These are above the origin of the femoral-femoral bypass     5. Right Runoff:  Patent femoral-femoral bypass. Occluded SFA. Popliteal reconstitutes proximally and occluded distally. Two-vessel runoff-right anterior tibial and peroneal     6. Left Runoff:  Changes and stenoses are present above the femoral-femoral  bypass origin. Performed a diseased but patent. SFA is occluded. Popliteal  incompletely visualized is an artifact. Severely diseased. Single vessel runoff,  posterior tibial.    Assessment/Plan     76 yo male bed and wheelchair bound who now presents with gangrene of the LEFT foot in the presence of hypotension, fever, and elevated white count. He has known severe PAD. He is currently on IV abx with some improvement in fevers and white count. He has a normal lactate. He is quite anemic 7.0/22. 8. From a vascular standpoint he will require an AKA, but not emergently. His sepsis is more likely from UTI than his leg. He would need blood transfusion and preferably resuscitation prior to moving forward with an AKA. He is obviously unable to give consent and will need to speak with his daughter. Given his quality of life and inevitable above knee amputation hospice would certainly be an appropriate discussion to have with family. Hx and clinic findings will be discussed with Dr Viola Walton.   On ASA and lipitor    Principal Problem:    Pyelonephritis (8/25/2021)    Active Problems:    Sepsis ([address] (8/25/2021)      RAQUEL (acute kidney injury) ([address] (8/25/2021)        Patti Elliott, LUIS  August 31, 2021

## 2021-09-01 NOTE — PROGRESS NOTES
Pt crying out in pain. Gave Percocet with no relieving affects. Per daughter request, notify MD and try to get stronger medication. Dr Morteza Mireles notified and waiting on new order for pain medication.

## 2021-09-01 NOTE — PERIOP NOTES
TRANSFER - OUT REPORT:    Verbal report given to Kathy(name) on Janine Enrique  being transferred to [address] [place] [address](unit) for routine post - op       Report consisted of patients Situation, Background, Assessment and   Recommendations(SBAR). Information from the following report(s) SBAR, Kardex, Procedure Summary and Intake/Output was reviewed with the receiving nurse. Lines:   Peripheral IV 09/01/21 Posterior;Right Hand (Active)   Site Assessment Clean, dry, & intact 09/01/21 0853   Phlebitis Assessment 0 09/01/21 0853   Infiltration Assessment 0 09/01/21 0853   Dressing Status Clean, dry, & intact 09/01/21 0853   Dressing Type Tape;Transparent 09/01/21 0853   Hub Color/Line Status Blue; Infusing 09/01/21 0853        Opportunity for questions and clarification was provided.       Patient transported with:   Agora Shopping

## 2021-09-01 NOTE — PERIOP NOTES
TRANSFER - IN REPORT:    Verbal report received from [facility]) on Estephania Brooks  being received from [facility](unit) for routine progression of care      Report consisted of patients Situation, Background, Assessment and   Recommendations(SBAR). Information from the following report(s) SBAR was reviewed with the receiving nurse. Opportunity for questions and clarification was provided. Assessment completed upon patients arrival to unit and care assumed.

## 2021-09-01 NOTE — PROGRESS NOTES
Problem: Risk for Spread of Infection  Goal: Prevent transmission of infectious organism to others  Description: Prevent the transmission of infectious organisms to other patients, staff members, and visitors. Outcome: Progressing Towards Goal     Problem: Nutrition Deficit  Goal: *Optimize nutritional status  Outcome: Progressing Towards Goal     Problem: Falls - Risk of  Goal: *Absence of Falls  Description: Document Cornell Rojas Fall Risk and appropriate interventions in the flowsheet. Outcome: Progressing Towards Goal  Note: Fall Risk Interventions:  Mobility Interventions: Bed/chair exit alarm    Mentation Interventions: Bed/chair exit alarm    Medication Interventions: Bed/chair exit alarm    Elimination Interventions: Bed/chair exit alarm, Call light in reach    History of Falls Interventions: Bed/chair exit alarm         Problem: Impaired Skin Integrity/Pressure Injury Treatment  Goal: *Improvement of Existing Pressure Injury  Outcome: Progressing Towards Goal  Goal: *Prevention of pressure injury  Description: Document Alton Scale and appropriate interventions in the flowsheet. Outcome: Progressing Towards Goal  Note: Pressure Injury Interventions:  Sensory Interventions: Assess changes in LOC    Moisture Interventions: Absorbent underpads    Activity Interventions: Pressure redistribution bed/mattress(bed type)    Mobility Interventions: Pressure redistribution bed/mattress (bed type)    Nutrition Interventions: Document food/fluid/supplement intake, Offer support with meals,snacks and hydration    Friction and Shear Interventions: Apply protective barrier, creams and emollients                Problem: Impaired Skin Integrity/Pressure Injury Treatment  Goal: *Improvement of Existing Pressure Injury  Outcome: Progressing Towards Goal  Goal: *Prevention of pressure injury  Description: Document Alton Scale and appropriate interventions in the flowsheet.   Outcome: Progressing Towards Goal     Problem: Pain  Goal: *Control of Pain  Outcome: Progressing Towards Goal     Problem: Pressure Injury - Risk of  Goal: *Prevention of pressure injury  Description: Document Alton Scale and appropriate interventions in the flowsheet.   Outcome: Progressing Towards Goal

## 2021-09-01 NOTE — ANESTHESIA PREPROCEDURE EVALUATION
Relevant Problems   No relevant active problems       Anesthetic History   No history of anesthetic complications            Review of Systems / Medical History  Patient summary reviewed, nursing notes reviewed and pertinent labs reviewed    Pulmonary  Within defined limits                 Neuro/Psych     seizures  CVA  TIA and psychiatric history     Cardiovascular    Hypertension              Exercise tolerance: >4 METS     GI/Hepatic/Renal  Within defined limits              Endo/Other        Arthritis     Other Findings              Physical Exam    Airway  Mallampati: II  TM Distance: 4 - 6 cm  Neck ROM: normal range of motion   Mouth opening: Normal     Cardiovascular  Regular rate and rhythm,  S1 and S2 normal,  no murmur, click, rub, or gallop  Rhythm: regular  Rate: normal         Dental  No notable dental hx       Pulmonary  Breath sounds clear to auscultation               Abdominal  GI exam deferred       Other Findings            Anesthetic Plan    ASA: 4, emergent  Anesthesia type: general          Induction: Intravenous  Anesthetic plan and risks discussed with: Patient and Family      DNR discussed.  Will be lifted

## 2021-09-01 NOTE — OP NOTES
Vascular Specialist Brief Post-Op Note    Date of Surgery: 9/1/2021  [id number]  1945    Hospital: [facility]    Surgeon(s):  Eugenia Krabbe, DO    Surgeon(s) and Role:     * Xiomara Messina DO - Primary    Other OR Staff/Assistants:  Circ-1: Belen Dancer, RN  Physician Assistant: Jaskaran Ren PA-C  Scrub Tech-1: Dietra Long  Surg Asst-1: Cheo Ratemorelia    Pre-Op Dx :  1. Left lower extremity non healing wounds  2. Severe PAD    Procedure:   1. Left above knee amputation     Post-Op Dx: same    Anesthesia Type: General     Fluid Replacement: per anesthesia     Findings : left lower extremity non healing wounds    Comp: none    EBL : Minimal    Drains: none      Specimens: left lower extremity       Kaylynn Hurley is a 77 y.o. male with left lower extremity non healing wound. It was discussed with the patient that he would need left above knee amputation . All the risk and benefits were discussed with the patient. All questions were answered to their satisfaction. Consent was obtained. The patient was brought to the operative suite and place in supine position. A time-out was made to verify correct patient, site of procedure, and the procedure type. After anesthesia monitoring lines were placed, induction of anesthesia performed. The patient was prepped and draped in a sterile manner. The patient had a preoperative antibiotic ordered. This was administered within one hour of the skin incision. Esmarch was wrapped around the limb from the foot to the proximal limb. And tourniquet was turned on. Esmarch was removed. Skin incision was made and carried through the soft tissues and muscles. Satisfactory bleeding and tissue color were noted. Neurovascular bundles individually identified and ligated. The femur transected with an oscillating saw. The level of the amputation was low. A fishmouth flap was developed.   Posterior muscle groups were divided and the specimen delivered from the operative field. Additional hemostasis achieved with cautery or ligation of individual vessels. Wound was irrigated with saline solution. Tourniquet was taken down and hemostasis was controlled. Fascia reapproximated with 2-0 Vicryl. Skin reapproximated with skin staples. Sterile dressings applied. Patient otherwise tolerated the procedure well. All instruments were accounted for. The patient was then awakened, and taken to the recovery room in stable condition. The patient tolerated the procedure well.          Yeimy Thibodeaux,   Vascular Surgery [facility]

## 2021-09-01 NOTE — ROUTINE PROCESS
Bedside and Verbal shift change report given to Gala Salazar RN (oncoming nurse) by Familia Lugo RN   (offgoing nurse).  Report included the following information SBAR, Kardex, Intake/Output, MAR, Cardiac Rhythm AV paced and Pre Procedure Checklist.

## 2021-09-01 NOTE — PROGRESS NOTES
Problem: Risk for Spread of Infection  Goal: Prevent transmission of infectious organism to others  Description: Prevent the transmission of infectious organisms to other patients, staff members, and visitors. Outcome: Progressing Towards Goal     Problem: Patient Education:  Go to Education Activity  Goal: Patient/Family Education  Outcome: Progressing Towards Goal     Problem: Patient Education: Go to Patient Education Activity  Goal: Patient/Family Education  Outcome: Progressing Towards Goal     Problem: Nutrition Deficit  Goal: *Optimize nutritional status  Outcome: Progressing Towards Goal     Problem: Falls - Risk of  Goal: *Absence of Falls  Description: Document Carolina Niño Fall Risk and appropriate interventions in the flowsheet. Outcome: Progressing Towards Goal  Note: Fall Risk Interventions:  Mobility Interventions: Bed/chair exit alarm    Mentation Interventions: Bed/chair exit alarm    Medication Interventions: Bed/chair exit alarm    Elimination Interventions: Bed/chair exit alarm, Call light in reach    History of Falls Interventions: Bed/chair exit alarm         Problem: Patient Education: Go to Patient Education Activity  Goal: Patient/Family Education  Outcome: Progressing Towards Goal     Problem: Impaired Skin Integrity/Pressure Injury Treatment  Goal: *Improvement of Existing Pressure Injury  Outcome: Progressing Towards Goal  Goal: *Prevention of pressure injury  Description: Document Alton Scale and appropriate interventions in the flowsheet.   Outcome: Progressing Towards Goal  Note: Pressure Injury Interventions:  Sensory Interventions: Assess changes in LOC    Moisture Interventions: Absorbent underpads    Activity Interventions: Pressure redistribution bed/mattress(bed type)    Mobility Interventions: Pressure redistribution bed/mattress (bed type)    Nutrition Interventions: Document food/fluid/supplement intake, Offer support with meals,snacks and hydration    Friction and Shear Interventions: Apply protective barrier, creams and emollients                Problem: Patient Education: Go to Patient Education Activity  Goal: Patient/Family Education  Outcome: Progressing Towards Goal     Problem: Pain  Goal: *Control of Pain  Outcome: Progressing Towards Goal     Problem: Patient Education: Go to Patient Education Activity  Goal: Patient/Family Education  Outcome: Progressing Towards Goal     Problem: Impaired Skin Integrity/Pressure Injury Treatment  Goal: *Improvement of Existing Pressure Injury  Outcome: Progressing Towards Goal  Goal: *Prevention of pressure injury  Description: Document Alton Scale and appropriate interventions in the flowsheet. Outcome: Progressing Towards Goal  Note: Pressure Injury Interventions:  Sensory Interventions: Assess changes in LOC    Moisture Interventions: Absorbent underpads    Activity Interventions: Pressure redistribution bed/mattress(bed type)    Mobility Interventions: Pressure redistribution bed/mattress (bed type)    Nutrition Interventions: Document food/fluid/supplement intake, Offer support with meals,snacks and hydration    Friction and Shear Interventions: Apply protective barrier, creams and emollients                Problem: Patient Education: Go to Patient Education Activity  Goal: Patient/Family Education  Outcome: Progressing Towards Goal     Problem: Pressure Injury - Risk of  Goal: *Prevention of pressure injury  Description: Document Alton Scale and appropriate interventions in the flowsheet.   Outcome: Progressing Towards Goal  Note: Pressure Injury Interventions:  Sensory Interventions: Assess changes in LOC    Moisture Interventions: Absorbent underpads    Activity Interventions: Pressure redistribution bed/mattress(bed type)    Mobility Interventions: Pressure redistribution bed/mattress (bed type)    Nutrition Interventions: Document food/fluid/supplement intake, Offer support with meals,snacks and hydration    Friction and Shear Interventions: Apply protective barrier, creams and emollients                Problem: Patient Education: Go to Patient Education Activity  Goal: Patient/Family Education  Outcome: Progressing Towards Goal

## 2021-09-01 NOTE — PROGRESS NOTES
1940-Pt in bed resting alert and oriented to self speech garbled. Pt able to say his name and states in pain. Assessement completed Pt has LBKA dressings intact. . Peg tube in place clamped. HOB elevated, bed low and in locked position. Call light and frequently used items within reach. Bed alarm on for safety-    2038 Antibiotic given as scheduled iv-   2133- Morphine 1 mg iv administered. Pt noted to have phantom pain - pt holds the amputed site  Pt incontinent of urine care done. Pt turned and repositioned made comfortable. 1067- Pt incontinent care done pt turned and repositioned. - pt noted to be calm. 0720- During huddle and change of shift report, this Rn Was notified by the unit secretary that pt had vtach as per the tele tech. This RN ran to room to check on pt found pt unresponsive , not breathing- immediately RRT and code called-   7572- Pt had no pulse,no respirations , no bP- + pt was pronounced dead.

## 2021-09-01 NOTE — ANESTHESIA POSTPROCEDURE EVALUATION
Procedure(s):  LEFT ABOVE KNEE AMPUTATION (AKA). general    Anesthesia Post Evaluation      Multimodal analgesia: multimodal analgesia used between 6 hours prior to anesthesia start to PACU discharge  Patient location during evaluation: bedside  Patient participation: complete - patient participated  Level of consciousness: awake  Pain management: adequate  Airway patency: patent  Anesthetic complications: no  Cardiovascular status: stable  Respiratory status: acceptable  Hydration status: acceptable  Post anesthesia nausea and vomiting:  controlled  Final Post Anesthesia Temperature Assessment:  Normothermia (36.0-37.5 degrees C)      INITIAL Post-op Vital signs:   Vitals Value Taken Time   /66 09/01/21 0902   Temp 37.2 °C (98.9 °F) 09/01/21 0853   Pulse 100 09/01/21 0903   Resp 21 09/01/21 0903   SpO2 100 % 09/01/21 0903   Vitals shown include unvalidated device data.

## 2021-09-01 NOTE — H&P
STATEMENT OF INFORMED CONSENT AND HISTORY AND PHYSICAL UPDATE     Patient Name: Bora Gu  Patient MRN:  [id number]    The history and physical exam were evaluated and no significant changes were noted, specifically the heart and lung exam are unchanged. PROPOSED PROCEDURE:  Left above knee amputation      I have discussed the above procedure with patient and daughter over the phone    Included in the discussion were:   The nature of the proposed procedure;   Potential benefits, risks, or side effects, including potential problems that might occur during recuperation;   The likelihood of achieving goals;   Reasonable alternatives;   The relevant risks, benefits, and side effects related to alternatives, including the possible results of not receiving care, treatment, and services;   The risks, benefits, and alternatives of potential blood and/or blood products were discussed and he and daughter agrees. Patient/Patient Representative (as above) will designate their consent to receive a transfusion of blood/blood products by their signature on the separate blood consent form;   When indicated, any limitations on the confidentiality of information learned from or about the patient. Total time spent by Dr. Gómez Hawk in discussion of risks, benefits, and alternatives was in excess of 15 minutes. In support of our discussion, all questions were answered and Daughter consented to proceed.     Gale Leong,   Vascular [facility]

## 2021-09-02 NOTE — PROGRESS NOTES
visited with the family of Sumaya Puente, who is a 77 y. o.,male. The  provided the following Interventions:  Initiated a relationship of care and support to patient's daughter Bryant Rhoades) and nephew. Grief intervention provided. Family will arrange  services with [facility] in [facility], [address] [facility] Maryan Ochoa Offered prayer and assurance of continued prayers on patient's behalf. Plan:  Chaplains will continue to follow and will provide pastoral care on an as needed/requested basis.  recommends bedside caregivers page  on duty if patient shows signs of acute spiritual or emotional distress.     Alyssa Sow [address]   [phone]

## 2021-09-02 NOTE — PROGRESS NOTES
responded to Death of  Evelia Vernon, who was a 77 y. o.,male,     The  provided the following Interventions:  Provided crisis pastoral care, pastoral support and grief interventions. Offered prayers on behalf of the patient. Chart reviewed. Daughter was informed of death, she is on her way to say her good-byes. Plan:  Chaplains will continue to follow and will provide pastoral care on an as needed/requested basis and grief support for the family.       [address] [facility] Certified [address] [facility]   [phone]

## 2021-09-02 NOTE — PROGRESS NOTES
Death Pronouncement Note  [address] [facility]      Patient: Felecia King MRN: [id number]   SSN: [id number]  YOB: 1945   Age: 77 y.o. Sex: male    Admission Date: 8/25/2021 12:03 PM Time of Death: 07:33        Comments:   Called to patient's bedside for apnea and pulselessness. Patient lying in bed, eyes closed, mouth open, unresponsive to voice or painful stimuli. No spontaneous respirations and chest rise absent. No palpable carotid pulse bilaterally. No heart sounds or breath sounds. Pupils fixed and dilated bilaterally. Corneal reflexes absent. Death officially pronounced at 07:33 9/2/2021. Attending physician to be notified by nursing. Attending physician to notify family.     Nicholas Emmanuel MD, PGY-1   [facility] Medicine   September 2, 2021, 7:37 AM

## 2021-09-02 NOTE — PROGRESS NOTES
Code Blue Note  [address] [facility]    Patient: Evelia Vernon MRN: [id number]  CSN: [id number]    YOB: 1945  Age: 77 y.o. Sex: male      Admit Date: 8/25/2021   Admitting Diagnosis: Pyelonephritis [N12]  RAQUEL (acute kidney injury) ([facility] [N17.9]  Sepsis ([facility] [A41.9]    CODE BLUE     Code Blue Called at [facility]. On arrival, chest compressions had started. It was then confirmed patient was DNR. Code stopped. Patient pronounced dead at 07:33.       OBJECTIVE     Patient Vitals for the past 24 hrs:   Temp Pulse Resp BP SpO2   09/02/21 0444 98.5 °F (36.9 °C) 69 16 (!) 151/64 96 %   09/02/21 0144 98.8 °F (37.1 °C) 70 16 (!) 148/76 97 %   09/01/21 2134 (!) 100.6 °F (38.1 °C) 76      09/01/21 1512 100.1 °F (37.8 °C) 66 16 (!) 159/81 96 %   09/01/21 1121 98.9 °F (37.2 °C) 65 18 (!) 166/92 97 %   09/01/21 1008 98.6 °F (37 °C) 89 20 (!) 163/73 98 %   09/01/21 0922 99 °F (37.2 °C) 96 21 132/61 99 %   09/01/21 0912  96 19 (!) 149/76 100 %   09/01/21 0902  96 20 132/66 100 %   09/01/21 0853 98.9 °F (37.2 °C) 93 24 (!) 128/55 100 %               Ginny Ren MD, PGY-1   [address] Medicine   September 2, 2021, 7:40 AM

## 2021-09-02 NOTE — PROGRESS NOTES
Problem: Risk for Spread of Infection  Goal: Prevent transmission of infectious organism to others  Description: Prevent the transmission of infectious organisms to other patients, staff members, and visitors. Outcome: Progressing Towards Goal     Problem: Patient Education:  Go to Education Activity  Goal: Patient/Family Education  Outcome: Progressing Towards Goal     Problem: Nutrition Deficit  Goal: *Optimize nutritional status  Outcome: Progressing Towards Goal     Problem: Falls - Risk of  Goal: *Absence of Falls  Description: Document Sridevi De Paz Fall Risk and appropriate interventions in the flowsheet. Outcome: Progressing Towards Goal  Note: Fall Risk Interventions:  Mobility Interventions: Bed/chair exit alarm    Mentation Interventions: Bed/chair exit alarm    Medication Interventions: Bed/chair exit alarm    Elimination Interventions: Bed/chair exit alarm, Call light in reach    History of Falls Interventions: Bed/chair exit alarm         Problem: Impaired Skin Integrity/Pressure Injury Treatment  Goal: *Improvement of Existing Pressure Injury  Outcome: Progressing Towards Goal  Goal: *Prevention of pressure injury  Description: Document Alton Scale and appropriate interventions in the flowsheet.   Outcome: Progressing Towards Goal  Note: Pressure Injury Interventions:  Sensory Interventions: Assess changes in LOC    Moisture Interventions: Absorbent underpads    Activity Interventions: Pressure redistribution bed/mattress(bed type)    Mobility Interventions: Pressure redistribution bed/mattress (bed type)    Nutrition Interventions: Document food/fluid/supplement intake, Offer support with meals,snacks and hydration    Friction and Shear Interventions: Apply protective barrier, creams and emollients                Problem: Impaired Skin Integrity/Pressure Injury Treatment  Goal: *Improvement of Existing Pressure Injury  Outcome: Progressing Towards Goal  Goal: *Prevention of pressure injury  Description: Document Alton Scale and appropriate interventions in the flowsheet. Outcome: Progressing Towards Goal  Note: Pressure Injury Interventions:  Sensory Interventions: Assess changes in LOC    Moisture Interventions: Absorbent underpads    Activity Interventions: Pressure redistribution bed/mattress(bed type)    Mobility Interventions: Pressure redistribution bed/mattress (bed type)    Nutrition Interventions: Document food/fluid/supplement intake, Offer support with meals,snacks and hydration    Friction and Shear Interventions: Apply protective barrier, creams and emollients                Problem: Impaired Skin Integrity/Pressure Injury Treatment  Goal: *Prevention of pressure injury  Description: Document Alton Scale and appropriate interventions in the flowsheet. Outcome: Progressing Towards Goal  Note: Pressure Injury Interventions:  Sensory Interventions: Assess changes in LOC    Moisture Interventions: Absorbent underpads    Activity Interventions: Pressure redistribution bed/mattress(bed type)    Mobility Interventions: Pressure redistribution bed/mattress (bed type)    Nutrition Interventions: Document food/fluid/supplement intake, Offer support with meals,snacks and hydration    Friction and Shear Interventions: Apply protective barrier, creams and emollients                Problem: Pain  Goal: *Control of Pain  Outcome: Progressing Towards Goal     Problem: Pressure Injury - Risk of  Goal: *Prevention of pressure injury  Description: Document Alton Scale and appropriate interventions in the flowsheet.   Outcome: Progressing Towards Goal  Note: Pressure Injury Interventions:  Sensory Interventions: Assess changes in LOC    Moisture Interventions: Absorbent underpads    Activity Interventions: Pressure redistribution bed/mattress(bed type)    Mobility Interventions: Pressure redistribution bed/mattress (bed type)    Nutrition Interventions: Document food/fluid/supplement intake, Offer support with meals,snacks and hydration    Friction and Shear Interventions: Apply protective barrier, creams and emollients       Problem: Patient Education: Go to Patient Education Activity  Goal: Patient/Family Education  Outcome: Progressing Towards Goal

## 2021-09-02 NOTE — PROGRESS NOTES
Brief Progress     Spoke to patient's daughter, Hannah Whalen this morning following the death of her father. She had already been called by nursing. Hannah Whalen was currently without questions.      Warren Merlin, DO  09/02/21  7:45 AM

## 2021-09-02 NOTE — DISCHARGE SUMMARY
Death Summary    Patient: Nba Moya MRN: [id number]  CSN: [id number]    YOB: 1945  Age: 77 y.o.   Sex: male    DOA: 8/25/2021 LOS:  LOS: 8 days   Discharge Date: 9/2/2021     Admission Diagnoses: Pyelonephritis [N12]  RAQUEL (acute kidney injury) ([facility] [N17.9]  Sepsis ([facility] [A41.9]    Discharge Diagnoses:    Problem List as of 9/2/2021 Date Reviewed: 8/31/2021        Codes Class Noted - Resolved    * (Principal) Pyelonephritis ICD-10-CM: N12  ICD-9-CM: 590.80  8/25/2021 - Present        Sepsis ([facility] ICD-10-CM: A41.9  ICD-9-CM: 038.9, 995.91  8/25/2021 - Present        RAQUEL (acute kidney injury) ([facility] ICD-10-CM: N17.9  ICD-9-CM: 584.9  8/25/2021 - Present        Critical lower limb ischemia ([facility] ICD-10-CM: P25.211  ICD-9-CM: 459.9  8/7/2021 - Present        Goals of care, counseling/discussion ICD-10-CM: Z71.89  ICD-9-CM: V65.49  Unknown - Present        Debility ICD-10-CM: R53.81  ICD-9-CM: 799.3  Unknown - Present        Severe protein-calorie malnutrition ([facility] ICD-10-CM: E43  ICD-9-CM: 262  3/14/2021 - Present        UTI (urinary tract infection) ICD-10-CM: N39.0  ICD-9-CM: 599.0  3/12/2021 - Present        Alteration consciousness ICD-10-CM: R40.4  ICD-9-CM: 780.09  3/12/2021 - Present        Acute lower GI bleeding ICD-10-CM: K92.2  ICD-9-CM: 578.9  9/22/2017 - Present        Lower GI bleed ICD-10-CM: K92.2  ICD-9-CM: 578.9  9/21/2017 - Present        History of CVA (cerebrovascular accident) ICD-10-CM: Z86.73  ICD-9-CM: V12.54  9/21/2017 - Present        Peripheral artery disease ([facility] (Chronic) ICD-10-CM: I73.9  ICD-9-CM: 443.9  Unknown - Present        On chronic clopidogrel therapy (Chronic) ICD-10-CM: Z79.01  ICD-9-CM: V58.61  Unknown - Present        Alcohol use disorder (Chronic) ICD-10-CM: ITU4222  ICD-9-CM: V49.89  Unknown - Present        Tobacco use disorder (Chronic) ICD-10-CM: F17.200  ICD-9-CM: 305.1  Unknown - Present        Benign hypertensive heart disease with systolic congestive heart failure, NYHA class 2 (HCC) (Chronic) ICD-10-CM: I11.0, I50.20  ICD-9-CM: 402.11, 428.20, 428.0  Unknown - Present        Erectile dysfunction (Chronic) ICD-10-CM: N52.9  ICD-9-CM: 607.84  Unknown - Present    Overview Signed 8/18/2017  1:38 PM by General Louise MD     On Sildenafil citrate             Depression (Chronic) ICD-10-CM: F32.9  ICD-9-CM: 311  Unknown - Present    Overview Signed 8/18/2017  1:37 PM by General Louise MD     On Trazodone             Osteoarthritis (Chronic) ICD-10-CM: M19.90  ICD-9-CM: 715.90  Unknown - Present    Overview Signed 8/18/2017  1:37 PM by General Louise MD     On Etodolac and Oxycodone-Acetaminophen             Ischemic cardiomyopathy (Chronic) ICD-10-CM: I25.5  ICD-9-CM: 414.8  8/15/2017 - Present    Overview Signed 8/18/2017  1:31 PM by General Louise MD     EF 40%             Hyperlipidemia with target LDL less than 70 (Chronic) ICD-10-CM: E78.5  ICD-9-CM: 272.4  8/15/2017 - Present    Overview Signed 8/18/2017  1:32 PM by General Louise MD     Lipid profile (8/15/2017) showed TG 99, , HDL 81, LDL 84             Seizure, late effect of stroke ([facility] ICD-10-CM: Q43.559, R56.9  ICD-9-CM: 438.89, 780.39  8/15/2017 - Present    Overview Signed 8/18/2017  1:39 PM by General Louise MD     On Levetiracetam             Occlusion of right internal carotid artery ICD-10-CM: I65.21  ICD-9-CM: 433.10  8/15/2017 - Present        Cerebrovascular accident (CVA) due to occlusion of right carotid artery ([facility] ICD-10-CM: B28.722  ICD-9-CM: 433.11  8/14/2017 - Present    Overview Addendum 8/18/2017  1:40 PM by General Louise MD     Acute Ischemic Stroke (acute infarctions involving right parieto-occipital area and occipitotemporal area and anterior and midportion of right corona radiata) with residual left hemiparesis, dysphagia, dysarthria and cognitive-communication deficits             Cardiac pacemaker in situ (Chronic) ICD-10-CM: Z95.0  ICD-9-CM: V45.01  Unknown - Present        Impaired mobility and ADLs ICD-10-CM: Z74.09, Z78.9  ICD-9-CM: V49.89  8/14/2017 - Present        Hemiparesis affecting left side as late effect of cerebrovascular accident (CVA) ([facility] ICD-10-CM: O18.636  ICD-9-CM: 438.20  8/14/2017 - Present        Dysphagia as late effect of cerebrovascular accident (CVA) ICD-10-CM: I69.391  ICD-9-CM: 438.82  8/14/2017 - Present        Cognitive communication deficit ICD-10-CM: R41.841  ICD-9-CM: 799.52  8/14/2017 - Present        Hypokalemia ICD-10-CM: E87.6  ICD-9-CM: 276.8  8/14/2017 - Present    Overview Signed 8/18/2017  1:33 PM by MD KASSANDRA Meadows (8/14/2017, on admission) = 2.8             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) ICD-10-CM: O75.348  ICD-9-CM: 438.13  8/14/2017 - Present              Reason for admission  77 y.o. male with PMHx of ESBL UTI, severe BLE PAD with chronically occluded right iliac and common femoral arteries, bilateral SFA occlusions, chronic left foot ulcer, stroke with residual left hemiparesis, cognitive/aphasia deficits and dysphagia with PEG tube in place, dementia, seizure disorder, ischemic cardiomyopathy with EF 40% by echo in 2017, HTN, HLD, and BLE contractures who presented to the ED with reported complaints of pain all over. Patient was reportedly able to tell the ED provider that he hurt all over. When I saw him a little later, he was essentially nonverbal and only able to grunt nonspecific sounds. Discussed with his daughter over the phone who reported that at baseline he does not talk much, but that he does talk with slurred speech and you can understand some things that he says. In addition she reports that he is usually able to help when she moved him from the bed to the wheelchair and so forth. He is exclusively n.p.o. and PEG tube fed. His daughter reports that over the past couple days he has become increasingly weak and less responsive.   She also reports that he had fever to 101 this morning and that she's noticed darker urine in his chronic Zhang bag. Hospital Course:   Sepsis due to UTI: Fever 101.6, hypotension, WBC 17.2 with left shift on admission              -  blood cx: NGTD x2              -  urine cx: >2 organisms  Urinary tract infection without hematuria, with early pyelonephritis: UA with large leukocyte esterase, 11-15 WBCs 1+ bacteria. CT of the abdomen and pelvis with mild perinephric stranding which is reported to be unchanged compared to prior imaging studies  Acute metabolic encephalopathy  Acute on chronic anemia:  S/p I unit PRB on   Hyponatremia: Improving  RAQUEL due to sepsis:resolved with IV fluids  Transaminitis  Severe bilateral peripheral artery disease with occluded right iliac, right femoral and bilateral SFA: Status post left AKA  Chronic lower extremity ulcerations  Prior CVA with residual left-sided hemiparesis, dysarthria, dysphagia:  PEG tube in place  Ischemic cardiomyopathy  History of hypertension currently hypotensive  Dyslipidemia  Bilateral lower extremity contractures  Dementia  Post form on file from 2021 specified DNR/DNI. Patient unfortunately  2021, officially pronounced at 7:33 AM.  Family advised. Significant Diagnostic Studies:      Ref. Range 2021 02:58   WBC Latest Ref Range: 4.6 - 13.2 K/uL 12.7   RBC Latest Ref Range: 4.35 - 5.65 M/uL 3.20 (L)   HGB Latest Ref Range: 13.0 - 16.0 g/dL 7.6 (L)   HCT Latest Ref Range: 36.0 - 48.0 % 24.5 (L)   MCV Latest Ref Range: 78.0 - 100.0 FL 76.6 (L)   MCH Latest Ref Range: 24.0 - 34.0 PG 23.8 (L)   MCHC Latest Ref Range: 31.0 - 37.0 g/dL 31.0   RDW Latest Ref Range: 11.6 - 14.5 % 17.2 (H)   PLATELET Latest Ref Range: 135 - 420 K/uL 532 (H)   MPV Latest Ref Range: 9.2 - 11.8 FL 9.9      Ref.  Range 2021 01:36   Sodium Latest Ref Range: 136 - 145 mmol/L 141   Potassium Latest Ref Range: 3.5 - 5.5 mmol/L 4.2   Chloride Latest Ref Range: 100 - 111 mmol/L 110   CO2 Latest Ref Range: 21 - 32 mmol/L 23   Anion gap Latest Ref Range: 3.0 - 18 mmol/L 8   Glucose Latest Ref Range: 74 - 99 mg/dL 141 (H)   BUN Latest Ref Range: 7.0 - 18 MG/DL 23 (H)   Creatinine Latest Ref Range: 0.6 - 1.3 MG/DL 0.69   BUN/Creatinine ratio Latest Ref Range: 12 - 20   33 (H)   Calcium Latest Ref Range: 8.5 - 10.1 MG/DL 8.5   GFR est non-AA Latest Ref Range: >60 ml/min/1.73m2 >60   GFR est AA Latest Ref Range: >60 ml/min/1.73m2 >60

## 2021-09-04 LAB
BACTERIA SPEC CULT: NORMAL
BACTERIA SPEC CULT: NORMAL
SERVICE CMNT-IMP: NORMAL
SERVICE CMNT-IMP: NORMAL

## 2024-06-20 NOTE — ANESTHESIA POSTPROCEDURE EVALUATION
-- DO NOT REPLY / DO NOT REPLY ALL --  -- This inbox is not monitored  -- Message is from Engagement Center Operations (ECO) --    General Patient Message: Patient returns call to clinic.  Nurse is unavailable.  Please try calling again.            Can a detailed message be left? Yes - Voicemail             Procedure(s): PERCUTANEOUS ENDOSCOPIC GASTROSTOMY TUBE INSERTION. MAC Anesthesia Post Evaluation Multimodal analgesia: multimodal analgesia not used between 6 hours prior to anesthesia start to PACU discharge Patient location during evaluation: PACU Patient participation: complete - patient cannot participate Level of consciousness: lethargic Pain score: 0 Pain management: adequate Airway patency: patent Anesthetic complications: no 
Cardiovascular status: acceptable Respiratory status: acceptable Hydration status: acceptable Post anesthesia nausea and vomiting:  none Final Post Anesthesia Temperature Assessment:  Normothermia (36.0-37.5 degrees C) INITIAL Post-op Vital signs:  
Vitals Value Taken Time /80 03/18/21 1557 Temp 36.5 °C (97.7 °F) 03/18/21 1539 Pulse 71 03/18/21 1604 Resp 24 03/18/21 1604 SpO2 100 % 03/18/21 1604 Vitals shown include unvalidated device data.

## (undated) DEVICE — SYRINGE MED 25GA 3ML L5/8IN SUBQ PLAS W/ DETACH NDL SFTY

## (undated) DEVICE — DRAPE,EXTREMITY,89X128,STERILE: Brand: MEDLINE

## (undated) DEVICE — BLANKET WRM AD W50XL85.8IN PACU FULL BODY FORC AIR

## (undated) DEVICE — SYR 10ML LUER LOK 1/5ML GRAD --

## (undated) DEVICE — DRESSING,GAUZE,XEROFORM,CURAD,1"X8",ST: Brand: CURAD

## (undated) DEVICE — INTENDED FOR TISSUE SEPARATION, AND OTHER PROCEDURES THAT REQUIRE A SHARP SURGICAL BLADE TO PUNCTURE OR CUT.: Brand: BARD-PARKER SAFETY BLADES SIZE 10, STERILE

## (undated) DEVICE — BANDAGE COMPR SGL LAYERED CLP CLSR ELAS 15FT LEN 6IN W

## (undated) DEVICE — BANDAGE COBAN 6 IN WND 6INX5YD FOAM

## (undated) DEVICE — MAYO STAND COVER: Brand: CONVERTORS

## (undated) DEVICE — BANDAGE COBAN 4 IN COMPR W4INXL5YD FOAM COHESIVE QUIK STK SELF ADH SFT

## (undated) DEVICE — SUT SLK 0 30IN SH BLK --

## (undated) DEVICE — SPONGE LAP 18X18IN STRL -- 5/PK

## (undated) DEVICE — SUTURE ABSORBABLE BRAIDED 2-0 CT-1 27 IN UD VICRYL J259H

## (undated) DEVICE — SYR 50ML LR LCK 1ML GRAD NSAF --

## (undated) DEVICE — TRAY PREP DRY W/ PREM GLV 2 APPL 6 SPNG 2 UNDPD 1 OVERWRAP

## (undated) DEVICE — KIT GASTMY PERC PEG PULL 20FR -- ENDOVIVE BX/2

## (undated) DEVICE — BITE BLOCK ENDOSCP UNIV AD 6 TO 9.4 MM

## (undated) DEVICE — STOCKINETTE,IMPERVIOUS,12X48,STERILE: Brand: MEDLINE

## (undated) DEVICE — GAUZE,SPONGE,4"X4",16PLY,STRL,LF,10/TRAY: Brand: MEDLINE

## (undated) DEVICE — TAPE ADH W1INXL10YD CLTH SILK H2O RESIST CURAD

## (undated) DEVICE — SOFT SILICONE HYDROCELLULAR SACRUM DRESSING WITH LOCK AWAY LAYER: Brand: ALLEVYN LIFE SACRUM (LARGE) PACK OF 10

## (undated) DEVICE — GLOVE SURG SZ 7 L11.33IN FNGR THK9.8MIL STRW LTX POLYMER

## (undated) DEVICE — REM POLYHESIVE ADULT PATIENT RETURN ELECTRODE: Brand: VALLEYLAB

## (undated) DEVICE — TABLE COVER: Brand: CONVERTORS

## (undated) DEVICE — SUTURE PERMAHAND SZ 0 L30IN NONABSORBABLE BLK SILK BRAID A306H

## (undated) DEVICE — PREP SKN CHLRAPRP APL 26ML STR --

## (undated) DEVICE — 2108 SERIES SAGITTAL BLADE (24.8 X 1.24 X 80.1MM)

## (undated) DEVICE — PAD,ABDOMINAL,8"X10",ST,LF: Brand: MEDLINE

## (undated) DEVICE — BANDAGE,GAUZE,BULKEE II,4.5"X4.1YD,STRL: Brand: MEDLINE

## (undated) DEVICE — BNDG ELAS HK LOOP 4X5YD NS -- MATRIX

## (undated) DEVICE — KIT CLN UP BON SECOURS MARYV

## (undated) DEVICE — Device

## (undated) DEVICE — GLOVE SURG SZ 7.5 L11.73IN FNGR THK9.8MIL STRW LTX POLYMER

## (undated) DEVICE — TAPE ADH CLTH SILK H2O REPELLENT CURAD

## (undated) DEVICE — COVER LT HNDL FLX

## (undated) DEVICE — SOLUTION IV 1000ML 0.9% SOD CHL

## (undated) DEVICE — PACK PROCEDURE SURG MAJ W/ BASIN LF